# Patient Record
Sex: FEMALE | Race: WHITE | Employment: UNEMPLOYED | URBAN - METROPOLITAN AREA
[De-identification: names, ages, dates, MRNs, and addresses within clinical notes are randomized per-mention and may not be internally consistent; named-entity substitution may affect disease eponyms.]

---

## 2017-10-26 PROBLEM — I34.0 NON-RHEUMATIC MITRAL REGURGITATION: Status: ACTIVE | Noted: 2017-10-26

## 2017-10-26 PROBLEM — R00.0 TACHYCARDIA: Status: ACTIVE | Noted: 2017-10-26

## 2017-10-26 PROBLEM — E78.00 PURE HYPERCHOLESTEROLEMIA: Status: ACTIVE | Noted: 2017-10-26

## 2017-10-26 PROBLEM — Z98.890 S/P MVR (MITRAL VALVE REPAIR): Status: ACTIVE | Noted: 2017-10-26

## 2017-10-26 PROBLEM — I25.10 CORONARY ARTERY DISEASE INVOLVING NATIVE CORONARY ARTERY OF NATIVE HEART WITHOUT ANGINA PECTORIS: Status: ACTIVE | Noted: 2017-10-26

## 2017-11-10 PROBLEM — I42.0 DCM (DILATED CARDIOMYOPATHY) (HCC): Status: ACTIVE | Noted: 2017-11-10

## 2017-11-29 ENCOUNTER — HOSPITAL ENCOUNTER (OUTPATIENT)
Dept: LAB | Age: 57
Discharge: HOME | End: 2017-11-29
Payer: COMMERCIAL

## 2017-11-29 DIAGNOSIS — I42.0 DILATED CARDIOMYOPATHY (HCC): ICD-10-CM

## 2017-11-29 PROBLEM — I50.22 CHRONIC SYSTOLIC CONGESTIVE HEART FAILURE (HCC): Status: ACTIVE | Noted: 2017-11-29

## 2017-11-29 LAB
ANION GAP SERPL CALC-SCNC: 7 MMOL/L
BUN SERPL-MCNC: 12 MG/DL (ref 6–23)
CALCIUM SERPL-MCNC: 9.5 MG/DL (ref 8.3–10.4)
CHLORIDE SERPL-SCNC: 103 MMOL/L (ref 98–107)
CO2 SERPL-SCNC: 29 MMOL/L (ref 21–32)
CREAT SERPL-MCNC: 0.8 MG/DL (ref 0.6–1)
GLUCOSE SERPL-MCNC: 116 MG/DL (ref 65–100)
POTASSIUM SERPL-SCNC: 4 MMOL/L (ref 3.5–5.1)
SODIUM SERPL-SCNC: 139 MMOL/L (ref 136–145)

## 2017-11-29 PROCEDURE — 80048 BASIC METABOLIC PNL TOTAL CA: CPT | Performed by: INTERNAL MEDICINE

## 2017-11-29 PROCEDURE — 36415 COLL VENOUS BLD VENIPUNCTURE: CPT | Performed by: INTERNAL MEDICINE

## 2018-01-04 ENCOUNTER — HOSPITAL ENCOUNTER (OUTPATIENT)
Dept: LAB | Age: 58
Discharge: HOME | End: 2018-01-04
Payer: COMMERCIAL

## 2018-01-04 DIAGNOSIS — I50.22 CHRONIC SYSTOLIC CONGESTIVE HEART FAILURE (HCC): ICD-10-CM

## 2018-01-04 LAB
ANION GAP SERPL CALC-SCNC: 8 MMOL/L
BUN SERPL-MCNC: 14 MG/DL (ref 6–23)
CALCIUM SERPL-MCNC: 9.5 MG/DL (ref 8.3–10.4)
CHLORIDE SERPL-SCNC: 102 MMOL/L (ref 98–107)
CO2 SERPL-SCNC: 28 MMOL/L (ref 21–32)
CREAT SERPL-MCNC: 0.8 MG/DL (ref 0.6–1)
GLUCOSE SERPL-MCNC: 97 MG/DL (ref 65–100)
POTASSIUM SERPL-SCNC: 3.7 MMOL/L (ref 3.5–5.1)
SODIUM SERPL-SCNC: 138 MMOL/L (ref 136–145)

## 2018-01-04 PROCEDURE — 36415 COLL VENOUS BLD VENIPUNCTURE: CPT | Performed by: INTERNAL MEDICINE

## 2018-01-04 PROCEDURE — 80048 BASIC METABOLIC PNL TOTAL CA: CPT | Performed by: INTERNAL MEDICINE

## 2018-01-19 ENCOUNTER — HOSPITAL ENCOUNTER (OUTPATIENT)
Dept: CARDIAC REHAB | Age: 58
Discharge: HOME | End: 2018-01-19
Attending: INTERNAL MEDICINE

## 2018-01-19 NOTE — CARDIO/PULMONARY
Dear Dr. Mccormick Books: Thank you for referring your patient, Tito Mendoza (1960), to the Cardiac Rehabilitation Program at [facility]. Mrs. Milan Luevano is a good candidate for the Rehab Program and should see improvements with regular participation. We will be addressing appropriate interventions for modifiable risk factors with your patient during the next 12 weeks. We will contact you with any issues or concerns that may arise, or you can follow your patients progress through [address] [place] [address] at any time. A final summary will be sent to you when the program is completed. Again, thank you for your referral. If we can be of further assistance, please feel free to contact the Cardiopulmonary Rehab staff at [phone].

## 2018-01-23 ENCOUNTER — HOSPITAL ENCOUNTER (OUTPATIENT)
Dept: CARDIAC REHAB | Age: 58
Discharge: HOME | End: 2018-01-23
Payer: COMMERCIAL

## 2018-01-23 VITALS — HEIGHT: 62 IN | BODY MASS INDEX: 36.18 KG/M2 | WEIGHT: 196.6 LBS

## 2018-01-23 PROCEDURE — 93798 PHYS/QHP OP CAR RHAB W/ECG: CPT

## 2018-01-24 ENCOUNTER — HOSPITAL ENCOUNTER (OUTPATIENT)
Dept: CARDIAC REHAB | Age: 58
Discharge: HOME | End: 2018-01-24
Payer: COMMERCIAL

## 2018-01-24 PROCEDURE — 93798 PHYS/QHP OP CAR RHAB W/ECG: CPT

## 2018-01-26 ENCOUNTER — HOSPITAL ENCOUNTER (OUTPATIENT)
Dept: CARDIAC REHAB | Age: 58
Discharge: HOME | End: 2018-01-26
Payer: COMMERCIAL

## 2018-01-26 VITALS — WEIGHT: 197.4 LBS | BODY MASS INDEX: 36.1 KG/M2

## 2018-01-26 PROCEDURE — 93798 PHYS/QHP OP CAR RHAB W/ECG: CPT

## 2018-01-29 ENCOUNTER — HOSPITAL ENCOUNTER (OUTPATIENT)
Dept: CARDIAC REHAB | Age: 58
Discharge: HOME | End: 2018-01-29
Payer: COMMERCIAL

## 2018-01-29 PROCEDURE — 93798 PHYS/QHP OP CAR RHAB W/ECG: CPT

## 2018-01-31 ENCOUNTER — HOSPITAL ENCOUNTER (OUTPATIENT)
Dept: CARDIAC REHAB | Age: 58
Discharge: HOME | End: 2018-01-31
Payer: COMMERCIAL

## 2018-01-31 VITALS — BODY MASS INDEX: 36.03 KG/M2 | WEIGHT: 197 LBS

## 2018-01-31 PROCEDURE — 93798 PHYS/QHP OP CAR RHAB W/ECG: CPT

## 2018-02-02 ENCOUNTER — APPOINTMENT (OUTPATIENT)
Dept: CARDIAC REHAB | Age: 58
End: 2018-02-02
Payer: COMMERCIAL

## 2018-02-05 ENCOUNTER — HOSPITAL ENCOUNTER (OUTPATIENT)
Dept: CARDIAC REHAB | Age: 58
Discharge: HOME | End: 2018-02-05
Payer: COMMERCIAL

## 2018-02-05 PROCEDURE — 93798 PHYS/QHP OP CAR RHAB W/ECG: CPT

## 2018-02-06 ENCOUNTER — APPOINTMENT (OUTPATIENT)
Dept: CARDIAC CATH/INVASIVE PROCEDURES | Age: 58
End: 2018-02-06
Payer: COMMERCIAL

## 2018-02-09 ENCOUNTER — HOSPITAL ENCOUNTER (OUTPATIENT)
Dept: CARDIAC REHAB | Age: 58
Discharge: HOME | End: 2018-02-09
Payer: COMMERCIAL

## 2018-02-09 PROCEDURE — 93798 PHYS/QHP OP CAR RHAB W/ECG: CPT

## 2018-02-09 NOTE — PROGRESS NOTES
Client History:  Current Medical Diagnosis: see ITP  Pertinent Medications: see review PTA meds      Have you gained or lost any weight in the past 3 months: lost 20-30 lbs      What do you attribute it to: surgery and foods did not taste the same  What is your weight goal: see cardiac ITP    Have you made any changes in your eating habits since your heart problems began: yes, pt is lowering added salt and not eating mann anymore    Describe your appetite: good    Supplements/Vitamins/Herbs: MVI, krill oil, garlic, iron, flaxseed oil    Food allergies: no    Problems with digestion, N/V/C/D: no    Alcohol use: see cardiac ITP      Dietary recall:  Breakfast is egg, half an [place] muffin, 1 cup of coffee with 1/2 tsp sugar and small amt of half and half  Lunch is tuna and villagomez or salad with tuna and olive oil and vinegar or roast beef sandwich on white bread or rye  Snack is tuna on crackers or popcorn cooked in olive oil  Dinner is grilled pork, salad and vegetables. 48 oz water/day            Anthropometric Data: see cardiac ITP  Ht:   Wt:   Age:  BMI:  Waist measurement:     Estimated Nutritional Needs:  Calories: 15 kcal/kg PCZ=0766 kcals  Protein:1.5gm/kg IBW=75gm    CHO:   Fluids: 1ml/kcal unless restricted by MD      Nutrition Diagnosis: Good knowledge base related to therapeutic diet as evidenced by diet low in sodium and high in fiber. Nutrition Intervention:  Reviewed lab/body comp results/goals. Reviewed rate your plate and heart healthy choices. Reviewed fiber and sodium guidelines. Reviewed label reading. Reviewed plate method for portion control and wt loss. Reviewed wt loss tips. Affirmed that pt is limiting added salt. Reviewed dietary ways to lower LDL cholesterol and increase HDL cholesterol.     Handouts: lab/body comp, heart healthy, grocery guide, fiber, herbs and spices, recipes, recipe modification tips, plate method    Goals: See cardiac ITP         Monitoring/Evaluation: Follow-up appointment: RD to follow up with participant during cardiac rehab sessions for questions and assessment of progression toward goals.     Jaime Whipple, OTIS, LD, CDE  Phone: [phone]

## 2018-02-12 ENCOUNTER — HOSPITAL ENCOUNTER (OUTPATIENT)
Dept: CARDIAC REHAB | Age: 58
Discharge: HOME | End: 2018-02-12
Payer: COMMERCIAL

## 2018-02-12 PROCEDURE — 93798 PHYS/QHP OP CAR RHAB W/ECG: CPT

## 2018-02-14 ENCOUNTER — HOSPITAL ENCOUNTER (OUTPATIENT)
Dept: LAB | Age: 58
Discharge: HOME | End: 2018-02-14
Payer: COMMERCIAL

## 2018-02-14 ENCOUNTER — HOSPITAL ENCOUNTER (OUTPATIENT)
Dept: CARDIAC REHAB | Age: 58
Discharge: HOME | End: 2018-02-14
Payer: COMMERCIAL

## 2018-02-14 VITALS — BODY MASS INDEX: 36.25 KG/M2 | WEIGHT: 198.2 LBS

## 2018-02-14 DIAGNOSIS — I50.22 CHRONIC SYSTOLIC CONGESTIVE HEART FAILURE (HCC): ICD-10-CM

## 2018-02-14 LAB
ALBUMIN SERPL-MCNC: 4 G/DL (ref 3.5–5)
ALBUMIN/GLOB SERPL: 1.1 {RATIO}
ALP SERPL-CCNC: 80 U/L (ref 50–136)
ALT SERPL-CCNC: 37 U/L (ref 12–65)
ANION GAP SERPL CALC-SCNC: 5 MMOL/L
AST SERPL-CCNC: 24 U/L (ref 15–37)
BASOPHILS # BLD: 0.1 K/UL (ref 0–0.2)
BASOPHILS NFR BLD: 1 % (ref 0–2)
BILIRUB SERPL-MCNC: 0.8 MG/DL (ref 0.2–1.1)
BUN SERPL-MCNC: 18 MG/DL (ref 6–23)
CALCIUM SERPL-MCNC: 9.3 MG/DL (ref 8.3–10.4)
CHLORIDE SERPL-SCNC: 101 MMOL/L (ref 98–107)
CO2 SERPL-SCNC: 31 MMOL/L (ref 21–32)
CREAT SERPL-MCNC: 0.8 MG/DL (ref 0.6–1)
DIFFERENTIAL METHOD BLD: ABNORMAL
EOSINOPHIL # BLD: 0.1 K/UL (ref 0–0.8)
EOSINOPHIL NFR BLD: 2 % (ref 0.5–7.8)
ERYTHROCYTE [DISTWIDTH] IN BLOOD BY AUTOMATED COUNT: 14.8 % (ref 11.9–14.6)
GLOBULIN SER CALC-MCNC: 3.8 G/DL
GLUCOSE SERPL-MCNC: 96 MG/DL (ref 65–100)
HCT VFR BLD AUTO: 41.5 % (ref 35.8–46.3)
HGB BLD-MCNC: 14 G/DL (ref 11.7–15.4)
LYMPHOCYTES # BLD: 1.4 K/UL (ref 0.5–4.6)
LYMPHOCYTES NFR BLD: 22 % (ref 13–44)
MAGNESIUM SERPL-MCNC: 2.1 MG/DL (ref 1.8–2.4)
MCH RBC QN AUTO: 31.3 PG (ref 26.1–32.9)
MCHC RBC AUTO-ENTMCNC: 33.7 G/DL (ref 31.4–35)
MCV RBC AUTO: 92.6 FL (ref 79.6–97.8)
MONOCYTES # BLD: 0.5 K/UL (ref 0.1–1.3)
MONOCYTES NFR BLD: 8 % (ref 4–12)
NEUTS SEG # BLD: 4.1 K/UL (ref 1.7–8.2)
NEUTS SEG NFR BLD: 67 % (ref 43–78)
PLATELET # BLD AUTO: 274 K/UL (ref 150–450)
PMV BLD AUTO: 10.5 FL (ref 10.8–14.1)
POTASSIUM SERPL-SCNC: 4.2 MMOL/L (ref 3.5–5.1)
PROT SERPL-MCNC: 7.8 G/DL (ref 6.3–8.2)
RBC # BLD AUTO: 4.48 M/UL (ref 4.05–5.25)
SODIUM SERPL-SCNC: 137 MMOL/L (ref 136–145)
WBC # BLD AUTO: 6.2 K/UL (ref 4.3–11.1)

## 2018-02-14 PROCEDURE — 36415 COLL VENOUS BLD VENIPUNCTURE: CPT | Performed by: INTERNAL MEDICINE

## 2018-02-14 PROCEDURE — 93798 PHYS/QHP OP CAR RHAB W/ECG: CPT

## 2018-02-14 PROCEDURE — 85025 COMPLETE CBC W/AUTO DIFF WBC: CPT | Performed by: INTERNAL MEDICINE

## 2018-02-14 PROCEDURE — 80053 COMPREHEN METABOLIC PANEL: CPT | Performed by: INTERNAL MEDICINE

## 2018-02-14 PROCEDURE — 83735 ASSAY OF MAGNESIUM: CPT | Performed by: INTERNAL MEDICINE

## 2018-02-15 ENCOUNTER — ANESTHESIA EVENT (OUTPATIENT)
Dept: SURGERY | Age: 58
End: 2018-02-15
Payer: COMMERCIAL

## 2018-02-16 ENCOUNTER — APPOINTMENT (OUTPATIENT)
Dept: CARDIAC REHAB | Age: 58
End: 2018-02-16
Payer: COMMERCIAL

## 2018-02-16 ENCOUNTER — APPOINTMENT (OUTPATIENT)
Dept: CARDIAC CATH/INVASIVE PROCEDURES | Age: 58
End: 2018-02-16
Attending: INTERNAL MEDICINE
Payer: COMMERCIAL

## 2018-02-16 ENCOUNTER — HOSPITAL ENCOUNTER (OUTPATIENT)
Age: 58
Setting detail: OBSERVATION
Discharge: HOME | End: 2018-02-17
Attending: INTERNAL MEDICINE | Admitting: INTERNAL MEDICINE
Payer: COMMERCIAL

## 2018-02-16 ENCOUNTER — APPOINTMENT (OUTPATIENT)
Dept: GENERAL RADIOLOGY | Age: 58
End: 2018-02-16
Attending: INTERNAL MEDICINE
Payer: COMMERCIAL

## 2018-02-16 ENCOUNTER — ANESTHESIA (OUTPATIENT)
Dept: SURGERY | Age: 58
End: 2018-02-16
Payer: COMMERCIAL

## 2018-02-16 DIAGNOSIS — I50.22 CHRONIC SYSTOLIC CONGESTIVE HEART FAILURE (HCC): Primary | ICD-10-CM

## 2018-02-16 LAB
ALBUMIN SERPL-MCNC: 4.1 G/DL (ref 3.5–5)
ALBUMIN/GLOB SERPL: 1.2 {RATIO} (ref 1.2–3.5)
ALP SERPL-CCNC: 77 U/L (ref 50–136)
ALT SERPL-CCNC: 34 U/L (ref 12–65)
ANION GAP SERPL CALC-SCNC: 7 MMOL/L (ref 7–16)
AST SERPL-CCNC: 28 U/L (ref 15–37)
ATRIAL RATE: 72 BPM
ATRIAL RATE: 86 BPM
BILIRUB SERPL-MCNC: 0.7 MG/DL (ref 0.2–1.1)
BUN SERPL-MCNC: 18 MG/DL (ref 6–23)
CALCIUM SERPL-MCNC: 9.1 MG/DL (ref 8.3–10.4)
CALCULATED P AXIS, ECG09: 72 DEGREES
CALCULATED P AXIS, ECG09: 74 DEGREES
CALCULATED R AXIS, ECG10: 105 DEGREES
CALCULATED R AXIS, ECG10: 87 DEGREES
CALCULATED T AXIS, ECG11: 35 DEGREES
CALCULATED T AXIS, ECG11: 65 DEGREES
CHLORIDE SERPL-SCNC: 106 MMOL/L (ref 98–107)
CO2 SERPL-SCNC: 27 MMOL/L (ref 21–32)
CREAT SERPL-MCNC: 0.83 MG/DL (ref 0.6–1)
DIAGNOSIS, 93000: NORMAL
DIAGNOSIS, 93000: NORMAL
ERYTHROCYTE [DISTWIDTH] IN BLOOD BY AUTOMATED COUNT: 15 % (ref 11.9–14.6)
GLOBULIN SER CALC-MCNC: 3.4 G/DL (ref 2.3–3.5)
GLUCOSE SERPL-MCNC: 114 MG/DL (ref 65–100)
HCT VFR BLD AUTO: 40.7 % (ref 35.8–46.3)
HGB BLD-MCNC: 13.7 G/DL (ref 11.7–15.4)
INR PPP: 1
MAGNESIUM SERPL-MCNC: 2.2 MG/DL (ref 1.8–2.4)
MCH RBC QN AUTO: 31 PG (ref 26.1–32.9)
MCHC RBC AUTO-ENTMCNC: 33.7 G/DL (ref 31.4–35)
MCV RBC AUTO: 92.1 FL (ref 79.6–97.8)
P-R INTERVAL, ECG05: 206 MS
P-R INTERVAL, ECG05: 238 MS
PLATELET # BLD AUTO: 247 K/UL (ref 150–450)
PMV BLD AUTO: 10.6 FL (ref 10.8–14.1)
POTASSIUM SERPL-SCNC: 4 MMOL/L (ref 3.5–5.1)
PROT SERPL-MCNC: 7.5 G/DL (ref 6.3–8.2)
PROTHROMBIN TIME: 12.4 SEC (ref 11.5–14.5)
Q-T INTERVAL, ECG07: 394 MS
Q-T INTERVAL, ECG07: 434 MS
QRS DURATION, ECG06: 102 MS
QRS DURATION, ECG06: 88 MS
QTC CALCULATION (BEZET), ECG08: 471 MS
QTC CALCULATION (BEZET), ECG08: 475 MS
RBC # BLD AUTO: 4.42 M/UL (ref 4.05–5.25)
SODIUM SERPL-SCNC: 140 MMOL/L (ref 136–145)
VENTRICULAR RATE, ECG03: 72 BPM
VENTRICULAR RATE, ECG03: 86 BPM
WBC # BLD AUTO: 5 K/UL (ref 4.3–11.1)

## 2018-02-16 PROCEDURE — 74011250637 HC RX REV CODE- 250/637: Performed by: INTERNAL MEDICINE

## 2018-02-16 PROCEDURE — 76060000033 HC ANESTHESIA 1 TO 1.5 HR: Performed by: INTERNAL MEDICINE

## 2018-02-16 PROCEDURE — 74011000250 HC RX REV CODE- 250: Performed by: INTERNAL MEDICINE

## 2018-02-16 PROCEDURE — 74011250636 HC RX REV CODE- 250/636: Performed by: INTERNAL MEDICINE

## 2018-02-16 PROCEDURE — C1892 INTRO/SHEATH,FIXED,PEEL-AWAY: HCPCS

## 2018-02-16 PROCEDURE — 74011000272 HC RX REV CODE- 272: Performed by: INTERNAL MEDICINE

## 2018-02-16 PROCEDURE — C1722 AICD, SINGLE CHAMBER: HCPCS

## 2018-02-16 PROCEDURE — 74011250636 HC RX REV CODE- 250/636: Performed by: ANESTHESIOLOGY

## 2018-02-16 PROCEDURE — 93641 EP EVL 1/2CHMB PAC CVDFB TST: CPT

## 2018-02-16 PROCEDURE — 99218 HC RM OBSERVATION: CPT

## 2018-02-16 PROCEDURE — A4565 SLINGS: HCPCS

## 2018-02-16 PROCEDURE — 33249 INSJ/RPLCMT DEFIB W/LEAD(S): CPT

## 2018-02-16 PROCEDURE — 77030008467 HC STPLR SKN COVD -B

## 2018-02-16 PROCEDURE — 93005 ELECTROCARDIOGRAM TRACING: CPT | Performed by: INTERNAL MEDICINE

## 2018-02-16 PROCEDURE — 85027 COMPLETE CBC AUTOMATED: CPT | Performed by: INTERNAL MEDICINE

## 2018-02-16 PROCEDURE — 71046 X-RAY EXAM CHEST 2 VIEWS: CPT

## 2018-02-16 PROCEDURE — 80053 COMPREHEN METABOLIC PANEL: CPT | Performed by: INTERNAL MEDICINE

## 2018-02-16 PROCEDURE — 74011250637 HC RX REV CODE- 250/637: Performed by: ANESTHESIOLOGY

## 2018-02-16 PROCEDURE — 83735 ASSAY OF MAGNESIUM: CPT | Performed by: INTERNAL MEDICINE

## 2018-02-16 PROCEDURE — 77030012935 HC DRSG AQUACEL BMS -B

## 2018-02-16 PROCEDURE — C1777 LEAD, AICD, ENDO SINGLE COIL: HCPCS

## 2018-02-16 PROCEDURE — 85610 PROTHROMBIN TIME: CPT | Performed by: INTERNAL MEDICINE

## 2018-02-16 PROCEDURE — 74011250636 HC RX REV CODE- 250/636

## 2018-02-16 RX ORDER — SPIRONOLACTONE 25 MG/1
12.5 TABLET ORAL DAILY
Status: DISCONTINUED | OUTPATIENT
Start: 2018-02-17 | End: 2018-02-17 | Stop reason: HOSPADM

## 2018-02-16 RX ORDER — SODIUM CHLORIDE 9 MG/ML
25 INJECTION, SOLUTION INTRAVENOUS CONTINUOUS
Status: DISCONTINUED | OUTPATIENT
Start: 2018-02-16 | End: 2018-02-16

## 2018-02-16 RX ORDER — SODIUM CHLORIDE 0.9 % (FLUSH) 0.9 %
5-10 SYRINGE (ML) INJECTION AS NEEDED
Status: DISCONTINUED | OUTPATIENT
Start: 2018-02-16 | End: 2018-02-17 | Stop reason: HOSPADM

## 2018-02-16 RX ORDER — FENTANYL CITRATE 50 UG/ML
INJECTION, SOLUTION INTRAMUSCULAR; INTRAVENOUS AS NEEDED
Status: DISCONTINUED | OUTPATIENT
Start: 2018-02-16 | End: 2018-02-16 | Stop reason: HOSPADM

## 2018-02-16 RX ORDER — SODIUM CHLORIDE, SODIUM LACTATE, POTASSIUM CHLORIDE, CALCIUM CHLORIDE 600; 310; 30; 20 MG/100ML; MG/100ML; MG/100ML; MG/100ML
100 INJECTION, SOLUTION INTRAVENOUS CONTINUOUS
Status: DISCONTINUED | OUTPATIENT
Start: 2018-02-16 | End: 2018-02-16

## 2018-02-16 RX ORDER — MIDAZOLAM HYDROCHLORIDE 1 MG/ML
2 INJECTION, SOLUTION INTRAMUSCULAR; INTRAVENOUS
Status: DISCONTINUED | OUTPATIENT
Start: 2018-02-16 | End: 2018-02-16

## 2018-02-16 RX ORDER — CEPHALEXIN 500 MG/1
500 CAPSULE ORAL 3 TIMES DAILY
Status: DISCONTINUED | OUTPATIENT
Start: 2018-02-17 | End: 2018-02-17 | Stop reason: HOSPADM

## 2018-02-16 RX ORDER — BUPIVACAINE HYDROCHLORIDE AND EPINEPHRINE 5; 5 MG/ML; UG/ML
60 INJECTION, SOLUTION EPIDURAL; INTRACAUDAL; PERINEURAL ONCE
Status: COMPLETED | OUTPATIENT
Start: 2018-02-16 | End: 2018-02-16

## 2018-02-16 RX ORDER — OXYCODONE AND ACETAMINOPHEN 5; 325 MG/1; MG/1
1 TABLET ORAL
Status: DISCONTINUED | OUTPATIENT
Start: 2018-02-16 | End: 2018-02-17 | Stop reason: HOSPADM

## 2018-02-16 RX ORDER — SODIUM CHLORIDE 0.9 % (FLUSH) 0.9 %
5-10 SYRINGE (ML) INJECTION AS NEEDED
Status: DISCONTINUED | OUTPATIENT
Start: 2018-02-16 | End: 2018-02-16

## 2018-02-16 RX ORDER — HYDROCODONE BITARTRATE AND ACETAMINOPHEN 7.5; 325 MG/1; MG/1
1 TABLET ORAL AS NEEDED
Status: DISCONTINUED | OUTPATIENT
Start: 2018-02-16 | End: 2018-02-16 | Stop reason: ALTCHOICE

## 2018-02-16 RX ORDER — CEFAZOLIN SODIUM/WATER 2 G/20 ML
2 SYRINGE (ML) INTRAVENOUS EVERY 8 HOURS
Status: DISCONTINUED | OUTPATIENT
Start: 2018-02-16 | End: 2018-02-16 | Stop reason: SDUPTHER

## 2018-02-16 RX ORDER — PROPOFOL 10 MG/ML
INJECTION, EMULSION INTRAVENOUS
Status: DISCONTINUED | OUTPATIENT
Start: 2018-02-16 | End: 2018-02-16 | Stop reason: HOSPADM

## 2018-02-16 RX ORDER — MIDAZOLAM HYDROCHLORIDE 1 MG/ML
INJECTION, SOLUTION INTRAMUSCULAR; INTRAVENOUS AS NEEDED
Status: DISCONTINUED | OUTPATIENT
Start: 2018-02-16 | End: 2018-02-16 | Stop reason: HOSPADM

## 2018-02-16 RX ORDER — ZOLPIDEM TARTRATE 5 MG/1
5 TABLET ORAL
Status: DISCONTINUED | OUTPATIENT
Start: 2018-02-16 | End: 2018-02-17 | Stop reason: HOSPADM

## 2018-02-16 RX ORDER — SODIUM CHLORIDE 0.9 % (FLUSH) 0.9 %
5-10 SYRINGE (ML) INJECTION EVERY 8 HOURS
Status: DISCONTINUED | OUTPATIENT
Start: 2018-02-16 | End: 2018-02-17 | Stop reason: HOSPADM

## 2018-02-16 RX ORDER — LIDOCAINE HYDROCHLORIDE 10 MG/ML
0.1 INJECTION INFILTRATION; PERINEURAL AS NEEDED
Status: DISCONTINUED | OUTPATIENT
Start: 2018-02-16 | End: 2018-02-16

## 2018-02-16 RX ORDER — NALOXONE HYDROCHLORIDE 0.4 MG/ML
0.1 INJECTION, SOLUTION INTRAMUSCULAR; INTRAVENOUS; SUBCUTANEOUS AS NEEDED
Status: DISCONTINUED | OUTPATIENT
Start: 2018-02-16 | End: 2018-02-17 | Stop reason: HOSPADM

## 2018-02-16 RX ORDER — OXYCODONE HYDROCHLORIDE 5 MG/1
5 TABLET ORAL
Status: DISCONTINUED | OUTPATIENT
Start: 2018-02-16 | End: 2018-02-16 | Stop reason: ALTCHOICE

## 2018-02-16 RX ORDER — CEFAZOLIN SODIUM/WATER 2 G/20 ML
2 SYRINGE (ML) INTRAVENOUS
Status: COMPLETED | OUTPATIENT
Start: 2018-02-16 | End: 2018-02-16

## 2018-02-16 RX ORDER — CEFAZOLIN SODIUM/WATER 2 G/20 ML
2 SYRINGE (ML) INTRAVENOUS EVERY 8 HOURS
Status: COMPLETED | OUTPATIENT
Start: 2018-02-16 | End: 2018-02-17

## 2018-02-16 RX ORDER — SODIUM CHLORIDE 0.9 % (FLUSH) 0.9 %
5-10 SYRINGE (ML) INJECTION EVERY 8 HOURS
Status: DISCONTINUED | OUTPATIENT
Start: 2018-02-16 | End: 2018-02-16

## 2018-02-16 RX ORDER — FENTANYL CITRATE 50 UG/ML
100 INJECTION, SOLUTION INTRAMUSCULAR; INTRAVENOUS ONCE
Status: DISCONTINUED | OUTPATIENT
Start: 2018-02-16 | End: 2018-02-16

## 2018-02-16 RX ORDER — HYDROMORPHONE HYDROCHLORIDE 2 MG/ML
0.5 INJECTION, SOLUTION INTRAMUSCULAR; INTRAVENOUS; SUBCUTANEOUS
Status: DISCONTINUED | OUTPATIENT
Start: 2018-02-16 | End: 2018-02-16 | Stop reason: ALTCHOICE

## 2018-02-16 RX ORDER — ASPIRIN 81 MG/1
81 TABLET ORAL DAILY
Status: DISCONTINUED | OUTPATIENT
Start: 2018-02-17 | End: 2018-02-17 | Stop reason: HOSPADM

## 2018-02-16 RX ORDER — METOPROLOL SUCCINATE 25 MG/1
12.5 TABLET, EXTENDED RELEASE ORAL DAILY
Status: DISCONTINUED | OUTPATIENT
Start: 2018-02-17 | End: 2018-02-17 | Stop reason: HOSPADM

## 2018-02-16 RX ORDER — FAMOTIDINE 20 MG/1
20 TABLET, FILM COATED ORAL ONCE
Status: DISCONTINUED | OUTPATIENT
Start: 2018-02-16 | End: 2018-02-16

## 2018-02-16 RX ADMIN — Medication 2 G: at 21:12

## 2018-02-16 RX ADMIN — BUPIVACAINE HYDROCHLORIDE AND EPINEPHRINE 300 MG: 5; 5 INJECTION, SOLUTION EPIDURAL; INTRACAUDAL; PERINEURAL at 13:08

## 2018-02-16 RX ADMIN — WATER: 100 IRRIGANT IRRIGATION at 13:07

## 2018-02-16 RX ADMIN — MIDAZOLAM HYDROCHLORIDE 2 MG: 1 INJECTION, SOLUTION INTRAMUSCULAR; INTRAVENOUS at 12:11

## 2018-02-16 RX ADMIN — Medication 10 ML: at 22:56

## 2018-02-16 RX ADMIN — OXYCODONE HYDROCHLORIDE AND ACETAMINOPHEN 1 TABLET: 5; 325 TABLET ORAL at 22:35

## 2018-02-16 RX ADMIN — OXYCODONE HYDROCHLORIDE AND ACETAMINOPHEN 1 TABLET: 5; 325 TABLET ORAL at 18:14

## 2018-02-16 RX ADMIN — PROPOFOL 50 MCG/KG/MIN: 10 INJECTION, EMULSION INTRAVENOUS at 12:18

## 2018-02-16 RX ADMIN — FENTANYL CITRATE 25 MCG: 50 INJECTION, SOLUTION INTRAMUSCULAR; INTRAVENOUS at 12:52

## 2018-02-16 RX ADMIN — FENTANYL CITRATE 25 MCG: 50 INJECTION, SOLUTION INTRAMUSCULAR; INTRAVENOUS at 12:42

## 2018-02-16 RX ADMIN — HYDROCODONE BITARTRATE AND ACETAMINOPHEN 1 TABLET: 7.5; 325 TABLET ORAL at 13:50

## 2018-02-16 RX ADMIN — FENTANYL CITRATE 25 MCG: 50 INJECTION, SOLUTION INTRAMUSCULAR; INTRAVENOUS at 12:15

## 2018-02-16 RX ADMIN — SODIUM CHLORIDE, SODIUM LACTATE, POTASSIUM CHLORIDE, AND CALCIUM CHLORIDE: 600; 310; 30; 20 INJECTION, SOLUTION INTRAVENOUS at 12:11

## 2018-02-16 RX ADMIN — MIDAZOLAM HYDROCHLORIDE 2 MG: 1 INJECTION, SOLUTION INTRAMUSCULAR; INTRAVENOUS at 12:27

## 2018-02-16 RX ADMIN — FENTANYL CITRATE 25 MCG: 50 INJECTION, SOLUTION INTRAMUSCULAR; INTRAVENOUS at 12:21

## 2018-02-16 RX ADMIN — Medication 2 G: at 12:22

## 2018-02-16 NOTE — PROGRESS NOTES
Pt arrived, ambulated to room with no visible problems, planned ICD for Dr Damien Lucas. Consent signed, Procedure discussed with pt all questions answered voiced understanding. Medications and history discussed with pt.     Pt prepped per orders

## 2018-02-16 NOTE — PROGRESS NOTES
TRANSFER - IN REPORT:    Verbal report received from Jones Garcia [id number] RN(name) on Maryanne Sullivan  being received from EP lab(unit) for routine progression of care      Report consisted of patients Situation, Background, Assessment and   Recommendations(SBAR). Information from the following report(s) Procedure Summary was reviewed with the receiving nurse. Opportunity for questions and clarification was provided. Assessment completed upon patients arrival to unit and care assumed.

## 2018-02-16 NOTE — IP AVS SNAPSHOT
[address] 
[phone] Patient: Tito Mendoza MRN: [id number] PGL:[id number] A check jayson indicates which time of day the medication should be taken. My Medications START taking these medications Instructions Each Dose to Equal  
 Morning Noon Evening Bedtime  
 cephALEXin 500 mg capsule Commonly known as:  Delgado Higuera Your last dose was: Today 2/17/2018 Take 1 Cap by mouth three (3) times daily for 3 days. 500 mg HYDROcodone-acetaminophen 5-325 mg per tablet Commonly known as:  Mortimer Newness Take 1 Tab by mouth every six (6) hours as needed for Pain. Max Daily Amount: 4 Tabs. 1 Tab CONTINUE taking these medications Instructions Each Dose to Equal  
 Morning Noon Evening Bedtime AMBIEN 5 mg tablet Generic drug:  zolpidem Take  by mouth nightly as needed for Sleep. aspirin delayed-release 81 mg tablet Your last dose was: Today 2/17/2018 Take  by mouth daily. flaxseed oil 1,000 mg Cap Your last dose was:  2/15/2018 Take  by mouth daily. garlic 1,394 mg Cap Your last dose was:  2/15/2018 Take  by mouth daily. Iron 325 mg (65 mg iron) tablet Generic drug:  ferrous sulfate Your last dose was:  2/15/2018 Take  by mouth Daily (before breakfast). ivabradine 5 mg tablet Commonly known as:  [employer] Your last dose was: Today 2/17/2018 Take 1 Tab by mouth two (2) times daily (with meals). Indications: Chronic Heart Failure 5 mg KRILL OIL PO Your last dose was:  2/15/2018 Take  by mouth daily. metoprolol succinate 25 mg XL tablet Commonly known as:  TOPROL-XL  
 Your last dose was: Today 2/17/2018 Take 0.5 Tabs by mouth daily. 12.5 mg  
    
  
   
   
   
  
 multivitamin tablet Commonly known as:  ONE A DAY Your last dose was:  2/15/2018 Take 1 Tab by mouth daily. 1 Tab  
    
  
   
   
   
  
 sacubitril-valsartan 24-26 mg tablet Commonly known as:  ENTRESTO Your last dose was: Today 2/17/2018 Take 1 Tab by mouth two (2) times a day. 1 Tab  
    
  
   
   
   
  
  
 spironolactone 25 mg tablet Commonly known as:  ALDACTONE Your last dose was: Today 2/18/2018 Take 0.5 Tabs by mouth daily. 12.5 mg Where to Get Your Medications These medications were sent to [facility] [address] [place] [id number] Phone:  [phone]  
  cephALEXin 500 mg capsule Information on where to get these meds will be given to you by the nurse or doctor. ! Ask your nurse or doctor about these medications HYDROcodone-acetaminophen 5-325 mg per tablet

## 2018-02-16 NOTE — ANESTHESIA POSTPROCEDURE EVALUATION
Post-Anesthesia Evaluation and Assessment    Patient: Ge Mcclain MRN: [id number]  SSN: [id number]    YOB: 1960  Age: 56 y.o. Sex: female       Cardiovascular Function/Vital Signs  Visit Vitals    /70    Pulse 76    Temp 36.4 °C (97.5 °F)    Resp 15    Ht 5' 2.5\" (1.588 m)    Wt 89.4 kg (197 lb)    SpO2 96%    Breastfeeding No    BMI 35.46 kg/m2       Patient is status post total IV anesthesia anesthesia for Procedure(s):  IMPLANTATION OF CARDIOVERTER DEFIBRILLATOR. Nausea/Vomiting: None    Postoperative hydration reviewed and adequate. Pain:  Pain Scale 1: Numeric (0 - 10) (02/16/18 1349)  Pain Intensity 1: 5 (02/16/18 1349)   Managed    Neurological Status: At baseline    Mental Status and Level of Consciousness: Alert and oriented     Pulmonary Status:   O2 Device: Room air (02/16/18 1322)   Adequate oxygenation and airway patent    Complications related to anesthesia: None    Post-anesthesia assessment completed.  No concerns    Signed By: Jose Rivera MD     February 16, 2018

## 2018-02-16 NOTE — PROGRESS NOTES
TRANSFER - IN REPORT:    Verbal report received from Serena Olmstead, [facility] (name) on Luanne Bowie  being received from Cath Lab (unit) for routine progression of care      Report consisted of patients Situation, Background, Assessment and   Recommendations(SBAR). Information from the following report(s) SBAR, Kardex, Procedure Summary and MAR was reviewed with the receiving nurse. Opportunity for questions and clarification was provided. Assessment completed upon patients arrival to unit and care assumed.

## 2018-02-16 NOTE — PROGRESS NOTES
Dual admission skin assessment completed. Left subclavian incision. Dressing clean, dry, and intact. No other abnormalities noted.

## 2018-02-16 NOTE — IP AVS SNAPSHOT
Brooke Ibarra 
 
 
 [address] [facility] 322 [facility] 
[phone] Patient: Talia Alcaraz MRN: [id number] About your hospitalization You were admitted on:  February 16, 2018 You last received care in the:  [facility] 3 TELEMETRY You were discharged on:  February 17, 2018 Why you were hospitalized Your primary diagnosis was:  Not on File Your diagnoses also included:  Chronic Systolic Hf (Heart Failure) (Hcc) Follow-up Information Follow up With Details Comments Contact Info Blu Resendez MD In 1 week [facility] follow up, Please call office for appointment on Monday  [address] [place] [address] 
[phone]  Dianna Gallego 
[phone] Veronica Garza MD In 2 weeks [facility] follow up, Please call office for appointment on Monday for wound re-check [address] [facility] [address] 
[phone] Your Scheduled Appointments Monday February 19, 2018 11:00 AM EST CARDIAC REHAB with Dariel Phenes SFO CARDIOPULM REHAB ([facility]) [address] [place] [address]  
[phone] Wednesday February 21, 2018 11:00 AM EST CARDIAC REHAB with Dariel Phenes SFO CARDIOPULM REHAB ([facility]) [address] [place] [address]  
[phone] Friday February 23, 2018 11:00 AM EST CARDIAC REHAB with Dariel Phenes SFO CARDIOPULM REHAB ([facility]) [address] [place] [address]  
[phone] Monday February 26, 2018 11:00 AM EST CARDIAC REHAB with Dariel Phenes SFO CARDIOPULM REHAB ([facility]) [address] [place] [address]  
[phone] Wednesday February 28, 2018 11:00 AM EST CARDIAC REHAB with Dariel Phenes SFO CARDIOPULM REHAB ([facility]) [address] [place] [address]  
[phone] Thursday March 01, 2018  9:00 AM EST OFFICE DEVICE CHECKS with GVLLE DEVICE 39 [facility] CARDIOLOGY [place] OFFICE ([address] [facility]) [address] 
[address] [place] [address]  
[phone] This upcoming device check will take place IN OUR OFFICE. Please arrive 15 minutes early to review any necessary paperwork requirements. If you have any further questions or need to change this appointment, we are happy to help and can be reached at [phone]. Friday March 02, 2018 11:00 AM EST CARDIAC REHAB with Yonas Fuller SFO CARDIOPULM REHAB ([facility]) [address]  
[phone] Monday March 05, 2018 11:00 AM EST CARDIAC REHAB with Yonas Fuller SFO CARDIOPULM REHAB ([facility]) [address]  
[phone] Wednesday March 07, 2018 11:00 AM EST CARDIAC REHAB with Yonas Fuller SFO CARDIOPULM REHAB ([facility]) [address]  
[phone] Friday March 09, 2018 11:00 AM EST CARDIAC REHAB with Yonas Fuller SFO CARDIOPULM REHAB ([facility]) [address] [place] [address]  
[phone] Monday March 12, 2018 11:00 AM EDT  
CARDIAC REHAB with Yonas Fuller SFO CARDIOPULM REHAB ([facility]) [address]  
[phone] Wednesday March 14, 2018 11:00 AM EDT  
CARDIAC REHAB with Yonas Fuller SFO CARDIOPULM REHAB ([facility]) [address]  
[phone] Wednesday March 14, 2018 12:30 PM EDT  
CARDIAC FOLLOW UP with OTIS Garcia DIABETIC TREATMENT ([facility]) 1 [facility] [address] [place] [address]  
[phone] Friday March 16, 2018 11:00 AM EDT  
CARDIAC REHAB with Yonas Fuller SFO CARDIOPULM REHAB ([facility]) 1100 [facility] [address] [facility]  
[phone] Monday March 19, 2018 11:00 AM EDT  
CARDIAC REHAB with Hawa Casino SFO CARDIOPULM REHAB ([facility]) 1100 [facility] [address] [facility]  
[phone] Wednesday March 21, 2018 11:00 AM EDT  
CARDIAC REHAB with Hawa Casino SFO CARDIOPULM REHAB ([facility]) 1100 [facility] [address] [facility]  
[phone] Friday March 23, 2018 11:00 AM EDT  
CARDIAC REHAB with Hawa Casino SFO CARDIOPULM REHAB ([facility]) 1100 [facility] [address] [facility]  
[phone] Monday March 26, 2018 11:00 AM EDT  
CARDIAC REHAB with Hawa Casino SFO CARDIOPULM REHAB ([facility]) [address] [facility]  
[phone] Wednesday March 28, 2018 11:00 AM EDT  
CARDIAC REHAB with Hawa Casino SFO CARDIOPULM REHAB ([facility]) 1100 [facility]  
[phone] Monday April 02, 2018 11:00 AM EDT  
CARDIAC REHAB with Hawa Casino SFO CARDIOPULM REHAB ([facility]) 1100 [facility] [address] [facility]  
[phone] Wednesday April 04, 2018 11:00 AM EDT  
CARDIAC REHAB with Hawa Casino SFO CARDIOPULM REHAB ([facility]) [facility]  
[phone] Friday April 06, 2018 11:00 AM EDT  
CARDIAC REHAB with Hawa Casino SFO CARDIOPULM REHAB ([facility]) [address]  
[phone] Monday April 09, 2018 11:00 AM EDT  
CARDIAC REHAB with Hawa Casino SFO CARDIOPULM REHAB ([facility]) [facility]  
[phone] Wednesday April 11, 2018 11:00 AM EDT  
CARDIAC REHAB with Hawa Casino SFO CARDIOPULM REHAB ([facility]) [address]  
[phone] Friday April 13, 2018 11:00 AM EDT  
CARDIAC REHAB with Claudeen Fraction SFO CARDIOPULM REHAB ([facility]) [address]  
[phone] Discharge Orders None A check jayson indicates which time of day the medication should be taken. My Medications START taking these medications Instructions Each Dose to Equal  
 Morning Noon Evening Bedtime  
 cephALEXin 500 mg capsule Commonly known as:  Delgado Higuera Your last dose was: Today 2/17/2018 Take 1 Cap by mouth three (3) times daily for 3 days. 500 mg HYDROcodone-acetaminophen 5-325 mg per tablet Commonly known as:  Mortimer Newness Take 1 Tab by mouth every six (6) hours as needed for Pain. Max Daily Amount: 4 Tabs. 1 Tab CONTINUE taking these medications Instructions Each Dose to Equal  
 Morning Noon Evening Bedtime AMBIEN 5 mg tablet Generic drug:  zolpidem Take  by mouth nightly as needed for Sleep. aspirin delayed-release 81 mg tablet Your last dose was: Today 2/17/2018 Take  by mouth daily. flaxseed oil 1,000 mg Cap Your last dose was:  2/15/2018 Take  by mouth daily. garlic 1,399 mg Cap Your last dose was:  2/15/2018 Take  by mouth daily. Iron 325 mg (65 mg iron) tablet Generic drug:  ferrous sulfate Your last dose was:  2/15/2018 Take  by mouth Daily (before breakfast). ivabradine 5 mg tablet Commonly known as:  [employer] Your last dose was: Today 2/17/2018 Take 1 Tab by mouth two (2) times daily (with meals). Indications: Chronic Heart Failure 5 mg KRILL OIL PO Your last dose was:  2/15/2018 Take  by mouth daily. metoprolol succinate 25 mg XL tablet Commonly known as:  TOPROL-XL Your last dose was: Today 2/17/2018 Take 0.5 Tabs by mouth daily. 12.5 mg  
    
  
   
   
   
  
 multivitamin tablet Commonly known as:  ONE A DAY Your last dose was:  2/15/2018 Take 1 Tab by mouth daily. 1 Tab  
    
  
   
   
   
  
 sacubitril-valsartan 24-26 mg tablet Commonly known as:  ENTRESTO Your last dose was: Today 2/17/2018 Take 1 Tab by mouth two (2) times a day. 1 Tab  
    
  
   
   
   
  
  
 spironolactone 25 mg tablet Commonly known as:  ALDACTONE Your last dose was: Today 2/18/2018 Take 0.5 Tabs by mouth daily. 12.5 mg Where to Get Your Medications These medications were sent to [address] [place] [address] [address] [place] [id number] Phone:  [phone]  
  cephALEXin 500 mg capsule Information on where to get these meds will be given to you by the nurse or doctor. ! Ask your nurse or doctor about these medications HYDROcodone-acetaminophen 5-325 mg per tablet Discharge Instructions CARDIAC DEVICE INSTRUCTION SHEET 1. You should have received a 1-2 weeks follow up appointment upon discharge from the hospital.  If you did not receive this appointment prior to leaving the hospital, please contact us at [phone]. 2.  Keep your incision dry for 14 days. DO NOT put ointments, creams or lotions on the incision for 2 weeks. 3.  Your dressing will be removed at your follow up appointment. If you have sutures/staples, the nurse will remove them at the follow up appointment. 4.  Call us IMMEDIATELY if you develop fever, pain, redness, or drainage at the incision site.   
 
5.  You may use your pacemaker/ICD arm, but keep your arm lower than your shoulder for the first 8 weeks (or until cleared by a physician) to prevent the device lead(s) from moving. 6.  Do not lift more than 10 pounds for 4 weeks and 20 pounds for 8 weeks. 7.  Within 6 weeks you should receive your cardiac device ID card. Carry your card with you at all times. Always show it to any doctor or dentist you see. 8.    You will need to have your device evaluated every 3 months via office, remote, or telephone. 9.  Microwaves WILL NOT harm your device. Warnings do not apply to you. 10.  At airports, always show your cardiac device ID card. You may walk through metal detectors but do not allow the hand held wand near your device. 11.  DO NOT have an MRI without contacting your cardiologists office first.  
 
12.  Please refer to the education booklet given to you at the hospital for the activities and equipment you should avoid. Some may reprogram or interfere with your cardiac device. Learning About ICDs (Implantable Cardioverter-Defibrillators) What is an ICD (implantable cardioverter-defibrillator)? An ICD (implantable cardioverter-defibrillator) is a small, battery-powered device. It fixes serious changes in your heartbeat. ICDs are used in people who have had a life-threatening heart rhythm or are at high risk of having one. The ICD is placed under the skin of the chest. It's attached to one or two wires (called leads). Most of the time, these leads go into the heart through a vein. How does an ICD work? An ICD is always checking your heart rate and rhythm. If the ICD detects a life-threatening, rapid heart rhythm, it tries to slow the rhythm to get it back to normal. If the bad rhythm doesn't stop, the ICD sends an electric shock to the heart. This restores a normal rhythm. The device then goes back to its watchful mode. An ICD also can fix a heart rate that is too fast or too slow. It does this without using a shock.  It can send out electrical pulses to speed up a heart rate that is too slow. Or it can slow down a fast heart rate by matching the pace and bringing the heart rate back to normal. 
Your doctor will check the ICD regularly to make sure it is working right and isn't causing any problems. Your doctor will also check the battery to see if it needs to be replaced. How is an ICD placed? Your doctor will put the ICD under the skin in your chest during minor surgery. You will likely have medicine to make you feel relaxed and sleepy during the surgery. Your doctor makes a small cut (incision) in your upper chest. He or she puts one or two leads (wires) through the cut. Most of the time, the leads go into a large blood vessel in the upper chest. Then your doctor guides the leads through the blood vessel into your heart. Your doctor connects the leads to the ICD and places it in your chest. Then the incision is closed. Your doctor also programs the ICD. Most people spend the night in the hospital, just to make sure that the device is working and that there are no problems from the surgery. How does it feel to get a shock? The shock from an ICD hurts briefly. People feel it in different ways. It's been described as feeling like a punch in the chest. But the shock is a sign that the ICD is doing its job. It's there to save your life. You won't feel any pain if the ICD uses electrical pulses to fix a heart rate that is too fast or too slow. There's no way to know how often a shock might occur. It might never happen. Not knowing when or if a shock might happen may make you nervous. Knowing what to do if you get shocked can help. Ask your doctor for an action plan. This plan will guide you step-by-step if a shock happens. What else should you know? You can live a normal life with your ICD. Here are a few tips for living well with your ICD. · Avoid strong magnetic and electrical fields.  These can keep your device from working right. Most office equipment and home appliances are safe to use. Check with your doctor about which things you should use with caution and which you should stay away from. · Be sure that any doctor, dentist, or other health professional you see knows that you have an ICD. · Always carry a card that tells what kind of device you have. Wear medical alert jewelry that says you have an ICD. You can buy this at most drugstores. · If you do get a shock, follow your action plan for what to do. · You can lead an active life with an ICD. Ask your doctor what sort of activity and intensity is safe for you. As you plan for your future and your end of life, be sure to include plans for your ICD. You can make the decision to turn off your ICD as part of the medical treatment you want at the end of life. Follow-up care is a key part of your treatment and safety. Be sure to make and go to all appointments, and call your doctor if you are having problems. It's also a good idea to know your test results and keep a list of the medicines you take. Where can you learn more? Go to http://priyank-bret.info/. Enter C381 in the search box to learn more about \"Learning About ICDs (Implantable Cardioverter-Defibrillators). \" 
Current as of: September 21, 2016 Content Version: 11.4 © 8430-4103 Healthwise, Incorporated. Care instructions adapted under license by [employer] (which disclaims liability or warranty for this information). If you have questions about a medical condition or this instruction, always ask your healthcare professional. [place] [address] any warranty or liability for your use of this information. DISCHARGE SUMMARY from Nurse PATIENT INSTRUCTIONS: 
 
 
F-face looks uneven A-arms unable to move or move unevenly S-speech slurred or non-existent T-time-call 911 as soon as signs and symptoms begin-DO NOT go Back to bed or wait to see if you get better-TIME IS BRAIN. Warning Signs of HEART ATTACK Call 911 if you have these symptoms: 
? Chest discomfort. Most heart attacks involve discomfort in the center of the chest that lasts more than a few minutes, or that goes away and comes back. It can feel like uncomfortable pressure, squeezing, fullness, or pain. ? Discomfort in other areas of the upper body. Symptoms can include pain or discomfort in one or both arms, the back, neck, jaw, or stomach. ? Shortness of breath with or without chest discomfort. ? Other signs may include breaking out in a cold sweat, nausea, or lightheadedness. Don't wait more than five minutes to call [address]! Fast action can save your life. Calling 911 is almost always the fastest way to get lifesaving treatment. Emergency Medical Services staff can begin treatment when they arrive  up to an hour sooner than if someone gets to the hospital by car. The discharge information has been reviewed with the patient. The patient verbalized understanding. Discharge medications reviewed with the patient and appropriate educational materials and side effects teaching were provided. Avoiding Triggers With Heart Failure: Care Instructions Your Care Instructions Triggers are anything that make your heart failure flare up. A flare-up is also called \"sudden heart failure\" or \"acute heart failure. \" When you have a flare-up, fluid builds up in your lungs, and you have problems breathing.  You might need to go to the hospital. By watching for changes in your condition and avoiding triggers, you can prevent heart failure flare-ups. Follow-up care is a key part of your treatment and safety. Be sure to make and go to all appointments, and call your doctor if you are having problems. It's also a good idea to know your test results and keep a list of the medicines you take. How can you care for yourself at home? Watch for changes in your weight and condition · Weigh yourself without clothing at the same time each day. Record your weight. Call your doctor if you have sudden weight gain, such as more than 2 to 3 pounds in a day or 5 pounds in a week. (Your doctor may suggest a different range of weight gain.) A sudden weight gain may mean that your heart failure is getting worse. · Keep a daily record of your symptoms. Write down any changes in how you feel, such as new shortness of breath, cough, or problems eating. Also record if your ankles are more swollen than usual and if you feel more tired than usual. Note anything that you ate or did that could have triggered these changes. Limit sodium Sodium causes your body to hold on to extra water. This may cause your heart failure symptoms to get worse. People get most of their sodium from processed foods. Fast food and restaurant meals also tend to be very high in sodium. · Your doctor may suggest that you limit sodium to 2,000 milligrams (mg) a day or less. That is less than 1 teaspoon of salt a day, including all the salt you eat in cooking or in packaged foods. · Read food labels on cans and food packages. They tell you how much sodium you get in one serving. Check the serving size. If you eat more than one serving, you are getting more sodium. · Be aware that sodium can come in forms other than salt, including monosodium glutamate (MSG), sodium citrate, and sodium bicarbonate (baking soda). MSG is often added to Asian food. You can sometimes ask for food without MSG or salt. · Slowly reducing salt will help you adjust to the taste. Take the salt shaker off the table. · Flavor your food with garlic, lemon juice, onion, vinegar, herbs, and spices instead of salt. Do not use soy sauce, steak sauce, onion salt, garlic salt, mustard, or ketchup on your food, unless it is labeled \"low-sodium\" or \"low-salt. \" 
· Make your own salad dressings, sauces, and ketchup without adding salt. · Use fresh or frozen ingredients, instead of canned ones, whenever you can. Choose low-sodium canned goods. · Eat less processed food and food from restaurants, including fast food. Exercise as directed Moderate, regular exercise is very good for your heart. It improves your blood flow and helps control your weight. But too much exercise can stress your heart and cause a heart failure flare-up. · Check with your doctor before you start an exercise program. 
· Walking is an easy way to get exercise. Start out slowly. Gradually increase the length and pace of your walk. Swimming, riding a bike, and using a treadmill are also good forms of exercise. · When you exercise, watch for signs that your heart is working too hard. You are pushing yourself too hard if you cannot talk while you are exercising. If you become short of breath or dizzy or have chest pain, stop, sit down, and rest. 
· Do not exercise when you do not feel well. Take medicines correctly · Take your medicines exactly as prescribed. Call your doctor if you think you are having a problem with your medicine. · Make a list of all the medicines you take. Include those prescribed to you by other doctors and any over-the-counter medicines, vitamins, or supplements you take. Take this list with you when you go to any doctor. · Take your medicines at the same time every day. It may help you to post a list of all the medicines you take every day and what time of day you take them. · Make taking your medicine as simple as you can. Plan times to take your medicines when you are doing other things, such as eating a meal or getting ready for bed. This will make it easier to remember to take your medicines. · Get organized. Use helpful tools, such as daily or weekly pill containers. When should you call for help? Call 911 if you have symptoms of sudden heart failure such as: 
? · You have severe trouble breathing. ? · You cough up pink, foamy mucus. ? · You have a new irregular or rapid heartbeat. ?Call your doctor now or seek immediate medical care if: 
? · You have new or increased shortness of breath. ? · You are dizzy or lightheaded, or you feel like you may faint. ? · You have sudden weight gain, such as more than 2 to 3 pounds in a day or 5 pounds in a week. (Your doctor may suggest a different range of weight gain.) ? · You have increased swelling in your legs, ankles, or feet. ? · You are suddenly so tired or weak that you cannot do your usual activities. ? Watch closely for changes in your health, and be sure to contact your doctor if you develop new symptoms. Where can you learn more? Go to http://priyank-bret.info/. Enter W345 in the search box to learn more about \"Avoiding Triggers With Heart Failure: Care Instructions. \" Current as of: September 21, 2016 Content Version: 11.4 © 7600-7366 Healthwise, Incorporated. Care instructions adapted under license by [employer] (which disclaims liability or warranty for this information). If you have questions about a medical condition or this instruction, always ask your healthcare professional. [address] any warranty or liability for your use of this information. ___________________________________________________________________________________________________________________________________ MyChart Announcement We are excited to announce that we are making your provider's discharge notes available to you in Janeeva. You will see these notes when they are completed and signed by the physician that discharged you from your recent hospital stay. If you have any questions or concerns about any information you see in Janeeva, please call the Health Information Department where you were seen or reach out to your Primary Care Provider for more information about your plan of care. Introducing [facility]! Dear Carrie Davidson: Thank you for requesting a Janeeva account. Our records indicate that you already have an active Janeeva account. You can access your account anytime at [email or web address] Did you know that you can access your hospital and ER discharge instructions at any time in Janeeva? You can also review all of your test results from your hospital stay or ER visit. Additional Information If you have questions, please visit the Frequently Asked Questions section of the Janeeva website at [email or web address] Remember, Janeeva is NOT to be used for urgent needs. For medical emergencies, dial 911. Now available from your iPhone and Android! Providers Seen During Your Hospitalization Provider Specialty Primary office phone Shauna Zambrano MD Cardiology [phone] Your Primary Care Physician (PCP) Primary Care Physician Office Phone Office Fax Samreen Potter [phone] You are allergic to the following Allergen Reactions Sulfa (Sulfonamide Antibiotics) Anaphylaxis Shortness of breath & hives Lipitor (Atorvastatin) Myalgia Septa Hives Jenny Arthur Recent Documentation Height Weight Breastfeeding? BMI OB Status Smoking Status 1.588 m 91.9 kg No 36.45 kg/m2 Postmenopausal Former Smoker Emergency Contacts Name Discharge Info Relation Home Work Mobile Tiffanie Diver  Spouse [3] [phone] Patient Belongings The following personal items are in your possession at time of discharge: 
  Dental Appliances: None  Visual Aid: Glasses, At bedside      Home Medications: Sent to pharmacy   Jewelry: None  Clothing: None Please provide this summary of care documentation to your next provider. Signatures-by signing, you are acknowledging that this After Visit Summary has been reviewed with you and you have received a copy. Patient Signature:  ____________________________________________________________ Date:  ____________________________________________________________  
  
[facility] Provider Signature:  ____________________________________________________________ Date:  ____________________________________________________________

## 2018-02-16 NOTE — PROGRESS NOTES
TRANSFER - OUT REPORT:    Verbal report given to Deyanira RN(name) on Noris Uriarte  being transferred to tele 315(unit) for routine progression of care       Report consisted of patients Situation, Background, Assessment and   Recommendations(SBAR). Information from the following report(s) Procedure Summary was reviewed with the receiving nurse. Lines:   Peripheral IV 02/16/18 Left Hand (Active)        Opportunity for questions and clarification was provided.       Patient transported with:   [employer] [place]

## 2018-02-16 NOTE — PROCEDURES
PRE-ELECTROPHYSIOLOGY STUDY DIAGNOSES  1. Chronic systolic heart failure, ejection fraction of 20%  2. Nonischemic dilated cardiomyopathy. 3. Class II heart failure symptoms. 4. Chronic systolic heart failure for greater than 3 months duration. 5. Primary prevention indications for defibrillator  6. No hospitalizations for congestive heart failure. 7. Life expectancy greater than 1 year. 8. On Guideline directed medical therapy maximum dose for 3 months prior to implant. 9.  Mitral valve regurgitation-status post repair July 2017  10.  Echo-November 2017-EF 16%, moderate MR  11.  Echo - Jan 2018 - EF 15%, Mod to Sev MR        PROCEDURE PERFORMED  1. Insertion of Biotronik VDI implantable cardioverter defibrillator. 2. EP Evaluation of implantable cardioverter defibrillator lead at the time of implant    Anesthesia: MAC     Estimated Blood Loss: Less than 10 mL     Specimens: * No specimens in log *       PROCEDURE IN DETAIL: The patient was brought into the EP lab in a fasting  state. The left shoulder was prepped and draped in the usual sterile  fashion. Skin anesthetized with lidocaine with epinephrine. Incision was  made in the region of the deltopectoral groove. Pocket was made for the   ICD. Access was obtained in the left subclavian vein via  the Seldinger technique over which a guidewire was placed. Over the first guidewire, an 8-Congolese sheath was placed. Through this  8-Congolese sheath, a Biotronik ICD lead, model Plexa ProMRI DX 65/15 was placed  at the RV apex, we achieved the following values: R waves were 7.8mV,  threshold was 0.5 at 0.4 msec pulse width. This lead was then secured  to the pectoral fascia with 0 Ti-Cron x2.  We achieved the following values: P waves were 6.2mV. Pocket was irrigated with triple antibiotic flush. The leads were connected to a Biotronik VDI ICD, model Inventra 7 VR-T dx, serial [id number]. The leads  And ICD were then placed in the pocket area.   Pocket was then closed with 2-0 Vicryl, followed by a next layer of 3-0  Vicryl, followed by a superficial layer of staples. The wound was  dressed. The patient was recovered from his sedation, transferred from  the lab in a stable condition. IMPRESSION: Successful implantation of Biotronik VDI MRI  implantable cardioverter defibrillator for primary prevention of sudden death.

## 2018-02-16 NOTE — ANESTHESIA PREPROCEDURE EVALUATION
Anesthetic History               Review of Systems / Medical History  Patient summary reviewed    Pulmonary                   Neuro/Psych              Cardiovascular      Valvular problems/murmurs (s/p MV repair 2017)    CHF: NYHA Classification II        Exercise tolerance: >4 METS  Comments: EF 16% 11/2017   GI/Hepatic/Renal                Endo/Other             Other Findings              Physical Exam    Airway  Mallampati: II    Neck ROM: normal range of motion   Mouth opening: Normal     Cardiovascular  Regular rate and rhythm,  S1 and S2 normal,  no murmur, click, rub, or gallop             Dental  No notable dental hx       Pulmonary  Breath sounds clear to auscultation               Abdominal         Other Findings            Anesthetic Plan    ASA: 4  Anesthesia type: total IV anesthesia            Anesthetic plan and risks discussed with: Patient

## 2018-02-16 NOTE — PROGRESS NOTES
Patient pre-assessment complete for ICD with DR Mere Cheung scheduled for 18 at 12pm, arrival time 10am. Patient verified using . Patient instructed to bring all home medications in labeled bottles on the day of procedure. NPO status reinforced. Patient instructed to HOLD Spironolactone & entresto in am. Instructed they can take all other medications excluding vitamins & supplements. Patient verbalizes understanding of all instructions & denies any questions at this time.

## 2018-02-16 NOTE — PROGRESS NOTES
[address], [address] [address]  [address] [place] [address], [address] [place] [address]  PHONE: [phone]  Marcos Cortez  1960  PCP: Doris Tang MD    SUBJECTIVE:   Marcos Cortez is a 56 y.o. female seen for a consultation visit regarding the following:     No chief complaint on file. HPI: Chronic systolic heart failure symptoms and EF have not improved. CHF symptoms have improved to Class II    History: This is a 14-year-old patient followed by Dr. Jerod Zarco. She has a history of a nonischemic dilated cardiomyopathy and mitral valve disease. She underwent a mitral valve repair in July 2017. Her ejection fraction has not improved since surgery is actually decreased. Cardiac PMH: (Old records have been reviewed and summarized below)  1. Chronic systolic heart failure-diagnosed July 2017  2. Mitral valve regurgitation-status post repair July 2017  3. Echo-November 2017-EF 16%, moderate MR  4.  Echo - Jan 2018 - EF 15%, Mod to Sev MR      Past Medical History, Past Surgical History, Family history, Social History, and Medications were all reviewed with the patient today and updated as necessary.      Current Facility-Administered Medications   Medication Dose Route Frequency Provider Last Rate Last Dose    lidocaine (XYLOCAINE) 10 mg/mL (1 %) injection 0.1 mL  0.1 mL SubCUTAneous PRN Rian Lacy MD        lactated Ringers infusion  100 mL/hr IntraVENous CONTINUOUS Rian Lacy MD        0.9% sodium chloride infusion  25 mL/hr IntraVENous CONTINUOUS Rian Lacy MD        sodium chloride (NS) flush 5-10 mL  5-10 mL IntraVENous Q8H Rian Lacy MD        sodium chloride (NS) flush 5-10 mL  5-10 mL IntraVENous PRN Rian Lacy MD        famotidine (PEPCID) tablet 20 mg  20 mg Oral ONCE Rian Lacy MD        fentaNYL citrate (PF) injection 100 mcg  100 mcg IntraVENous ONCE Rian Lacy MD        midazolam (VERSED) injection 2 mg  2 mg IntraVENous ONCE PRN Terrence Reynoso MD        ceFAZolin (ANCEF) 2 g/20 mL in sterile water IV syringe  2 g IntraVENous ON CALL Kim Huynh MD        bupivacaine-EPINEPHrine (PF) (SENSORCAINE PF) 0.5 %-1:200,000 injection 300 mg  60 mL SubCUTAneous Machelle Linder MD         Allergies   Allergen Reactions    Sulfa (Sulfonamide Antibiotics) Anaphylaxis     Shortness of breath & hives    Lipitor [Atorvastatin] Myalgia    Septa Hives     SEPTRA     Patient Active Problem List    Diagnosis    Chronic systolic congestive heart failure (HCC)    DCM (dilated cardiomyopathy) (HCC)    S/P MVR (mitral valve repair)    Tachycardia    Non-rheumatic mitral regurgitation    Coronary artery disease involving native coronary artery of native heart without angina pectoris    Pure hypercholesterolemia       Past Medical History:   Diagnosis Date    Heart failure ([facility] [address]     Psychiatric disorder     patient reports mild     Past Surgical History:   Procedure Laterality Date    CARDIAC SURG PROCEDURE UNLIST      MVR 07/2017    VASCULAR SURGERY PROCEDURE UNLIST      MVR 2017     History reviewed. No pertinent family history.   Social History   Substance Use Topics    Smoking status: Former Smoker     Packs/day: 0.25     Years: 10.00     Quit date: 1991    Smokeless tobacco: Never Used    Alcohol use Yes      Comment: occasional       ROS:    Constitutional: negative for fevers, fatigue and weight loss  Eyes: negative for visual disturbance  Ears, nose, mouth, throat, and face: negative for hearing loss and tinnitus  Respiratory: negative for cough  Cardiovascular: negative except as noted in HPI  Gastrointestinal: negative for change in bowel habits and abdominal pain  Genitourinary:negative for urinary issues  Integument: negative for rash  Hematologic/lymphatic: negative for bleeding or blood clots  Musculoskeletal:negative for joint pain  Neurological: negative for seizures and stroke  Behavioral/Psych: negative for anxiety and depression  Endocrine: negative for diabetic symptoms including polyuria, polydipsia and poor wound healing         PHYSICAL EXAM:     Visit Vitals    /64 (BP 1 Location: Right arm, BP Patient Position: At rest)    Pulse 89    Temp 98.1 °F (36.7 °C)    Ht 5' 2.5\" (1.588 m)    Wt 197 lb (89.4 kg)    SpO2 97%    Breastfeeding No    BMI 35.46 kg/m2        Wt Readings from Last 5 Encounters:   02/16/18 197 lb (89.4 kg)   02/14/18 198 lb 3.2 oz (89.9 kg)   01/31/18 197 lb (89.4 kg)   01/26/18 197 lb 6.4 oz (89.5 kg)   01/23/18 196 lb 9.6 oz (89.2 kg)     BP Readings from Last 5 Encounters:   02/16/18 124/64   01/15/18 124/68   01/04/18 114/72   12/06/17 112/78   11/29/17 110/64         General appearance - Alert, well appearing, and in no distress   Mental status - Affect appropriate to mood. Eyes - Sclerae anicteric,  ENMT - Hearing grossly normal bilaterally, Dental hygiene good. Neck - Carotids upstroke normal bilaterally, no bruits, no JVD. Resp - Clear to auscultation, no wheezes, rales or rhonchi, symmetric air entry. Heart - Normal rate, regular rhythm, normal S1, S2, 1/6 murmur, No rubs, clicks or gallops. GI - Soft, nontender, nondistended, no masses or organomegaly. Neurological - Grossly intact - normal speech, no focal findings  Musculoskeletal - No joint tenderness, deformity or swelling, no muscular tenderness noted. Extremities - Peripheral pulses normal, no pedal edema, no clubbing or cyanosis. Skin - Normal coloration and turgor. Psych -  oriented to person, place, and time. Medical problems and test results were reviewed with the patient today.      Results for orders placed or performed during the hospital encounter of 02/16/18   CBC W/O DIFF   Result Value Ref Range    WBC 5.0 4.3 - 11.1 K/uL    RBC 4.42 4.05 - 5.25 M/uL    HGB 13.7 11.7 - 15.4 g/dL    HCT 40.7 35.8 - 46.3 %    MCV 92.1 79.6 - 97.8 FL    MCH 31.0 26.1 - 32.9 PG    MCHC 33.7 31.4 - 35.0 g/dL    RDW 15.0 (H) 11.9 - 14.6 %    PLATELET 464 401 - 918 K/uL    MPV 10.6 (L) 10.8 - 14.1 FL   EKG, 12 LEAD, INITIAL   Result Value Ref Range    Ventricular Rate 86 BPM    Atrial Rate 86 BPM    P-R Interval 206 ms    QRS Duration 88 ms    Q-T Interval 394 ms    QTC Calculation (Bezet) 471 ms    Calculated P Axis 72 degrees    Calculated R Axis 105 degrees    Calculated T Axis 65 degrees    Diagnosis       Normal sinus rhythm  Possible Left atrial enlargement  Rightward axis  Possible Anterior infarct , age undetermined  Abnormal ECG  No previous ECGs available           Recent Results (from the past 672 hour(s))   CBC WITH AUTOMATED DIFF    Collection Time: 02/14/18 12:15 PM   Result Value Ref Range    WBC 6.2 4.3 - 11.1 K/uL    RBC 4.48 4.05 - 5.25 M/uL    HGB 14.0 11.7 - 15.4 g/dL    HCT 41.5 35.8 - 46.3 %    MCV 92.6 79.6 - 97.8 FL    MCH 31.3 26.1 - 32.9 PG    MCHC 33.7 31.4 - 35.0 g/dL    RDW 14.8 (H) 11.9 - 14.6 %    PLATELET 242 012 - 653 K/uL    MPV 10.5 (L) 10.8 - 14.1 FL    DF AUTOMATED      NEUTROPHILS 67 43 - 78 %    LYMPHOCYTES 22 13 - 44 %    MONOCYTES 8 4.0 - 12.0 %    EOSINOPHILS 2 0.5 - 7.8 %    BASOPHILS 1 0.0 - 2.0 %    ABS. NEUTROPHILS 4.1 1.7 - 8.2 K/UL    ABS. LYMPHOCYTES 1.4 0.5 - 4.6 K/UL    ABS. MONOCYTES 0.5 0.1 - 1.3 K/UL    ABS. EOSINOPHILS 0.1 0.0 - 0.8 K/UL    ABS. BASOPHILS 0.1 0.0 - 0.2 K/UL   METABOLIC PANEL, COMPREHENSIVE    Collection Time: 02/14/18 12:15 PM   Result Value Ref Range    Sodium 137 136 - 145 mmol/L    Potassium 4.2 3.5 - 5.1 mmol/L    Chloride 101 98 - 107 mmol/L    CO2 31 21 - 32 mmol/L    Anion gap 5 mmol/L    Glucose 96 65 - 100 mg/dL    BUN 18 6 - 23 MG/DL    Creatinine 0.80 0.6 - 1.0 MG/DL    GFR est AA >60 ml/min/1.73m2    GFR est non-AA >60 ml/min/1.73m2    Calcium 9.3 8.3 - 10.4 MG/DL    Bilirubin, total 0.8 0.2 - 1.1 MG/DL    ALT (SGPT) 37 12 - 65 U/L    AST (SGOT) 24 15 - 37 U/L    Alk.  phosphatase 80 50 - 136 U/L    Protein, total 7.8 6.3 - 8.2 g/dL    Albumin 4.0 3.5 - 5.0 g/dL    Globulin 3.8 g/dL    A-G Ratio 1.1     MAGNESIUM    Collection Time: 02/14/18 12:15 PM   Result Value Ref Range    Magnesium 2.1 1.8 - 2.4 mg/dL   EKG, 12 LEAD, INITIAL    Collection Time: 02/16/18 10:40 AM   Result Value Ref Range    Ventricular Rate 86 BPM    Atrial Rate 86 BPM    P-R Interval 206 ms    QRS Duration 88 ms    Q-T Interval 394 ms    QTC Calculation (Bezet) 471 ms    Calculated P Axis 72 degrees    Calculated R Axis 105 degrees    Calculated T Axis 65 degrees    Diagnosis       Normal sinus rhythm  Possible Left atrial enlargement  Rightward axis  Possible Anterior infarct , age undetermined  Abnormal ECG  No previous ECGs available     CBC W/O DIFF    Collection Time: 02/16/18 10:41 AM   Result Value Ref Range    WBC 5.0 4.3 - 11.1 K/uL    RBC 4.42 4.05 - 5.25 M/uL    HGB 13.7 11.7 - 15.4 g/dL    HCT 40.7 35.8 - 46.3 %    MCV 92.1 79.6 - 97.8 FL    MCH 31.0 26.1 - 32.9 PG    MCHC 33.7 31.4 - 35.0 g/dL    RDW 15.0 (H) 11.9 - 14.6 %    PLATELET 761 326 - 881 K/uL    MPV 10.6 (L) 10.8 - 14.1 FL     No results found for: CHOL, CHOLPOCT, CHOLX, CHLST, CHOLV, HDL, HDLPOC, LDL, LDLCPOC, LDLC, DLDLP, VLDLC, VLDL, TGLX, TRIGL, TRIGP, TGLPOCT, CHHD, CHHDX    EKG:  (EKG has been independently visualized by me and summarized below)  Sinus  Rhythm   -Left atrial enlargement.    -  Nonspecific T-abnormality. ASSESSMENT and PLAN  1. Chronic systolic heart failure-patient meets indications for a defibrillator. We discussed with her the risk and benefits of a defibrillator. We discussed the procedure in great detail including risks, benefits, and alternatives. The patient understands that risks include bleeding, infection, stroke, MI, cardiac perforation, and even death. Follow-up Disposition: Not on File    1. [employer] device    [employer]. Nora Alvarez M.D., F.A.C.C, F.H.R.S.   Cardiology/Electrophysiology  02/16/18  2:28 PM

## 2018-02-17 VITALS
BODY MASS INDEX: 35.88 KG/M2 | SYSTOLIC BLOOD PRESSURE: 98 MMHG | WEIGHT: 202.5 LBS | OXYGEN SATURATION: 97 % | HEIGHT: 63 IN | HEART RATE: 83 BPM | RESPIRATION RATE: 18 BRPM | TEMPERATURE: 97.4 F | DIASTOLIC BLOOD PRESSURE: 54 MMHG

## 2018-02-17 PROCEDURE — 74011250637 HC RX REV CODE- 250/637: Performed by: INTERNAL MEDICINE

## 2018-02-17 PROCEDURE — 74011250636 HC RX REV CODE- 250/636: Performed by: INTERNAL MEDICINE

## 2018-02-17 PROCEDURE — 99218 HC RM OBSERVATION: CPT

## 2018-02-17 RX ORDER — HYDROCODONE BITARTRATE AND ACETAMINOPHEN 5; 325 MG/1; MG/1
1 TABLET ORAL
Qty: 20 TAB | Refills: 0 | Status: SHIPPED
Start: 2018-02-17 | End: 2018-06-05

## 2018-02-17 RX ORDER — CEPHALEXIN 500 MG/1
500 CAPSULE ORAL 3 TIMES DAILY
Qty: 9 CAP | Refills: 0 | Status: SHIPPED | OUTPATIENT
Start: 2018-02-17 | End: 2018-02-17

## 2018-02-17 RX ORDER — CEPHALEXIN 500 MG/1
500 CAPSULE ORAL 3 TIMES DAILY
Qty: 9 CAP | Refills: 0 | Status: SHIPPED | OUTPATIENT
Start: 2018-02-17 | End: 2018-02-20

## 2018-02-17 RX ADMIN — CEPHALEXIN 500 MG: 500 CAPSULE ORAL at 08:35

## 2018-02-17 RX ADMIN — METOPROLOL SUCCINATE 12.5 MG: 25 TABLET, EXTENDED RELEASE ORAL at 08:54

## 2018-02-17 RX ADMIN — Medication 2 G: at 04:54

## 2018-02-17 RX ADMIN — OXYCODONE HYDROCHLORIDE AND ACETAMINOPHEN 1 TABLET: 5; 325 TABLET ORAL at 04:57

## 2018-02-17 RX ADMIN — ASPIRIN 81 MG: 81 TABLET ORAL at 08:53

## 2018-02-17 RX ADMIN — SPIRONOLACTONE 12.5 MG: 25 TABLET ORAL at 08:53

## 2018-02-17 RX ADMIN — Medication 10 ML: at 05:28

## 2018-02-17 NOTE — DISCHARGE INSTRUCTIONS
CARDIAC DEVICE INSTRUCTION SHEET    1. You should have received a 1-2 weeks follow up appointment upon discharge from the hospital.  If you did not receive this appointment prior to leaving the hospital, please contact us at [phone]. 2.  Keep your incision dry for 14 days. DO NOT put ointments, creams or lotions on the incision for 2 weeks. 3.  Your dressing will be removed at your follow up appointment. If you have sutures/staples, the nurse will remove them at the follow up appointment. 4.  Call us IMMEDIATELY if you develop fever, pain, redness, or drainage at the incision site. 5.  You may use your pacemaker/ICD arm, but keep your arm lower than your shoulder for the first 8 weeks (or until cleared by a physician) to prevent the device lead(s) from moving. 6.  Do not lift more than 10 pounds for 4 weeks and 20 pounds for 8 weeks. 7.  Within 6 weeks you should receive your cardiac device ID card. Carry your card with you at all times. Always show it to any doctor or dentist you see. 8.    You will need to have your device evaluated every 3 months via office, remote, or telephone. 9.  Microwaves WILL NOT harm your device. Warnings do not apply to you. 10.  At airports, always show your cardiac device ID card. You may walk through metal detectors but do not allow the hand held wand near your device. 11.  DO NOT have an MRI without contacting your cardiologists office first.     12.  Please refer to the education booklet given to you at the hospital for the activities and equipment you should avoid. Some may reprogram or interfere with your cardiac device. Learning About ICDs (Implantable Cardioverter-Defibrillators)  What is an ICD (implantable cardioverter-defibrillator)? An ICD (implantable cardioverter-defibrillator) is a small, battery-powered device. It fixes serious changes in your heartbeat.  ICDs are used in people who have had a life-threatening heart rhythm or are at high risk of having one. The ICD is placed under the skin of the chest. It's attached to one or two wires (called leads). Most of the time, these leads go into the heart through a vein. How does an ICD work? An ICD is always checking your heart rate and rhythm. If the ICD detects a life-threatening, rapid heart rhythm, it tries to slow the rhythm to get it back to normal. If the bad rhythm doesn't stop, the ICD sends an electric shock to the heart. This restores a normal rhythm. The device then goes back to its watchful mode. An ICD also can fix a heart rate that is too fast or too slow. It does this without using a shock. It can send out electrical pulses to speed up a heart rate that is too slow. Or it can slow down a fast heart rate by matching the pace and bringing the heart rate back to normal.  Your doctor will check the ICD regularly to make sure it is working right and isn't causing any problems. Your doctor will also check the battery to see if it needs to be replaced. How is an ICD placed? Your doctor will put the ICD under the skin in your chest during minor surgery. You will likely have medicine to make you feel relaxed and sleepy during the surgery. Your doctor makes a small cut (incision) in your upper chest. He or she puts one or two leads (wires) through the cut. Most of the time, the leads go into a large blood vessel in the upper chest. Then your doctor guides the leads through the blood vessel into your heart. Your doctor connects the leads to the ICD and places it in your chest. Then the incision is closed. Your doctor also programs the ICD. Most people spend the night in the hospital, just to make sure that the device is working and that there are no problems from the surgery. How does it feel to get a shock? The shock from an ICD hurts briefly. People feel it in different ways.  It's been described as feeling like a punch in the chest. But the shock is a sign that the ICD is doing its job. It's there to save your life. You won't feel any pain if the ICD uses electrical pulses to fix a heart rate that is too fast or too slow. There's no way to know how often a shock might occur. It might never happen. Not knowing when or if a shock might happen may make you nervous. Knowing what to do if you get shocked can help. Ask your doctor for an action plan. This plan will guide you step-by-step if a shock happens. What else should you know? You can live a normal life with your ICD. Here are a few tips for living well with your ICD. · Avoid strong magnetic and electrical fields. These can keep your device from working right. Most office equipment and home appliances are safe to use. Check with your doctor about which things you should use with caution and which you should stay away from. · Be sure that any doctor, dentist, or other health professional you see knows that you have an ICD. · Always carry a card that tells what kind of device you have. Wear medical alert jewelry that says you have an ICD. You can buy this at most [employer]. · If you do get a shock, follow your action plan for what to do. · You can lead an active life with an ICD. Ask your doctor what sort of activity and intensity is safe for you. As you plan for your future and your end of life, be sure to include plans for your ICD. You can make the decision to turn off your ICD as part of the medical treatment you want at the end of life. Follow-up care is a key part of your treatment and safety. Be sure to make and go to all appointments, and call your doctor if you are having problems. It's also a good idea to know your test results and keep a list of the medicines you take. Where can you learn more? Go to http://priyank-bret.info/. Enter E656 in the search box to learn more about \"Learning About ICDs (Implantable Cardioverter-Defibrillators). \"  Current as of: September 21, 2016  Content Version: 11.4  © 2702-9414 [employer]. Care instructions adapted under license by [employer] (which disclaims liability or warranty for this information). If you have questions about a medical condition or this instruction, always ask your healthcare professional. [address] any warranty or liability for your use of this information. DISCHARGE SUMMARY from Nurse    PATIENT INSTRUCTIONS:    After general anesthesia or intravenous sedation, for 24 hours or while taking prescription Narcotics:  · Limit your activities  · Do not drive and operate hazardous machinery  · Do not make important personal or business decisions  · Do  not drink alcoholic beverages  · If you have not urinated within 8 hours after discharge, please contact your surgeon on call. Report the following to your surgeon:  · Excessive pain, swelling, redness or odor of or around the surgical area  · Temperature over 100.5  · Nausea and vomiting lasting longer than 4 hours or if unable to take medications  · Any signs of decreased circulation or nerve impairment to extremity: change in color, persistent  numbness, tingling, coldness or increase pain  · Any questions    What to do at Home:  Recommended activity: No heavy lifting, pushing, pulling with the implant side for 2 months    If you experience any of the following symptoms chest pain, shortness of breath, weight gain of 3 pounds overnight or 5 pounds in a week, drainage at incision site please follow up with [facility]. *  Please give a list of your current medications to your Primary Care Provider. *  Please update this list whenever your medications are discontinued, doses are      changed, or new medications (including over-the-counter products) are added. *  Please carry medication information at all times in case of emergency situations.     These are general instructions for a healthy lifestyle:    No smoking/ No tobacco products/ Avoid exposure to second hand smoke  Surgeon General's Warning:  Quitting smoking now greatly reduces serious risk to your health. Obesity, smoking, and sedentary lifestyle greatly increases your risk for illness    A healthy diet, regular physical exercise & weight monitoring are important for maintaining a healthy lifestyle    You may be retaining fluid if you have a history of heart failure or if you experience any of the following symptoms:  Weight gain of 3 pounds or more overnight or 5 pounds in a week, increased swelling in our hands or feet or shortness of breath while lying flat in bed. Please call your doctor as soon as you notice any of these symptoms; do not wait until your next office visit. Recognize signs and symptoms of STROKE:    F-face looks uneven    A-arms unable to move or move unevenly    S-speech slurred or non-existent    T-time-call 911 as soon as signs and symptoms begin-DO NOT go       Back to bed or wait to see if you get better-TIME IS BRAIN. Warning Signs of HEART ATTACK     Call 911 if you have these symptoms:   Chest discomfort. Most heart attacks involve discomfort in the center of the chest that lasts more than a few minutes, or that goes away and comes back. It can feel like uncomfortable pressure, squeezing, fullness, or pain.  Discomfort in other areas of the upper body. Symptoms can include pain or discomfort in one or both arms, the back, neck, jaw, or stomach.  Shortness of breath with or without chest discomfort.  Other signs may include breaking out in a cold sweat, nausea, or lightheadedness. Don't wait more than five minutes to call 911 - MINUTES MATTER! Fast action can save your life. Calling 911 is almost always the fastest way to get lifesaving treatment. Emergency Medical Services staff can begin treatment when they arrive -- up to an hour sooner than if someone gets to the hospital by car.      The discharge information has been reviewed with the patient. The patient verbalized understanding. Discharge medications reviewed with the patient and appropriate educational materials and side effects teaching were provided. Avoiding Triggers With Heart Failure: Care Instructions  Your Care Instructions    Triggers are anything that make your heart failure flare up. A flare-up is also called \"sudden heart failure\" or \"acute heart failure. \" When you have a flare-up, fluid builds up in your lungs, and you have problems breathing. You might need to go to the hospital. By watching for changes in your condition and avoiding triggers, you can prevent heart failure flare-ups. Follow-up care is a key part of your treatment and safety. Be sure to make and go to all appointments, and call your doctor if you are having problems. It's also a good idea to know your test results and keep a list of the medicines you take. How can you care for yourself at home? Watch for changes in your weight and condition  · Weigh yourself without clothing at the same time each day. Record your weight. Call your doctor if you have sudden weight gain, such as more than 2 to 3 pounds in a day or 5 pounds in a week. (Your doctor may suggest a different range of weight gain.) A sudden weight gain may mean that your heart failure is getting worse. · Keep a daily record of your symptoms. Write down any changes in how you feel, such as new shortness of breath, cough, or problems eating. Also record if your ankles are more swollen than usual and if you feel more tired than usual. Note anything that you ate or did that could have triggered these changes. Limit sodium  Sodium causes your body to hold on to extra water. This may cause your heart failure symptoms to get worse. People get most of their sodium from processed foods. Fast food and restaurant meals also tend to be very high in sodium.   · Your doctor may suggest that you limit sodium to 2,000 milligrams (mg) a day or less. That is less than 1 teaspoon of salt a day, including all the salt you eat in cooking or in packaged foods. · Read food labels on cans and food packages. They tell you how much sodium you get in one serving. Check the serving size. If you eat more than one serving, you are getting more sodium. · Be aware that sodium can come in forms other than salt, including monosodium glutamate (MSG), sodium citrate, and sodium bicarbonate (baking soda). MSG is often added to Asian food. You can sometimes ask for food without MSG or salt. · Slowly reducing salt will help you adjust to the taste. Take the salt shaker off the table. · Flavor your food with garlic, lemon juice, onion, vinegar, herbs, and spices instead of salt. Do not use soy sauce, steak sauce, onion salt, garlic salt, mustard, or ketchup on your food, unless it is labeled \"low-sodium\" or \"low-salt. \"  · Make your own salad dressings, sauces, and ketchup without adding salt. · Use fresh or frozen ingredients, instead of canned ones, whenever you can. Choose low-sodium canned goods. · Eat less processed food and food from restaurants, including fast food. Exercise as directed  Moderate, regular exercise is very good for your heart. It improves your blood flow and helps control your weight. But too much exercise can stress your heart and cause a heart failure flare-up. · Check with your doctor before you start an exercise program.  · Walking is an easy way to get exercise. Start out slowly. Gradually increase the length and pace of your walk. Swimming, riding a bike, and using a treadmill are also good forms of exercise. · When you exercise, watch for signs that your heart is working too hard. You are pushing yourself too hard if you cannot talk while you are exercising. If you become short of breath or dizzy or have chest pain, stop, sit down, and rest.  · Do not exercise when you do not feel well.   Take medicines correctly  · Take your medicines exactly as prescribed. Call your doctor if you think you are having a problem with your medicine. · Make a list of all the medicines you take. Include those prescribed to you by other doctors and any over-the-counter medicines, vitamins, or supplements you take. Take this list with you when you go to any doctor. · Take your medicines at the same time every day. It may help you to post a list of all the medicines you take every day and what time of day you take them. · Make taking your medicine as simple as you can. Plan times to take your medicines when you are doing other things, such as eating a meal or getting ready for bed. This will make it easier to remember to take your medicines. · Get organized. Use helpful tools, such as daily or weekly pill containers. When should you call for help? Call 911 if you have symptoms of sudden heart failure such as:  ? · You have severe trouble breathing. ? · You cough up pink, foamy mucus. ? · You have a new irregular or rapid heartbeat. ?Call your doctor now or seek immediate medical care if:  ? · You have new or increased shortness of breath. ? · You are dizzy or lightheaded, or you feel like you may faint. ? · You have sudden weight gain, such as more than 2 to 3 pounds in a day or 5 pounds in a week. (Your doctor may suggest a different range of weight gain.)   ? · You have increased swelling in your legs, ankles, or feet. ? · You are suddenly so tired or weak that you cannot do your usual activities. ? Watch closely for changes in your health, and be sure to contact your doctor if you develop new symptoms. Where can you learn more? Go to http://priyank-bret.info/. Enter P459 in the search box to learn more about \"Avoiding Triggers With Heart Failure: Care Instructions. \"  Current as of: September 21, 2016  Content Version: 11.4  © 9866-9898 [employer].  Care instructions adapted under license by [address] (which disclaims liability or warranty for this information). If you have questions about a medical condition or this instruction, always ask your healthcare professional. [address] any warranty or liability for your use of this information.     ___________________________________________________________________________________________________________________________________

## 2018-02-17 NOTE — PROGRESS NOTES
Verbal and bedside shift report received from PADMINI Mcmillan. Left subclavian site clean dry and intact.

## 2018-02-17 NOTE — DISCHARGE SUMMARY
[facility] Discharge Summary     Patient ID:  Brenton Matias  [id number]  67 y.o.  1960    Admit date: 2/16/2018    Discharge date:  2/17/2018    Admitting Physician: Shauna Zambrano MD     Discharge Physician: Dr. Vianey Valentino    Admission Diagnoses: Chronic left systolic heart failure ([address] [I50.22]    Discharge Diagnoses:    Diagnosis    [facility] systolic HF (heart failure) [facility] [address]    DCM (dilated cardiomyopathy) ([facility] [address]    S/P MVR (mitral valve repair)    Non-rheumatic mitral regurgitation    Coronary artery disease involving native coronary artery of native heart without angina pectoris    Pure hypercholesterolemia       Cardiology Procedures this admission:  Insertion of Biotronik VDI ICD  Consults: None    Hospital Course: Patient was seen at the office of [facility] by Dr. Av Valencia for complaints of worsening EF (15%) s/p MV repair (7/2017) and was subsequently scheduled for an ICD implantation at [facility] on 2/16/18. Patient was taken to the EP lab and underwent implantation of a Biotronik VDI ICD by Dr. Av Valencia. Patient tolerated the procedure well and was taken to the telemetry floor for recovery. Follow up chest xray showed no pneumothorax. The following morning patient was up feeling well without any complaints of chest pain, shortness of breath, or palpitations. Patient's left subclavian site was clean, dry and intact without hematoma. Patient's labs were stable. Patient was seen and examined by Dr. Vianey Valentino and determined stable and ready for discharge. For maximized medical treatment of CHF will cont BB and ARB. Patient was instructed on the importance of medication compliance and outpatient follow up. The patient will follow up with [facility] for device check  -- office will call Pt to schedule appt. DISPOSITION: Patient has been instructed to keep affected arm below shoulder level for the next 4 weeks or until cleared by doctor.   The arm sling should be worn while sleeping. The dressing will be removed at follow up appointment. The incision site must be kept clean and dry. The patient may shower in a few days. Lotions, powders, or creams should be avoided as these can increase the risk of infection. The affected arm should not be used for any pushing, pulling, or lifting until cleared by doctor. Driving is prohibited until cleared by doctor in a follow up appointment. Any signs of infection including increased redness, suspicious drainage, or unexplained fever should be reported to the doctor immediately by calling [phone]. Discharge Exam:   Visit Vitals    BP 98/54 (BP 1 Location: Right arm, BP Patient Position: At rest)    Pulse 83    Temp 97.4 °F (36.3 °C)    Resp 18    Ht 5' 2.5\" (1.588 m)    Wt 91.9 kg (202 lb 8 oz)    SpO2 97%    Breastfeeding No    BMI 36.45 kg/m2       Pt has been seen by Dr. Keshawn Deng: see his progress note for exam details.     Recent Results (from the past 24 hour(s))   EKG, 12 LEAD, INITIAL    Collection Time: 02/16/18 10:40 AM   Result Value Ref Range    Ventricular Rate 86 BPM    Atrial Rate 86 BPM    P-R Interval 206 ms    QRS Duration 88 ms    Q-T Interval 394 ms    QTC Calculation (Bezet) 471 ms    Calculated P Axis 72 degrees    Calculated R Axis 105 degrees    Calculated T Axis 65 degrees    Diagnosis       Normal sinus rhythm  Possible Left atrial enlargement  Rightward axis  Possible Anterior infarct , age undetermined  Abnormal ECG  No previous ECGs available  Confirmed by Jo-Ann White ([id number]) on 2/16/2018 11:06:52 AM     CBC W/O DIFF    Collection Time: 02/16/18 10:41 AM   Result Value Ref Range    WBC 5.0 4.3 - 11.1 K/uL    RBC 4.42 4.05 - 5.25 M/uL    HGB 13.7 11.7 - 15.4 g/dL    HCT 40.7 35.8 - 46.3 %    MCV 92.1 79.6 - 97.8 FL    MCH 31.0 26.1 - 32.9 PG    MCHC 33.7 31.4 - 35.0 g/dL    RDW 15.0 (H) 11.9 - 14.6 %    PLATELET 123 665 - 155 K/uL    MPV 10.6 (L) 10.8 - 78.9 FL   METABOLIC PANEL, COMPREHENSIVE    Collection Time: 02/16/18 10:41 AM   Result Value Ref Range    Sodium 140 136 - 145 mmol/L    Potassium 4.0 3.5 - 5.1 mmol/L    Chloride 106 98 - 107 mmol/L    CO2 27 21 - 32 mmol/L    Anion gap 7 7 - 16 mmol/L    Glucose 114 (H) 65 - 100 mg/dL    BUN 18 6 - 23 MG/DL    Creatinine 0.83 0.6 - 1.0 MG/DL    GFR est AA >60 >60 ml/min/1.73m2    GFR est non-AA >60 >60 ml/min/1.73m2    Calcium 9.1 8.3 - 10.4 MG/DL    Bilirubin, total 0.7 0.2 - 1.1 MG/DL    ALT (SGPT) 34 12 - 65 U/L    AST (SGOT) 28 15 - 37 U/L    Alk. phosphatase 77 50 - 136 U/L    Protein, total 7.5 6.3 - 8.2 g/dL    Albumin 4.1 3.5 - 5.0 g/dL    Globulin 3.4 2.3 - 3.5 g/dL    A-G Ratio 1.2 1.2 - 3.5     PROTHROMBIN TIME + INR    Collection Time: 02/16/18 10:41 AM   Result Value Ref Range    Prothrombin time 12.4 11.5 - 14.5 sec    INR 1.0     MAGNESIUM    Collection Time: 02/16/18 10:41 AM   Result Value Ref Range    Magnesium 2.2 1.8 - 2.4 mg/dL   EKG, 12 LEAD, INITIAL    Collection Time: 02/16/18  1:34 PM   Result Value Ref Range    Ventricular Rate 72 BPM    Atrial Rate 72 BPM    P-R Interval 238 ms    QRS Duration 102 ms    Q-T Interval 434 ms    QTC Calculation (Bezet) 475 ms    Calculated P Axis 74 degrees    Calculated R Axis 87 degrees    Calculated T Axis 35 degrees    Diagnosis       Sinus rhythm with 1st degree A-V block  Possible Left atrial enlargement  Incomplete right bundle branch block  Cannot rule out Anterior infarct (cited on or before 16-FEB-2018)  Abnormal ECG  When compared with ECG of 16-FEB-2018 10:40,  CO interval has increased  Inverted T waves have replaced nonspecific T wave abnormality in Anterior   leads  Confirmed by Tracy Sharma ([id number]) on 2/16/2018 1:45:52 PM           Patient Instructions:   Current Discharge Medication List      START taking these medications    Details   cephALEXin (KEFLEX) 500 mg capsule Take 1 Cap by mouth three (3) times daily for 3 days.   Qty: 9 Cap, Refills: 0    Associated Diagnoses: Chronic systolic congestive heart failure (HCC)      HYDROcodone-acetaminophen (NORCO) 5-325 mg per tablet Take 1 Tab by mouth every six (6) hours as needed for Pain. Max Daily Amount: 4 Tabs. Qty: 20 Tab, Refills: 0    Associated Diagnoses: Chronic systolic congestive heart failure ([facility] [address]         CONTINUE these medications which have NOT CHANGED    Details   sacubitril-valsartan (ENTRESTO) 24 mg/26 mg tablet Take 1 Tab by mouth two (2) times a day. Qty: 60 Tab, Refills: 5      ivabradine (CORLANOR) 5 mg tablet Take 1 Tab by mouth two (2) times daily (with meals). Indications: Chronic Heart Failure  Qty: 60 Tab, Refills: 5      spironolactone (ALDACTONE) 25 mg tablet Take 0.5 Tabs by mouth daily. Qty: 15 Tab, Refills: 5      metoprolol succinate (TOPROL-XL) 25 mg XL tablet Take 0.5 Tabs by mouth daily. Qty: 15 Tab, Refills: 5      aspirin delayed-release 81 mg tablet Take  by mouth daily. ferrous sulfate (IRON) 325 mg (65 mg iron) tablet Take  by mouth Daily (before breakfast). multivitamin (ONE A DAY) tablet Take 1 Tab by mouth daily. KRILL OIL PO Take  by mouth daily. garlic 5,713 mg cap Take  by mouth daily. flaxseed oil 1,000 mg cap Take  by mouth daily. zolpidem (AMBIEN) 5 mg tablet Take  by mouth nightly as needed for Sleep.

## 2018-02-17 NOTE — PROGRESS NOTES
Bedside and Verbal shift change report given to Garfield Michaud RN (oncoming nurse) by self Cherylene Berliner nurse). Report included the following information SBAR, Procedure Summary, MAR and Recent Results. LEft subclavian site clean dry and intact.

## 2018-02-17 NOTE — PROGRESS NOTES
Received shift report from PADMINI Nick at patients bedside. Assumed patient care. Pt resting in bed with no needs at this time.

## 2018-02-19 ENCOUNTER — APPOINTMENT (OUTPATIENT)
Dept: CARDIAC REHAB | Age: 58
End: 2018-02-19
Payer: COMMERCIAL

## 2018-02-21 ENCOUNTER — HOSPITAL ENCOUNTER (OUTPATIENT)
Dept: CARDIAC REHAB | Age: 58
Discharge: HOME | End: 2018-02-21
Payer: COMMERCIAL

## 2018-02-23 ENCOUNTER — APPOINTMENT (OUTPATIENT)
Dept: CARDIAC REHAB | Age: 58
End: 2018-02-23
Payer: COMMERCIAL

## 2018-02-26 ENCOUNTER — APPOINTMENT (OUTPATIENT)
Dept: CARDIAC REHAB | Age: 58
End: 2018-02-26
Payer: COMMERCIAL

## 2018-02-28 ENCOUNTER — APPOINTMENT (OUTPATIENT)
Dept: CARDIAC REHAB | Age: 58
End: 2018-02-28
Payer: COMMERCIAL

## 2018-03-02 ENCOUNTER — APPOINTMENT (OUTPATIENT)
Dept: CARDIAC REHAB | Age: 58
End: 2018-03-02
Payer: COMMERCIAL

## 2018-03-02 ENCOUNTER — HOSPITAL ENCOUNTER (OUTPATIENT)
Dept: CARDIAC REHAB | Age: 58
Discharge: HOME | End: 2018-03-02
Payer: COMMERCIAL

## 2018-03-02 VITALS — BODY MASS INDEX: 36.23 KG/M2 | WEIGHT: 201.3 LBS

## 2018-03-02 PROCEDURE — 93798 PHYS/QHP OP CAR RHAB W/ECG: CPT

## 2018-03-05 ENCOUNTER — HOSPITAL ENCOUNTER (OUTPATIENT)
Dept: CARDIAC REHAB | Age: 58
Discharge: HOME | End: 2018-03-05
Payer: COMMERCIAL

## 2018-03-05 PROCEDURE — 93798 PHYS/QHP OP CAR RHAB W/ECG: CPT

## 2018-03-07 ENCOUNTER — HOSPITAL ENCOUNTER (OUTPATIENT)
Dept: CARDIAC REHAB | Age: 58
Discharge: HOME | End: 2018-03-07
Payer: COMMERCIAL

## 2018-03-07 VITALS — BODY MASS INDEX: 36.03 KG/M2 | WEIGHT: 200.2 LBS

## 2018-03-07 PROCEDURE — 93798 PHYS/QHP OP CAR RHAB W/ECG: CPT

## 2018-03-09 ENCOUNTER — APPOINTMENT (OUTPATIENT)
Dept: CARDIAC REHAB | Age: 58
End: 2018-03-09
Payer: COMMERCIAL

## 2018-03-12 ENCOUNTER — APPOINTMENT (OUTPATIENT)
Dept: CARDIAC REHAB | Age: 58
End: 2018-03-12
Payer: COMMERCIAL

## 2018-03-14 ENCOUNTER — HOSPITAL ENCOUNTER (OUTPATIENT)
Dept: CARDIAC REHAB | Age: 58
End: 2018-03-14
Payer: COMMERCIAL

## 2018-03-16 ENCOUNTER — HOSPITAL ENCOUNTER (OUTPATIENT)
Dept: CARDIAC REHAB | Age: 58
End: 2018-03-16
Payer: COMMERCIAL

## 2018-03-19 ENCOUNTER — APPOINTMENT (OUTPATIENT)
Dept: CARDIAC REHAB | Age: 58
End: 2018-03-19
Payer: COMMERCIAL

## 2018-03-21 ENCOUNTER — HOSPITAL ENCOUNTER (OUTPATIENT)
Dept: CARDIAC REHAB | Age: 58
End: 2018-03-21
Payer: COMMERCIAL

## 2018-03-23 ENCOUNTER — APPOINTMENT (OUTPATIENT)
Dept: CARDIAC REHAB | Age: 58
End: 2018-03-23
Payer: COMMERCIAL

## 2018-03-26 ENCOUNTER — HOSPITAL ENCOUNTER (OUTPATIENT)
Dept: CARDIAC REHAB | Age: 58
Discharge: HOME | End: 2018-03-26
Payer: COMMERCIAL

## 2018-03-28 ENCOUNTER — HOSPITAL ENCOUNTER (OUTPATIENT)
Dept: CARDIAC REHAB | Age: 58
End: 2018-03-28
Payer: COMMERCIAL

## 2018-03-30 ENCOUNTER — APPOINTMENT (OUTPATIENT)
Dept: CARDIAC REHAB | Age: 58
End: 2018-03-30
Payer: COMMERCIAL

## 2018-04-02 ENCOUNTER — APPOINTMENT (OUTPATIENT)
Dept: CARDIAC REHAB | Age: 58
End: 2018-04-02
Payer: COMMERCIAL

## 2018-04-04 ENCOUNTER — HOSPITAL ENCOUNTER (OUTPATIENT)
Dept: CARDIAC REHAB | Age: 58
Discharge: HOME | End: 2018-04-04
Payer: COMMERCIAL

## 2018-04-06 ENCOUNTER — HOSPITAL ENCOUNTER (OUTPATIENT)
Dept: CARDIAC REHAB | Age: 58
Discharge: HOME | End: 2018-04-06
Payer: COMMERCIAL

## 2018-04-06 PROCEDURE — 93798 PHYS/QHP OP CAR RHAB W/ECG: CPT

## 2018-04-09 ENCOUNTER — HOSPITAL ENCOUNTER (OUTPATIENT)
Dept: CARDIAC REHAB | Age: 58
Discharge: HOME | End: 2018-04-09
Payer: COMMERCIAL

## 2018-04-09 VITALS — BODY MASS INDEX: 37.01 KG/M2 | WEIGHT: 205.6 LBS

## 2018-04-09 PROCEDURE — 93798 PHYS/QHP OP CAR RHAB W/ECG: CPT

## 2018-04-11 ENCOUNTER — APPOINTMENT (OUTPATIENT)
Dept: CARDIAC REHAB | Age: 58
End: 2018-04-11
Payer: COMMERCIAL

## 2018-04-13 ENCOUNTER — HOSPITAL ENCOUNTER (OUTPATIENT)
Dept: CARDIAC REHAB | Age: 58
Discharge: HOME | End: 2018-04-13
Payer: COMMERCIAL

## 2018-04-13 PROCEDURE — 93798 PHYS/QHP OP CAR RHAB W/ECG: CPT

## 2018-04-16 ENCOUNTER — HOSPITAL ENCOUNTER (OUTPATIENT)
Dept: CARDIAC REHAB | Age: 58
Discharge: HOME | End: 2018-04-16
Payer: COMMERCIAL

## 2018-04-16 ENCOUNTER — APPOINTMENT (OUTPATIENT)
Dept: CARDIAC REHAB | Age: 58
End: 2018-04-16
Payer: COMMERCIAL

## 2018-04-16 PROCEDURE — 93798 PHYS/QHP OP CAR RHAB W/ECG: CPT

## 2018-04-18 ENCOUNTER — APPOINTMENT (OUTPATIENT)
Dept: CARDIAC REHAB | Age: 58
End: 2018-04-18
Payer: COMMERCIAL

## 2018-04-20 ENCOUNTER — APPOINTMENT (OUTPATIENT)
Dept: CARDIAC REHAB | Age: 58
End: 2018-04-20
Payer: COMMERCIAL

## 2018-04-23 ENCOUNTER — HOSPITAL ENCOUNTER (OUTPATIENT)
Dept: CARDIAC REHAB | Age: 58
Discharge: HOME | End: 2018-04-23
Payer: COMMERCIAL

## 2018-04-25 ENCOUNTER — HOSPITAL ENCOUNTER (OUTPATIENT)
Dept: CARDIAC REHAB | Age: 58
Discharge: HOME | End: 2018-04-25
Payer: COMMERCIAL

## 2018-04-25 VITALS — BODY MASS INDEX: 37.44 KG/M2 | WEIGHT: 208 LBS

## 2018-04-25 PROCEDURE — 93798 PHYS/QHP OP CAR RHAB W/ECG: CPT

## 2018-04-27 ENCOUNTER — HOSPITAL ENCOUNTER (OUTPATIENT)
Dept: CARDIAC REHAB | Age: 58
End: 2018-04-27
Payer: COMMERCIAL

## 2018-04-30 ENCOUNTER — HOSPITAL ENCOUNTER (OUTPATIENT)
Dept: CARDIAC REHAB | Age: 58
Discharge: HOME | End: 2018-04-30
Payer: COMMERCIAL

## 2018-04-30 PROCEDURE — 93798 PHYS/QHP OP CAR RHAB W/ECG: CPT

## 2018-05-02 ENCOUNTER — HOSPITAL ENCOUNTER (OUTPATIENT)
Dept: CARDIAC REHAB | Age: 58
Discharge: HOME | End: 2018-05-02
Payer: COMMERCIAL

## 2018-05-02 VITALS — BODY MASS INDEX: 37.51 KG/M2 | WEIGHT: 208.4 LBS

## 2018-05-02 PROCEDURE — 93798 PHYS/QHP OP CAR RHAB W/ECG: CPT

## 2018-05-04 ENCOUNTER — APPOINTMENT (OUTPATIENT)
Dept: CARDIAC REHAB | Age: 58
End: 2018-05-04
Payer: COMMERCIAL

## 2018-05-07 ENCOUNTER — HOSPITAL ENCOUNTER (OUTPATIENT)
Dept: CARDIAC REHAB | Age: 58
Discharge: HOME | End: 2018-05-07
Payer: COMMERCIAL

## 2018-05-07 PROCEDURE — 93798 PHYS/QHP OP CAR RHAB W/ECG: CPT

## 2018-05-09 ENCOUNTER — HOSPITAL ENCOUNTER (OUTPATIENT)
Dept: CARDIAC REHAB | Age: 58
Discharge: HOME | End: 2018-05-09
Payer: COMMERCIAL

## 2018-05-09 VITALS — WEIGHT: 209 LBS | BODY MASS INDEX: 37.62 KG/M2

## 2018-05-09 PROCEDURE — 93798 PHYS/QHP OP CAR RHAB W/ECG: CPT

## 2018-05-11 ENCOUNTER — APPOINTMENT (OUTPATIENT)
Dept: CARDIAC REHAB | Age: 58
End: 2018-05-11
Payer: COMMERCIAL

## 2018-05-14 ENCOUNTER — HOSPITAL ENCOUNTER (OUTPATIENT)
Dept: CARDIAC REHAB | Age: 58
Discharge: HOME | End: 2018-05-14
Payer: COMMERCIAL

## 2018-05-14 PROCEDURE — 93798 PHYS/QHP OP CAR RHAB W/ECG: CPT

## 2018-05-16 ENCOUNTER — HOSPITAL ENCOUNTER (OUTPATIENT)
Dept: CARDIAC REHAB | Age: 58
Discharge: HOME | End: 2018-05-16
Payer: COMMERCIAL

## 2018-05-16 VITALS — WEIGHT: 208 LBS | BODY MASS INDEX: 37.44 KG/M2

## 2018-05-16 PROCEDURE — 93798 PHYS/QHP OP CAR RHAB W/ECG: CPT

## 2018-05-18 ENCOUNTER — APPOINTMENT (OUTPATIENT)
Dept: CARDIAC REHAB | Age: 58
End: 2018-05-18
Payer: COMMERCIAL

## 2018-05-21 ENCOUNTER — HOSPITAL ENCOUNTER (OUTPATIENT)
Dept: CARDIAC REHAB | Age: 58
Discharge: HOME | End: 2018-05-21
Payer: COMMERCIAL

## 2018-05-21 PROCEDURE — 93798 PHYS/QHP OP CAR RHAB W/ECG: CPT

## 2018-05-23 ENCOUNTER — HOSPITAL ENCOUNTER (OUTPATIENT)
Dept: CARDIAC REHAB | Age: 58
Discharge: HOME | End: 2018-05-23
Payer: COMMERCIAL

## 2018-05-23 VITALS — BODY MASS INDEX: 37.44 KG/M2 | WEIGHT: 208 LBS

## 2018-05-23 PROCEDURE — 93798 PHYS/QHP OP CAR RHAB W/ECG: CPT

## 2018-05-25 ENCOUNTER — APPOINTMENT (OUTPATIENT)
Dept: CARDIAC REHAB | Age: 58
End: 2018-05-25
Payer: COMMERCIAL

## 2018-05-30 ENCOUNTER — HOSPITAL ENCOUNTER (OUTPATIENT)
Dept: CARDIAC REHAB | Age: 58
Discharge: HOME | End: 2018-05-30
Payer: COMMERCIAL

## 2018-05-30 VITALS — WEIGHT: 208 LBS | BODY MASS INDEX: 37.44 KG/M2

## 2018-05-30 PROCEDURE — 93798 PHYS/QHP OP CAR RHAB W/ECG: CPT

## 2018-06-01 ENCOUNTER — HOSPITAL ENCOUNTER (OUTPATIENT)
Dept: CARDIAC REHAB | Age: 58
Discharge: HOME | End: 2018-06-01
Payer: COMMERCIAL

## 2018-06-01 PROCEDURE — 93798 PHYS/QHP OP CAR RHAB W/ECG: CPT

## 2018-06-04 ENCOUNTER — APPOINTMENT (OUTPATIENT)
Dept: CARDIAC REHAB | Age: 58
End: 2018-06-04
Payer: COMMERCIAL

## 2018-06-06 ENCOUNTER — APPOINTMENT (OUTPATIENT)
Dept: CARDIAC REHAB | Age: 58
End: 2018-06-06
Payer: COMMERCIAL

## 2018-06-08 ENCOUNTER — APPOINTMENT (OUTPATIENT)
Dept: CARDIAC REHAB | Age: 58
End: 2018-06-08
Payer: COMMERCIAL

## 2018-06-11 ENCOUNTER — APPOINTMENT (OUTPATIENT)
Dept: CARDIAC REHAB | Age: 58
End: 2018-06-11
Payer: COMMERCIAL

## 2018-06-13 ENCOUNTER — HOSPITAL ENCOUNTER (OUTPATIENT)
Dept: CARDIAC REHAB | Age: 58
Discharge: HOME | End: 2018-06-13
Payer: COMMERCIAL

## 2018-06-13 VITALS — BODY MASS INDEX: 37.4 KG/M2 | WEIGHT: 207.8 LBS

## 2018-06-13 PROCEDURE — 93798 PHYS/QHP OP CAR RHAB W/ECG: CPT

## 2018-06-15 ENCOUNTER — APPOINTMENT (OUTPATIENT)
Dept: CARDIAC REHAB | Age: 58
End: 2018-06-15
Payer: COMMERCIAL

## 2018-06-18 ENCOUNTER — APPOINTMENT (OUTPATIENT)
Dept: CARDIAC REHAB | Age: 58
End: 2018-06-18
Payer: COMMERCIAL

## 2018-06-20 ENCOUNTER — APPOINTMENT (OUTPATIENT)
Dept: CARDIAC REHAB | Age: 58
End: 2018-06-20
Payer: COMMERCIAL

## 2018-06-22 ENCOUNTER — APPOINTMENT (OUTPATIENT)
Dept: CARDIAC REHAB | Age: 58
End: 2018-06-22
Payer: COMMERCIAL

## 2018-06-25 ENCOUNTER — HOSPITAL ENCOUNTER (OUTPATIENT)
Dept: CARDIAC REHAB | Age: 58
Discharge: HOME | End: 2018-06-25
Payer: COMMERCIAL

## 2018-06-27 ENCOUNTER — HOSPITAL ENCOUNTER (OUTPATIENT)
Dept: CARDIAC REHAB | Age: 58
Discharge: HOME | End: 2018-06-27
Payer: COMMERCIAL

## 2018-06-27 VITALS — WEIGHT: 208.4 LBS | BODY MASS INDEX: 37.51 KG/M2

## 2018-06-27 PROCEDURE — 93798 PHYS/QHP OP CAR RHAB W/ECG: CPT

## 2018-06-29 ENCOUNTER — APPOINTMENT (OUTPATIENT)
Dept: CARDIAC REHAB | Age: 58
End: 2018-06-29
Payer: COMMERCIAL

## 2018-07-02 ENCOUNTER — APPOINTMENT (OUTPATIENT)
Dept: CARDIAC REHAB | Age: 58
End: 2018-07-02

## 2018-07-06 ENCOUNTER — APPOINTMENT (OUTPATIENT)
Dept: CARDIAC REHAB | Age: 58
End: 2018-07-06

## 2018-07-09 ENCOUNTER — APPOINTMENT (OUTPATIENT)
Dept: CARDIAC REHAB | Age: 58
End: 2018-07-09

## 2018-07-11 ENCOUNTER — APPOINTMENT (OUTPATIENT)
Dept: CARDIAC REHAB | Age: 58
End: 2018-07-11

## 2018-07-13 ENCOUNTER — APPOINTMENT (OUTPATIENT)
Dept: CARDIAC REHAB | Age: 58
End: 2018-07-13

## 2018-07-16 ENCOUNTER — APPOINTMENT (OUTPATIENT)
Dept: CARDIAC REHAB | Age: 58
End: 2018-07-16

## 2018-07-18 ENCOUNTER — APPOINTMENT (OUTPATIENT)
Dept: CARDIAC REHAB | Age: 58
End: 2018-07-18

## 2018-07-20 ENCOUNTER — APPOINTMENT (OUTPATIENT)
Dept: CARDIAC REHAB | Age: 58
End: 2018-07-20

## 2018-07-23 ENCOUNTER — HOSPITAL ENCOUNTER (OUTPATIENT)
Dept: CARDIAC REHAB | Age: 58
Discharge: HOME | End: 2018-07-23

## 2018-07-25 ENCOUNTER — HOSPITAL ENCOUNTER (OUTPATIENT)
Dept: CARDIAC REHAB | Age: 58
End: 2018-07-25

## 2018-07-27 ENCOUNTER — APPOINTMENT (OUTPATIENT)
Dept: CARDIAC REHAB | Age: 58
End: 2018-07-27

## 2018-07-30 ENCOUNTER — APPOINTMENT (OUTPATIENT)
Dept: CARDIAC REHAB | Age: 58
End: 2018-07-30

## 2018-08-01 ENCOUNTER — APPOINTMENT (OUTPATIENT)
Dept: CARDIAC REHAB | Age: 58
End: 2018-08-01

## 2018-08-15 ENCOUNTER — HOSPITAL ENCOUNTER (OUTPATIENT)
Dept: CARDIAC REHAB | Age: 58
End: 2018-08-15

## 2018-08-17 ENCOUNTER — APPOINTMENT (OUTPATIENT)
Dept: CARDIAC REHAB | Age: 58
End: 2018-08-17

## 2018-08-20 ENCOUNTER — APPOINTMENT (OUTPATIENT)
Dept: CARDIAC REHAB | Age: 58
End: 2018-08-20

## 2018-08-22 ENCOUNTER — HOSPITAL ENCOUNTER (OUTPATIENT)
Dept: CARDIAC REHAB | Age: 58
End: 2018-08-22

## 2018-08-22 ENCOUNTER — TELEPHONE (OUTPATIENT)
Dept: CARDIAC REHAB | Age: 58
End: 2018-08-22

## 2018-08-24 ENCOUNTER — HOSPITAL ENCOUNTER (OUTPATIENT)
Dept: CARDIAC REHAB | Age: 58
Discharge: HOME | End: 2018-08-24

## 2018-08-27 ENCOUNTER — APPOINTMENT (OUTPATIENT)
Dept: CARDIAC REHAB | Age: 58
End: 2018-08-27

## 2018-08-29 ENCOUNTER — APPOINTMENT (OUTPATIENT)
Dept: CARDIAC REHAB | Age: 58
End: 2018-08-29

## 2018-08-31 ENCOUNTER — HOSPITAL ENCOUNTER (OUTPATIENT)
Dept: CARDIAC REHAB | Age: 58
Discharge: HOME | End: 2018-08-31

## 2018-09-05 ENCOUNTER — APPOINTMENT (OUTPATIENT)
Dept: CARDIAC REHAB | Age: 58
End: 2018-09-05

## 2019-02-25 PROBLEM — Z95.810 ICD (IMPLANTABLE CARDIOVERTER-DEFIBRILLATOR) IN PLACE: Status: ACTIVE | Noted: 2019-02-25

## 2019-07-09 ENCOUNTER — HOSPITAL ENCOUNTER (OUTPATIENT)
Dept: LAB | Age: 59
Discharge: HOME | End: 2019-07-09
Attending: INTERNAL MEDICINE
Payer: COMMERCIAL

## 2019-07-09 DIAGNOSIS — I50.22 CHRONIC SYSTOLIC CONGESTIVE HEART FAILURE (HCC): ICD-10-CM

## 2019-07-09 LAB
ANION GAP SERPL CALC-SCNC: 6 MMOL/L (ref 7–16)
BUN SERPL-MCNC: 15 MG/DL (ref 6–23)
CALCIUM SERPL-MCNC: 9.6 MG/DL (ref 8.3–10.4)
CHLORIDE SERPL-SCNC: 103 MMOL/L (ref 98–107)
CHOLEST SERPL-MCNC: 307 MG/DL
CO2 SERPL-SCNC: 29 MMOL/L (ref 21–32)
CREAT SERPL-MCNC: 0.9 MG/DL (ref 0.6–1)
GLUCOSE SERPL-MCNC: 109 MG/DL (ref 65–100)
HDLC SERPL-MCNC: 67 MG/DL (ref 40–60)
HDLC SERPL: 4.6 {RATIO}
LDLC SERPL CALC-MCNC: 215.4 MG/DL
LIPID PROFILE,FLP: ABNORMAL
POTASSIUM SERPL-SCNC: 3.9 MMOL/L (ref 3.5–5.1)
SODIUM SERPL-SCNC: 138 MMOL/L (ref 136–145)
TRIGL SERPL-MCNC: 123 MG/DL (ref 35–150)
VLDLC SERPL CALC-MCNC: 24.6 MG/DL (ref 6–23)

## 2019-07-09 PROCEDURE — 36415 COLL VENOUS BLD VENIPUNCTURE: CPT

## 2019-07-09 PROCEDURE — 80061 LIPID PANEL: CPT

## 2019-07-09 PROCEDURE — 80048 BASIC METABOLIC PNL TOTAL CA: CPT

## 2020-11-03 PROBLEM — U07.1 COVID-19: Status: ACTIVE | Noted: 2020-11-03

## 2022-03-18 PROBLEM — I25.10 CORONARY ARTERY DISEASE INVOLVING NATIVE CORONARY ARTERY OF NATIVE HEART WITHOUT ANGINA PECTORIS: Status: ACTIVE | Noted: 2017-10-26

## 2022-03-18 PROBLEM — R00.0 TACHYCARDIA: Status: ACTIVE | Noted: 2017-10-26

## 2022-03-19 PROBLEM — I50.22 CHRONIC SYSTOLIC HF (HEART FAILURE) (HCC): Status: ACTIVE | Noted: 2018-02-16

## 2022-03-19 PROBLEM — I34.0 NON-RHEUMATIC MITRAL REGURGITATION: Status: ACTIVE | Noted: 2017-10-26

## 2022-03-19 PROBLEM — U07.1 COVID-19: Status: ACTIVE | Noted: 2020-11-03

## 2022-03-19 PROBLEM — I42.0 DCM (DILATED CARDIOMYOPATHY) (HCC): Status: ACTIVE | Noted: 2017-11-10

## 2022-03-19 PROBLEM — I50.22 CHRONIC SYSTOLIC CONGESTIVE HEART FAILURE (HCC): Status: ACTIVE | Noted: 2017-11-29

## 2022-03-19 PROBLEM — Z95.810 ICD (IMPLANTABLE CARDIOVERTER-DEFIBRILLATOR) IN PLACE: Status: ACTIVE | Noted: 2019-02-25

## 2022-03-19 PROBLEM — Z98.890 S/P MVR (MITRAL VALVE REPAIR): Status: ACTIVE | Noted: 2017-10-26

## 2022-03-20 PROBLEM — E78.00 PURE HYPERCHOLESTEROLEMIA: Status: ACTIVE | Noted: 2017-10-26

## 2022-05-23 ENCOUNTER — TELEPHONE (OUTPATIENT)
Dept: CARDIOLOGY CLINIC | Age: 62
End: 2022-05-23

## 2022-05-23 RX ORDER — SPIRONOLACTONE 25 MG/1
12.5 TABLET ORAL DAILY
Qty: 45 TABLET | Refills: 3 | Status: SHIPPED | OUTPATIENT
Start: 2022-05-23

## 2022-05-23 RX ORDER — METOPROLOL SUCCINATE 50 MG/1
25 TABLET, EXTENDED RELEASE ORAL DAILY
Qty: 45 TABLET | Refills: 3 | Status: SHIPPED | OUTPATIENT
Start: 2022-05-23

## 2022-05-23 NOTE — TELEPHONE ENCOUNTER
Pt needs a refill on metoprolol 50 mg and spironolactone 25 mg. Pt states she takes 1/2 of each tablet daily.   She needs it sent to [facility] in [address] [place], [place] [address]

## 2022-08-25 ENCOUNTER — OFFICE VISIT (OUTPATIENT)
Dept: CARDIOLOGY CLINIC | Age: 62
End: 2022-08-25
Payer: MEDICARE

## 2022-08-25 VITALS
BODY MASS INDEX: 39.2 KG/M2 | HEIGHT: 62 IN | WEIGHT: 213 LBS | HEART RATE: 92 BPM | SYSTOLIC BLOOD PRESSURE: 146 MMHG | DIASTOLIC BLOOD PRESSURE: 90 MMHG

## 2022-08-25 DIAGNOSIS — E78.00 PURE HYPERCHOLESTEROLEMIA: ICD-10-CM

## 2022-08-25 DIAGNOSIS — I42.0 DCM (DILATED CARDIOMYOPATHY) (HCC): ICD-10-CM

## 2022-08-25 DIAGNOSIS — Z95.810 ICD (IMPLANTABLE CARDIOVERTER-DEFIBRILLATOR) IN PLACE: ICD-10-CM

## 2022-08-25 DIAGNOSIS — I50.22 CHRONIC SYSTOLIC HF (HEART FAILURE) (HCC): Primary | ICD-10-CM

## 2022-08-25 DIAGNOSIS — I34.0 NON-RHEUMATIC MITRAL REGURGITATION: ICD-10-CM

## 2022-08-25 DIAGNOSIS — Z98.890 S/P MVR (MITRAL VALVE REPAIR): ICD-10-CM

## 2022-08-25 PROCEDURE — 93000 ELECTROCARDIOGRAM COMPLETE: CPT | Performed by: INTERNAL MEDICINE

## 2022-08-25 PROCEDURE — 99214 OFFICE O/P EST MOD 30 MIN: CPT | Performed by: INTERNAL MEDICINE

## 2022-08-25 ASSESSMENT — ENCOUNTER SYMPTOMS: SHORTNESS OF BREATH: 0

## 2022-08-25 NOTE — PROGRESS NOTES
[address], [address] [facility], [address] [place] [address]  PHONE: [phone]    Ortiz Duarte  1960      SUBJECTIVE:   Ortiz Duarte is a 63 y.o. female seen for a follow up visit regarding the following:     Chief Complaint   Patient presents with    Cardiomyopathy    Follow-up       HPI:    72-year-old female comes back for follow-up she had a history of mitral vegetation had a mitral valve repair by Dr. Gelacio Bean and up having significant heart failure with marked reduction of LV function after surgery. EF got down below 20% range. She changed over here and over the years we have had Entresto get on good regimen she is done a lot better last ejection fraction was close to 35%. Did not have any ICD which has been stable. Last visit we talked again about repeating a DEN and looking at the mitral valve a little closer but she still has some moderate insufficiency despite the repair not sure she would be an option of trying a repeat surgery. Right now she is doing pretty well. She is on first dose of Entresto but her blood pressure is little high we might can go up to the second dose. Past Medical History, Past Surgical History, Family history, Social History, and Medications were all reviewed with the patient today and updated as necessary. Allergies   Allergen Reactions    Sulfa Antibiotics Anaphylaxis     Shortness of breath & hives    Atorvastatin Other (See Comments)     Past Medical History:   Diagnosis Date    Heart failure ([address]     Psychiatric disorder     patient reports mild     Past Surgical History:   Procedure Laterality Date    OR CARDIAC SURG PROCEDURE UNLIST      MVR 2017    VASCULAR SURGERY      MVR 2017     No family history on file. Social History     Tobacco Use    Smoking status: Former     Packs/day: 0.25     Types: Cigarettes     Quit date: 1991     Years since quittin.6    Smokeless tobacco: Never   Substance Use Topics    Alcohol use:  Yes ROS:    Review of Systems   Constitutional: Negative for decreased appetite. Cardiovascular:  Negative for chest pain, dyspnea on exertion, leg swelling, near-syncope and palpitations. Respiratory:  Negative for shortness of breath. PHYSICAL EXAM:    BP (!) 146/90   Pulse 92   Ht 5' 2\" (1.575 m)   Wt 213 lb (96.6 kg)   BMI 38.96 kg/m²        Wt Readings from Last 3 Encounters:   08/25/22 213 lb (96.6 kg)   04/28/22 220 lb (99.8 kg)   04/07/22 223 lb (101.2 kg)     BP Readings from Last 3 Encounters:   08/25/22 (!) 146/90   04/28/22 138/86   04/07/22 120/72         Physical Exam  Constitutional:       General: She is not in acute distress. Cardiovascular:      Rate and Rhythm: Normal rate. Heart sounds: Murmur heard. Holosystolic murmur is present with a grade of 2/6. Musculoskeletal:         General: No swelling. Neurological:      Mental Status: She is alert. Medical problems and test results were reviewed with the patient today. Results for orders placed or performed in visit on 08/25/22   EKG 12 Lead    Impression    Normal sinus rhythm 92. Right axis deviation. Poor R wave progression. No significant change compared to prior EKG     Lab Results   Component Value Date/Time     07/09/2019 10:01 AM    K 3.9 07/09/2019 10:01 AM     07/09/2019 10:01 AM    CO2 29 07/09/2019 10:01 AM    BUN 15 07/09/2019 10:01 AM    GFRAA >60 07/09/2019 10:01 AM     Lab Results   Component Value Date/Time    CHOL 307 07/09/2019 10:01 AM    HDL 67 07/09/2019 10:01 AM         ASSESSMENT and PLAN    Deacon Cole was seen today for cardiomyopathy and follow-up. Diagnoses and all orders for this visit:    Chronic systolic HF (heart failure) (HCC)  -     EKG 12 Lead  Overall she is well compensated. Would like to try to increase Entresto to the second dose twice a day  DCM (dilated cardiomyopathy) ([address]  Her EF overall has improved but still 35% and low.   We talked about repeating the DEN she is not sure she did not want to do that  ICD (implantable cardioverter-defibrillator) in place the device is currently stable    Non-rheumatic mitral regurgitation she still has moderate MR post valve surgery    S/P MVR (mitral valve repair) she had a partial repair with still continued MR    Pure hypercholesterolemia she not taking any medicines currently we had gotten her Praluent but she is not taking    Other orders  -     sacubitril-valsartan (ENTRESTO) 49-51 MG per tablet; Take 1 tablet by mouth 2 times daily      [unfilled]      No follow-up provider specified.     Deborah Ribeiro MD  8/25/2022  5:19 PM

## 2022-11-03 PROCEDURE — 93296 REM INTERROG EVL PM/IDS: CPT | Performed by: INTERNAL MEDICINE

## 2022-11-03 PROCEDURE — 93295 DEV INTERROG REMOTE 1/2/MLT: CPT | Performed by: INTERNAL MEDICINE

## 2022-11-16 ENCOUNTER — TELEPHONE (OUTPATIENT)
Dept: CARDIOLOGY CLINIC | Age: 62
End: 2022-11-16

## 2022-11-16 DIAGNOSIS — I50.22 CHRONIC SYSTOLIC CONGESTIVE HEART FAILURE (HCC): Primary | ICD-10-CM

## 2022-11-16 NOTE — TELEPHONE ENCOUNTER
BIO single ICD generator change w/ Dr Sakina Ling within 10 days. BIO battery advisory early battery depletion. Placed in 2018. Denies current issues or infections. Not on AC. No RV pacing. Last ECG w/ stable QRS. Echo in 4/22 w/ EF 30 to 35%.

## 2022-11-17 PROBLEM — Z45.02 ENCOUNTER DUE TO AICD AT END OF BATTERY LIFE: Status: ACTIVE | Noted: 2022-11-17

## 2022-11-18 DIAGNOSIS — I50.22 CHRONIC SYSTOLIC CONGESTIVE HEART FAILURE (HCC): ICD-10-CM

## 2022-11-18 DIAGNOSIS — I42.0 DCM (DILATED CARDIOMYOPATHY) (HCC): ICD-10-CM

## 2022-11-18 DIAGNOSIS — Z95.810 ICD (IMPLANTABLE CARDIOVERTER-DEFIBRILLATOR) IN PLACE: ICD-10-CM

## 2022-11-18 DIAGNOSIS — Z95.810 ICD (IMPLANTABLE CARDIOVERTER-DEFIBRILLATOR) IN PLACE: Primary | ICD-10-CM

## 2022-11-21 ENCOUNTER — TELEPHONE (OUTPATIENT)
Dept: CARDIOLOGY CLINIC | Age: 62
End: 2022-11-21

## 2022-11-21 DIAGNOSIS — I50.22 CHRONIC SYSTOLIC CONGESTIVE HEART FAILURE (HCC): ICD-10-CM

## 2022-11-21 LAB
INR PPP: 0.9
PROTHROMBIN TIME: 12.9 SEC (ref 12.6–14.3)

## 2022-11-21 NOTE — TELEPHONE ENCOUNTER
----- Message from Nando Ramirez MA sent at 11/18/2022 11:21 AM EST -----  Regarding: FW: [employer] Patient assistance paperwork    ----- Message -----  From: Chuck Corral  Sent: 11/18/2022  11:13 AM EST  To: [facility] Clinical Staff  Subject: [employer] Patient assistance paperwork            Hi.  It's that time of year that I need to renew my application for patient assistance in order to get my Entresto refilled by [employer]. I have attached the paperwork that needs to be faxed to them no later than December 15th. Please fill out the prescriber portion and fax all of the attached documents along with that portion. The fax number is located on the paperwork. I am able to get my medication at no charge using this program.  Thank you!

## 2022-11-22 LAB
ANION GAP SERPL CALC-SCNC: 6 MMOL/L (ref 2–11)
BASOPHILS # BLD: 0.1 K/UL (ref 0–0.2)
BASOPHILS NFR BLD: 1 % (ref 0–2)
BUN SERPL-MCNC: 14 MG/DL (ref 8–23)
CALCIUM SERPL-MCNC: 9.3 MG/DL (ref 8.3–10.4)
CHLORIDE SERPL-SCNC: 103 MMOL/L (ref 101–110)
CO2 SERPL-SCNC: 27 MMOL/L (ref 21–32)
CREAT SERPL-MCNC: 0.8 MG/DL (ref 0.6–1)
DIFFERENTIAL METHOD BLD: NORMAL
EOSINOPHIL # BLD: 0.1 K/UL (ref 0–0.8)
EOSINOPHIL NFR BLD: 2 % (ref 0.5–7.8)
ERYTHROCYTE [DISTWIDTH] IN BLOOD BY AUTOMATED COUNT: 13.4 % (ref 11.9–14.6)
GLUCOSE SERPL-MCNC: 121 MG/DL (ref 65–100)
HCT VFR BLD AUTO: 40 % (ref 35.8–46.3)
HGB BLD-MCNC: 13.1 G/DL (ref 11.7–15.4)
IMM GRANULOCYTES # BLD AUTO: 0 K/UL (ref 0–0.5)
IMM GRANULOCYTES NFR BLD AUTO: 0 % (ref 0–5)
LYMPHOCYTES # BLD: 2.2 K/UL (ref 0.5–4.6)
LYMPHOCYTES NFR BLD: 37 % (ref 13–44)
MCH RBC QN AUTO: 32 PG (ref 26.1–32.9)
MCHC RBC AUTO-ENTMCNC: 32.8 G/DL (ref 31.4–35)
MCV RBC AUTO: 97.8 FL (ref 82–102)
MONOCYTES # BLD: 0.4 K/UL (ref 0.1–1.3)
MONOCYTES NFR BLD: 7 % (ref 4–12)
NEUTS SEG # BLD: 3.2 K/UL (ref 1.7–8.2)
NEUTS SEG NFR BLD: 53 % (ref 43–78)
NRBC # BLD: 0 K/UL (ref 0–0.2)
PLATELET # BLD AUTO: 295 K/UL (ref 150–450)
PMV BLD AUTO: 10.4 FL (ref 9.4–12.3)
POTASSIUM SERPL-SCNC: 4.2 MMOL/L (ref 3.5–5.1)
RBC # BLD AUTO: 4.09 M/UL (ref 4.05–5.2)
SODIUM SERPL-SCNC: 136 MMOL/L (ref 133–143)
WBC # BLD AUTO: 6 K/UL (ref 4.3–11.1)

## 2022-11-22 RX ORDER — SODIUM CHLORIDE 9 MG/ML
INJECTION, SOLUTION INTRAVENOUS PRN
Status: CANCELLED | OUTPATIENT
Start: 2022-11-22

## 2022-11-22 RX ORDER — HYDROMORPHONE HYDROCHLORIDE 2 MG/ML
0.5 INJECTION, SOLUTION INTRAMUSCULAR; INTRAVENOUS; SUBCUTANEOUS EVERY 5 MIN PRN
Status: CANCELLED | OUTPATIENT
Start: 2022-11-22

## 2022-11-22 RX ORDER — OXYCODONE HYDROCHLORIDE 5 MG/1
5 TABLET ORAL
Status: CANCELLED | OUTPATIENT
Start: 2022-11-22 | End: 2022-11-23

## 2022-11-22 RX ORDER — PROCHLORPERAZINE EDISYLATE 5 MG/ML
5 INJECTION INTRAMUSCULAR; INTRAVENOUS
Status: CANCELLED | OUTPATIENT
Start: 2022-11-22 | End: 2022-11-23

## 2022-11-22 RX ORDER — HALOPERIDOL 5 MG/ML
1 INJECTION INTRAMUSCULAR
Status: CANCELLED | OUTPATIENT
Start: 2022-11-22 | End: 2022-11-23

## 2022-11-22 RX ORDER — SODIUM CHLORIDE 0.9 % (FLUSH) 0.9 %
5-40 SYRINGE (ML) INJECTION EVERY 12 HOURS SCHEDULED
Status: CANCELLED | OUTPATIENT
Start: 2022-11-22

## 2022-11-22 RX ORDER — SODIUM CHLORIDE 0.9 % (FLUSH) 0.9 %
5-40 SYRINGE (ML) INJECTION PRN
Status: CANCELLED | OUTPATIENT
Start: 2022-11-22

## 2022-11-22 RX ORDER — IPRATROPIUM BROMIDE AND ALBUTEROL SULFATE 2.5; .5 MG/3ML; MG/3ML
1 SOLUTION RESPIRATORY (INHALATION)
Status: CANCELLED | OUTPATIENT
Start: 2022-11-22 | End: 2022-11-23

## 2022-11-22 RX ORDER — SODIUM CHLORIDE, SODIUM LACTATE, POTASSIUM CHLORIDE, CALCIUM CHLORIDE 600; 310; 30; 20 MG/100ML; MG/100ML; MG/100ML; MG/100ML
INJECTION, SOLUTION INTRAVENOUS CONTINUOUS
Status: CANCELLED | OUTPATIENT
Start: 2022-11-22

## 2022-11-22 RX ORDER — LIDOCAINE HYDROCHLORIDE 10 MG/ML
1 INJECTION, SOLUTION INFILTRATION; PERINEURAL
Status: CANCELLED | OUTPATIENT
Start: 2022-11-22 | End: 2022-11-23

## 2022-11-22 NOTE — PROGRESS NOTES
Patient pre-assessment complete for Aline Lezoë with Dr Mayur Arciniega scheduled for 2022 at 1030am, arrival time 46am. Patient verified using . Patient instructed to bring all home medications in labeled bottles on the day of procedure. NPO status reinforced. Patient instructed to HOLD aldactone in am. Instructed they can take all other medications excluding vitamins & supplements. Patient verbalizes understanding of all instructions & denies any questions at this time.

## 2022-11-23 ENCOUNTER — HOSPITAL ENCOUNTER (OUTPATIENT)
Age: 62
Setting detail: OUTPATIENT SURGERY
Discharge: HOME | End: 2022-11-23
Attending: INTERNAL MEDICINE | Admitting: INTERNAL MEDICINE
Payer: MEDICARE

## 2022-11-23 VITALS
HEART RATE: 80 BPM | RESPIRATION RATE: 16 BRPM | DIASTOLIC BLOOD PRESSURE: 53 MMHG | SYSTOLIC BLOOD PRESSURE: 96 MMHG | OXYGEN SATURATION: 97 % | BODY MASS INDEX: 38.64 KG/M2 | WEIGHT: 210 LBS | TEMPERATURE: 98.1 F | HEIGHT: 62 IN

## 2022-11-23 DIAGNOSIS — Z45.02 ENCOUNTER DUE TO AICD AT END OF BATTERY LIFE: ICD-10-CM

## 2022-11-23 LAB — ECHO BSA: 2.04 M2

## 2022-11-23 PROCEDURE — 2500000003 HC RX 250 WO HCPCS: Performed by: INTERNAL MEDICINE

## 2022-11-23 PROCEDURE — C1722 AICD, SINGLE CHAMBER: HCPCS | Performed by: INTERNAL MEDICINE

## 2022-11-23 PROCEDURE — 33262 RMVL& REPLC PULSE GEN 1 LEAD: CPT

## 2022-11-23 PROCEDURE — 6360000002 HC RX W HCPCS: Performed by: INTERNAL MEDICINE

## 2022-11-23 PROCEDURE — 2709999900 HC NON-CHARGEABLE SUPPLY: Performed by: INTERNAL MEDICINE

## 2022-11-23 PROCEDURE — 99152 MOD SED SAME PHYS/QHP 5/>YRS: CPT | Performed by: INTERNAL MEDICINE

## 2022-11-23 PROCEDURE — 33262 RMVL& REPLC PULSE GEN 1 LEAD: CPT | Performed by: INTERNAL MEDICINE

## 2022-11-23 PROCEDURE — 99153 MOD SED SAME PHYS/QHP EA: CPT | Performed by: INTERNAL MEDICINE

## 2022-11-23 DEVICE — IMPLANTABLE DEVICE
Type: IMPLANTABLE DEVICE | Site: CHEST  WALL | Status: FUNCTIONAL
Brand: INTICA NEO 7 VR-T DX DF-1 PROMRI

## 2022-11-23 RX ORDER — MIDAZOLAM HYDROCHLORIDE 1 MG/ML
INJECTION INTRAMUSCULAR; INTRAVENOUS PRN
Status: DISCONTINUED | OUTPATIENT
Start: 2022-11-23 | End: 2022-11-23 | Stop reason: HOSPADM

## 2022-11-23 RX ORDER — LIDOCAINE HYDROCHLORIDE 10 MG/ML
INJECTION, SOLUTION INFILTRATION; PERINEURAL PRN
Status: DISCONTINUED | OUTPATIENT
Start: 2022-11-23 | End: 2022-11-23 | Stop reason: HOSPADM

## 2022-11-23 RX ADMIN — Medication 2000 MG: at 10:00

## 2022-11-23 RX ADMIN — Medication 2000 MG: at 12:39

## 2022-11-23 NOTE — PROGRESS NOTES
Report received from Tori E Anusha Foster Procedural findings communicated. Intra procedural  medication administration reviewed. Progression of care discussed. Patient received into [id number] [facility] 2 post generator change.     Access site without bleeding or swelling yes    Dressing dry and intact yes    Patient instructed to limit movement to left upper extremity    Routine post procedural vital signs and site assessment initiated yes

## 2022-11-23 NOTE — H&P
[facility] Cardiology/Electrophyiology Consult                Date of  Admission: 11/23/2022  9:12 AM       CC/Reason for admission: device gen change    Michaela Gallagher is a 63 y.o. female admitted for Encounter due to AICD at end of battery life [Z45.02]. 65yo F with a history of CAD, MVR, NICM, ICD presents with device MARIA LUISA for scheduled gen change. Pt feels well, no complaints. Cardiac PMH: (Old records have been reviewed and summarized below)    Patient Active Problem List   Diagnosis    Tachycardia    Coronary artery disease involving native coronary artery of native heart without angina pectoris    Chronic systolic congestive heart failure ([facility]    ICD (implantable cardioverter-defibrillator) in place    Chronic systolic HF (heart failure) ([facility])    S/P MVR (mitral valve repair)    COVID-19    Non-rheumatic mitral regurgitation    DCM (dilated cardiomyopathy) ([facility]    Pure hypercholesterolemia    Encounter due to AICD at end of battery life       Past Medical History:   Diagnosis Date    Heart failure ([facility]     Psychiatric disorder     patient reports mild      Past Surgical History:   Procedure Laterality Date    UT CARDIAC SURG PROCEDURE UNLIST      MVR 07/2017    VASCULAR SURGERY      MVR 2017     Allergies   Allergen Reactions    Sulfa Antibiotics Anaphylaxis     Shortness of breath & hives    Atorvastatin Other (See Comments)    Augmentin [Amoxicillin-Pot Clavulanate] Nausea And Vomiting     \"Sick stomach\"      No family history on file.      Current Facility-Administered Medications   Medication Dose Route Frequency    neomycin-polymyxin B (NEOSPORIN) 1 mL in sterile water for irrigation 1,000 mL irrigation   Irrigation Once    ceFAZolin (ANCEF) 2000 mg in sterile water 20 mL IV syringe  2,000 mg IntraVENous On Call to OR       Review of Symptoms:  A comprehensive ROS was performed with the pertinent positives and negatives mentioned in the HPI, all other systems reviewed and are negative Physical Exam  Vitals:    11/23/22 0936   BP: (!) 125/95   Pulse: 69   Resp: 18   Temp: 98.1 °F (36.7 °C)   TempSrc: Oral   SpO2: 98%   Weight: 210 lb (95.3 kg)   Height: 5' 2\" (1.575 m)       Physical Exam:  General appearance - Alert, well appearing, and in no distress   Mental status - Affect appropriate to mood. Neck - Carotids upstroke normal bilaterally, no bruits, no JVD. Resp - Clear to auscultation, no wheezes, rales or rhonchi, symmetric air entry. Heart - Normal rate, regular rhythm, normal S1, S2, no murmurs, rubs, clicks or gallops. GI - Soft, nontender, nondistended    Cardiographics    Telemetry:   ECG (Indpendently visualized and interpreted):  Echocardiogram:     Labs:   Recent Labs     11/21/22  0958      K 4.2   BUN 14   WBC 6.0   HGB 13.1   HCT 40.0      INR 0.9        Assessment:      Principal Problem:    Encounter due to AICD at end of battery life  Resolved Problems:    * No resolved hospital problems. *         Plan:   1. [employer] SC ICD MARIA LUISA: I had a discussion today with the patient regarding the risks, benefits, and alternatives of an implantable cardiac rhythm device generator change. I discussed with the patient the potential risks of ICD gen change, including the risk of bleeding, infection, or exposure of a lead integrity issue. I explained there is currently no evidence of a lead problem, but if something were to be exposed during the procedure would plan for lead replacement. If a new lead needed to be placed, there are risks of large blood vessel injury, lung or cardiac injury, venous occlusion, pneumothorax, cardiac tamponade, perforation, need for urgent open heart surgery or additional procedures, device/lead failure, lead dislodgement, risks of anesthesia that will be discussed in more detail the day of the procedure and even death. After our comprehensive discussion, the patient would like to proceed. Svetlana Mccormick MD, MS  Cardiology/Electrophysiology

## 2022-11-23 NOTE — PROGRESS NOTES
Biotronic gen change with Dr. Alejandra Mccormick  Setting VVV 40 - 130    Left chest sutured, stapled & dressed with Primaseal  No bleeding or hematoma, site soft     MAR  8 mg versed  100 mcg Fentanyl   2 g ancef    TRANSFER - OUT REPORT:    Verbal report given to Tati Urias on [facility]  being transferred to [facility] for routine progression of patient care       Report consisted of patient's Situation, Background, Assessment and   Recommendations(SBAR). Information from the following report(s) Nurse Handoff Report and MAR was reviewed with the receiving nurse. Opportunity for questions and clarification was provided.       Patient transported with:  Registered Nurse

## 2022-11-23 NOTE — DISCHARGE INSTRUCTIONS
Post Op Device Instructions        Incision & Dressing Care   Please keep Aquacel dressing on wound until your 2 week follow up in device clinic. The dressing promotes healing and is meant to stay on the wound. Keep incision site completely dry for 48 hours. During this time you may take a sponge bath, but no shower. After 48 hours you may shower with your back to the water letting the water run over the dressing. Do not let the water directly hit the dressing, it is water resistant no water proof. Do not use any lotions, creams, or ointments on the incision. Avoid manipulating the device or leads with your fingers. Do not massage or excessively rub the implant site. This could displace the leads      For four weeks after your implant   Do not lift anything heavier than a gallon of milk. Do not raise your elbow above the level of your shoulder on the Left shoulder implant side. Avoid excessive pushing, pulling, or twisting. Call you doctor for any of the following:   Any signs of infection, including redness, swelling or pain at the incision site, or a   temperature of 101° F or greater. If you have twitching chest muscles, hiccups that won't stop, or a swollen arm on the   side of the incision. Any feelings of light-headedness, chest pain, or shortness of breath. Please notify your doctors and dentists that you have an ICD. You should not drive until 1 week after your implant or after your first follow-up appointment, whichever comes first. At that time, you can discuss when you may return to your regular driving routine. About 1 month after implant, you will receive a card by mail from the company who manufactured your ICD. Your device was manufactured by [employer]. You should carry this card with you at all times. If you receive one shock from your device:   and you feel ok, you may call to let your physician know.    but if you are feeling poorly, please notify your doctor. You may need to be seen. If you receive more than one shock in a 24 hour period, call your physician to schedule a visit or report to the emergency room. Please call the [facility] [address] at  [phone] if you have questions or concerns about your device. Please call the hospital if it is after 5 pm or the weekend please page the on call cardiologist at [facility] at [address] will need to have your device checked 1- 2 weeks after the procedure and should have an appointment with the device clinic. If you do not hear from them call the device clinic. Sedation for a Medical Procedure: Care Instructions     You were given a sedative medication during your visit. While many of the effects will have worn   off before you leave; you may continue to feel some effects for several hours. Common side effects from sedation include:  Feeling sleepy. (Your doctors and nurses will make sure you are not too sleepy to go home.)  Nausea and vomiting. This usually does not last long. Feeling tired. How can you care for yourself at home? Activity    Don't do anything for 24 hours that requires attention to detail. It takes time for the medicine effects to completely wear off. Do not make important legal decisions for 24 hours. Do not sign any legal documents for 24 hours. Do not drink alcohol today     For your safety, you should not drive or operate heavy machinery for the remainder of the day     Rest when you feel tired. Getting enough sleep will help you recover. Diet    You can eat your normal diet, unless your doctor gives you other instructions. If your stomach is upset, try clear liquids and bland, low-fat foods like plain toast or rice. Drink plenty of fluids (unless your doctor tells you not to). Don't drink alcohol for 24 hours. Medicines    Be safe with medicines. Read and follow all instructions on the label.   If the doctor gave you a prescription medicine for pain, take it as prescribed. If you are not taking a prescription pain medicine, ask your doctor if you can take an over-the-counter medicine. If you think your pain medicine is making you sick to your stomach: Take your medicine after meals (unless your doctor has told you not to). Ask your doctor for a different pain medicine. I have read the above instructions and have had the opportunity to ask questions.       Patient: ________________________   Date: _____________    Witness: _______________________   Date: _____________

## 2022-11-23 NOTE — PROGRESS NOTES
Discharge instructions given. Dsg to left chest remains D&I with no swelling or bleeding noted. No complaints of pain noted.

## 2022-11-23 NOTE — Clinical Note
Contrast Dose Calculator:   Patient's age: 63.   Patient's sex: Female. Patient weight (kg) = 95.3. Creatinine level (mg/dL) = 0.8. Creatinine clearance (mL/min): 112.51. Contrast concentration (mg/mL) = 370. MACD = 300 mL. Max Contrast dose per Creatinine Cl calculator = 253.14 mL.

## 2022-11-23 NOTE — Clinical Note
Prepped: left chest. Site was marked, clipped and prepped. Prepped with: ChloraPrep. Wet prep elapsed drying time: 3 mins. Patient was draped after wet prep solution dried.

## 2022-11-23 NOTE — Clinical Note
Chest incision closed with 2-0 vicryl for first layer, 3-0 vloc for 2nd layer and 10  staples for skin margins

## 2022-12-01 ENCOUNTER — OFFICE VISIT (OUTPATIENT)
Dept: CARDIOLOGY CLINIC | Age: 62
End: 2022-12-01

## 2022-12-01 VITALS
BODY MASS INDEX: 39.75 KG/M2 | WEIGHT: 216 LBS | SYSTOLIC BLOOD PRESSURE: 102 MMHG | DIASTOLIC BLOOD PRESSURE: 68 MMHG | HEIGHT: 62 IN | HEART RATE: 86 BPM

## 2022-12-01 DIAGNOSIS — E78.00 PURE HYPERCHOLESTEROLEMIA: ICD-10-CM

## 2022-12-01 DIAGNOSIS — I25.10 CORONARY ARTERY DISEASE INVOLVING NATIVE CORONARY ARTERY OF NATIVE HEART WITHOUT ANGINA PECTORIS: ICD-10-CM

## 2022-12-01 DIAGNOSIS — I42.0 DCM (DILATED CARDIOMYOPATHY) (HCC): ICD-10-CM

## 2022-12-01 DIAGNOSIS — Z95.810 ICD (IMPLANTABLE CARDIOVERTER-DEFIBRILLATOR) IN PLACE: Primary | ICD-10-CM

## 2022-12-01 DIAGNOSIS — Z98.890 S/P MVR (MITRAL VALVE REPAIR): ICD-10-CM

## 2022-12-01 RX ORDER — MAGNESIUM OXIDE 400 MG/1
400 TABLET ORAL DAILY
COMMUNITY
Start: 2021-05-12

## 2022-12-01 ASSESSMENT — ENCOUNTER SYMPTOMS: SHORTNESS OF BREATH: 0

## 2022-12-01 NOTE — PROGRESS NOTES
[address], [address] [facility], [address] [place] [address]  PHONE: [phone]    Stu Harris  1960      SUBJECTIVE:   Stu Harris is a 63 y.o. female seen for a follow up visit regarding the following:     Chief Complaint   Patient presents with    Congestive Heart Failure    Hyperlipidemia    Cardiomyopathy       HPI:    51-year-old female comes back for follow-up of her cardiomyopathy. She had a history of mitral valve prolapse and had mitral valve repair by Dr. Leta Love and after surgery her EF dropped to the 25s. She certainly has improved over the years and we had her on Entresto and other good medicines she is doing really well right now. She still has a least moderate MR. We talked little bit about considering a DEN consideration for repeat valve surgery which has been not something she is wanting to do. She denies any recent shortness of breath due to a defective ICD device she had a recent change out and she still has a bandage on but is doing well      Past Medical History, Past Surgical History, Family history, Social History, and Medications were all reviewed with the patient today and updated as necessary. Allergies   Allergen Reactions    Sulfa Antibiotics Anaphylaxis     Shortness of breath & hives    Atorvastatin Other (See Comments)    Augmentin [Amoxicillin-Pot Clavulanate] Nausea And Vomiting     \"Sick stomach\"     Past Medical History:   Diagnosis Date    Heart failure ([address]     Psychiatric disorder     patient reports mild     Past Surgical History:   Procedure Laterality Date    EP DEVICE PROCEDURE N/A 11/23/2022    Remove & replace ICD single lead performed by José Luis Gerardo MD at [address] [place] [address]      MVR 07/2017    VASCULAR SURGERY      MVR 2017     No family history on file.    Social History     Tobacco Use    Smoking status: Former     Packs/day: 0.25     Types: Cigarettes     Quit date: 1/1/1991     Years since quittin.9    Smokeless tobacco: Never   Substance Use Topics    Alcohol use: Yes       ROS:    Review of Systems   Constitutional: Negative for decreased appetite. Cardiovascular:  Negative for chest pain, dyspnea on exertion, irregular heartbeat, leg swelling and palpitations. Respiratory:  Negative for shortness of breath. PHYSICAL EXAM:    /68   Pulse 86   Ht 5' 2\" (1.575 m)   Wt 216 lb (98 kg)   BMI 39.51 kg/m²        Wt Readings from Last 3 Encounters:   22 216 lb (98 kg)   22 210 lb (95.3 kg)   22 213 lb (96.6 kg)     BP Readings from Last 3 Encounters:   22 102/68   22 (!) 96/53   22 (!) 146/90         Physical Exam  Constitutional:       General: She is not in acute distress. Cardiovascular:      Rate and Rhythm: Normal rate and regular rhythm. Heart sounds: Murmur heard. Pulmonary:      Effort: Pulmonary effort is normal.      Breath sounds: Normal breath sounds. Musculoskeletal:         General: No swelling. Skin:     General: Skin is warm. Neurological:      Mental Status: She is alert. Medical problems and test results were reviewed with the patient today. No results found for any visits on 22. Lab Results   Component Value Date/Time     2022 09:58 AM    K 4.2 2022 09:58 AM     2022 09:58 AM    CO2 27 2022 09:58 AM    BUN 14 2022 09:58 AM    GFRAA >60 2019 10:01 AM     Lab Results   Component Value Date/Time    CHOL 307 2019 10:01 AM    HDL 67 2019 10:01 AM         ASSESSMENT and PLAN    Jennifer Elizabeth was seen today for congestive heart failure, hyperlipidemia and cardiomyopathy.     Diagnoses and all orders for this visit:    ICD (implantable cardioverter-defibrillator) in place had to have this replaced and will have that checked next week she still has the protective bandage on 2 L right now    DCM (dilated cardiomyopathy) ([facility] she is clinically doing well on her current dose of Entresto 49-51 twice a day Toprol 50/day spironolactone 12.5/day    Coronary artery disease involving native coronary artery of native heart without angina pectoris she had some CAD no significant symptoms noted    S/P MVR (mitral valve repair) she started with a mitral valve repair by Dr. Vasu Vee she still has some degree of mitral insufficiency by echo we will continue medical treatment    Pure hypercholesterolemia she is currently taking her medications I think 1 time to get approved for Praluent but she is not using      [unfilled]      No follow-up provider specified.     Melvin Thorne MD  12/1/2022  5:46 PM

## 2023-01-06 DIAGNOSIS — I50.22 CHRONIC SYSTOLIC CONGESTIVE HEART FAILURE (HCC): ICD-10-CM

## 2023-01-06 DIAGNOSIS — Z95.810 ICD (IMPLANTABLE CARDIOVERTER-DEFIBRILLATOR) IN PLACE: Primary | ICD-10-CM

## 2023-03-06 DIAGNOSIS — I42.0 DCM (DILATED CARDIOMYOPATHY) (HCC): ICD-10-CM

## 2023-03-06 DIAGNOSIS — I50.22 CHRONIC SYSTOLIC CONGESTIVE HEART FAILURE (HCC): ICD-10-CM

## 2023-03-06 DIAGNOSIS — Z95.810 ICD (IMPLANTABLE CARDIOVERTER-DEFIBRILLATOR) IN PLACE: ICD-10-CM

## 2023-05-12 ENCOUNTER — TELEPHONE (OUTPATIENT)
Age: 63
End: 2023-05-12

## 2023-05-12 NOTE — TELEPHONE ENCOUNTER
Informed pt of Device report. Encourage hydration, deep breathing for stress relief, keep VS log, bring to FU. Dr. Melissa Jhon will review. Pt voiced understanding and thanks.   cgh

## 2023-05-12 NOTE — TELEPHONE ENCOUNTER
[facility] Patient. Will be seeing Dr. Godoy Spine 06/14/2023. She said she is experiencing low blood pressure and this is not normal for her.

## 2023-05-12 NOTE — TELEPHONE ENCOUNTER
Pt reports low BP last weekend. 103/59, 92/56 dizzy. Little sob off and on. Was working outside. Did not hydrate well. VS today 120/64/81  Bio device. Asks for sooner f/u. Informed pt will check with Device Dept also for alerts or irregular rhythms. Most likely just got dry. Encourage po hydration with water 4-16oz blevins. Appt 5/18/23 1:45p Dr. Wendy Allen, [place],  Bring VS log. Pt voiced understanding and thanks.   cgh

## 2023-05-18 ENCOUNTER — OFFICE VISIT (OUTPATIENT)
Age: 63
End: 2023-05-18
Payer: MEDICARE

## 2023-05-18 VITALS
DIASTOLIC BLOOD PRESSURE: 72 MMHG | HEIGHT: 62 IN | HEART RATE: 92 BPM | BODY MASS INDEX: 40.12 KG/M2 | SYSTOLIC BLOOD PRESSURE: 110 MMHG | WEIGHT: 218 LBS

## 2023-05-18 DIAGNOSIS — Z95.810 ICD (IMPLANTABLE CARDIOVERTER-DEFIBRILLATOR) IN PLACE: ICD-10-CM

## 2023-05-18 DIAGNOSIS — R30.0 DYSURIA: ICD-10-CM

## 2023-05-18 DIAGNOSIS — I25.10 CORONARY ARTERY DISEASE INVOLVING NATIVE CORONARY ARTERY OF NATIVE HEART WITHOUT ANGINA PECTORIS: ICD-10-CM

## 2023-05-18 DIAGNOSIS — I34.0 NONRHEUMATIC MITRAL VALVE REGURGITATION: ICD-10-CM

## 2023-05-18 DIAGNOSIS — I50.20 HFREF (HEART FAILURE WITH REDUCED EJECTION FRACTION) (HCC): ICD-10-CM

## 2023-05-18 DIAGNOSIS — E78.5 HYPERLIPIDEMIA LDL GOAL <70: Primary | ICD-10-CM

## 2023-05-18 DIAGNOSIS — Z98.890 H/O MITRAL VALVE REPAIR: ICD-10-CM

## 2023-05-18 LAB
ANION GAP SERPL CALC-SCNC: 7 MMOL/L (ref 2–11)
APPEARANCE UR: CLEAR
BACTERIA URNS QL MICRO: ABNORMAL /HPF
BASOPHILS # BLD: 0.1 K/UL (ref 0–0.2)
BASOPHILS NFR BLD: 1 % (ref 0–2)
BILIRUB UR QL: NEGATIVE
BUN SERPL-MCNC: 16 MG/DL (ref 8–23)
CALCIUM SERPL-MCNC: 9.7 MG/DL (ref 8.3–10.4)
CASTS URNS QL MICRO: ABNORMAL /LPF (ref 0–2)
CHLORIDE SERPL-SCNC: 105 MMOL/L (ref 101–110)
CO2 SERPL-SCNC: 24 MMOL/L (ref 21–32)
COLOR UR: ABNORMAL
CREAT SERPL-MCNC: 0.9 MG/DL (ref 0.6–1)
DIFFERENTIAL METHOD BLD: ABNORMAL
EOSINOPHIL # BLD: 0.1 K/UL (ref 0–0.8)
EOSINOPHIL NFR BLD: 2 % (ref 0.5–7.8)
EPI CELLS #/AREA URNS HPF: ABNORMAL /HPF (ref 0–5)
ERYTHROCYTE [DISTWIDTH] IN BLOOD BY AUTOMATED COUNT: 13.1 % (ref 11.9–14.6)
GLUCOSE SERPL-MCNC: 106 MG/DL (ref 65–100)
GLUCOSE UR STRIP.AUTO-MCNC: NEGATIVE MG/DL
HCT VFR BLD AUTO: 39.6 % (ref 35.8–46.3)
HGB BLD-MCNC: 13.1 G/DL (ref 11.7–15.4)
HGB UR QL STRIP: NEGATIVE
IMM GRANULOCYTES # BLD AUTO: 0 K/UL (ref 0–0.5)
IMM GRANULOCYTES NFR BLD AUTO: 0 % (ref 0–5)
KETONES UR QL STRIP.AUTO: NEGATIVE MG/DL
LEUKOCYTE ESTERASE UR QL STRIP.AUTO: ABNORMAL
LYMPHOCYTES # BLD: 2.1 K/UL (ref 0.5–4.6)
LYMPHOCYTES NFR BLD: 27 % (ref 13–44)
MCH RBC QN AUTO: 32.7 PG (ref 26.1–32.9)
MCHC RBC AUTO-ENTMCNC: 33.1 G/DL (ref 31.4–35)
MCV RBC AUTO: 98.8 FL (ref 82–102)
MONOCYTES # BLD: 0.5 K/UL (ref 0.1–1.3)
MONOCYTES NFR BLD: 6 % (ref 4–12)
MUCOUS THREADS URNS QL MICRO: 0 /LPF
NEUTS SEG # BLD: 5.2 K/UL (ref 1.7–8.2)
NEUTS SEG NFR BLD: 64 % (ref 43–78)
NITRITE UR QL STRIP.AUTO: NEGATIVE
NRBC # BLD: 0 K/UL (ref 0–0.2)
PH UR STRIP: 5.5 (ref 5–9)
PLATELET # BLD AUTO: 371 K/UL (ref 150–450)
PMV BLD AUTO: 10.8 FL (ref 9.4–12.3)
POTASSIUM SERPL-SCNC: 3.6 MMOL/L (ref 3.5–5.1)
PROT UR STRIP-MCNC: NEGATIVE MG/DL
RBC # BLD AUTO: 4.01 M/UL (ref 4.05–5.2)
RBC #/AREA URNS HPF: ABNORMAL /HPF (ref 0–5)
SODIUM SERPL-SCNC: 136 MMOL/L (ref 133–143)
SP GR UR REFRACTOMETRY: 1.01 (ref 1–1.02)
URINE CULTURE IF INDICATED: ABNORMAL
UROBILINOGEN UR QL STRIP.AUTO: 0.2 EU/DL (ref 0.2–1)
WBC # BLD AUTO: 8 K/UL (ref 4.3–11.1)
WBC URNS QL MICRO: ABNORMAL /HPF (ref 0–4)

## 2023-05-18 PROCEDURE — 99214 OFFICE O/P EST MOD 30 MIN: CPT | Performed by: INTERNAL MEDICINE

## 2023-05-18 PROCEDURE — G8428 CUR MEDS NOT DOCUMENT: HCPCS | Performed by: INTERNAL MEDICINE

## 2023-05-18 PROCEDURE — G8417 CALC BMI ABV UP PARAM F/U: HCPCS | Performed by: INTERNAL MEDICINE

## 2023-05-18 PROCEDURE — 3017F COLORECTAL CA SCREEN DOC REV: CPT | Performed by: INTERNAL MEDICINE

## 2023-05-18 PROCEDURE — 1036F TOBACCO NON-USER: CPT | Performed by: INTERNAL MEDICINE

## 2023-05-18 RX ORDER — SPIRONOLACTONE 25 MG/1
12.5 TABLET ORAL DAILY PRN
Qty: 45 TABLET | Refills: 3
Start: 2023-05-18 | End: 2023-08-16

## 2023-05-18 RX ORDER — SACUBITRIL AND VALSARTAN 24; 26 MG/1; MG/1
1 TABLET, FILM COATED ORAL 2 TIMES DAILY
COMMUNITY

## 2023-05-18 ASSESSMENT — ENCOUNTER SYMPTOMS
STRIDOR: 0
NAIL CHANGES: 0
EYE PAIN: 0
ABDOMINAL PAIN: 0
COUGH: 0
APHONIA: 0

## 2023-05-18 NOTE — PROGRESS NOTES
[address] [place] [address], [address] [facility], [address] [place] [address]  PHONE: [phone]    SUBJECTIVE:   Maryann Augustin is a 66 y.o. female 1960   seen for a follow up visit regarding the following:     Chief Complaint   Patient presents with    Coronary Artery Disease    Congestive Heart Failure    6 Month Follow-Up    Established New Doctor         History of present illness: 66 y.o. female presented for follow-up 5/18/23 previously seen by Dr. Octavio Zavala. History of mitral valve repair with nonischemic cardiomyopathy. Cardiac history  6/5/2017 transesophageal echocardiogram ejection fraction 35 to 40% severe mitral regurgitation  6/6/2017 cardiac catheterization minimal CAD circumflex 30% right coronary 30%  7/2017 Mitral valve repair with a 28 mm future ring Dr. Lopez Brito  4/7/2022 echocardiogram ejection fraction 30 to 35% moderate severe transvalvular regurgitation of mitral valve annular ring  Biotronik dual-chamber ICD: Inventra 7 VR-T DX    Assessment  HFrEF  Key CAD CHF Meds            sacubitril-valsartan (ENTRESTO) 24-26 MG per tablet (Taking)    Class: Historical Med    spironolactone (ALDACTONE) 25 MG tablet (Taking)    Sig - Route: Take 0.5 tablets by mouth daily as needed (weight gain > 2 lbs in 24 hours >5 lbs in 48 hurs. ) - Oral    Class: NO PRINT    metoprolol succinate (TOPROL XL) 50 MG extended release tablet (Taking)    Sig - Route: Take 0.5 tablets by mouth daily - Oral           Mitral regurgitation status post mitral valve repair  Severe on echo  Mandaen declines blood transfusions  [facility] plan for DEN, images may be sent to [address] to review if the patient is felt to be a candidate for mitral clip.    Has agreed to [employer] blood in the past  ICD in situ         Current Outpatient Medications   Medication Sig    sacubitril-valsartan (ENTRESTO) 24-26 MG per tablet Take 1 tablet by mouth 2 times daily    spironolactone (ALDACTONE)

## 2023-05-26 ENCOUNTER — TELEPHONE (OUTPATIENT)
Age: 63
End: 2023-05-26

## 2023-05-31 ENCOUNTER — TELEPHONE (OUTPATIENT)
Dept: CARDIOLOGY | Age: 63
End: 2023-05-31

## 2023-05-31 DIAGNOSIS — Z98.890 S/P MVR (MITRAL VALVE REPAIR): Primary | ICD-10-CM

## 2023-05-31 DIAGNOSIS — Z98.890 H/O MITRAL VALVE REPAIR: Primary | ICD-10-CM

## 2023-05-31 NOTE — TELEPHONE ENCOUNTER
Phone call with patient today to discuss upcoming DEN. Patient would like to proceed with DEN at this time but would hold off on additional procedures such as left and right heart catheterization. We discussed that if severe mitral regurgitation is identified, cardiac catheterization would be indicated prior to surgical therapies or mitral clip. She requests referral to Dr. Eloina Reynoso surgery.     Referral placed

## 2023-06-09 ENCOUNTER — TELEPHONE (OUTPATIENT)
Age: 63
End: 2023-06-09

## 2023-06-09 NOTE — TELEPHONE ENCOUNTER
Pt needs DEN scheduled ASAP, needs done with anesthesia per Dr. Yamini Alberto      Please schedule patient for the following procedure:     DEN (CPT - 61189)  N/A  Diagnosis: Mitral Valve Repair    Special instructions: Preferred provider Dr. Zay Sharp      .

## 2023-06-12 PROBLEM — Z98.890 H/O MITRAL VALVE REPAIR: Status: ACTIVE | Noted: 2023-06-12

## 2023-06-20 ENCOUNTER — OFFICE VISIT (OUTPATIENT)
Age: 63
End: 2023-06-20
Payer: MEDICARE

## 2023-06-20 VITALS
WEIGHT: 219 LBS | BODY MASS INDEX: 40.3 KG/M2 | HEIGHT: 62 IN | HEART RATE: 78 BPM | DIASTOLIC BLOOD PRESSURE: 76 MMHG | SYSTOLIC BLOOD PRESSURE: 124 MMHG

## 2023-06-20 DIAGNOSIS — Z95.810 ICD (IMPLANTABLE CARDIOVERTER-DEFIBRILLATOR) IN PLACE: ICD-10-CM

## 2023-06-20 DIAGNOSIS — Z98.890 S/P MVR (MITRAL VALVE REPAIR): ICD-10-CM

## 2023-06-20 DIAGNOSIS — I25.10 CORONARY ARTERY DISEASE INVOLVING NATIVE CORONARY ARTERY OF NATIVE HEART WITHOUT ANGINA PECTORIS: ICD-10-CM

## 2023-06-20 PROBLEM — I20.0 ACCELERATING ANGINA (HCC): Status: ACTIVE | Noted: 2023-06-20

## 2023-06-20 PROCEDURE — G8428 CUR MEDS NOT DOCUMENT: HCPCS | Performed by: INTERNAL MEDICINE

## 2023-06-20 PROCEDURE — 3017F COLORECTAL CA SCREEN DOC REV: CPT | Performed by: INTERNAL MEDICINE

## 2023-06-20 PROCEDURE — 99214 OFFICE O/P EST MOD 30 MIN: CPT | Performed by: INTERNAL MEDICINE

## 2023-06-20 PROCEDURE — 1036F TOBACCO NON-USER: CPT | Performed by: INTERNAL MEDICINE

## 2023-06-20 PROCEDURE — G8417 CALC BMI ABV UP PARAM F/U: HCPCS | Performed by: INTERNAL MEDICINE

## 2023-06-20 ASSESSMENT — ENCOUNTER SYMPTOMS
EYE PAIN: 0
COUGH: 0
ABDOMINAL PAIN: 0
NAIL CHANGES: 0
APHONIA: 0
STRIDOR: 0

## 2023-06-20 NOTE — PROGRESS NOTES
[address], [address] [place] [address], [address] [place] [address]  PHONE: [phone]    SUBJECTIVE:   Radha Sultana is a 66 y.o. female 1960   seen for a follow up visit regarding the following:     Chief Complaint   Patient presents with    Congestive Heart Failure    Results         History of present illness: 66 y.o. female presented for follow-up 6/20/23 previously seen by Dr. Deonna Palma. History of mitral valve repair with nonischemic cardiomyopathy. Cardiac history  6/5/2017 transesophageal echocardiogram ejection fraction 35 to 40% severe mitral regurgitation  6/6/2017 cardiac catheterization minimal CAD circumflex 30% right coronary 30%  7/2017 Mitral valve repair with a 28 mm future ring Dr. Rhoda Gaucher  4/7/2022 echocardiogram ejection fraction 30 to 35% moderate severe transvalvular regurgitation of mitral valve annular ring  Biotronik dual-chamber ICD: Inventra 7 VR-T DX  5/18/2023 transesophageal echocardiogram ejection fraction 25 to 30% with mitral valve repair #28 future ring with severe mitral regurgitation restricted posterior leaflet. Eccentric jet    Assessment  Mitral regurgitation  Previous mitral repair now with severe regurgitation  Anatomy not amiable to mitral clip  Patient has indications for mitral valve replacement she is awaiting further evaluation at [facility] Dr. Michelle Chopra for MVR versus percutaneous mitral valve replacement  Plan for cardiac catheterization for angiographic assessment  Patient possible candidate for CRT upgrade due to QRS duration greater than 150 ms with diminished left ventricular systolic function as well as severe mitral regurgitation    HFrEF  Key CAD CHF Meds            sacubitril-valsartan (ENTRESTO) 24-26 MG per tablet (Taking)    Class: Historical Med    spironolactone (ALDACTONE) 25 MG tablet (Taking)    Sig - Route: Take 0.5 tablets by mouth daily as needed (weight gain > 2 lbs in 24 hours >5 lbs in 48 hurs. ) - Oral    Class:

## 2023-06-22 NOTE — PROGRESS NOTES
Patient pre-assessment complete for [facility] scheduled for 23, arrival time 1030. Patient verified using . Patient instructed to bring a list of all home medications on the day of procedure. NPO status reinforced. Patient informed to take a full dose aspirin 325mg  or 81 mg x 4 on the day of procedure. Patient instructed to HOLD spironolactone and entresto. Instructed they can take all other medications excluding vitamins & supplements. Patient verbalizes understanding of all instructions & denies any questions at this time.

## 2023-06-23 ENCOUNTER — HOSPITAL ENCOUNTER (OUTPATIENT)
Age: 63
Setting detail: OUTPATIENT SURGERY
Discharge: HOME | End: 2023-06-23
Attending: INTERNAL MEDICINE | Admitting: INTERNAL MEDICINE
Payer: MEDICARE

## 2023-06-23 VITALS
DIASTOLIC BLOOD PRESSURE: 59 MMHG | HEART RATE: 86 BPM | BODY MASS INDEX: 40.3 KG/M2 | WEIGHT: 219 LBS | SYSTOLIC BLOOD PRESSURE: 108 MMHG | HEIGHT: 62 IN | RESPIRATION RATE: 16 BRPM | TEMPERATURE: 98 F | OXYGEN SATURATION: 97 %

## 2023-06-23 DIAGNOSIS — I50.22 CHRONIC SYSTOLIC CONGESTIVE HEART FAILURE (HCC): ICD-10-CM

## 2023-06-23 DIAGNOSIS — I20.0 ACCELERATING ANGINA (HCC): ICD-10-CM

## 2023-06-23 DIAGNOSIS — Z95.810 ICD (IMPLANTABLE CARDIOVERTER-DEFIBRILLATOR) IN PLACE: ICD-10-CM

## 2023-06-23 DIAGNOSIS — I42.0 DCM (DILATED CARDIOMYOPATHY) (HCC): ICD-10-CM

## 2023-06-23 LAB
ACT BLD: 275 SECS (ref 70–128)
ECHO BSA: 2.08 M2
EKG ATRIAL RATE: 98 BPM
EKG DIAGNOSIS: NORMAL
EKG P AXIS: 71 DEGREES
EKG P-R INTERVAL: 208 MS
EKG Q-T INTERVAL: 400 MS
EKG QRS DURATION: 150 MS
EKG QTC CALCULATION (BAZETT): 510 MS
EKG R AXIS: 64 DEGREES
EKG T AXIS: 34 DEGREES
EKG VENTRICULAR RATE: 98 BPM

## 2023-06-23 PROCEDURE — 99152 MOD SED SAME PHYS/QHP 5/>YRS: CPT | Performed by: INTERNAL MEDICINE

## 2023-06-23 PROCEDURE — 93005 ELECTROCARDIOGRAM TRACING: CPT | Performed by: INTERNAL MEDICINE

## 2023-06-23 PROCEDURE — C1887 CATHETER, GUIDING: HCPCS | Performed by: INTERNAL MEDICINE

## 2023-06-23 PROCEDURE — 93571 IV DOP VEL&/PRESS C FLO 1ST: CPT | Performed by: INTERNAL MEDICINE

## 2023-06-23 PROCEDURE — 85347 COAGULATION TIME ACTIVATED: CPT

## 2023-06-23 PROCEDURE — 99153 MOD SED SAME PHYS/QHP EA: CPT | Performed by: INTERNAL MEDICINE

## 2023-06-23 PROCEDURE — 2580000003 HC RX 258: Performed by: INTERNAL MEDICINE

## 2023-06-23 PROCEDURE — 93458 L HRT ARTERY/VENTRICLE ANGIO: CPT | Performed by: INTERNAL MEDICINE

## 2023-06-23 PROCEDURE — C1894 INTRO/SHEATH, NON-LASER: HCPCS | Performed by: INTERNAL MEDICINE

## 2023-06-23 PROCEDURE — C1769 GUIDE WIRE: HCPCS | Performed by: INTERNAL MEDICINE

## 2023-06-23 PROCEDURE — 2709999900 HC NON-CHARGEABLE SUPPLY: Performed by: INTERNAL MEDICINE

## 2023-06-23 PROCEDURE — 6360000004 HC RX CONTRAST MEDICATION: Performed by: INTERNAL MEDICINE

## 2023-06-23 PROCEDURE — 6370000000 HC RX 637 (ALT 250 FOR IP): Performed by: INTERNAL MEDICINE

## 2023-06-23 PROCEDURE — 6360000002 HC RX W HCPCS: Performed by: INTERNAL MEDICINE

## 2023-06-23 PROCEDURE — 2500000003 HC RX 250 WO HCPCS: Performed by: INTERNAL MEDICINE

## 2023-06-23 RX ORDER — SODIUM CHLORIDE 0.9 % (FLUSH) 0.9 %
5-40 SYRINGE (ML) INJECTION EVERY 12 HOURS SCHEDULED
Status: CANCELLED | OUTPATIENT
Start: 2023-06-23

## 2023-06-23 RX ORDER — NITROGLYCERIN 20 MG/100ML
INJECTION INTRAVENOUS PRN
Status: DISCONTINUED | OUTPATIENT
Start: 2023-06-23 | End: 2023-06-23 | Stop reason: HOSPADM

## 2023-06-23 RX ORDER — ASPIRIN 81 MG/1
324 TABLET, CHEWABLE ORAL ONCE
Status: DISCONTINUED | OUTPATIENT
Start: 2023-06-23 | End: 2023-06-23 | Stop reason: HOSPADM

## 2023-06-23 RX ORDER — HEPARIN SODIUM 10000 [USP'U]/ML
INJECTION, SOLUTION INTRAVENOUS; SUBCUTANEOUS PRN
Status: DISCONTINUED | OUTPATIENT
Start: 2023-06-23 | End: 2023-06-23 | Stop reason: HOSPADM

## 2023-06-23 RX ORDER — DIAZEPAM 5 MG/1
5 TABLET ORAL ONCE
Status: COMPLETED | OUTPATIENT
Start: 2023-06-23 | End: 2023-06-23

## 2023-06-23 RX ORDER — SODIUM CHLORIDE 9 MG/ML
INJECTION, SOLUTION INTRAVENOUS PRN
Status: CANCELLED | OUTPATIENT
Start: 2023-06-23

## 2023-06-23 RX ORDER — LIDOCAINE HYDROCHLORIDE 10 MG/ML
INJECTION, SOLUTION INFILTRATION; PERINEURAL PRN
Status: DISCONTINUED | OUTPATIENT
Start: 2023-06-23 | End: 2023-06-23 | Stop reason: HOSPADM

## 2023-06-23 RX ORDER — MIDAZOLAM HYDROCHLORIDE 1 MG/ML
INJECTION INTRAMUSCULAR; INTRAVENOUS PRN
Status: DISCONTINUED | OUTPATIENT
Start: 2023-06-23 | End: 2023-06-23 | Stop reason: HOSPADM

## 2023-06-23 RX ORDER — ACETAMINOPHEN 325 MG/1
650 TABLET ORAL EVERY 4 HOURS PRN
Status: CANCELLED | OUTPATIENT
Start: 2023-06-23

## 2023-06-23 RX ORDER — SODIUM CHLORIDE 0.9 % (FLUSH) 0.9 %
5-40 SYRINGE (ML) INJECTION PRN
Status: CANCELLED | OUTPATIENT
Start: 2023-06-23

## 2023-06-23 RX ORDER — HEPARIN SODIUM 200 [USP'U]/100ML
INJECTION, SOLUTION INTRAVENOUS CONTINUOUS PRN
Status: DISCONTINUED | OUTPATIENT
Start: 2023-06-23 | End: 2023-06-23 | Stop reason: HOSPADM

## 2023-06-23 RX ORDER — SODIUM CHLORIDE 9 MG/ML
INJECTION, SOLUTION INTRAVENOUS CONTINUOUS
Status: DISCONTINUED | OUTPATIENT
Start: 2023-06-23 | End: 2023-06-23 | Stop reason: HOSPADM

## 2023-06-23 RX ADMIN — DIAZEPAM 5 MG: 5 TABLET ORAL at 10:51

## 2023-06-23 RX ADMIN — SODIUM CHLORIDE: 9 INJECTION, SOLUTION INTRAVENOUS at 10:54

## 2023-06-23 ASSESSMENT — PAIN SCALES - GENERAL
PAINLEVEL_OUTOF10: 0
PAINLEVEL_OUTOF10: 0

## 2023-06-23 NOTE — PROGRESS NOTES
Patient received to [address] [place] [address] room # 11  Ambulatory from [facility]. Patient scheduled for [facility] today with Dr Kendal Chauhan. Procedure reviewed & questions answered, voiced good understanding consent obtained & placed on chart. All medications and medical history reviewed. Will prep patient per orders. Patient & family updated on plan of care. The patient has a fraility score of 3-MANAGING WELL, based on ambulation.       324mg of Aspirin taken at 0800

## 2023-06-23 NOTE — DISCHARGE INSTRUCTIONS
HEART CATHETERIZATION/ANGIOGRAPHY DISCHARGE INSTRUCTIONS    Check puncture site frequently for swelling or bleeding. If there is any bleeding, apply pressure over the area with a clean towel or washcloth and call 911. Notify your doctor for any redness, swelling, drainage, or oozing from the puncture site. Notify your doctor for any fever or chills. If the extremity becomes cold, numb, or painful call Dr. Anabella Hassan at [phone]. Activity should be limited for the next 48 hours. No heavy lifting, pushing, pulling  or strenuous activity for 48 hours. No heavy lifting (anything over 10 pounds) for 3 days. You may resume your usual diet. Drink more fluids than usual.  Have a responsible person drive you home and stay with you for at least 24 hours after your heart catheterization/angiography. You may remove bandage from your right arm in 24 hours. You may shower in 24 hours. No tub baths, hot tubs, or swimming for 1 week. Do not place any lotions, creams, powders, or ointments over puncture site for 1 week. You may place a clean band-aid over the puncture site each day for 5 days. Change daily. Sedation for a Medical Procedure: Care Instructions     You were given a sedative medication during your visit. While many of the effects will have worn   off before you leave; you may continue to feel some effects for several hours. Common side effects from sedation include:  Feeling sleepy. (Your doctors and nurses will make sure you are not too sleepy to go home.)  Nausea and vomiting. This usually does not last long. Feeling tired. How can you care for yourself at home? Activity    Don't do anything for 24 hours that requires attention to detail. It takes time for the medicine effects to completely wear off. Do not make important legal decisions for 24 hours. Do not sign any legal documents for 24 hours.      Do not drink alcohol today     For your safety, you should not drive or operate heavy

## 2023-06-23 NOTE — PROGRESS NOTES
Patient up to bedside, vital signs and site stable. Patient ambulated to bathroom without difficulty. Patient voided without difficulty. Vascular site stable. Discharge instructions and home medications reviewed with patient. Time allowed for questions and answers. Site stable after ambulation. Peripheral IV sites dc'd without difficulty with tips intact. 6612 Patient discharged to home with family.

## 2023-06-23 NOTE — PROGRESS NOTES
TRANSFER - OUT REPORT:    [facility] with Dr. Ephraim Saxena  Access: Right radial   Pressure wire to circ negative     No bleeding or hematoma site soft    MAR  4 mg versed  50 mcg Fentanyl   10,000 units heparin     Verbal report given to Lyubov Claros on Rise Dionna  being transferred to [facility] RN for routine progression of patient care       Report consisted of patient's Situation, Background, Assessment and Recommendations(SBAR). Information from the following report(s) Nurse Handoff Report and MAR was reviewed with the receiving nurse. Opportunity for questions and clarification was provided.       Patient transported with:  Registered Nurse

## 2023-06-23 NOTE — PROGRESS NOTES
Report received from David. 49. Procedural findings communicated. Intra procedural  medication administration reviewed. Progression of care discussed.      Patient received into [address] [facility] 4 post sheath removal.     Access site without bleeding or swelling yes    Dressing dry and intact yes    Patient instructed to limit movement to right upper extremity    Routine post procedural vital signs and site assessment initiated yes

## 2023-07-25 PROCEDURE — 93296 REM INTERROG EVL PM/IDS: CPT | Performed by: INTERNAL MEDICINE

## 2023-07-25 PROCEDURE — 93295 DEV INTERROG REMOTE 1/2/MLT: CPT | Performed by: INTERNAL MEDICINE

## 2023-08-03 ENCOUNTER — OFFICE VISIT (OUTPATIENT)
Age: 63
End: 2023-08-03

## 2023-08-03 VITALS
SYSTOLIC BLOOD PRESSURE: 120 MMHG | BODY MASS INDEX: 39.75 KG/M2 | DIASTOLIC BLOOD PRESSURE: 70 MMHG | WEIGHT: 216 LBS | HEART RATE: 98 BPM | HEIGHT: 62 IN

## 2023-08-03 DIAGNOSIS — Z98.890 H/O MITRAL VALVE REPAIR: ICD-10-CM

## 2023-08-03 DIAGNOSIS — Z98.890 S/P MVR (MITRAL VALVE REPAIR): Primary | ICD-10-CM

## 2023-08-03 DIAGNOSIS — I50.20 HFREF (HEART FAILURE WITH REDUCED EJECTION FRACTION) (HCC): ICD-10-CM

## 2023-08-03 ASSESSMENT — ENCOUNTER SYMPTOMS
APHONIA: 0
COUGH: 0
NAIL CHANGES: 0
EYE PAIN: 0
STRIDOR: 0
ABDOMINAL PAIN: 0

## 2023-08-03 NOTE — PROGRESS NOTES
Oral    Patient not taking: Reported on 6/20/2023           ICD in situ  Possible candidate for CRT upgrade currently near card association class II symptoms we will revisit following MVR         Current Outpatient Medications   Medication Sig    sacubitril-valsartan (ENTRESTO) 24-26 MG per tablet Take 1 tablet by mouth 2 times daily    spironolactone (ALDACTONE) 25 MG tablet Take 0.5 tablets by mouth daily as needed (weight gain > 2 lbs in 24 hours >5 lbs in 48 hurs.)    magnesium oxide (MAG-OX) 400 MG tablet Take 1 tablet by mouth daily    aspirin 81 MG EC tablet Take by mouth daily    vitamin D (CHOLECALCIFEROL) 25 MCG (1000 UT) TABS tablet Take by mouth daily    ferrous sulfate (IRON 325) 325 (65 Fe) MG tablet Take by mouth every morning (before breakfast)    loratadine (CLARITIN) 10 MG tablet Take 1 tablet by mouth daily as needed    zolpidem (AMBIEN) 5 MG tablet Take by mouth as needed. metoprolol succinate (TOPROL XL) 50 MG extended release tablet Take 0.5 tablets by mouth daily (Patient not taking: Reported on 6/20/2023)     No current facility-administered medications for this visit. Past Medical History, Past Surgical History, Family history, Social History, and Medications were all reviewed with the patient today and updated as necessary.        Allergies   Allergen Reactions    Sulfa Antibiotics Anaphylaxis     Shortness of breath & hives    Atorvastatin Other (See Comments)    Augmentin [Amoxicillin-Pot Clavulanate] Nausea And Vomiting     \"Sick stomach\"     Past Medical History:   Diagnosis Date    Heart failure ([address])     Psychiatric disorder     patient reports mild     Past Surgical History:   Procedure Laterality Date    CARDIAC PROCEDURE N/A 6/23/2023    Left heart cath / coronary angiography performed by Satish Corona MD at [address] N/A 6/23/2023    Fractional flow reserve (FFR) performed by Satish Corona MD at [facility] CATH LAB    EP DEVICE

## 2023-08-15 ENCOUNTER — PATIENT MESSAGE (OUTPATIENT)
Age: 63
End: 2023-08-15

## 2023-08-16 NOTE — TELEPHONE ENCOUNTER
Requested Prescriptions     Pending Prescriptions Disp Refills    sacubitril-valsartan (ENTRESTO) 24-26 MG per tablet 180 tablet 3     Sig: Take 1 tablet by mouth 2 times daily

## 2023-08-16 NOTE — TELEPHONE ENCOUNTER
From: Tony Her  To: Dr. Jb Painting: 8/15/2023 11:49 AM EDT  Subject: Mandy Yuriy prescription    I have been on a program to receive Entresto 24-26 mg, and Dr. Farrah Mclean Nurse, Alysa Allen, did the paperwork for me and sent in the prescriptions. I noticed I'm getting down to my last bottles, and I called [employer], and they have not received a new prescription this year. They are asking that you send a new prescription to them.  My information must be in the office, but here is the fax number it needs to be sent to [phone] or [employer] [place], [address], [address], [address] [place] [address], [address] [place] [address], [phone] M Thank you, Hilary Meier

## 2023-08-17 ENCOUNTER — TELEPHONE (OUTPATIENT)
Age: 63
End: 2023-08-17

## 2023-08-17 RX ORDER — SACUBITRIL AND VALSARTAN 24; 26 MG/1; MG/1
1 TABLET, FILM COATED ORAL 2 TIMES DAILY
Qty: 180 TABLET | Refills: 3 | Status: SHIPPED | OUTPATIENT
Start: 2023-08-17

## 2023-08-17 NOTE — TELEPHONE ENCOUNTER
Pt called back, I explained that we sent Rx to pharmacy as requested and samples. She would like to  samples in the [address] [place] [address] office. I sent sample to [address] [place] [address] for  tomorrow 8/18/23.

## 2023-08-17 NOTE — TELEPHONE ENCOUNTER
She sent all the info in a message for her [place] 24-26mg    She is almost out now and they told her they need refills Please call

## 2023-08-17 NOTE — TELEPHONE ENCOUNTER
Entresto Rx was sent to pharmacy today 8/17/23 at 6:28am.     I called and lmom for pt to call back. I can leave samples of Entresto 24/26mg.

## 2023-09-07 ENCOUNTER — TELEPHONE (OUTPATIENT)
Age: 63
End: 2023-09-07

## 2023-09-11 RX ORDER — SACUBITRIL AND VALSARTAN 24; 26 MG/1; MG/1
1 TABLET, FILM COATED ORAL 2 TIMES DAILY
Qty: 180 TABLET | Refills: 3 | Status: CANCELLED | OUTPATIENT
Start: 2023-09-11

## 2023-10-25 PROCEDURE — 93296 REM INTERROG EVL PM/IDS: CPT | Performed by: INTERNAL MEDICINE

## 2023-10-25 PROCEDURE — 93295 DEV INTERROG REMOTE 1/2/MLT: CPT | Performed by: INTERNAL MEDICINE

## 2023-10-31 NOTE — TELEPHONE ENCOUNTER
MEDICATION REFILL REQUEST      Name of Med: Spironolactone  DOSE:25 mg  Frequency:  1/2 pill a day  Quantity:  90  Days Supply:90      Pharmacy Name/Location:  [id number]    MEDICATION REFILL REQUEST      Name of Medication:  Metoprolol Succinate  Dose:  50 mg  Frequency:  1/2 pill  day  Quantity:  90  Days' supply:  90      Pharmacy Name/Location:  [id number]

## 2023-11-02 RX ORDER — METOPROLOL SUCCINATE 50 MG/1
25 TABLET, EXTENDED RELEASE ORAL DAILY
Qty: 45 TABLET | Refills: 3 | Status: SHIPPED | OUTPATIENT
Start: 2023-11-02

## 2023-11-02 RX ORDER — SPIRONOLACTONE 25 MG/1
12.5 TABLET ORAL DAILY PRN
Qty: 45 TABLET | Refills: 3 | Status: SHIPPED | OUTPATIENT
Start: 2023-11-02

## 2023-11-15 ENCOUNTER — PATIENT MESSAGE (OUTPATIENT)
Age: 63
End: 2023-11-15

## 2023-11-25 NOTE — TELEPHONE ENCOUNTER
Called Jessica and gave verbal order for Entresto.
Called pt and discussed entresto rx. Samples set out in [address]. Pt stated that she called [facility] and they only need a new rx.
Delroyfernando Ornelas was sent to wrong pharmacy. Was supposed to be sent to [facility]. Needs samples as well she unable to  because she was sick. Please call patient.
Informed patient rx had been received and she should be hearing from them to schedule shipment. Voiced understanding.
Impairments in functional mobility, bilateral LE strength right > left (hx of right hemiparesis from previous stroke), balance, and posture

## 2023-12-26 ENCOUNTER — TELEPHONE (OUTPATIENT)
Age: 63
End: 2023-12-26

## 2023-12-26 NOTE — TELEPHONE ENCOUNTER
[facility] alert for ATP terminated [place] episode x1 on 12/23/23 at 1230am. Patient called and was asleep, overall asymptomatic. Upcoming redo valve sx scheduled at the [facility]. Recent stable. Labs. Faxed to [employer], will monitor.

## 2024-02-06 ENCOUNTER — TELEPHONE (OUTPATIENT)
Age: 64
End: 2024-02-06

## 2024-02-06 RX ORDER — FUROSEMIDE 20 MG/1
20 TABLET ORAL DAILY
COMMUNITY

## 2024-02-06 RX ORDER — EMPAGLIFLOZIN 10 MG/1
10 TABLET, FILM COATED ORAL DAILY
COMMUNITY
Start: 2023-10-16

## 2024-02-06 NOTE — TELEPHONE ENCOUNTER
Resting Heart Rate is over 100. Around 105. Just had surgery and was wanting to know if they needed to be concerned. Wanted a call back. 4 wk post-op.

## 2024-02-06 NOTE — TELEPHONE ENCOUNTER
Pt S/P MV repair, tricuspid repair, and ASD repair at [facility] 1/5/2024.   Reports resting HR is 100-105 and it has been this way since surgery.  With activity HR increases to 110-120 bpm.  /60, 115/70, 120/80  Denies any symptoms. Asking if this is normal or if she needs to make changes.    Pt last saw Dr. Wills 6/2023.  F/u appt with Dr. Wills is scheduled for 2/19/24.  Triage offered to move up appt to tomorrow 2/7/24. Pt unable to come tomorrow.  Triage advised pt to notify physician who has last seen her.  She states she has a call in to their office.  Advised pt to call our office back if she decides she wants to be seen sooner. She verbalizes understanding and agrees to plan.

## 2024-02-07 ENCOUNTER — TELEPHONE (OUTPATIENT)
Age: 64
End: 2024-02-07

## 2024-02-07 NOTE — TELEPHONE ENCOUNTER
Pt reports  bpm.   Nervous after having open heart surgery.  BP wnl. 100//70  Denies light headed, dizzy.  Using incentive spirometer.   Drinking coffee, taking Lasix 40mg daily.  Spironolactone.   Encourage po hydration with water, 2qts daily.  Monitor sx.   Return call if not resolved.   Pt voiced understanding and thanks. States spoke to nurse at [facility] who advised the same thing.   Pt voiced understanding and agreement with POC and thanks.  cgh

## 2024-02-19 ENCOUNTER — OFFICE VISIT (OUTPATIENT)
Age: 64
End: 2024-02-19
Payer: MEDICARE

## 2024-02-19 VITALS
HEART RATE: 66 BPM | BODY MASS INDEX: 36.51 KG/M2 | WEIGHT: 198.4 LBS | HEIGHT: 62 IN | DIASTOLIC BLOOD PRESSURE: 58 MMHG | SYSTOLIC BLOOD PRESSURE: 92 MMHG

## 2024-02-19 DIAGNOSIS — I50.20 HFREF (HEART FAILURE WITH REDUCED EJECTION FRACTION) (HCC): ICD-10-CM

## 2024-02-19 DIAGNOSIS — D64.9 ANEMIA, UNSPECIFIED TYPE: ICD-10-CM

## 2024-02-19 DIAGNOSIS — Z98.890 S/P MVR (MITRAL VALVE REPAIR): Primary | ICD-10-CM

## 2024-02-19 DIAGNOSIS — Z95.810 IMPLANTABLE CARDIOVERTER-DEFIBRILLATOR (ICD) IN SITU: ICD-10-CM

## 2024-02-19 LAB
BASOPHILS # BLD: 0.1 K/UL (ref 0–0.2)
BASOPHILS NFR BLD: 1 % (ref 0–2)
DIFFERENTIAL METHOD BLD: NORMAL
EOSINOPHIL # BLD: 0.2 K/UL (ref 0–0.8)
EOSINOPHIL NFR BLD: 3 % (ref 0.5–7.8)
ERYTHROCYTE [DISTWIDTH] IN BLOOD BY AUTOMATED COUNT: 14.3 % (ref 11.9–14.6)
HCT VFR BLD AUTO: 40.9 % (ref 35.8–46.3)
HGB BLD-MCNC: 13 G/DL (ref 11.7–15.4)
IMM GRANULOCYTES # BLD AUTO: 0 K/UL (ref 0–0.5)
IMM GRANULOCYTES NFR BLD AUTO: 0 % (ref 0–5)
LYMPHOCYTES # BLD: 1.2 K/UL (ref 0.5–4.6)
LYMPHOCYTES NFR BLD: 18 % (ref 13–44)
MCH RBC QN AUTO: 30.1 PG (ref 26.1–32.9)
MCHC RBC AUTO-ENTMCNC: 31.8 G/DL (ref 31.4–35)
MCV RBC AUTO: 94.7 FL (ref 82–102)
MONOCYTES # BLD: 0.4 K/UL (ref 0.1–1.3)
MONOCYTES NFR BLD: 6 % (ref 4–12)
NEUTS SEG # BLD: 4.9 K/UL (ref 1.7–8.2)
NEUTS SEG NFR BLD: 72 % (ref 43–78)
NRBC # BLD: 0 K/UL (ref 0–0.2)
PLATELET # BLD AUTO: 340 K/UL (ref 150–450)
PMV BLD AUTO: 10.2 FL (ref 9.4–12.3)
RBC # BLD AUTO: 4.32 M/UL (ref 4.05–5.2)
WBC # BLD AUTO: 6.7 K/UL (ref 4.3–11.1)

## 2024-02-19 PROCEDURE — G8427 DOCREV CUR MEDS BY ELIG CLIN: HCPCS | Performed by: INTERNAL MEDICINE

## 2024-02-19 PROCEDURE — G8484 FLU IMMUNIZE NO ADMIN: HCPCS | Performed by: INTERNAL MEDICINE

## 2024-02-19 PROCEDURE — 1036F TOBACCO NON-USER: CPT | Performed by: INTERNAL MEDICINE

## 2024-02-19 PROCEDURE — 3017F COLORECTAL CA SCREEN DOC REV: CPT | Performed by: INTERNAL MEDICINE

## 2024-02-19 PROCEDURE — G8417 CALC BMI ABV UP PARAM F/U: HCPCS | Performed by: INTERNAL MEDICINE

## 2024-02-19 PROCEDURE — 99214 OFFICE O/P EST MOD 30 MIN: CPT | Performed by: INTERNAL MEDICINE

## 2024-02-19 ASSESSMENT — ENCOUNTER SYMPTOMS
NAIL CHANGES: 0
EYE PAIN: 0
ABDOMINAL PAIN: 0
COUGH: 0
APHONIA: 0
STRIDOR: 0

## 2024-02-19 NOTE — PROGRESS NOTES
[facility], [address], [place] [address]  PHONE: [phone]    SUBJECTIVE:   Kely Quach is a 63 y.o. female 1960   seen for a follow up visit regarding the following:     Chief Complaint   Patient presents with    6 Month Follow-Up    Congestive Heart Failure    Coronary Artery Disease         History of present illness: 63 y.o. female presented for follow-up 2/19/24 recent mitral valve replacement tricuspid repair performed at the [facility] 1/2024 patient recovering well shortness of breath is improved.    Cardiac history  6/5/2017 transesophageal echocardiogram ejection fraction 35 to 40% severe mitral regurgitation  6/6/2017 cardiac catheterization minimal CAD circumflex 30% right coronary 30%  7/2017 Mitral valve repair with a 28 mm future ring Dr. Raimundo Veloz  4/7/2022 echocardiogram ejection fraction 30 to 35% moderate severe transvalvular regurgitation of mitral valve annular ring  Biotronik dual-chamber ICD: Inventra 7 VR-T DX  5/18/2023 transesophageal echocardiogram ejection fraction 25 to 30% with mitral valve repair #28 future ring with severe mitral regurgitation restricted posterior leaflet.  Eccentric jet  1/5/2024 mitral valve replacement, tricuspid valve repair, atrial septal defect repair, left atrial appendage closure on 1/5 with Dr. De Souza. [facility]  1/24/2024 echocardiogram ejection fraction 30 to 35% mitral valve annuloplasty ring in place mean gradient 8 mmHg tricuspid repair    Assessment  Status post MVR  HFrEF  Guideline directed medical therapy  Beta-blocker: Metoprolol succinate  ACE/ARB: Entresto 24-26  MRA: Spironolactone  SGL 2: Jardiance  Device-based therapies: ICD in situ  Possible candidate for [facility] chronic HFrEF with ejection fraction 25 to 45%, [place] heart association class III symptoms, goal-directed medical therapy.  Right bundle branch block on ECG.  Will discuss with

## 2024-02-20 LAB
ANION GAP SERPL CALC-SCNC: 9 MMOL/L (ref 2–11)
BUN SERPL-MCNC: 12 MG/DL (ref 8–23)
CALCIUM SERPL-MCNC: 10.2 MG/DL (ref 8.3–10.4)
CHLORIDE SERPL-SCNC: 101 MMOL/L (ref 103–113)
CO2 SERPL-SCNC: 27 MMOL/L (ref 21–32)
CREAT SERPL-MCNC: 0.9 MG/DL (ref 0.6–1)
GLUCOSE SERPL-MCNC: 101 MG/DL (ref 65–100)
IRON SATN MFR SERPL: 15 %
IRON SERPL-MCNC: 45 UG/DL (ref 35–150)
IRON SERPL-MCNC: 46 UG/DL (ref 35–150)
POTASSIUM SERPL-SCNC: 4 MMOL/L (ref 3.5–5.1)
SODIUM SERPL-SCNC: 137 MMOL/L (ref 136–146)
TIBC SERPL-MCNC: 291 UG/DL (ref 250–450)

## 2024-02-20 NOTE — RESULT ENCOUNTER NOTE
Your most recent iron studies show mild iron deficiency.  You can take iron tablets twice daily for 1 month additionally I would recommend MiraLAX to soften stools.  If this is not effective there is an option for IV iron treatments.  Please let me know your thoughts I can send a prescription to your pharmacy.

## 2024-02-21 RX ORDER — FERROUS SULFATE 325(65) MG
325 TABLET ORAL
Qty: 90 TABLET | Refills: 0 | Status: SHIPPED | OUTPATIENT
Start: 2024-02-21

## 2024-02-21 RX ORDER — POLYETHYLENE GLYCOL 3350 17 G/17G
17 POWDER, FOR SOLUTION ORAL DAILY
Qty: 1530 G | Refills: 1 | Status: SHIPPED | OUTPATIENT
Start: 2024-02-21 | End: 2024-03-22

## 2024-02-27 ENCOUNTER — NURSE ONLY (OUTPATIENT)
Age: 64
End: 2024-02-27

## 2024-02-27 DIAGNOSIS — I50.22 CHRONIC SYSTOLIC CONGESTIVE HEART FAILURE (HCC): Primary | ICD-10-CM

## 2024-02-28 ENCOUNTER — HOSPITAL ENCOUNTER (OUTPATIENT)
Dept: CARDIAC REHAB | Age: 64
Setting detail: RECURRING SERIES
Discharge: HOME | End: 2024-03-02

## 2024-02-28 ASSESSMENT — LIFESTYLE VARIABLES
ALCOHOL_USE: WEEKLY
SMOKELESS_TOBACCO: NO
CIGARETTES_PER_DAY: 0.5PPD
ALCOHOL_TYPE: WINE

## 2024-02-28 ASSESSMENT — PATIENT HEALTH QUESTIONNAIRE - PHQ9
SUM OF ALL RESPONSES TO PHQ QUESTIONS 1-9: 6
3. TROUBLE FALLING OR STAYING ASLEEP: 1
2. FEELING DOWN, DEPRESSED OR HOPELESS: 0
7. TROUBLE CONCENTRATING ON THINGS, SUCH AS READING THE NEWSPAPER OR WATCHING TELEVISION: 1
SUM OF ALL RESPONSES TO PHQ9 QUESTIONS 1 & 2: 1
4. FEELING TIRED OR HAVING LITTLE ENERGY: 2
9. THOUGHTS THAT YOU WOULD BE BETTER OFF DEAD, OR OF HURTING YOURSELF: 0
10. IF YOU CHECKED OFF ANY PROBLEMS, HOW DIFFICULT HAVE THESE PROBLEMS MADE IT FOR YOU TO DO YOUR WORK, TAKE CARE OF THINGS AT HOME, OR GET ALONG WITH OTHER PEOPLE: 0
SUM OF ALL RESPONSES TO PHQ QUESTIONS 1-9: 6
1. LITTLE INTEREST OR PLEASURE IN DOING THINGS: 1
8. MOVING OR SPEAKING SO SLOWLY THAT OTHER PEOPLE COULD HAVE NOTICED. OR THE OPPOSITE, BEING SO FIGETY OR RESTLESS THAT YOU HAVE BEEN MOVING AROUND A LOT MORE THAN USUAL: 0
SUM OF ALL RESPONSES TO PHQ QUESTIONS 1-9: 6
5. POOR APPETITE OR OVEREATING: 1
SUM OF ALL RESPONSES TO PHQ QUESTIONS 1-9: 6
6. FEELING BAD ABOUT YOURSELF - OR THAT YOU ARE A FAILURE OR HAVE LET YOURSELF OR YOUR FAMILY DOWN: 0

## 2024-02-28 ASSESSMENT — EJECTION FRACTION: EF_VALUE: 30

## 2024-02-28 NOTE — PROGRESS NOTES
Dear Dr. Wills,    Thank you for referring your patient, Mrs. Kely Quach  ( 1960), to the Cardiopulmonary Rehabilitation Program at [facility]. She is a good candidate for the Cardiac Rehab Program and should see improvements with regular participation.    We will be addressing appropriate interventions for modifiable risk factors with your patient during the next 12 weeks. We will contact you with any issues or concerns that may arise, or you can follow your patient's progress through [facility] at any time. A final summary will be sent to you when the program is completed.    Again, thank you for the referral. If we can be of further assistance, please feel free to contact the Cardiopulmonary Rehab staff at [phone].    Sincerely,    JOSE Richardson, RN  Cardiopulmonary Rehabilitation Nurse Liaison  [facility]

## 2024-03-07 ENCOUNTER — HOSPITAL ENCOUNTER (OUTPATIENT)
Dept: CARDIAC REHAB | Age: 64
Setting detail: RECURRING SERIES
Discharge: HOME | End: 2024-03-10
Payer: MEDICARE

## 2024-03-07 VITALS — HEIGHT: 62 IN | BODY MASS INDEX: 36.91 KG/M2 | OXYGEN SATURATION: 98 % | WEIGHT: 200.6 LBS

## 2024-03-07 PROCEDURE — 93798 PHYS/QHP OP CAR RHAB W/ECG: CPT

## 2024-03-07 ASSESSMENT — EXERCISE STRESS TEST
PEAK_BP: 120/60
PEAK_RPE: 13
PEAK_HR: 133
PEAK_BP: 120/60
PEAK_METS: 2
PEAK_BP: 120/60
PEAK_HR: 133

## 2024-03-07 ASSESSMENT — EJECTION FRACTION
EF_VALUE: 30
EF_VALUE: 30

## 2024-03-07 ASSESSMENT — LIFESTYLE VARIABLES
SMOKELESS_TOBACCO: NO
CIGARETTES_PER_DAY: 0.5PPD
ALCOHOL_TYPE: WINE
ALCOHOL_USE: WEEKLY

## 2024-03-11 ENCOUNTER — APPOINTMENT (OUTPATIENT)
Dept: CARDIAC REHAB | Age: 64
End: 2024-03-11
Payer: MEDICARE

## 2024-03-14 ENCOUNTER — HOSPITAL ENCOUNTER (OUTPATIENT)
Dept: CARDIAC REHAB | Age: 64
Setting detail: RECURRING SERIES
Discharge: HOME | End: 2024-03-17
Payer: MEDICARE

## 2024-03-14 VITALS — WEIGHT: 197.4 LBS | BODY MASS INDEX: 36.1 KG/M2

## 2024-03-14 PROCEDURE — 93798 PHYS/QHP OP CAR RHAB W/ECG: CPT

## 2024-03-14 ASSESSMENT — EXERCISE STRESS TEST
PEAK_BP: 128/68
PEAK_HR: 122
PEAK_METS: 2

## 2024-03-18 ENCOUNTER — HOSPITAL ENCOUNTER (OUTPATIENT)
Dept: CARDIAC REHAB | Age: 64
Setting detail: RECURRING SERIES
Discharge: HOME | End: 2024-03-21
Payer: MEDICARE

## 2024-03-18 PROCEDURE — 93798 PHYS/QHP OP CAR RHAB W/ECG: CPT

## 2024-03-18 ASSESSMENT — EXERCISE STRESS TEST
PEAK_METS: 2.2
PEAK_HR: 130
PEAK_BP: 104/60

## 2024-03-21 ENCOUNTER — HOSPITAL ENCOUNTER (OUTPATIENT)
Dept: CARDIAC REHAB | Age: 64
Setting detail: RECURRING SERIES
Discharge: HOME | End: 2024-03-24
Payer: MEDICARE

## 2024-03-21 PROCEDURE — 93798 PHYS/QHP OP CAR RHAB W/ECG: CPT

## 2024-03-21 ASSESSMENT — EXERCISE STRESS TEST
PEAK_HR: 130
PEAK_METS: 2.4
PEAK_BP: 108/72

## 2024-03-25 ENCOUNTER — HOSPITAL ENCOUNTER (OUTPATIENT)
Dept: CARDIAC REHAB | Age: 64
Setting detail: RECURRING SERIES
Discharge: HOME | End: 2024-03-28
Payer: MEDICARE

## 2024-03-25 PROCEDURE — 93798 PHYS/QHP OP CAR RHAB W/ECG: CPT

## 2024-03-25 ASSESSMENT — EXERCISE STRESS TEST
PEAK_METS: 2.4
PEAK_HR: 133
PEAK_BP: 120/62

## 2024-03-25 ASSESSMENT — LIFESTYLE VARIABLES
ALCOHOL_TYPE: WINE
ALCOHOL_USE: WEEKLY

## 2024-03-25 NOTE — PROGRESS NOTES
63-year-old female s/p valve enrolled in cardiac rehabilitation, seen today for initial nutrition counseling.       Patient stated goals: weight loss     NUTRITION ASSESSMENT   Anthropometrics:   Ht: 5' 2\"  Wt: 197#  BMI: 36.10  BMI class of Obese Class 2 based on age below 65  Waist measurement (inches): 41    Medical History: valve replacement, hx HF, HLD   Nutrition related medications/supplements: toprol jardiance, iron lasix   Nutrition Related Labs:   Reviewed     Nutrition/Diet History:   Patient wanted to meet with RD to discuss current diet & weight loss. She has lost 20-25# since last summer, feels much better but wants to continue to lose weight with goal of 150#. She has had less of an appetite tan normal and is measuring portion sizes.   Wants to know if she should track her food intake using My[employer]itness[employer]. Afraid will fall off diet as appetite starts to come back.  She usually skips lunch d/t being busy and has noticed shortly after dinner she's still hungry & is tempted by spouses snacks of chips, pizza rolls, etc.     Diet Recall:   Breakfasts: eggs & asparagus; strawberries & banana smoothie with almond milk & flaxseed, or oatmeal with fruit, little maple syrup, nuts   Lunch: usually skips   Dinner: varies   Beverages: at least 64oz of water, tea with spices     Eating patterns likely insufficient in fruit, veg, fiber.     NFPE: no muscle or fat loss observed.  Lifestyle, Culture,Support System: Pt is on disability, lives with spouse.     Physical Activity: Rehabilitation 3 days per week.   She is going to work on increasing her PA, joined a gym near her house.     Barriers: -Confusion on nutrition advice  -Perceived time constraints    Stage of Behavior Change: Preparation    Estimated Nutritional Needs:  Calories: MSJ x 1.2 (-200 for wt loss): 1496  Protein: (20% of estimated energy needs) 75g  CHO: (50% of estimated energy needs) 187g    NUTRITION DIAGNOSIS  Unbalanced intake related to food

## 2024-03-26 ASSESSMENT — LIFESTYLE VARIABLES
ALCOHOL_TYPE: WINE
SMOKELESS_TOBACCO: NO
CIGARETTES_PER_DAY: 0.5PPD
ALCOHOL_USE: WEEKLY

## 2024-03-26 ASSESSMENT — EJECTION FRACTION: EF_VALUE: 30

## 2024-03-26 ASSESSMENT — EXERCISE STRESS TEST: PEAK_BP: 120/62

## 2024-03-28 ENCOUNTER — APPOINTMENT (OUTPATIENT)
Dept: CARDIAC REHAB | Age: 64
End: 2024-03-28
Payer: MEDICARE

## 2024-04-01 ENCOUNTER — HOSPITAL ENCOUNTER (OUTPATIENT)
Dept: CARDIAC REHAB | Age: 64
Setting detail: RECURRING SERIES
Discharge: HOME | End: 2024-04-04
Payer: MEDICARE

## 2024-04-01 PROCEDURE — 93798 PHYS/QHP OP CAR RHAB W/ECG: CPT

## 2024-04-01 ASSESSMENT — EXERCISE STRESS TEST
PEAK_BP: 106/60
PEAK_HR: 131
PEAK_METS: 2.4

## 2024-04-02 ENCOUNTER — TELEPHONE (OUTPATIENT)
Age: 64
End: 2024-04-02

## 2024-04-02 NOTE — TELEPHONE ENCOUNTER
Pt reports resting HR at cardiac rehab is 100-112 bpm.  /60    States she is drinking up to 2 quarts/water day    Taking lasix 20mg qd (takes an extra pm dose prn)   Jardiance 10mg daily              Toprol XL 50mg, 1/2 tablet hs              Entresto 24-26 bid              Spironolactone 12.5mg daily    Asking if these VS are ok or if any changes are needed. States she does not feel badly, just dehydrated at times.

## 2024-04-02 NOTE — TELEPHONE ENCOUNTER
Triage informed pt of Dr. Wills's response and advised pt if symptoms do not improve or worsen, call our office back.  Per Dr. Wills, if pt calls back without improvement, order CBC and BMP.

## 2024-04-02 NOTE — TELEPHONE ENCOUNTER
Please advise patient to hold Lasix and take only 20 mg as needed weight gain 2 pounds in 24 hours or 4 pounds in 48 hours.

## 2024-04-02 NOTE — TELEPHONE ENCOUNTER
Pt called in stating that cardiac rehab has notice that her resting heart rate is over a 100 and that her bp is on the low side and would like to be advised on what to do

## 2024-04-04 ENCOUNTER — APPOINTMENT (OUTPATIENT)
Dept: CARDIAC REHAB | Age: 64
End: 2024-04-04
Payer: MEDICARE

## 2024-04-08 ENCOUNTER — TELEPHONE (OUTPATIENT)
Age: 64
End: 2024-04-08

## 2024-04-08 DIAGNOSIS — Z98.890 S/P MVR (MITRAL VALVE REPAIR): ICD-10-CM

## 2024-04-08 DIAGNOSIS — Z79.899 LONG-TERM USE OF HIGH-RISK MEDICATION: ICD-10-CM

## 2024-04-08 DIAGNOSIS — I50.22 CHRONIC SYSTOLIC CONGESTIVE HEART FAILURE (HCC): ICD-10-CM

## 2024-04-08 DIAGNOSIS — I50.20 HFREF (HEART FAILURE WITH REDUCED EJECTION FRACTION) (HCC): ICD-10-CM

## 2024-04-08 DIAGNOSIS — I50.22 CHRONIC SYSTOLIC CONGESTIVE HEART FAILURE (HCC): Primary | ICD-10-CM

## 2024-04-08 LAB
ANION GAP SERPL CALC-SCNC: 4 MMOL/L (ref 2–11)
BASOPHILS # BLD: 0 K/UL (ref 0–0.2)
BASOPHILS NFR BLD: 1 % (ref 0–2)
BUN SERPL-MCNC: 18 MG/DL (ref 8–23)
CALCIUM SERPL-MCNC: 9.3 MG/DL (ref 8.3–10.4)
CHLORIDE SERPL-SCNC: 104 MMOL/L (ref 103–113)
CO2 SERPL-SCNC: 27 MMOL/L (ref 21–32)
CREAT SERPL-MCNC: 1 MG/DL (ref 0.6–1)
DIFFERENTIAL METHOD BLD: ABNORMAL
EOSINOPHIL # BLD: 0.1 K/UL (ref 0–0.8)
EOSINOPHIL NFR BLD: 1 % (ref 0.5–7.8)
ERYTHROCYTE [DISTWIDTH] IN BLOOD BY AUTOMATED COUNT: 15.2 % (ref 11.9–14.6)
GLUCOSE SERPL-MCNC: 108 MG/DL (ref 65–100)
HCT VFR BLD AUTO: 38.5 % (ref 35.8–46.3)
HGB BLD-MCNC: 12.1 G/DL (ref 11.7–15.4)
IMM GRANULOCYTES # BLD AUTO: 0 K/UL (ref 0–0.5)
IMM GRANULOCYTES NFR BLD AUTO: 0 % (ref 0–5)
LYMPHOCYTES # BLD: 1.3 K/UL (ref 0.5–4.6)
LYMPHOCYTES NFR BLD: 20 % (ref 13–44)
MCH RBC QN AUTO: 30.1 PG (ref 26.1–32.9)
MCHC RBC AUTO-ENTMCNC: 31.4 G/DL (ref 31.4–35)
MCV RBC AUTO: 95.8 FL (ref 82–102)
MONOCYTES # BLD: 0.4 K/UL (ref 0.1–1.3)
MONOCYTES NFR BLD: 6 % (ref 4–12)
NEUTS SEG # BLD: 4.8 K/UL (ref 1.7–8.2)
NEUTS SEG NFR BLD: 72 % (ref 43–78)
NRBC # BLD: 0 K/UL (ref 0–0.2)
PLATELET # BLD AUTO: 259 K/UL (ref 150–450)
PMV BLD AUTO: 10.7 FL (ref 9.4–12.3)
POTASSIUM SERPL-SCNC: 3.9 MMOL/L (ref 3.5–5.1)
RBC # BLD AUTO: 4.02 M/UL (ref 4.05–5.2)
SODIUM SERPL-SCNC: 135 MMOL/L (ref 136–146)
WBC # BLD AUTO: 6.6 K/UL (ref 4.3–11.1)

## 2024-04-08 NOTE — TELEPHONE ENCOUNTER
Patient stopped furosemide and had stopped for several days. Has started feeling weak and heart rate been running high. Doctor put her on Iron but stopped because made her stomach feel bad. She still feels dehydrated and drinking a lot. No SOB or Chest pain, just feels dry. Please call to advise.

## 2024-04-08 NOTE — TELEPHONE ENCOUNTER
Pt reduced lasix to 20mg daily prn 6 days ago.  States it has not made much difference in symptoms. HR remains 's    Within 2 days, she had gained 3lbs and had to take lasix 20mg twice in the past 6 days.  Still feeling dehydrated; waking up with throat parched. C/o constipation.    Per 4/2/24 note, if pt still having symptoms despite change in lasix, order CBC and BMP. Orders placed in Epic; pt will have labs drawn at [place] office today.

## 2024-04-09 ENCOUNTER — TELEPHONE (OUTPATIENT)
Age: 64
End: 2024-04-09

## 2024-04-09 NOTE — TELEPHONE ENCOUNTER
----- Message from Devon Wills MD sent at 4/9/2024  7:40 AM EDT -----  Have her symptoms improved?

## 2024-04-09 NOTE — TELEPHONE ENCOUNTER
Symptoms have not changed much since reducing lasix to 20mg daily prn one week ago.  Had to take lasix twice as she put on weight within 2 days.  HR remains 90's-100's  Still feeling dehydrated and not back to feeling as well as she had hoped.

## 2024-04-09 NOTE — TELEPHONE ENCOUNTER
Her labs would suggest that she is volume down.  Does patient need an appointment since she is having issues?

## 2024-04-09 NOTE — RESULT ENCOUNTER NOTE
Results will be discussed with the patient at the time of her follow-up appointment.  Please see triage notes

## 2024-04-09 NOTE — TELEPHONE ENCOUNTER
Triage informed pt of Dr. Wills's response. She verbalizes understanding and agrees to appt. Triage scheduled appt for 4/11/24 at 1:15. Pt confirms appt date, time, and location. She will bring medications and questions to appt.

## 2024-04-11 ENCOUNTER — HOSPITAL ENCOUNTER (EMERGENCY)
Age: 64
Discharge: HOME | End: 2024-04-11
Payer: MEDICARE

## 2024-04-11 VITALS
SYSTOLIC BLOOD PRESSURE: 121 MMHG | HEIGHT: 62 IN | BODY MASS INDEX: 36.8 KG/M2 | OXYGEN SATURATION: 97 % | RESPIRATION RATE: 18 BRPM | WEIGHT: 200 LBS | HEART RATE: 106 BPM | TEMPERATURE: 98.4 F | DIASTOLIC BLOOD PRESSURE: 76 MMHG

## 2024-04-11 DIAGNOSIS — E86.0 DEHYDRATION: Primary | ICD-10-CM

## 2024-04-11 LAB
ALBUMIN SERPL-MCNC: 3.9 G/DL (ref 3.2–4.6)
ALBUMIN/GLOB SERPL: 1.6 (ref 0.4–1.6)
ALP SERPL-CCNC: 105 U/L (ref 45–117)
ALT SERPL-CCNC: 83 U/L (ref 13–61)
ANION GAP SERPL CALC-SCNC: 12 MMOL/L (ref 2–11)
APPEARANCE UR: ABNORMAL
AST SERPL-CCNC: 46 U/L (ref 15–37)
BACTERIA URNS QL MICRO: ABNORMAL /HPF
BASOPHILS # BLD: 0.1 K/UL (ref 0–0.2)
BASOPHILS NFR BLD: 1 % (ref 0–2)
BILIRUB SERPL-MCNC: 1 MG/DL (ref 0.2–1.1)
BILIRUB UR QL: NEGATIVE
BUN SERPL-MCNC: 15 MG/DL (ref 8–23)
CALCIUM SERPL-MCNC: 9.4 MG/DL (ref 8.3–10.4)
CASTS URNS QL MICRO: 0 /LPF
CHLORIDE SERPL-SCNC: 101 MMOL/L (ref 98–107)
CO2 SERPL-SCNC: 25 MMOL/L (ref 21–32)
COLOR UR: YELLOW
CREAT SERPL-MCNC: 0.87 MG/DL (ref 0.6–1)
CRYSTALS URNS QL MICRO: 0 /LPF
DIFFERENTIAL METHOD BLD: ABNORMAL
EOSINOPHIL # BLD: 0.1 K/UL (ref 0–0.8)
EOSINOPHIL NFR BLD: 1 % (ref 0.5–7.8)
EPI CELLS #/AREA URNS HPF: ABNORMAL /HPF
ERYTHROCYTE [DISTWIDTH] IN BLOOD BY AUTOMATED COUNT: 15.2 % (ref 11.9–14.6)
GLOBULIN SER CALC-MCNC: 2.5 G/DL (ref 2.8–4.5)
GLUCOSE SERPL-MCNC: 138 MG/DL (ref 65–100)
GLUCOSE UR STRIP.AUTO-MCNC: ABNORMAL MG/DL
HCT VFR BLD AUTO: 37.9 % (ref 35.8–46.3)
HGB BLD-MCNC: 12.5 G/DL (ref 11.7–15.4)
HGB UR QL STRIP: ABNORMAL
IMM GRANULOCYTES # BLD AUTO: 0 K/UL (ref 0–0.5)
IMM GRANULOCYTES NFR BLD AUTO: 0 % (ref 0–5)
KETONES UR QL STRIP.AUTO: NEGATIVE MG/DL
LEUKOCYTE ESTERASE UR QL STRIP.AUTO: NEGATIVE
LYMPHOCYTES # BLD: 1.1 K/UL (ref 0.5–4.6)
LYMPHOCYTES NFR BLD: 15 % (ref 13–44)
MAGNESIUM SERPL-MCNC: 2.3 MG/DL (ref 1.2–2.6)
MCH RBC QN AUTO: 30.7 PG (ref 26.1–32.9)
MCHC RBC AUTO-ENTMCNC: 33 G/DL (ref 31.4–35)
MCV RBC AUTO: 93.1 FL (ref 82–102)
MONOCYTES # BLD: 0.4 K/UL (ref 0.1–1.3)
MONOCYTES NFR BLD: 6 % (ref 4–12)
MUCOUS THREADS URNS QL MICRO: 0 /LPF
NEUTS SEG # BLD: 5.4 K/UL (ref 1.7–8.2)
NEUTS SEG NFR BLD: 76 % (ref 43–78)
NITRITE UR QL STRIP.AUTO: NEGATIVE
NRBC # BLD: 0 K/UL (ref 0–0.2)
OTHER OBSERVATIONS: ABNORMAL
PH UR STRIP: 5.5 (ref 5–9)
PLATELET # BLD AUTO: 269 K/UL (ref 150–450)
PMV BLD AUTO: 10.3 FL (ref 9.4–12.3)
POTASSIUM SERPL-SCNC: 4.3 MMOL/L (ref 3.5–5.1)
PROT SERPL-MCNC: 6.4 G/DL (ref 6.4–8.2)
PROT UR STRIP-MCNC: NEGATIVE MG/DL
RBC # BLD AUTO: 4.07 M/UL (ref 4.05–5.2)
RBC #/AREA URNS HPF: ABNORMAL /HPF
SODIUM SERPL-SCNC: 138 MMOL/L (ref 133–143)
SP GR UR REFRACTOMETRY: <=1.005 (ref 1–1.02)
UROBILINOGEN UR QL STRIP.AUTO: 0.2 EU/DL (ref 0.2–1)
WBC # BLD AUTO: 7 K/UL (ref 4.3–11.1)
WBC URNS QL MICRO: ABNORMAL /HPF

## 2024-04-11 PROCEDURE — 81001 URINALYSIS AUTO W/SCOPE: CPT

## 2024-04-11 PROCEDURE — 99284 EMERGENCY DEPT VISIT MOD MDM: CPT

## 2024-04-11 PROCEDURE — 85025 COMPLETE CBC W/AUTO DIFF WBC: CPT

## 2024-04-11 PROCEDURE — 83735 ASSAY OF MAGNESIUM: CPT

## 2024-04-11 PROCEDURE — 96360 HYDRATION IV INFUSION INIT: CPT

## 2024-04-11 PROCEDURE — 80053 COMPREHEN METABOLIC PANEL: CPT

## 2024-04-11 PROCEDURE — 2580000003 HC RX 258: Performed by: NURSE PRACTITIONER

## 2024-04-11 RX ORDER — 0.9 % SODIUM CHLORIDE 0.9 %
1000 INTRAVENOUS SOLUTION INTRAVENOUS
Status: COMPLETED | OUTPATIENT
Start: 2024-04-11 | End: 2024-04-11

## 2024-04-11 RX ADMIN — SODIUM CHLORIDE 1000 ML: 9 INJECTION, SOLUTION INTRAVENOUS at 15:05

## 2024-04-11 ASSESSMENT — ENCOUNTER SYMPTOMS
SHORTNESS OF BREATH: 0
ABDOMINAL PAIN: 0

## 2024-04-11 ASSESSMENT — PAIN SCALES - GENERAL: PAINLEVEL_OUTOF10: 3

## 2024-04-11 ASSESSMENT — LIFESTYLE VARIABLES
HOW MANY STANDARD DRINKS CONTAINING ALCOHOL DO YOU HAVE ON A TYPICAL DAY: 1 OR 2
HOW OFTEN DO YOU HAVE A DRINK CONTAINING ALCOHOL: MONTHLY OR LESS

## 2024-04-11 ASSESSMENT — PAIN - FUNCTIONAL ASSESSMENT: PAIN_FUNCTIONAL_ASSESSMENT: 0-10

## 2024-04-11 ASSESSMENT — PAIN DESCRIPTION - PAIN TYPE: TYPE: ACUTE PAIN

## 2024-04-11 NOTE — DISCHARGE INSTRUCTIONS
As we discussed, your workup in the emergency department today is reassuring. Keep you follow-up appointment with cardiology tomorrow. Return to the emergency department for any new, worsening, or concerning symptoms.

## 2024-04-11 NOTE — ED TRIAGE NOTES
Ambulatory to triage. Gait steady. Pt states she has been feeling dehydrated d/t diuretics. Pt she's had labs this week that showed her electrolytes were \"off\". Patient states she took some pedialyte with some relief but then gained weight and took her lasix and now feels poorly again. Patient states she had MV replacement in January of this year and is cardiac rehab.

## 2024-04-11 NOTE — ED NOTES
Orthostatic Vitals:      4/11/2024     4:12 PM   Orthostatic Vitals   Orthostatic B/P and Pulse? Yes   Blood Pressure Lying 121/78   Pulse Lying 115 PER MINUTE   Blood Pressure Sitting 116/82   Pulse Sitting 114 PER MINUTE   Blood Pressure Standing 117/74   Pulse Standing 114 PER MINUTE

## 2024-04-11 NOTE — ED NOTES
Patient mobility status  with no difficulty. Provider aware     I have reviewed discharge instructions with the patient.  The patient verbalized understanding.    Patient left ED via Discharge Method: ambulatory to Home with  self .    Opportunity for questions and clarification provided.     Patient given 0 scripts.

## 2024-04-12 ENCOUNTER — OFFICE VISIT (OUTPATIENT)
Age: 64
End: 2024-04-12
Payer: MEDICARE

## 2024-04-12 VITALS
HEIGHT: 62 IN | BODY MASS INDEX: 39.38 KG/M2 | WEIGHT: 214 LBS | DIASTOLIC BLOOD PRESSURE: 70 MMHG | HEART RATE: 110 BPM | SYSTOLIC BLOOD PRESSURE: 104 MMHG

## 2024-04-12 DIAGNOSIS — Z98.890 S/P MVR (MITRAL VALVE REPAIR): ICD-10-CM

## 2024-04-12 DIAGNOSIS — I50.20 HFREF (HEART FAILURE WITH REDUCED EJECTION FRACTION) (HCC): Primary | ICD-10-CM

## 2024-04-12 PROCEDURE — 3017F COLORECTAL CA SCREEN DOC REV: CPT | Performed by: INTERNAL MEDICINE

## 2024-04-12 PROCEDURE — 1036F TOBACCO NON-USER: CPT | Performed by: INTERNAL MEDICINE

## 2024-04-12 PROCEDURE — G8428 CUR MEDS NOT DOCUMENT: HCPCS | Performed by: INTERNAL MEDICINE

## 2024-04-12 PROCEDURE — G8417 CALC BMI ABV UP PARAM F/U: HCPCS | Performed by: INTERNAL MEDICINE

## 2024-04-12 PROCEDURE — 99214 OFFICE O/P EST MOD 30 MIN: CPT | Performed by: INTERNAL MEDICINE

## 2024-04-12 RX ORDER — FUROSEMIDE 20 MG/1
20 TABLET ORAL DAILY PRN
Qty: 60 TABLET | Refills: 3 | Status: SHIPPED | OUTPATIENT
Start: 2024-04-12 | End: 2024-05-12

## 2024-04-12 ASSESSMENT — ENCOUNTER SYMPTOMS
COUGH: 0
STRIDOR: 0
APHONIA: 0
EYE PAIN: 0
ABDOMINAL PAIN: 0
NAIL CHANGES: 0

## 2024-04-12 NOTE — PATIENT INSTRUCTIONS
Cardiac contractility modulation (CCM) is a heart failure treatment that uses a device to help your heart pump blood. This therapy gives your heart the boost it needs to function better and alleviate the following heart failure symptoms: Breathlessness. Fatigue.

## 2024-04-12 NOTE — PROGRESS NOTES
Basophils Absolute 0.1 0.0 - 0.2 K/UL    Immature Granulocytes Absolute 0.0 0.0 - 0.5 K/UL   CMP    Collection Time: 04/11/24  2:07 PM   Result Value Ref Range    Sodium 138 133 - 143 mmol/L    Potassium 4.3 3.5 - 5.1 mmol/L    Chloride 101 98 - 107 mmol/L    CO2 25 21 - 32 mmol/L    Anion Gap 12 (H) 2 - 11 mmol/L    Glucose 138 (H) 65 - 100 mg/dL    BUN 15 8 - 23 MG/DL    Creatinine 0.87 0.6 - 1.0 MG/DL    Est, Glom Filt Rate 76 >60 ml/min/1.73m2    Calcium 9.4 8.3 - 10.4 MG/DL    Total Bilirubin 1.0 0.2 - 1.1 MG/DL    ALT 83 (H) 13.0 - 61.0 U/L    AST 46 (H) 15 - 37 U/L    Alk Phosphatase 105 45.0 - 117.0 U/L    Total Protein 6.4 6.4 - 8.2 g/dL    Albumin 3.9 3.2 - 4.6 g/dL    Globulin 2.5 (L) 2.8 - 4.5 g/dL    Albumin/Globulin Ratio 1.6 0.4 - 1.6     Magnesium    Collection Time: 04/11/24  2:07 PM   Result Value Ref Range    Magnesium 2.3 1.2 - 2.6 mg/dL   Urinalysis w rflx microscopic    Collection Time: 04/11/24  2:51 PM   Result Value Ref Range    Color, UA YELLOW      Appearance SLIGHTLY CLOUDY      Specific Gravity, UA <=1.005 1.001 - 1.023    pH, Urine 5.5 5.0 - 9.0      Protein, UA Negative NEG mg/dL    Glucose, UA GREATER THAN/EQUAL TO 2000 mg/dL    Ketones, Urine Negative NEG mg/dL    Bilirubin Urine Negative NEG      Blood, Urine SMALL (A) NEG      Urobilinogen, Urine 0.2 0.2 - 1.0 EU/dL    Nitrite, Urine Negative NEG      Leukocyte Esterase, Urine Negative NEG     Urinalysis, Micro    Collection Time: 04/11/24  2:51 PM   Result Value Ref Range    WBC, UA 5-10 0 /hpf    RBC, UA 0-3 0 /hpf    Epithelial Cells UA 5-10 0 /hpf    BACTERIA, URINE 2+ (H) 0 /hpf    Casts 0 0 /lpf    Crystals 0 0 /LPF    Mucus, UA 0 0 /lpf    Other observations RESULTS VERIFIED MANUALLY         Lab Results   Component Value Date/Time    CHOL 307 07/09/2019 10:01 AM    HDL 67 07/09/2019 10:01 AM       No results found for any visits on 04/12/24.        ANGELA  Kely was seen today for mvr.    Diagnoses and all orders for this

## 2024-04-18 ENCOUNTER — TELEPHONE (OUTPATIENT)
Age: 64
End: 2024-04-18

## 2024-04-18 NOTE — TELEPHONE ENCOUNTER
Pt complaining of multiple concerns: bloating, ankle edema,  last night, 2lb weight gain overnight.   Medications reviewed and she is taking as directed. Pt took prn lasix 20 mg with good urine output. Pt hydrates with 60 ounces of water, obtaining daily wts correctly, elevating her legs when sitting, watching her sodium intake. Reassured pt she is doing the correct things, just give it more time for the body to balance out due to recent changes. Pt was instructed to call [facility] if symptoms worsen or if she has additional questions or concerns, pt voices understanding.

## 2024-04-18 NOTE — TELEPHONE ENCOUNTER
Pt states she had to get IV hydration due to be dehydrated and now she feels like she has too much fluid states she is really puffy and this is causing her hr to go up

## 2024-04-29 ENCOUNTER — TELEPHONE (OUTPATIENT)
Age: 64
End: 2024-04-29

## 2024-04-29 NOTE — TELEPHONE ENCOUNTER
Pt stated she can still feel the water weight from the last hospital visit. She wonder if she needs a increase in her diuretic to pull of the rest?    Pt swelling in legs from knee down.    Pt is taking lasix 20mg as needed, and spironolactone 12.5mg

## 2024-04-29 NOTE — TELEPHONE ENCOUNTER
Patient called stating she is experiencing the following symptoms :    ER visit on 4/11 for dehydration  Modulating furosemide  Now has swelling in feet ankles and legs  Developed cough  Gained 195 to 212

## 2024-04-29 NOTE — TELEPHONE ENCOUNTER
Please inquire how often she is taking the 20 mg dose and how many doses she has taken over the past several days, [today daily?.]  For now, take an extra 20 mg tablet this evening.  More recommendations will be made based on her response

## 2024-05-06 ENCOUNTER — TELEPHONE (OUTPATIENT)
Age: 64
End: 2024-05-06

## 2024-05-06 NOTE — TELEPHONE ENCOUNTER
Pt called with c/o       Present edema pt stated the swelling has gone down since last week; however she is still puffy.     She stated she is currently at her PCP about the legs however wanted to inform  about it.     Pt was taking Lasix 20mg BID for 3 days.

## 2024-05-07 NOTE — TELEPHONE ENCOUNTER
Lasix 20 mg p.o. daily as needed weight gain greater than 2 pounds in 24 hours or 4 pounds in 48 hours.

## 2024-05-10 ENCOUNTER — TELEPHONE (OUTPATIENT)
Age: 64
End: 2024-05-10

## 2024-05-10 NOTE — TELEPHONE ENCOUNTER
Pt had her labs done with her PCP and was concern with the Results of her BNP being elevated and low iron (Transferrin % Saturation: 11 ) She was wondering if the iron infusion would still be in the options.

## 2024-05-10 NOTE — TELEPHONE ENCOUNTER
Pt called in and had an update to her lab results from pcp please see notes from 5/6 and pt also has some questions.

## 2024-05-15 NOTE — TELEPHONE ENCOUNTER
[facility]  Figueroa Bustamante M.D., [facility]  [address], [place].    [address], [address]  [place] [address]  Phone [phone]  Fax [phone]    OUTPATIENT CARDIOLOGY FOLLOW-UP VISIT    CHIEF COMPLAINT / REASON FOR FOLLOW-UP: acute combined systolic and diastolic congestive heart failure, ***    CARDIOLOGIST: Dr. Bustamante    I have reviewed and summarized all records as in problem list:  PROBLEM LIST:  1. Coronary artery disease:             a. CABG in the remote past at [facility]             b.  re-do CABG 2008 at [facility] with Bioprosthetic AVR             c.  no recurrent angina  2.Peripheral artery disease:             a. left Femoral-proximal popliteal bypass with PTFE 10/22/2009.             b. right external iliac-distal popliteal arterial bypass with PTFE 2/2/2010             c. MARIUM/PVR 9/2/2016 (performed for recurrent intermittent claudication): mild-moderate arterial disease on right leg. Moderate to severe disease on the left.             d. lower extremity arterial duplex bilateral, 09/22/2016: revealed abnormally elevated external iliac and right common femoral velocity suggesting inflow disease and proximal stenosis.             e. IR aortogram, 10/04/2016:revealed no significant iliac artery stenosis, patent right common femoral to posterior tibial artery trunk graft, occlusion of the distal anastomosis of the left common femoral artery to distal SFA graft.  Patent left anterior tibial artery.             f.  IR angiogram left lower extremity, 10/12/2016: successful stenting of the left proximal popliteal artery.   3.Carotid artery disease:             a. left carotid endarterectomy 4/4/2001             b. carotid duplex 1/22/2016: right ICA occluded chronically. Mild left carotid bifurcation plaque without significant ICA stenosis. Vertebral artery flow antegrade.  4. Chronic Diastolic CHF:             a. 2-D echo 9/9/2016: EF 38%. LV systolic function  Spoke with pt and review  note. Pt v/u        moderately reduced. Moderate concentric LV hypertrophy. Mild to moderate mitral enlargement. Mild to moderate mitral regurgitation.   5. Valvular heart disease:             s. s/p Bioprosthetic aortic valve replacement in 2008  6. Chronic obstructive pulmonary disease:              -PFT 9/2/2016             Moderate restrictive defect.             Lung volumes are stable, diffusion capacity decreased from             2008             -CHEST XRAY 12/10/18:             Mild pulmonary venous hypertension  7. Essential hypertension  8. Dyslipidemia  8. Type 2 Diabetes  9.  Anemia  10. Chronic kidney disease stage 4             -GFR 15-29 ml/min  11. Neuropathy of both feet  12. Status post percutaneous angioplasty of renal artery  13. Hypothyroidism  14. CKD             -creatine 1.25; GFR 28 drawn 12/10/18 followed by Dr Vazquez    ASSESSMENT/PLAN:   ***    -CT of chest ordered - will call with date and time  -Pulmonary Rehab in the future  -make appointment with Dr Rossi when available  -fluid intake should be 48oz per day (including soup, milk, tea, ice cream, popsicles)  -start taking metolazone 30 minutes prior to furosemide  -start weighing daily and keep a log (1 liter of water = 2 pounds)  -start monitoring blood pressure and heart rate daily (also put in log book)  -when your mouth is dry try ice chips, freezing fruit, popsicles  -start wearing pressure stockings 8 hours a day, off at night   -do deep breathing exercises (inhale 5 sets of 5 up to 2000)  -try to loose 4-6 pounds  -SINCERE Ojeda will call from CHF office for follow ups  -follow up in 15 days with Nia Hand  -echocardiogram scheduled for 12/21/18 at 9:30AM  -repeat PFT's once CHF is controlled      Total time spent with the patient was *** minutes, with greater than ***% of that time spent in counseling, teaching and/or coordination of care.    HISTORY OF PRESENT ILLNESS:   Carlos Lockhart is a 83 year old male who presents today for follow up  visit.  ***      MEDICATIONS:  Current Outpatient Medications   Medication Sig Dispense Refill   • nitroGLYcerin (NITROSTAT) 0.4 MG sublingual tablet Place 1 tablet under the tongue every 5 minutes as needed for Chest pain. 25 tablet 0   • metOLazone (ZAROXOLYN) 2.5 MG tablet Take one daily for 3 daily then one three times a week , mwf 30 tablet 1   • furosemide (LASIX) 80 MG tablet Take 1.5 tablets by mouth daily. 135 tablet 3   • imipramine (TOFRANIL) 10 MG tablet Take 1 tablet by mouth nightly. 90 tablet 1   • nortriptyline (PAMELOR) 10 MG capsule Take 1 capsule by mouth nightly. 90 capsule 1   • amLODIPine (NORVASC) 5 MG tablet Take 1 tablet by mouth daily. 90 tablet 3   • insulin glargine (TOUJEO SOLOSTAR) 300 UNIT/ML pen-injector Inject 35 Units into the skin nightly. 30 mL 0   • atorvastatin (LIPITOR) 80 MG tablet TAKE 1 TABLET EVERY DAY 90 tablet 0   • calcitRIOL (ROCALTROL) 0.25 MCG capsule TAKE 1 CAPSULE EVERY OTHER DAY 45 capsule 1   • ferrous sulfate 324 (65 Fe) MG EC tablet Take 1 tablet by mouth 2 times daily. (Patient taking differently: Take 324 mg by mouth daily. ) 60 tablet 0   • metoPROLOL tartrate (LOPRESSOR) 50 MG tablet Take 1.5 tablets by mouth 2 times daily. 270 tablet 3   • SOFTCLIX LANCETS Misc 4 times daily. Dx:  E11.9 400 each 11   • tiotropium-olodaterol (STIOLTO RESPIMAT) 2.5-2.5 MCG/ACT inhaler One Puff daily 4 g 0   • insulin aspart (NOVOLOG) 100 UNIT/ML injectable solution Indications: Type 2 Diabetes Inject 7-10 units three daily before meals per sliding scale. 10 mL 2   • Blood Glucose Monitoring Suppl (ACCU-CHEK JEFFRY SMARTVIEW) w/Device Kit 1 each 3 times daily. 1 kit 1   • blood glucose test strip Test blood sugar 3 times daily as directed. Diagnosis: E11.9. Meter: Insurnace covered, Accu-chek Jeffry Smartview 300 each 3   • Alcohol Swabs (B-D SINGLE USE SWABS REGULAR) Pads Use before each check 300 each 3   • Lancets Misc. (ACCU-CHEK FASTCLIX LANCET) Kit Check 3-4 times daily dx:  E11.9 300 kit 3   • aspirin 81 MG tablet Take 1 tablet by mouth daily. 90 tablet 3   • clopidogrel (PLAVIX) 75 MG tablet Take 1 tablet by mouth daily. 90 tablet 1   • levothyroxine (SYNTHROID, LEVOTHROID) 112 MCG tablet Take 1 tablet by mouth daily. 90 tablet 1   • cloNIDine (CATAPRES) 0.3 MG tablet Take 1 tablet by mouth 3 times daily. 270 tablet 1   • hydrALAZINE (APRESOLINE) 100 MG tablet Take 1 tablet by mouth 3 times daily. 270 tablet 1   • allopurinol (ZYLOPRIM) 100 MG tablet Take 1 tablet by mouth 2 times daily. 180 tablet 1   • tamsulosin (FLOMAX) 0.4 MG Cap Take 1 capsule by mouth daily after a meal. 90 capsule 1   • docusate sodium (COLACE) 100 MG capsule Take 1 capsule by mouth 3 times daily as needed for Constipation. 90 capsule 1   • polyethylene glycol (MIRALAX) powder Take 17 g by mouth daily. Take 4 doses over the first 2 hours then 1 dose nightly as needed. Drink 8 ounces of fluid with each dose. 255 g 0   • TRUEPLUS LANCETS 28G Misc 1 each 3 times daily. 3 Package 11     No current facility-administered medications for this visit.        ALLERGIES:  ALLERGIES:   Allergen Reactions   • Flurox [Fluorescein-Benoxinate] Other (See Comments)     Severe redness, itching and pain  Per Dr. Patient should NOT have any flourescein derivative.     • Losartan Other (See Comments)     Palpitation        SOCIAL HISTORY:  Social History     Tobacco Use   • Smoking status: Former Smoker     Packs/day: 1.00     Years: 22.00     Pack years: 22.00     Start date:      Last attempt to quit: 1970     Years since quittin.0   • Smokeless tobacco: Never Used   Substance Use Topics   • Alcohol use: No     Alcohol/week: 0.0 oz   • Drug use: No        FAMILY HISTORY:  Family History   Problem Relation Age of Onset   • Heart Mother 75        cabg   • Heart Father 84        chf   • Heart disease Brother         REVIEW OF SYSTEMS:  CONSTITUTIONAL: No fevers. No chills. No weight gain or loss.   EYES: No diplopia.  No floaters.  ENT: No otalgia. No difficulty swallowing.   RESPIRATORY: No wheezing. No cough.   CARDIAC: No chest pain. No palpitations.  : No hematuria. No dysuria.   GI: No hematemesis. No melena. No diarrhea.  SKIN: No rash. No urticaria.  NEURO: No seizure disorders.  ENDOCRINE: No heat or cold intolerances.   PSYCH: No depression.    PHYSICAL EXAMINATION:  Well developed male who is in no acute distress.   VITALS: There were no vitals taken for this visit.   General: Cooperative, sitting comfortably.  HEAD: Normocephalic and atraumatic.   Neck:  Trachea mid line.   Respiratory: Bilateral air entry present. Clear to auscultation bilaterally. Respirations regular and unlabored. No wheezes, rales, rhonchi or crackles. Good respiratory effort.   Cardiovascular: Regular rate and rhythm. Normal S1 and S2. No murmurs, rubs, or gallops. No jugular venous distension. The carotid pulses are 2+ bilaterally. No carotid bruits auscultated. No pulsatile abdominal mass or bruit.  Abdominal : Abdomen soft, nontender, nondistended. No rebound or guarding. Normal  bowel sounds.  No organomegaly.  Extremities: No cyanosis, clubbing or edema.  Lymphatic: No significant lymphadenopathy in submental, submandibular, or cervical chain.   Neurologic/psych : Alert and oriented x3. The patient is cooperative and pleasant. No gross sensory deficits noted. No apparent focal motor  deficits.   Skin/ integumentary : Warm and dry skin. No rashes.    LABS:   CHOLESTEROL (mg/dL)   Date Value   01/16/2018 126     TRIGLYCERIDE (mg/dL)   Date Value   01/16/2018 210 (H)     HDL (mg/dL)   Date Value   01/16/2018 37 (L)     CALCULATED LDL (mg/dL)   Date Value   01/16/2018 47      Lab Results   Component Value Date    WBC 12.6 (H) 12/10/2018    WBC 12.5 (H) 10/10/2018    HGB 11.3 (L) 12/10/2018    HGB 11.7 (L) 10/10/2018    HCT 36.2 (L) 12/10/2018    HCT 36.8 (L) 10/10/2018     12/10/2018     10/10/2018     12/16/2013    PLT  204 06/12/2013    INR 1.0 10/04/2016    POTASSIUM 4.6 12/10/2018    POTASSIUM 4.2 10/10/2018    BUN 84 (H) 12/10/2018    BUN 85 (H) 10/10/2018    CREATININE 2.45 (H) 12/10/2018    CREATININE 2.43 (H) 10/10/2018     (H) 09/19/2016     (H) 08/30/2016    TSH 2.432 10/10/2018         ECG: ***      FOLLOW-UP: *** months, or sooner if needed.

## 2024-05-15 NOTE — TELEPHONE ENCOUNTER
BNP is can be elevated in multiple clinical circumstances.  They can suggest fluid retention.  Typically cardiologists do not order these tests as they can have results that do not correlate late with the clinical scenario.  If the patient is having any symptoms of weight gain shortness of breath please arrange appropriate follow-up.

## 2024-05-20 ENCOUNTER — INITIAL CONSULT (OUTPATIENT)
Age: 64
End: 2024-05-20
Payer: MEDICARE

## 2024-05-20 VITALS
HEART RATE: 101 BPM | SYSTOLIC BLOOD PRESSURE: 90 MMHG | WEIGHT: 209.5 LBS | BODY MASS INDEX: 38.55 KG/M2 | HEIGHT: 62 IN | DIASTOLIC BLOOD PRESSURE: 60 MMHG

## 2024-05-20 DIAGNOSIS — I50.22 CHRONIC SYSTOLIC CONGESTIVE HEART FAILURE (HCC): Primary | ICD-10-CM

## 2024-05-20 DIAGNOSIS — Z95.810 ICD (IMPLANTABLE CARDIOVERTER-DEFIBRILLATOR) IN PLACE: ICD-10-CM

## 2024-05-20 DIAGNOSIS — Z98.890 S/P MVR (MITRAL VALVE REPAIR): ICD-10-CM

## 2024-05-20 DIAGNOSIS — I25.10 CORONARY ARTERY DISEASE INVOLVING NATIVE CORONARY ARTERY OF NATIVE HEART WITHOUT ANGINA PECTORIS: ICD-10-CM

## 2024-05-20 DIAGNOSIS — I50.22 CHRONIC SYSTOLIC HF (HEART FAILURE) (HCC): ICD-10-CM

## 2024-05-20 DIAGNOSIS — R00.0 TACHYCARDIA: ICD-10-CM

## 2024-05-20 DIAGNOSIS — I42.0 DCM (DILATED CARDIOMYOPATHY) (HCC): ICD-10-CM

## 2024-05-20 DIAGNOSIS — I50.20 HFREF (HEART FAILURE WITH REDUCED EJECTION FRACTION) (HCC): ICD-10-CM

## 2024-05-20 DIAGNOSIS — Z98.890 H/O MITRAL VALVE REPAIR: ICD-10-CM

## 2024-05-20 PROCEDURE — 93000 ELECTROCARDIOGRAM COMPLETE: CPT | Performed by: INTERNAL MEDICINE

## 2024-05-20 PROCEDURE — 3017F COLORECTAL CA SCREEN DOC REV: CPT | Performed by: INTERNAL MEDICINE

## 2024-05-20 PROCEDURE — G8417 CALC BMI ABV UP PARAM F/U: HCPCS | Performed by: INTERNAL MEDICINE

## 2024-05-20 PROCEDURE — 99205 OFFICE O/P NEW HI 60 MIN: CPT | Performed by: INTERNAL MEDICINE

## 2024-05-20 PROCEDURE — 1036F TOBACCO NON-USER: CPT | Performed by: INTERNAL MEDICINE

## 2024-05-20 PROCEDURE — G8427 DOCREV CUR MEDS BY ELIG CLIN: HCPCS | Performed by: INTERNAL MEDICINE

## 2024-05-20 ASSESSMENT — ENCOUNTER SYMPTOMS
ALLERGIC/IMMUNOLOGIC NEGATIVE: 1
EYES NEGATIVE: 1
SHORTNESS OF BREATH: 1
GASTROINTESTINAL NEGATIVE: 1

## 2024-05-20 NOTE — PROGRESS NOTES
History:   Procedure Laterality Date    CARDIAC PROCEDURE N/A 2023    Left heart cath / coronary angiography performed by Sal Graf MD at [facility] CARDIAC CATH LAB    CARDIAC PROCEDURE N/A 2023    Fractional flow reserve (FFR) performed by Sal Graf MD at [facility] CARDIAC CATH LAB    EP DEVICE PROCEDURE N/A 2022    Remove & replace ICD single lead performed by Jassi Mahan MD at [facility] CARDIAC CATH LAB    IL UNLISTED PROCEDURE CARDIAC SURGERY      MVR 2017    VASCULAR SURGERY      MVR 2017     Family History   Problem Relation Age of Onset    Mitral Valve Prolapse Mother     Lung Cancer Mother     Mitral Valve Prolapse Maternal Aunt     Mitral Valve Prolapse Maternal Grandfather      Social History     Tobacco Use    Smoking status: Former     Current packs/day: 0.00     Types: Cigarettes     Quit date: 1991     Years since quittin.4    Smokeless tobacco: Never   Substance Use Topics    Alcohol use: Yes     Alcohol/week: 1.0 standard drink of alcohol     Types: 1 Glasses of wine per week       ROS:  A comprehensive review of systems was performed with the pertinent positives and negatives as noted in the HPI in addition to:  Review of Systems   Constitutional:  Positive for fatigue.   HENT: Negative.     Eyes: Negative.    Respiratory:  Positive for shortness of breath.    Cardiovascular: Negative.    Gastrointestinal: Negative.    Endocrine: Negative.    Genitourinary: Negative.    Musculoskeletal: Negative.    Skin: Negative.    Allergic/Immunologic: Negative.    Neurological: Negative.    Hematological: Negative.    Psychiatric/Behavioral: Negative.     All other systems reviewed and are negative.    PHYSICAL EXAM:   BP 90/60   Pulse (!) 101   Ht 1.575 m (5' 2\")   Wt 95 kg (209 lb 8 oz)   BMI 38.32 kg/m²      Wt Readings from Last 3 Encounters:   24 95 kg (209 lb 8 oz)   24 97.1 kg (214 lb)   24 90.7 kg (200 lb)     BP Readings from Last 3 Encounters:

## 2024-05-23 ENCOUNTER — TELEPHONE (OUTPATIENT)
Age: 64
End: 2024-05-23

## 2024-05-23 NOTE — TELEPHONE ENCOUNTER
Patient calling stating that she had some lab work  a few weeks ago with PCP and her Iron low . Patient wanted to know if she should do an Iron infusion.The pill form for Iron upset's her stomach.  Please call to advise.

## 2024-05-24 PROBLEM — I50.20 HFREF (HEART FAILURE WITH REDUCED EJECTION FRACTION) (HCC): Status: ACTIVE | Noted: 2024-05-20

## 2024-05-29 NOTE — TELEPHONE ENCOUNTER
Iron levels were low hemoglobin normal.  Oral iron supplementation versus intravenous iron or options.  Oral supplements may be more appropriate.

## 2024-05-29 NOTE — TELEPHONE ENCOUNTER
Do know which infusion she will be approved for?  Unlikely she would need a full course of treatment.

## 2024-05-31 NOTE — TELEPHONE ENCOUNTER
Message sent to Giovana at [facility]. Sent pt mychart message and faxed orders to [facility] for iron infusions.

## 2024-06-04 DIAGNOSIS — D50.8 IRON DEFICIENCY ANEMIA SECONDARY TO INADEQUATE DIETARY IRON INTAKE: Primary | ICD-10-CM

## 2024-06-04 RX ORDER — SODIUM CHLORIDE 9 MG/ML
INJECTION, SOLUTION INTRAVENOUS CONTINUOUS
OUTPATIENT
Start: 2024-06-04

## 2024-06-04 RX ORDER — EPINEPHRINE 1 MG/ML
0.3 INJECTION, SOLUTION, CONCENTRATE INTRAVENOUS PRN
OUTPATIENT
Start: 2024-06-04

## 2024-06-04 RX ORDER — ALBUTEROL SULFATE 90 UG/1
4 AEROSOL, METERED RESPIRATORY (INHALATION) PRN
Status: CANCELLED | OUTPATIENT
Start: 2024-06-04

## 2024-06-04 RX ORDER — DIPHENHYDRAMINE HYDROCHLORIDE 50 MG/ML
50 INJECTION INTRAMUSCULAR; INTRAVENOUS
Status: CANCELLED | OUTPATIENT
Start: 2024-06-04

## 2024-06-04 RX ORDER — SODIUM CHLORIDE 0.9 % (FLUSH) 0.9 %
5-40 SYRINGE (ML) INJECTION PRN
OUTPATIENT
Start: 2024-06-04

## 2024-06-04 RX ORDER — ALBUTEROL SULFATE 90 UG/1
4 AEROSOL, METERED RESPIRATORY (INHALATION) PRN
OUTPATIENT
Start: 2024-06-04

## 2024-06-04 RX ORDER — ONDANSETRON 2 MG/ML
8 INJECTION INTRAMUSCULAR; INTRAVENOUS
OUTPATIENT
Start: 2024-06-04

## 2024-06-04 RX ORDER — SODIUM CHLORIDE 9 MG/ML
INJECTION, SOLUTION INTRAVENOUS CONTINUOUS
Status: CANCELLED | OUTPATIENT
Start: 2024-06-04

## 2024-06-04 RX ORDER — SODIUM CHLORIDE 9 MG/ML
5-250 INJECTION, SOLUTION INTRAVENOUS PRN
OUTPATIENT
Start: 2024-06-04

## 2024-06-04 RX ORDER — ACETAMINOPHEN 325 MG/1
650 TABLET ORAL
OUTPATIENT
Start: 2024-06-04

## 2024-06-04 RX ORDER — ACETAMINOPHEN 325 MG/1
650 TABLET ORAL
Status: CANCELLED | OUTPATIENT
Start: 2024-06-04

## 2024-06-04 RX ORDER — EPINEPHRINE 1 MG/ML
0.3 INJECTION, SOLUTION, CONCENTRATE INTRAVENOUS PRN
Status: CANCELLED | OUTPATIENT
Start: 2024-06-04

## 2024-06-04 RX ORDER — DIPHENHYDRAMINE HYDROCHLORIDE 50 MG/ML
50 INJECTION INTRAMUSCULAR; INTRAVENOUS
OUTPATIENT
Start: 2024-06-04

## 2024-06-04 RX ORDER — ONDANSETRON 2 MG/ML
8 INJECTION INTRAMUSCULAR; INTRAVENOUS
Status: CANCELLED | OUTPATIENT
Start: 2024-06-04

## 2024-06-04 RX ORDER — HEPARIN 100 UNIT/ML
500 SYRINGE INTRAVENOUS PRN
OUTPATIENT
Start: 2024-06-04

## 2024-06-05 ENCOUNTER — TELEPHONE (OUTPATIENT)
Age: 64
End: 2024-06-05

## 2024-06-05 DIAGNOSIS — I50.22 CHRONIC SYSTOLIC CONGESTIVE HEART FAILURE (HCC): Primary | ICD-10-CM

## 2024-06-05 NOTE — TELEPHONE ENCOUNTER
Lasix 40 mg Take 1 tablet daily.  Measure weight the a.m.  If weight increases by 2 pounds in 24 hours or 4 pounds in 48 hours, take 1 additional tablet in the afternoon.     BMP 1 week

## 2024-06-05 NOTE — TELEPHONE ENCOUNTER
Spoke with pt and she stated she is still had some swelling and she was concern, if she should take an extra lasix.

## 2024-06-06 RX ORDER — FUROSEMIDE 20 MG/1
40 TABLET ORAL DAILY PRN
Qty: 90 TABLET | Refills: 3 | Status: SHIPPED
Start: 2024-06-06 | End: 2024-12-03

## 2024-06-06 NOTE — TELEPHONE ENCOUNTER
Called patient regarding CCM procedure. Patient verbalized she called the triage department due to an increase in BLE edema. Dr. Wills ordered increase in Lasix dose and labs. I educated the patient on daily weights, daily intake and daily output recording. Instructed patient to write down all 3 measurements and call me on Monday. Patient verbalized understanding.

## 2024-06-06 NOTE — TELEPHONE ENCOUNTER
Spoke with pt and review  note. Pt v/u      Orders Placed This Encounter   Procedures    Basic Metabolic Panel     Standing Status:   Future     Standing Expiration Date:   6/6/2025

## 2024-06-13 DIAGNOSIS — I50.22 CHRONIC SYSTOLIC CONGESTIVE HEART FAILURE (HCC): ICD-10-CM

## 2024-06-13 LAB
ANION GAP SERPL CALC-SCNC: 5 MMOL/L (ref 9–18)
BUN SERPL-MCNC: 16 MG/DL (ref 8–23)
CALCIUM SERPL-MCNC: 9.7 MG/DL (ref 8.8–10.2)
CHLORIDE SERPL-SCNC: 100 MMOL/L (ref 98–107)
CO2 SERPL-SCNC: 37 MMOL/L (ref 20–28)
CREAT SERPL-MCNC: 1.09 MG/DL (ref 0.6–1.1)
ERYTHROCYTE [DISTWIDTH] IN BLOOD BY AUTOMATED COUNT: 14.8 % (ref 11.9–14.6)
GLUCOSE SERPL-MCNC: 81 MG/DL (ref 70–99)
HCT VFR BLD AUTO: 44.3 % (ref 35.8–46.3)
HGB BLD-MCNC: 13.9 G/DL (ref 11.7–15.4)
INR PPP: 1.2
MCH RBC QN AUTO: 29.3 PG (ref 26.1–32.9)
MCHC RBC AUTO-ENTMCNC: 31.4 G/DL (ref 31.4–35)
MCV RBC AUTO: 93.5 FL (ref 82–102)
NRBC # BLD: 0 K/UL (ref 0–0.2)
PLATELET # BLD AUTO: 259 K/UL (ref 150–450)
PMV BLD AUTO: 10.4 FL (ref 9.4–12.3)
POTASSIUM SERPL-SCNC: 3.6 MMOL/L (ref 3.5–5.1)
PROTHROMBIN TIME: 15.5 SEC (ref 11.3–14.9)
RBC # BLD AUTO: 4.74 M/UL (ref 4.05–5.2)
SODIUM SERPL-SCNC: 141 MMOL/L (ref 136–145)
WBC # BLD AUTO: 5.8 K/UL (ref 4.3–11.1)

## 2024-06-17 ENCOUNTER — TELEPHONE (OUTPATIENT)
Age: 64
End: 2024-06-17

## 2024-06-18 NOTE — PROGRESS NOTES
Patient pre-assessment complete for CCM Device implant with Dr. Mills scheduled for 24 at 1:00pm, arrival time 1100. Patient verified using . Patient instructed to bring a list of home medications on the day of procedure. NPO status reinforced. Patient instructed to follow EP Information Sheet given at appointment. Patient verbalizes understanding of all instructions & denies any questions at this time.

## 2024-06-19 ENCOUNTER — APPOINTMENT (OUTPATIENT)
Dept: GENERAL RADIOLOGY | Age: 64
End: 2024-06-19
Attending: INTERNAL MEDICINE
Payer: MEDICARE

## 2024-06-19 ENCOUNTER — HOSPITAL ENCOUNTER (OUTPATIENT)
Age: 64
Setting detail: OBSERVATION
Discharge: HOME | End: 2024-06-20
Attending: INTERNAL MEDICINE | Admitting: INTERNAL MEDICINE
Payer: MEDICARE

## 2024-06-19 ENCOUNTER — ANESTHESIA (OUTPATIENT)
Dept: CARDIAC CATH/INVASIVE PROCEDURES | Age: 64
End: 2024-06-19
Payer: MEDICARE

## 2024-06-19 ENCOUNTER — ANESTHESIA EVENT (OUTPATIENT)
Dept: CARDIAC CATH/INVASIVE PROCEDURES | Age: 64
End: 2024-06-19
Payer: MEDICARE

## 2024-06-19 DIAGNOSIS — I50.20 HFREF (HEART FAILURE WITH REDUCED EJECTION FRACTION) (HCC): ICD-10-CM

## 2024-06-19 PROBLEM — Z95.0: Status: ACTIVE | Noted: 2024-06-19

## 2024-06-19 LAB
ECHO BSA: 2 M2
EKG ATRIAL RATE: 70 BPM
EKG ATRIAL RATE: 82 BPM
EKG DIAGNOSIS: NORMAL
EKG DIAGNOSIS: NORMAL
EKG P AXIS: 63 DEGREES
EKG P AXIS: 85 DEGREES
EKG P-R INTERVAL: 250 MS
EKG P-R INTERVAL: 274 MS
EKG Q-T INTERVAL: 476 MS
EKG Q-T INTERVAL: 480 MS
EKG QRS DURATION: 112 MS
EKG QRS DURATION: 162 MS
EKG QTC CALCULATION (BAZETT): 514 MS
EKG QTC CALCULATION (BAZETT): 560 MS
EKG R AXIS: 143 DEGREES
EKG R AXIS: 19 DEGREES
EKG T AXIS: -27 DEGREES
EKG T AXIS: 92 DEGREES
EKG VENTRICULAR RATE: 70 BPM
EKG VENTRICULAR RATE: 82 BPM
MAGNESIUM SERPL-MCNC: 2.7 MG/DL (ref 1.8–2.4)

## 2024-06-19 PROCEDURE — 2580000003 HC RX 258: Performed by: INTERNAL MEDICINE

## 2024-06-19 PROCEDURE — 2580000003 HC RX 258

## 2024-06-19 PROCEDURE — 6360000004 HC RX CONTRAST MEDICATION: Performed by: INTERNAL MEDICINE

## 2024-06-19 PROCEDURE — 0408T INSJ/RPLC CARDIAC MODULJ SYS: CPT | Performed by: INTERNAL MEDICINE

## 2024-06-19 PROCEDURE — 93010 ELECTROCARDIOGRAM REPORT: CPT | Performed by: INTERNAL MEDICINE

## 2024-06-19 PROCEDURE — 93005 ELECTROCARDIOGRAM TRACING: CPT | Performed by: INTERNAL MEDICINE

## 2024-06-19 PROCEDURE — C1894 INTRO/SHEATH, NON-LASER: HCPCS | Performed by: INTERNAL MEDICINE

## 2024-06-19 PROCEDURE — C1898 LEAD, PMKR, OTHER THAN TRANS: HCPCS | Performed by: INTERNAL MEDICINE

## 2024-06-19 PROCEDURE — 3700000001 HC ADD 15 MINUTES (ANESTHESIA): Performed by: INTERNAL MEDICINE

## 2024-06-19 PROCEDURE — 6360000002 HC RX W HCPCS: Performed by: INTERNAL MEDICINE

## 2024-06-19 PROCEDURE — 83735 ASSAY OF MAGNESIUM: CPT

## 2024-06-19 PROCEDURE — 2720000010 HC SURG SUPPLY STERILE: Performed by: INTERNAL MEDICINE

## 2024-06-19 PROCEDURE — A4217 STERILE WATER/SALINE, 500 ML: HCPCS | Performed by: INTERNAL MEDICINE

## 2024-06-19 PROCEDURE — 2500000003 HC RX 250 WO HCPCS

## 2024-06-19 PROCEDURE — 3700000000 HC ANESTHESIA ATTENDED CARE: Performed by: INTERNAL MEDICINE

## 2024-06-19 PROCEDURE — 2500000003 HC RX 250 WO HCPCS: Performed by: ANESTHESIOLOGY

## 2024-06-19 PROCEDURE — 71045 X-RAY EXAM CHEST 1 VIEW: CPT

## 2024-06-19 PROCEDURE — C1892 INTRO/SHEATH,FIXED,PEEL-AWAY: HCPCS | Performed by: INTERNAL MEDICINE

## 2024-06-19 PROCEDURE — G0378 HOSPITAL OBSERVATION PER HR: HCPCS

## 2024-06-19 PROCEDURE — 2500000003 HC RX 250 WO HCPCS: Performed by: INTERNAL MEDICINE

## 2024-06-19 PROCEDURE — 6360000002 HC RX W HCPCS

## 2024-06-19 PROCEDURE — C1824 GENERATOR, CCM, IMPLANT: HCPCS | Performed by: INTERNAL MEDICINE

## 2024-06-19 PROCEDURE — 2709999900 HC NON-CHARGEABLE SUPPLY: Performed by: INTERNAL MEDICINE

## 2024-06-19 DEVICE — PACE/SENSE LEAD
Type: IMPLANTABLE DEVICE | Status: FUNCTIONAL
Brand: INGEVITY™+

## 2024-06-19 DEVICE — OPTIMIZER SMART MINI IMPLANTABLE PULSE GENERATOR FOR THE TREATMENT OF HEART FAILURESYSTEM CONSISTS OF:(1) OPTIMIZER SMART MINI IMPLANTABLE PULSE GENERATOR (IPG)(1) ALLEN #2 TORQUE WRENCH(1) PORT PLUG
Type: IMPLANTABLE DEVICE | Status: FUNCTIONAL
Brand: OPTIMIZER

## 2024-06-19 RX ORDER — METOPROLOL SUCCINATE 25 MG/1
25 TABLET, EXTENDED RELEASE ORAL DAILY
Status: DISCONTINUED | OUTPATIENT
Start: 2024-06-20 | End: 2024-06-20 | Stop reason: HOSPADM

## 2024-06-19 RX ORDER — SODIUM CHLORIDE, SODIUM LACTATE, POTASSIUM CHLORIDE, CALCIUM CHLORIDE 600; 310; 30; 20 MG/100ML; MG/100ML; MG/100ML; MG/100ML
INJECTION, SOLUTION INTRAVENOUS CONTINUOUS PRN
Status: DISCONTINUED | OUTPATIENT
Start: 2024-06-19 | End: 2024-06-19 | Stop reason: SDUPTHER

## 2024-06-19 RX ORDER — SODIUM CHLORIDE 0.9 % (FLUSH) 0.9 %
5-40 SYRINGE (ML) INJECTION EVERY 12 HOURS SCHEDULED
Status: DISCONTINUED | OUTPATIENT
Start: 2024-06-19 | End: 2024-06-20 | Stop reason: HOSPADM

## 2024-06-19 RX ORDER — ACETAMINOPHEN 325 MG/1
650 TABLET ORAL EVERY 4 HOURS PRN
Status: DISCONTINUED | OUTPATIENT
Start: 2024-06-19 | End: 2024-06-20 | Stop reason: HOSPADM

## 2024-06-19 RX ORDER — MIDAZOLAM HYDROCHLORIDE 2 MG/2ML
2 INJECTION, SOLUTION INTRAMUSCULAR; INTRAVENOUS
Status: CANCELLED | OUTPATIENT
Start: 2024-06-19 | End: 2024-06-20

## 2024-06-19 RX ORDER — ONDANSETRON 2 MG/ML
4 INJECTION INTRAMUSCULAR; INTRAVENOUS EVERY 6 HOURS PRN
Status: DISCONTINUED | OUTPATIENT
Start: 2024-06-19 | End: 2024-06-20 | Stop reason: HOSPADM

## 2024-06-19 RX ORDER — SODIUM CHLORIDE 0.9 % (FLUSH) 0.9 %
5-40 SYRINGE (ML) INJECTION PRN
Status: CANCELLED | OUTPATIENT
Start: 2024-06-19

## 2024-06-19 RX ORDER — ONDANSETRON 2 MG/ML
4 INJECTION INTRAMUSCULAR; INTRAVENOUS
Status: DISCONTINUED | OUTPATIENT
Start: 2024-06-19 | End: 2024-06-19 | Stop reason: HOSPADM

## 2024-06-19 RX ORDER — FENTANYL CITRATE 50 UG/ML
50 INJECTION, SOLUTION INTRAMUSCULAR; INTRAVENOUS EVERY 5 MIN PRN
Status: DISCONTINUED | OUTPATIENT
Start: 2024-06-19 | End: 2024-06-19 | Stop reason: HOSPADM

## 2024-06-19 RX ORDER — DEXMEDETOMIDINE HYDROCHLORIDE 4 UG/ML
INJECTION, SOLUTION INTRAVENOUS CONTINUOUS PRN
Status: DISCONTINUED | OUTPATIENT
Start: 2024-06-19 | End: 2024-06-19 | Stop reason: SDUPTHER

## 2024-06-19 RX ORDER — SODIUM CHLORIDE 0.9 % (FLUSH) 0.9 %
5-40 SYRINGE (ML) INJECTION PRN
Status: DISCONTINUED | OUTPATIENT
Start: 2024-06-19 | End: 2024-06-20 | Stop reason: HOSPADM

## 2024-06-19 RX ORDER — SPIRONOLACTONE 25 MG/1
12.5 TABLET ORAL DAILY
Status: DISCONTINUED | OUTPATIENT
Start: 2024-06-20 | End: 2024-06-20 | Stop reason: HOSPADM

## 2024-06-19 RX ORDER — MIDAZOLAM HYDROCHLORIDE 1 MG/ML
INJECTION INTRAMUSCULAR; INTRAVENOUS PRN
Status: DISCONTINUED | OUTPATIENT
Start: 2024-06-19 | End: 2024-06-19 | Stop reason: SDUPTHER

## 2024-06-19 RX ORDER — HYDROMORPHONE HYDROCHLORIDE 2 MG/ML
0.5 INJECTION, SOLUTION INTRAMUSCULAR; INTRAVENOUS; SUBCUTANEOUS EVERY 10 MIN PRN
Status: DISCONTINUED | OUTPATIENT
Start: 2024-06-19 | End: 2024-06-19 | Stop reason: HOSPADM

## 2024-06-19 RX ORDER — GLYCOPYRROLATE 0.2 MG/ML
INJECTION INTRAMUSCULAR; INTRAVENOUS PRN
Status: DISCONTINUED | OUTPATIENT
Start: 2024-06-19 | End: 2024-06-19 | Stop reason: SDUPTHER

## 2024-06-19 RX ORDER — SODIUM CHLORIDE 9 MG/ML
INJECTION, SOLUTION INTRAVENOUS PRN
Status: DISCONTINUED | OUTPATIENT
Start: 2024-06-19 | End: 2024-06-20 | Stop reason: HOSPADM

## 2024-06-19 RX ORDER — LIDOCAINE HYDROCHLORIDE 10 MG/ML
1 INJECTION, SOLUTION INFILTRATION; PERINEURAL
Status: CANCELLED | OUTPATIENT
Start: 2024-06-19 | End: 2024-06-20

## 2024-06-19 RX ORDER — DEXMEDETOMIDINE HYDROCHLORIDE 100 UG/ML
INJECTION, SOLUTION INTRAVENOUS PRN
Status: DISCONTINUED | OUTPATIENT
Start: 2024-06-19 | End: 2024-06-19 | Stop reason: SDUPTHER

## 2024-06-19 RX ORDER — SODIUM CHLORIDE, SODIUM LACTATE, POTASSIUM CHLORIDE, CALCIUM CHLORIDE 600; 310; 30; 20 MG/100ML; MG/100ML; MG/100ML; MG/100ML
INJECTION, SOLUTION INTRAVENOUS CONTINUOUS
Status: CANCELLED | OUTPATIENT
Start: 2024-06-19

## 2024-06-19 RX ORDER — EPHEDRINE SULFATE 5 MG/ML
INJECTION INTRAVENOUS PRN
Status: DISCONTINUED | OUTPATIENT
Start: 2024-06-19 | End: 2024-06-19 | Stop reason: SDUPTHER

## 2024-06-19 RX ORDER — SODIUM CHLORIDE 0.9 % (FLUSH) 0.9 %
5-40 SYRINGE (ML) INJECTION PRN
Status: DISCONTINUED | OUTPATIENT
Start: 2024-06-19 | End: 2024-06-19 | Stop reason: HOSPADM

## 2024-06-19 RX ORDER — POLYETHYLENE GLYCOL 3350 17 G/17G
17 POWDER, FOR SOLUTION ORAL DAILY PRN
Status: DISCONTINUED | OUTPATIENT
Start: 2024-06-19 | End: 2024-06-20 | Stop reason: HOSPADM

## 2024-06-19 RX ORDER — OXYCODONE HYDROCHLORIDE 5 MG/1
5 TABLET ORAL
Status: DISCONTINUED | OUTPATIENT
Start: 2024-06-19 | End: 2024-06-19 | Stop reason: HOSPADM

## 2024-06-19 RX ORDER — FENTANYL CITRATE 50 UG/ML
INJECTION, SOLUTION INTRAMUSCULAR; INTRAVENOUS PRN
Status: DISCONTINUED | OUTPATIENT
Start: 2024-06-19 | End: 2024-06-19 | Stop reason: SDUPTHER

## 2024-06-19 RX ORDER — IPRATROPIUM BROMIDE AND ALBUTEROL SULFATE 2.5; .5 MG/3ML; MG/3ML
1 SOLUTION RESPIRATORY (INHALATION)
Status: DISCONTINUED | OUTPATIENT
Start: 2024-06-19 | End: 2024-06-19 | Stop reason: HOSPADM

## 2024-06-19 RX ORDER — HALOPERIDOL 5 MG/ML
1 INJECTION INTRAMUSCULAR
Status: DISCONTINUED | OUTPATIENT
Start: 2024-06-19 | End: 2024-06-19 | Stop reason: HOSPADM

## 2024-06-19 RX ORDER — NALOXONE HYDROCHLORIDE 0.4 MG/ML
INJECTION, SOLUTION INTRAMUSCULAR; INTRAVENOUS; SUBCUTANEOUS PRN
Status: DISCONTINUED | OUTPATIENT
Start: 2024-06-19 | End: 2024-06-19 | Stop reason: HOSPADM

## 2024-06-19 RX ORDER — OXYCODONE HYDROCHLORIDE 5 MG/1
10 TABLET ORAL EVERY 4 HOURS PRN
Status: DISCONTINUED | OUTPATIENT
Start: 2024-06-19 | End: 2024-06-20 | Stop reason: HOSPADM

## 2024-06-19 RX ORDER — OXYCODONE HYDROCHLORIDE 5 MG/1
5 TABLET ORAL EVERY 4 HOURS PRN
Status: DISCONTINUED | OUTPATIENT
Start: 2024-06-19 | End: 2024-06-20 | Stop reason: HOSPADM

## 2024-06-19 RX ORDER — IPRATROPIUM BROMIDE AND ALBUTEROL SULFATE 2.5; .5 MG/3ML; MG/3ML
1 SOLUTION RESPIRATORY (INHALATION)
Status: CANCELLED | OUTPATIENT
Start: 2024-06-19 | End: 2024-06-20

## 2024-06-19 RX ORDER — FERROUS SULFATE 325(65) MG
325 TABLET ORAL
Status: DISCONTINUED | OUTPATIENT
Start: 2024-06-20 | End: 2024-06-20 | Stop reason: HOSPADM

## 2024-06-19 RX ORDER — EPHEDRINE SULFATE 50 MG/ML
25 INJECTION INTRAVENOUS ONCE
Status: COMPLETED | OUTPATIENT
Start: 2024-06-19 | End: 2024-06-19

## 2024-06-19 RX ORDER — SODIUM CHLORIDE 0.9 % (FLUSH) 0.9 %
5-40 SYRINGE (ML) INJECTION EVERY 12 HOURS SCHEDULED
Status: CANCELLED | OUTPATIENT
Start: 2024-06-19

## 2024-06-19 RX ORDER — ACETAMINOPHEN 500 MG
1000 TABLET ORAL ONCE
Status: CANCELLED | OUTPATIENT
Start: 2024-06-19 | End: 2024-06-19

## 2024-06-19 RX ORDER — SODIUM CHLORIDE 0.9 % (FLUSH) 0.9 %
5-40 SYRINGE (ML) INJECTION EVERY 12 HOURS SCHEDULED
Status: DISCONTINUED | OUTPATIENT
Start: 2024-06-19 | End: 2024-06-19 | Stop reason: HOSPADM

## 2024-06-19 RX ORDER — EPHEDRINE SULFATE 50 MG/ML
INJECTION INTRAVENOUS
Status: DISPENSED
Start: 2024-06-19 | End: 2024-06-20

## 2024-06-19 RX ADMIN — PHENYLEPHRINE HYDROCHLORIDE 100 MCG: 10 INJECTION INTRAVENOUS at 13:00

## 2024-06-19 RX ADMIN — DEXMEDETOMIDINE 8 MCG: 100 INJECTION, SOLUTION, CONCENTRATE INTRAVENOUS at 12:58

## 2024-06-19 RX ADMIN — SODIUM CHLORIDE, SODIUM LACTATE, POTASSIUM CHLORIDE, AND CALCIUM CHLORIDE: 600; 310; 30; 20 INJECTION, SOLUTION INTRAVENOUS at 12:46

## 2024-06-19 RX ADMIN — SODIUM CHLORIDE, PRESERVATIVE FREE 10 ML: 5 INJECTION INTRAVENOUS at 21:18

## 2024-06-19 RX ADMIN — FENTANYL CITRATE 25 MCG: 50 INJECTION, SOLUTION INTRAMUSCULAR; INTRAVENOUS at 13:15

## 2024-06-19 RX ADMIN — DEXMEDETOMIDINE HYDROCHLORIDE 0.8 MCG/KG/HR: 4 INJECTION, SOLUTION INTRAVENOUS at 12:54

## 2024-06-19 RX ADMIN — DEXMEDETOMIDINE 8 MCG: 100 INJECTION, SOLUTION, CONCENTRATE INTRAVENOUS at 13:03

## 2024-06-19 RX ADMIN — VASOPRESSIN 1 UNITS/HR: 20 INJECTION PARENTERAL at 13:14

## 2024-06-19 RX ADMIN — PHENYLEPHRINE HYDROCHLORIDE 200 MCG: 10 INJECTION INTRAVENOUS at 13:05

## 2024-06-19 RX ADMIN — FENTANYL CITRATE 25 MCG: 50 INJECTION, SOLUTION INTRAMUSCULAR; INTRAVENOUS at 12:53

## 2024-06-19 RX ADMIN — EPHEDRINE SULFATE 10 MG: 5 INJECTION INTRAVENOUS at 13:38

## 2024-06-19 RX ADMIN — EPHEDRINE SULFATE 5 MG: 5 INJECTION INTRAVENOUS at 13:11

## 2024-06-19 RX ADMIN — MIDAZOLAM 2 MG: 1 INJECTION INTRAMUSCULAR; INTRAVENOUS at 12:53

## 2024-06-19 RX ADMIN — GLYCOPYRROLATE 0.2 MG: 0.2 INJECTION INTRAMUSCULAR; INTRAVENOUS at 12:52

## 2024-06-19 RX ADMIN — FENTANYL CITRATE 25 MCG: 50 INJECTION, SOLUTION INTRAMUSCULAR; INTRAVENOUS at 13:00

## 2024-06-19 RX ADMIN — DEXMEDETOMIDINE 8 MCG: 100 INJECTION, SOLUTION, CONCENTRATE INTRAVENOUS at 12:53

## 2024-06-19 RX ADMIN — EPHEDRINE SULFATE 25 MG: 50 INJECTION, SOLUTION INTRAVENOUS at 15:36

## 2024-06-19 NOTE — PROGRESS NOTES
Patient received to CPRU room # 9  Ambulatory from [facility]. Patient scheduled for CCM today with Dr Mills. Procedure reviewed & questions answered, voiced good understanding consent obtained & placed on chart. All medications and medical history reviewed. Will prep patient per orders. Patient & family updated on plan of care.      The patient has a fraility score of 3-MANAGING WELL, based on ambulation.

## 2024-06-19 NOTE — ANESTHESIA POSTPROCEDURE EVALUATION
Department of Anesthesiology  Postprocedure Note    Patient: Kely Quach  MRN: [id number]  YOB: 1960  Date of evaluation: 6/19/2024    Procedure Summary       Date: 06/19/24 Room / Location: [facility] 2 ALL EVENTS / [facility] CARDIAC CATH LAB    Anesthesia Start: 1246 Anesthesia Stop: 1445    Procedure: Insert CCM Device Diagnosis:       HFrEF (heart failure with reduced ejection fraction) (HCC)      (HFrEF (heart failure with reduced ejection fraction) (HCC) [I50.20])    Providers: Landon Mills MD Responsible Provider: Romario Juares MD    Anesthesia Type: MAC ASA Status: 3            Anesthesia Type: No value filed.    Lexie Phase I: Lexie Score: 10    Lexie Phase II:      Anesthesia Post Evaluation    Patient location during evaluation: PACU  Patient participation: complete - patient participated  Level of consciousness: awake and alert  Airway patency: patent  Nausea & Vomiting: no nausea and no vomiting  Cardiovascular status: hypotensive and blood pressure returned to baseline  Respiratory status: acceptable, nonlabored ventilation and spontaneous ventilation  Hydration status: euvolemic  Comments: BP (!) 89/80   Pulse 78   Temp 98.2 °F (36.8 °C)   Resp 20   Ht 1.575 m (5' 2\")   Wt 91.6 kg (202 lb)   SpO2 95%   Breastfeeding No   BMI 36.95 kg/m²     Patiend has been hypotensive in the PACU, but she came in 90s/50s in preop and has known severe MR with EF 20-25% and difficult to manage CHF with recent admission for volume overload.  We gave her 250ml of a fluid bolus and I gave an additional 25mg ephedrine IM because her BP was in the high 70s systolic.  She has come up to high 80s systolic.  She is also wide awake and totally asymptomatic regarding her pressure.  I have asked Gene to also reevaluate her, but I think she has recovered from her light sedation and is stable for discharge from the PACU.    Multimodal analgesia pain management approach  Pain management: adequate

## 2024-06-19 NOTE — PROGRESS NOTES
TRANSFER - IN REPORT:    Verbal report received from Naomi MÁRQUEZ on Kely Quach  being received from EP lab for routine progression of patient care      Report consisted of patient's Situation, Background, Assessment and   Recommendations(SBAR).     Information from the following report(s) Nurse Handoff Report was reviewed with the receiving nurse.    Opportunity for questions and clarification was provided.      Assessment completed upon patient's arrival to unit and care assumed.

## 2024-06-19 NOTE — PROGRESS NOTES
TRANSFER - IN REPORT:    Verbal report received from Williams on Kely Quach  being received from Cath Lab for routine progression of patient care      Report consisted of patient's Situation, Background, Assessment and   Recommendations(SBAR).     Information from the following report(s) Procedure was reviewed with the receiving nurse.    Opportunity for questions and clarification was provided.      Assessment completed upon patient's arrival to unit and care assumed.

## 2024-06-19 NOTE — PROGRESS NOTES
BP 80s/50s  Dr. Billy at bedside  Dr. Mills at bedside  Patient A&Ox3, no complaints at this time  Order for 250ml Normal Saline Bolus

## 2024-06-19 NOTE — PROGRESS NOTES
4 Eyes Skin Assessment     NAME:  Kely Quach  YOB: 1960  MEDICAL RECORD NUMBER:  [id number]    The patient is being assessed for  Admission    I agree that at least one RN has performed a thorough Head to Toe Skin Assessment on the patient. ALL assessment sites listed below have been assessed.      Areas assessed by both nurses:    Head, Face, Ears, Shoulders, Back, Chest, Arms, Elbows, Hands, Sacrum. Buttock, Coccyx, Ischium, and Legs. Feet and Heels        Does the Patient have a Wound? No noted wound(s)       Abraham Prevention initiated by RN: No  Wound Care Orders initiated by RN: No    Pressure Injury (Stage 3,4, Unstageable, DTI, NWPT, and Complex wounds) if present, place Wound referral order by RN under : No    New Ostomies, if present place, Ostomy referral order under : No     Nurse 1 eSignature: Electronically signed by Rita Jackson RN on 6/19/24 at 6:38 PM EDT    **SHARE this note so that the co-signing nurse can place an eSignature**    Nurse 2 eSignature: Electronically signed by Anamika Rocha RN on 6/19/24 at 6:46 PM EDT

## 2024-06-19 NOTE — ANESTHESIA PRE PROCEDURE
problems/murmurs:, CAD:, CHF:    (-) murmur and peripheral edema      Rhythm: regular  Rate: normal                 ROS comment: 1. 1/5/2024 mitral valve replacement, tricuspid valve repair, atrial septal defect repair, left atrial appendage closure on 1/5 with Dr. De Souza. [facility]  2. 1/24/2024 echocardiogram ejection fraction 30 to 35% mitral valve annuloplasty ring in place mean gradient 8 mmHg tricuspid repair      2023 TTE :   1. Left ventricle: LV dilated. Mild concentric hypertrophy. EF 40% (+/-5%). There is moderate global hypokinesis noted. Inconclusive diastolic evaluation due to mitral stenosis.   2. Mitral valve: An annuloplasty ring. Mobility of the posterior      leaflet(s) is restricted. Moderate mitral stenosis. Moderate mitral regurgitation directed eccentrically and toward the septum. The mean diastolic gradient is 9.0mm Hg. The heart rate is 105      bpm.   3. Left atrium: The left atrium is mildly dilated as determined by left      atrial volume index.   4. Tricuspid valve: There is mild tricuspid regurgitation.   5. Pulmonary arteries: Pulmonary artery estimated peak systolic pressure:      37mm Hg.   No previous study was available for comparison.        Neuro/Psych:               GI/Hepatic/Renal:   (+) morbid obesity          Endo/Other:                     Abdominal:             Vascular:          Other Findings:       Anesthesia Plan      MAC     ASA 3       Induction: intravenous.      Anesthetic plan and risks discussed with patient.                    Romario Juares MD   6/19/2024

## 2024-06-19 NOTE — PROCEDURES
: Landon Mills MD    REFERRING: Devon Wills II, MD    Pre-Procedure Diagnosis  1. Chronic systolic heart failure, EF 25-45%  2. Stable GDMT.   3. NYHA class III symptoms.  4. Narrow QRS complex    Procedure Performed  1. Insertion of a Cardiac Contractility Modulation device with two standard pacing leads.    Anesthesia: MAC     Estimated Blood Loss: Less than 10 mL     Specimens: * No specimens in log *       Complications: None     Fluoroscopy Time: 12 minutes     Contrast: 15 cc     Patient Information and Indications: The procedure, indications, risks, benefits, and alternatives were discussed with the patient and family members, who desired to proceed after questions were answered and informed consent was documented.     Procedure: After informed consent was obtained, the patient was brought to the Electrophysiology Laboratory in a fasting state and was prepped and draped in sterile fashion. Prophylactic antibiotic was administered 10 minutes prior to skin incision: refer to anesthesia procedural documentation for full details. Conscious sedation was administered by anesthesia with continuous oxygen saturation measurement and blood pressure measurement. Local anesthetic (sensorcaine) was delivered to the left pectoral region. A venogram of the RUE demonstrated a patent right axillary and subclavian without obvious stenosis. An incision was made parallel to the deltopectoral groove. A subcutaneous pocket was created using blunt dissection and electrocautery, and adequate hemostasis was established. Access x 2 (Micropuncture kit) was obtained in the right axillary vein under fluoroscopic guidance using a modified Seldinger technique with placement of two long 0.35 wires. Next, placement of an 8 Fr peel-away sheath over the first guidewire was performed. Next, through the 8 Fr sheath, a SSPC-3 catheter ([employer]) was advanced over the wire and advanced into the RV septum. A permanent

## 2024-06-19 NOTE — PROGRESS NOTES
TRANSFER - OUT REPORT:    Verbal report given to Toshia RN on Kely Quach  being transferred to [place] [address] for routine progression of patient care       Report consisted of patient's Situation, Background, Assessment and   Recommendations(SBAR).     Information from the following report(s) Nurse Handoff Report was reviewed with the receiving nurse.           Lines:   Peripheral IV 06/19/24 Left;Posterior Hand (Active)       Peripheral IV 06/19/24 Right Antecubital (Active)        Opportunity for questions and clarification was provided.      Patient transported with:  Tech

## 2024-06-20 VITALS
BODY MASS INDEX: 37.17 KG/M2 | OXYGEN SATURATION: 95 % | HEART RATE: 91 BPM | TEMPERATURE: 98.4 F | SYSTOLIC BLOOD PRESSURE: 104 MMHG | HEIGHT: 62 IN | RESPIRATION RATE: 17 BRPM | DIASTOLIC BLOOD PRESSURE: 79 MMHG | WEIGHT: 202 LBS

## 2024-06-20 PROCEDURE — G0378 HOSPITAL OBSERVATION PER HR: HCPCS

## 2024-06-20 PROCEDURE — 2580000003 HC RX 258: Performed by: INTERNAL MEDICINE

## 2024-06-20 PROCEDURE — 6370000000 HC RX 637 (ALT 250 FOR IP): Performed by: INTERNAL MEDICINE

## 2024-06-20 RX ORDER — SPIRONOLACTONE 25 MG/1
12.5 TABLET ORAL DAILY
Qty: 30 TABLET | Refills: 3 | Status: SHIPPED | OUTPATIENT
Start: 2024-06-21

## 2024-06-20 RX ADMIN — ACETAMINOPHEN 650 MG: 325 TABLET ORAL at 08:35

## 2024-06-20 RX ADMIN — FERROUS SULFATE TAB 325 MG (65 MG ELEMENTAL FE) 325 MG: 325 (65 FE) TAB at 08:26

## 2024-06-20 RX ADMIN — SPIRONOLACTONE 12.5 MG: 25 TABLET ORAL at 08:24

## 2024-06-20 RX ADMIN — SODIUM CHLORIDE, PRESERVATIVE FREE 10 ML: 5 INJECTION INTRAVENOUS at 08:26

## 2024-06-20 RX ADMIN — METOPROLOL SUCCINATE 25 MG: 25 TABLET, EXTENDED RELEASE ORAL at 08:26

## 2024-06-20 RX ADMIN — ACETAMINOPHEN 650 MG: 325 TABLET ORAL at 04:16

## 2024-06-20 RX ADMIN — EMPAGLIFLOZIN 10 MG: 10 TABLET, FILM COATED ORAL at 08:26

## 2024-06-20 RX ADMIN — ACETAMINOPHEN 650 MG: 325 TABLET ORAL at 00:20

## 2024-06-20 ASSESSMENT — PAIN DESCRIPTION - ORIENTATION
ORIENTATION: RIGHT
ORIENTATION: RIGHT
ORIENTATION: RIGHT;UPPER

## 2024-06-20 ASSESSMENT — PAIN DESCRIPTION - LOCATION
LOCATION: CHEST
LOCATION: SHOULDER
LOCATION: SHOULDER

## 2024-06-20 ASSESSMENT — PAIN SCALES - GENERAL
PAINLEVEL_OUTOF10: 5
PAINLEVEL_OUTOF10: 0
PAINLEVEL_OUTOF10: 6
PAINLEVEL_OUTOF10: 3
PAINLEVEL_OUTOF10: 0

## 2024-06-20 ASSESSMENT — PAIN SCALES - WONG BAKER
WONGBAKER_NUMERICALRESPONSE: NO HURT
WONGBAKER_NUMERICALRESPONSE: NO HURT

## 2024-06-20 NOTE — PLAN OF CARE
Problem: Chronic Conditions and Co-morbidities  Goal: Patient's chronic conditions and co-morbidity symptoms are monitored and maintained or improved  Outcome: Progressing  Flowsheets (Taken 6/19/2024 2015)  Care Plan - Patient's Chronic Conditions and Co-Morbidity Symptoms are Monitored and Maintained or Improved:   Monitor and assess patient's chronic conditions and comorbid symptoms for stability, deterioration, or improvement   Collaborate with multidisciplinary team to address chronic and comorbid conditions and prevent exacerbation or deterioration   Update acute care plan with appropriate goals if chronic or comorbid symptoms are exacerbated and prevent overall improvement and discharge     Problem: Pain  Goal: Verbalizes/displays adequate comfort level or baseline comfort level  Outcome: Progressing  Flowsheets (Taken 6/19/2024 2015)  Verbalizes/displays adequate comfort level or baseline comfort level:   Assess pain using appropriate pain scale   Encourage patient to monitor pain and request assistance   Administer analgesics based on type and severity of pain and evaluate response   Implement non-pharmacological measures as appropriate and evaluate response   Consider cultural and social influences on pain and pain management   Notify Licensed Independent Practitioner if interventions unsuccessful or patient reports new pain     Problem: Discharge Planning  Goal: Discharge to home or other facility with appropriate resources  Outcome: Progressing  Flowsheets (Taken 6/19/2024 2015)  Discharge to home or other facility with appropriate resources:   Identify barriers to discharge with patient and caregiver   Arrange for needed discharge resources and transportation as appropriate   Identify discharge learning needs (meds, wound care, etc)     Problem: Safety - Adult  Goal: Free from fall injury  Outcome: Progressing  Flowsheets (Taken 6/19/2024 2015)  Free From Fall Injury:   Instruct family/caregiver on

## 2024-06-20 NOTE — MANAGEMENT PLAN
EP Note    64 year old female with a history of severe NICM/NYHA class III HF s/p CCM device.     -Stable device interrogation, CXR and right sided device wound.   -Anticipate d/c today with planned device clinic follow up.    Landon Mills MD, MS  Clinical Cardiac Electrophysiology  [facility]

## 2024-06-20 NOTE — DISCHARGE SUMMARY
[facility] Discharge Summary     Patient ID:  Kely Quach  [id number]  64 y.o.  1960    Admit date: 6/19/2024    Discharge date:  6/20/2024    Admitting Physician: Landon Mills MD     Discharge Physician: Dr. Mills    Admission Diagnoses: HFrEF (heart failure with reduced ejection fraction) ([facility]) [I50.20]  Status post placement of leadless cardiac pacemaker [Z95.0]    Discharge Diagnoses:   Patient Active Problem List    Diagnosis    Status post placement of leadless cardiac pacemaker    HFrEF (heart failure with reduced ejection fraction) ([facility])    DCM (dilated cardiomyopathy) ([facility])       Cardiology Procedures this admission:   CCM device implantation, CXR  Consults: none    Hospital Course: Patient was seen at the office of [facility] Card[facility] by Dr. Mills for management of class 3 chronic HF symptomas and was subsequently scheduled for an AM Admission leadless pacing device implantation at [facility] on 6/19/24. Patient was taken to the EP lab and underwent successful implantation of [employer] CCM device by Dr. Mills. Patient tolerated the procedure well and was taken to the telemetry floor for recovery. Follow up chest xray showed no pneumothorax. The following morning patient was up feeling well without any complaints of chest pain, shortness of breath, or palpitations. Patient's left subclavian cath site was clean, dry and intact without hematoma. Patient's labs were stable. Patient was seen and examined by Dr. Mills and determined stable and ready for discharge. Patient was instructed on the importance of medication compliance and outpatient follow up. Patient has been scheduled for follow-up with [facility] -- device clinic in 10-14 days.    DISPOSITION: Patient has been instructed to keep affected arm below shoulder level for the next 4 weeks or until cleared by doctor.  The arm sling should be worn while sleeping.  The dressing will be removed at

## 2024-06-20 NOTE — CARE COORDINATION
Patient hospitalized for scheduled implantation of [employer] CCM device. Discharge order placed this AM. CM met with patient for assessment of transition of care needs. Patient had Medicare card and CM provided to registration. Patient verified PCP. Patient lives with her spouse in a house where she is independent of ADLs. On disability. No DME needs.No current supportive care.  Patient reports her meds are free. She is enrolled in programs for Jardiance and Entresto where she receives those medications free of charge.   No CM needs identified.   Spouse to transport by car.   06/20/24 9107   Service Assessment   Patient Orientation Alert and Oriented   Cognition Alert   History Provided By Patient   Primary Caregiver Self   Accompanied By/Relationship No one at bedside   Support Systems Spouse/Significant Other   Patient's Healthcare Decision Maker is: Legal Next of Kin   PCP Verified by CM Yes  (Dr Best at [place])   Last Visit to PCP Within last 3 months   Prior Functional Level Independent in ADLs/IADLs   Current Functional Level Independent in ADLs/IADLs   Can patient return to prior living arrangement Yes   Ability to make needs known: Good   Family able to assist with home care needs: Yes   Would you like for me to discuss the discharge plan with any other family members/significant others, and if so, who? No   Financial Resources Medicare   Community Resources None   Social/Functional History   Lives With Spouse   Type of Home House   Home Equipment None   Receives Help From Family   ADL Assistance Independent   Homemaking Assistance Needs assistance  (Spouse assist)   Ambulation Assistance Independent   Transfer Assistance Independent   Mode of Transportation Car   Occupation On disability   Discharge Planning   Type of Residence House   Living Arrangements Spouse/Significant Other   Potential Assistance Needed N/A   DME Ordered? No   Potential Assistance Purchasing Medications No   Type of Home  Care Services None   Patient expects to be discharged to: House   Services At/After Discharge   Transition of Care Consult (CM Consult) Discharge Planning   Services At/After Discharge None    Resource Information Provided? No   Mode of Transport at Discharge Other (see comment)  (Spouse transport by private car.)   Confirm Follow Up Transport Family   Condition of Participation: Discharge Planning   The Plan for Transition of Care is related to the following treatment goals: Home with family assistance   The Patient and/Or Patient Representative agree with the Discharge Plan? Yes

## 2024-06-20 NOTE — PLAN OF CARE
Problem: Chronic Conditions and Co-morbidities  Goal: Patient's chronic conditions and co-morbidity symptoms are monitored and maintained or improved  6/20/2024 1015 by Opal Carcamo RN  Outcome: Completed  6/19/2024 2333 by Armin Colvin RN  Outcome: Progressing  Flowsheets (Taken 6/19/2024 2015)  Care Plan - Patient's Chronic Conditions and Co-Morbidity Symptoms are Monitored and Maintained or Improved:   Monitor and assess patient's chronic conditions and comorbid symptoms for stability, deterioration, or improvement   Collaborate with multidisciplinary team to address chronic and comorbid conditions and prevent exacerbation or deterioration   Update acute care plan with appropriate goals if chronic or comorbid symptoms are exacerbated and prevent overall improvement and discharge     Problem: Pain  Goal: Verbalizes/displays adequate comfort level or baseline comfort level  6/20/2024 1015 by Opal Carcamo RN  Outcome: Completed  6/19/2024 2333 by Armin Colvin RN  Outcome: Progressing  Flowsheets (Taken 6/19/2024 2015)  Verbalizes/displays adequate comfort level or baseline comfort level:   Assess pain using appropriate pain scale   Encourage patient to monitor pain and request assistance   Administer analgesics based on type and severity of pain and evaluate response   Implement non-pharmacological measures as appropriate and evaluate response   Consider cultural and social influences on pain and pain management   Notify Licensed Independent Practitioner if interventions unsuccessful or patient reports new pain     Problem: Discharge Planning  Goal: Discharge to home or other facility with appropriate resources  6/20/2024 1015 by Opal Carcamo RN  Outcome: Completed  6/19/2024 2333 by Armin Colvin RN  Outcome: Progressing  Flowsheets (Taken 6/19/2024 2015)  Discharge to home or other facility with appropriate resources:   Identify barriers to discharge with patient and caregiver   Arrange

## 2024-06-20 NOTE — DISCHARGE INSTRUCTIONS
DEVICE IMPLANT FOLLOWUP INSTRUCTIONS  You will be discharged with an occlusive dressing over the incision site. This dressing will be removed at the 10 -14-day postop site check with the Device Clinic. Your new device will be checked at that visit and all your device teaching will be given.   Keep the incision site dry until your follow up. Use a hand-held shower or bath.   Do not submerge or soak in pools or tubs for 6 weeks. If you are discharged with a prescription for antibiotics, please take the full prescription until it is gone.  After your site check, you may shower and get the incision site wet. You may let soap and water run on the incision and pat dry. Please do not apply creams, lotions or powders on or near the incision.   Mild bruising and swelling can be normal after implant and will resolve in a few weeks.     Call the office at [phone] if you have any of the following:  -Signs of infection, such as fever over 100 F, drainage from the incision, redness, significant swelling, or warmth at the incision site.  -Significant pain around the site that gets worse. Mild discomfort can be normal.   -Bleeding from the incision site  -Swelling in the arm on the side of the incision site  -Chest pain or shortness of breath     Your device has the capability of transmitting device information from home to our office using a home monitoring system or phone diann. The information will transmit through a cellular connection outside of your home. Your device data is reviewed during working hours to ensure proper device function. You will be given your equipment and instruction upon your hospital discharge.                                                                       -You will be given a sling to wear upon discharge with instructions when it should be worn.   -Do not lift the affected arm above                                                   Device Clinic [phone]

## 2024-06-20 NOTE — PROGRESS NOTES
Discharge instructions reviewed with patient. . Opportunity for questions provided. Patient voiced understanding of all discharge instructions.

## 2024-06-21 ENCOUNTER — TELEPHONE (OUTPATIENT)
Age: 64
End: 2024-06-21

## 2024-06-21 NOTE — TELEPHONE ENCOUNTER
Patient called regarding device clinic follow up post CCM insertion. Explained follow up protocol to patient. Understanding verbalized.

## 2024-07-03 ENCOUNTER — NURSE ONLY (OUTPATIENT)
Age: 64
End: 2024-07-03

## 2024-07-03 DIAGNOSIS — I50.22 CHRONIC SYSTOLIC CONGESTIVE HEART FAILURE (HCC): Primary | ICD-10-CM

## 2024-07-19 ENCOUNTER — TELEPHONE (OUTPATIENT)
Age: 64
End: 2024-07-19

## 2024-07-19 NOTE — TELEPHONE ENCOUNTER
Patient called with concerns about CCM site being swollen. Patient is currently in [place] and unable to come in to the clinic. Instructed patient to submit picture of site to [employer]. Patient verbalized understanding.

## 2024-08-01 ENCOUNTER — TELEPHONE (OUTPATIENT)
Age: 64
End: 2024-08-01

## 2024-08-01 NOTE — TELEPHONE ENCOUNTER
----- Message from Jeanie Saravia MA sent at 7/31/2024  8:15 AM EDT -----  Regarding: FW: device check appt  Contact: [phone]    ----- Message -----  From: Kely Quach  Sent: 7/31/2024   4:24 AM EDT  To: [facility] Clinical Staff  Subject: device check appt                                i tried to get through to cancel my appt on Friday, but could not get through the line, and it would not allow me to cancel my appt online.  The swelling had gone down, and that was the reason for the appt.  Thank you, Kely

## 2024-08-01 NOTE — TELEPHONE ENCOUNTER
Spoke with pt she stated she missed her appointment as she decided seh was no longer having any pain or issues and doesn't feel she will need the follow up. She stated it is still protruding but she is aware that device stated it may take longer to settle.     She stated she will follow up with any new or worsening symptoms.

## 2024-08-12 ENCOUNTER — OFFICE VISIT (OUTPATIENT)
Age: 64
End: 2024-08-12
Payer: MEDICARE

## 2024-08-12 VITALS
BODY MASS INDEX: 37.13 KG/M2 | WEIGHT: 201.8 LBS | DIASTOLIC BLOOD PRESSURE: 62 MMHG | HEIGHT: 62 IN | SYSTOLIC BLOOD PRESSURE: 120 MMHG | HEART RATE: 80 BPM

## 2024-08-12 DIAGNOSIS — I50.20 HFREF (HEART FAILURE WITH REDUCED EJECTION FRACTION) (HCC): ICD-10-CM

## 2024-08-12 DIAGNOSIS — I42.9 CARDIOMYOPATHY, UNSPECIFIED TYPE (HCC): ICD-10-CM

## 2024-08-12 DIAGNOSIS — I50.9 CONGESTIVE HEART FAILURE, UNSPECIFIED HF CHRONICITY, UNSPECIFIED HEART FAILURE TYPE (HCC): ICD-10-CM

## 2024-08-12 DIAGNOSIS — Z98.890 S/P MVR (MITRAL VALVE REPAIR): Primary | ICD-10-CM

## 2024-08-12 PROCEDURE — G8417 CALC BMI ABV UP PARAM F/U: HCPCS | Performed by: INTERNAL MEDICINE

## 2024-08-12 PROCEDURE — G8428 CUR MEDS NOT DOCUMENT: HCPCS | Performed by: INTERNAL MEDICINE

## 2024-08-12 PROCEDURE — 99214 OFFICE O/P EST MOD 30 MIN: CPT | Performed by: INTERNAL MEDICINE

## 2024-08-12 PROCEDURE — 1036F TOBACCO NON-USER: CPT | Performed by: INTERNAL MEDICINE

## 2024-08-12 PROCEDURE — 3017F COLORECTAL CA SCREEN DOC REV: CPT | Performed by: INTERNAL MEDICINE

## 2024-08-12 ASSESSMENT — ENCOUNTER SYMPTOMS
EYE PAIN: 0
APHONIA: 0
COUGH: 0
ABDOMINAL PAIN: 0
STRIDOR: 0
NAIL CHANGES: 0

## 2024-08-12 NOTE — PROGRESS NOTES
[facility], [address], [address]  [place] [address]  PHONE: [phone]    SUBJECTIVE:   Kely Quach is a 64 y.o. female 1960   seen for a follow up visit regarding the following:     Chief Complaint   Patient presents with    Follow-up     4 months    Coronary Artery Disease    Congestive Heart Failure         History of present illness: 64 y.o. female presented for follow-up 8/12/24 Now with CCM device. Feels better. BP lower edema is better. Plans to start exercising.     Interval history:  History of mitral valve replacement tricuspid repair performed 1/2024 at the [facility].  She has had multiple calls to triage over the past several days she was seen in the emergency department 4/11/2024.  She was concerned with dehydration lab performed 4/11/2024 with normal renal function electrolytes. Had IV fluids yesterday.     Cardiac history  6/5/2017 transesophageal echocardiogram ejection fraction 35 to 40% severe mitral regurgitation  6/6/2017 cardiac catheterization minimal CAD circumflex 30% right coronary 30%  7/2017 Mitral valve repair with a 28 mm future ring Dr. Raimundo Veloz  4/7/2022 echocardiogram ejection fraction 30 to 35% moderate severe transvalvular regurgitation of mitral valve annular ring  [employer] dual-chamber ICD: Inventra 7 VR-T DX  5/18/2023 transesophageal echocardiogram ejection fraction 25 to 30% with mitral valve repair #28 future ring with severe mitral regurgitation restricted posterior leaflet.  Eccentric jet  1/5/2024 mitral valve replacement, tricuspid valve repair, atrial septal defect repair, left atrial appendage closure on 1/5 with Dr. De Souza. [facility]  1/24/2024 echocardiogram ejection fraction 30 to 35% mitral valve annuloplasty ring in place mean gradient 8 mmHg tricuspid repair  6/19/2024 [employer] optimizer Smart Mini     Assessment  Patient is being seen today within a 90-day global period, but

## 2024-08-13 ENCOUNTER — TELEPHONE (OUTPATIENT)
Age: 64
End: 2024-08-13

## 2024-08-13 NOTE — TELEPHONE ENCOUNTER
Pt called in stating that she would like to go ahead and do the med change that was discussed in yesterdays appt

## 2024-08-14 PROCEDURE — 93295 DEV INTERROG REMOTE 1/2/MLT: CPT | Performed by: INTERNAL MEDICINE

## 2024-08-14 PROCEDURE — 93296 REM INTERROG EVL PM/IDS: CPT | Performed by: INTERNAL MEDICINE

## 2024-08-14 RX ORDER — VALSARTAN 80 MG/1
80 TABLET ORAL DAILY
Qty: 30 TABLET | Refills: 1 | Status: SHIPPED | OUTPATIENT
Start: 2024-08-14

## 2024-08-14 NOTE — TELEPHONE ENCOUNTER
I called pt and let her know about the medication changes and to call us if she needed the 90 day supply, pt voiced understanding.

## 2024-08-14 NOTE — TELEPHONE ENCOUNTER
Entresto discontinued.  Prescription sent to the patient's pharmacy for valsartan 80 mg [lowest dose].  Please advise patient to notify us if she is going to continue this and we will send a 90-day supply in.  I have sent in the prescriptions.

## 2024-08-23 ENCOUNTER — TELEPHONE (OUTPATIENT)
Age: 64
End: 2024-08-23

## 2024-08-23 NOTE — TELEPHONE ENCOUNTER
I don't think the Valsartin 80 mg is agreeing with me.  Yesterday, I stopped it, because I had developed a bad cough to the point where i was gagging myself.  I have had this happen with other blood-pressuring-lowering meds in the past.  Additionally, my blood pressure hasn't really gone up.    I took Entresto this morning.  Any other ideas on raising my blood pressure a bit?  I tried to call the office, but i couldn't get through.  Thank you, Kely    Patient called stating that she stopped the Valsartan 80 mg. Cough is better. Blood pressure runs /60-69. On fluid restriction less than 2 liters a day. Restarted the Entresto 24-26 mg. Blood pressure has not changed with just the Valsartan. Still feels tired. Would like to see what else she can do.//frank

## 2024-09-04 ENCOUNTER — TELEPHONE (OUTPATIENT)
Age: 64
End: 2024-09-04

## 2024-09-05 ENCOUNTER — NURSE ONLY (OUTPATIENT)
Age: 64
End: 2024-09-05

## 2024-09-05 DIAGNOSIS — I50.22 CHRONIC SYSTOLIC CONGESTIVE HEART FAILURE (HCC): Primary | ICD-10-CM

## 2024-09-15 NOTE — ED PROVIDER NOTES
NEUROLOGY PROGRESS NOTE:                             SUBJECTIVE:  Seen in followup while walking around her room with a walker, with the assistance of a nursing assistant. No new problems. She has no new complaints but has not yet decided whether she wants to pursue further workup for NPH.    OBJECTIVE:    Medications:  Current Facility-Administered Medications   Medication Dose Route Frequency Provider Last Rate Last Admin    citalopram (CeleXA) tablet 20 mg  20 mg Oral Nightly Chuyita Pete CNP   20 mg at 09/14/24 2039    sodium chloride 0.9 % injection 2 mL  2 mL Intracatheter 2 times per day Chuyita Pete CNP   2 mL at 09/15/24 0836    insulin lispro (ADMELOG,HumaLOG) - Correction Dose   Subcutaneous 4x Daily AC & HS Chuyita Pete CNP   1 Units at 09/14/24 1243    amLODIPine (NORVASC) tablet 5 mg  5 mg Oral Daily Chuyita Pete CNP   5 mg at 09/15/24 0836    nystatin (MYCOSTATIN) powder   Topical TID Chuyita Pete CNP   Given at 09/15/24 0836    tolterodine (DETROL LA) 24 hr capsule 2 mg  2 mg Oral Daily Chuyita Pete CNP   2 mg at 09/15/24 0836    timolol (TIMOPTIC) 0.5 % ophthalmic solution 1 drop  1 drop Both Eyes BID Chuyita Pete CNP   1 drop at 09/15/24 0836       As needed meds:   Current Facility-Administered Medications   Medication Dose Route Frequency Provider Last Rate Last Admin    sodium chloride 0.9 % flush bag 25 mL  25 mL Intravenous PRN Chuyita Pete CNP        acetaminophen (TYLENOL) tablet 650 mg  650 mg Oral Q4H PRN Chuyita Pete CNP   650 mg at 09/15/24 0159    Or    acetaminophen (TYLENOL) suppository 650 mg  650 mg Rectal Q4H PRN Chuyita Pete CNP        docusate sodium-sennosides (SENOKOT S) 50-8.6 MG 2 tablet  2 tablet Oral BID PRN Chuyita Pete CNP        dextrose 50 % injection 25 g  25 g Intravenous PRN Chuyita Pete CNP        dextrose 50 % injection 12.5 g  12.5 g Intravenous PRN Chuyita Pete CNP        glucagon (GLUCAGEN) injection 1 mg  1 mg  Intramuscular PRN Chuyita Pete CNP        dextrose (GLUTOSE) 40 % gel 15 g  15 g Oral PRN Chuyita Pete CNP        dextrose (GLUTOSE) 40 % gel 30 g  30 g Oral PRN Chuyita Pete CNP            Continuous meds:   Current Facility-Administered Medications   Medication Dose Route Frequency Provider Last Rate Last Admin        PHYSICAL EXAM  Vitals:  BP (!) 145/82   Pulse 61   Temp 98.2 °F (36.8 °C) (Oral)   Resp 18   Ht 4' 11.02\" (1.499 m)   Wt 83.8 kg (184 lb 11.9 oz)   BMI 37.29 kg/m²   BSA 1.78 m²   General:  In no acute distress.  Morbidly obese woman walking around the room with a walker.  Head & Neck:  Normocephalic and atraumatic.      Psychiatric:  Affect was appropriate to situation and mood was normal.  Neurologic:  Mentation: Alert and awake. Oriented to person and place. Patient is able to answer simple questions with fluent speech and is able to follow commands. But does not recall much of our conversation yesterday or the fact that I spoke to her POA in the room.  MMSE 28/30 yesterday, not repeated today.  Cranial Nerves:  Eyes midline, symmetrical pupils, normal visual fields, no facial droop, no dysarthria.   Cerebellar exam:  Finger-nose-finger was normal.  No tremor noted  Motor exam:  Strength in all 4 extremities is 5/5. No pronator drift.  Normal bulk and tone.  No bradykinesia. Walking with a walker.    LABORATORY  I have reviewed the pertinent laboratory tests:    Recent Labs   Lab 09/13/24  0331   WBC 7.0   RBC 4.21   HGB 12.5   HCT 38.1        Recent Labs   Lab 09/13/24  0331 09/12/24  0849   SODIUM 138 140   CHLORIDE 106 107   CO2 28 28   BUN 16 12   CREATININE 0.77 0.69   CALCIUM 9.2 9.4   ALBUMIN 3.1* 3.5*   BILIRUBIN 0.5 0.6   ALKPT 77 87   GPT 18 19   AST 15 14   GLUCOSE 116* 113*     Recent Labs   Lab 09/13/24  0331   CHOLESTEROL 203*   TRIGLYCERIDE 117   HDL 43*   CALCLDL 137*     Hemoglobin A1C (%)   Date Value   09/12/2024 5.8 (H)   ]    IMAGING/OTHER TESTING:  I  have reviewed the pertinent imaging study reports.      MRI CERVICAL SPINE WO CONTRAST   Final Result   1. No definite acute findings by MR criteria.   2. Cervical spondylosis. No spinal stenosis and mild cord compression at   C4-5. Due to motion artifacts spinal cord cannot be evaluated for edema.      Electronically Signed by: BRIAN ALEJO M.D.    Signed on: 9/14/2024 9:16 AM    Workstation ID: [id number]      MRI THORACIC SPINE SURVEY WO CONTRAST   Final Result   No significant findings. See above.      Electronically Signed by: BRIAN ALEJO M.D.    Signed on: 9/14/2024 9:19 AM    Workstation ID: [id number]      CT HEAD WO CONTRAST   Final Result   1. There are no acute intracranial abnormalities. Age-appropriate cerebral   and cerebellar atrophy is noted with chronic supratentorial white matter   ischemic demyelination from small vessel atherosclerotic disease.      Electronically Signed by: BERNADINE MORENO M.D.    Signed on: 9/12/2024 10:43 AM    Workstation ID: [id number]      XR CHEST PA OR AP 1 VIEW   Final Result   Borderline heart size.            Electronically Signed by: BRIAN ALEJO M.D.    Signed on: 9/12/2024 10:28 AM    Workstation ID: [id number]            ASSESSMENT/PLAN  74 yo woman with a history of gait dysfunction for many years, and several falls.  She also has urinary incontinence and some mild cognitive impairment.  Extensive workup over years has revealed persistent hydrocephalus, at one point she had a CSF stroke volume calculated to be 147 (suggestive of shunt responsiveness).  This is the most likely explanation for her chronic gait dysfunction and cognitive impairment. Recent MRI brain, MRI cervical and thoracic spine without alternative causes of imbalance identified. Does not appear myelopathic or parkinsonian on exam.  She would be a good candidate for a lumbar drain trial, though given the duration of her symptoms I'm not sure if she'd be shunt  responsive.     I reviewed the procedure with her again. She wishes to speak with some of her other family members/friends before deciding. I called and spoke to her POA and explained the situation yesterday.    Plan  I discussed the above at length with the patient and her POA yesterday.  We are awaiting a decision about a lumbar drain trial. This could be done during this admission or at another time (it is not urgent).    Kenny Rodriguez DO  (available via [employer])  [facility] - Neurology Attending  Date: 9/15/2024    0.87 0.6 - 1.0 MG/DL    Est, Glom Filt Rate 76 >60 ml/min/1.73m2    Calcium 9.4 8.3 - 10.4 MG/DL    Total Bilirubin 1.0 0.2 - 1.1 MG/DL    ALT 83 (H) 13.0 - 61.0 U/L    AST 46 (H) 15 - 37 U/L    Alk Phosphatase 105 45.0 - 117.0 U/L    Total Protein 6.4 6.4 - 8.2 g/dL    Albumin 3.9 3.2 - 4.6 g/dL    Globulin 2.5 (L) 2.8 - 4.5 g/dL    Albumin/Globulin Ratio 1.6 0.4 - 1.6     Magnesium   Result Value Ref Range    Magnesium 2.3 1.2 - 2.6 mg/dL   Urinalysis w rflx microscopic   Result Value Ref Range    Color, UA YELLOW      Appearance SLIGHTLY CLOUDY      Specific Gravity, UA <=1.005 1.001 - 1.023    pH, Urine 5.5 5.0 - 9.0      Protein, UA Negative NEG mg/dL    Glucose, UA GREATER THAN/EQUAL TO 2000 mg/dL    Ketones, Urine Negative NEG mg/dL    Bilirubin Urine Negative NEG      Blood, Urine SMALL (A) NEG      Urobilinogen, Urine 0.2 0.2 - 1.0 EU/dL    Nitrite, Urine Negative NEG      Leukocyte Esterase, Urine Negative NEG     Urinalysis, Micro   Result Value Ref Range    WBC, UA 5-10 0 /hpf    RBC, UA 0-3 0 /hpf    Epithelial Cells UA 5-10 0 /hpf    BACTERIA, URINE 2+ (H) 0 /hpf    Casts 0 0 /lpf    Crystals 0 0 /LPF    Mucus, UA 0 0 /lpf    Other observations RESULTS VERIFIED MANUALLY           No orders to display                No results for input(s): \"COVID19\" in the last 72 hours.    Voice dictation software was used during the making of this note.  This software is not perfect and grammatical and other typographical errors may be present.  This note has not been completely proofread for errors.        Hilary Ballard, APRN - CNP  04/11/24 [id number]

## 2024-09-27 ENCOUNTER — APPOINTMENT (OUTPATIENT)
Dept: GENERAL RADIOLOGY | Age: 64
End: 2024-09-27
Payer: MEDICARE

## 2024-09-27 ENCOUNTER — HOSPITAL ENCOUNTER (EMERGENCY)
Age: 64
Discharge: ANOTHER HOSPITAL | End: 2024-09-28
Attending: EMERGENCY MEDICINE
Payer: MEDICARE

## 2024-09-27 VITALS
DIASTOLIC BLOOD PRESSURE: 62 MMHG | RESPIRATION RATE: 16 BRPM | OXYGEN SATURATION: 98 % | BODY MASS INDEX: 36.8 KG/M2 | TEMPERATURE: 98.4 F | SYSTOLIC BLOOD PRESSURE: 97 MMHG | HEART RATE: 86 BPM | WEIGHT: 200 LBS | HEIGHT: 62 IN

## 2024-09-27 DIAGNOSIS — E87.1 HYPONATREMIA: Primary | ICD-10-CM

## 2024-09-27 DIAGNOSIS — R53.83 OTHER FATIGUE: ICD-10-CM

## 2024-09-27 DIAGNOSIS — R60.0 PERIPHERAL EDEMA: ICD-10-CM

## 2024-09-27 LAB
ALBUMIN SERPL-MCNC: 4 G/DL (ref 3.2–4.6)
ALBUMIN/GLOB SERPL: 1.9 (ref 0.4–1.6)
ALP SERPL-CCNC: 95 U/L (ref 45–117)
ALT SERPL-CCNC: 24 U/L (ref 13–61)
ANION GAP SERPL CALC-SCNC: 11 MMOL/L (ref 9–18)
AST SERPL-CCNC: 28 U/L (ref 15–37)
BASOPHILS # BLD: 0 K/UL (ref 0–0.2)
BASOPHILS NFR BLD: 0 % (ref 0–2)
BILIRUB SERPL-MCNC: 1.6 MG/DL (ref 0.2–1.1)
BUN SERPL-MCNC: 21 MG/DL (ref 8–23)
CALCIUM SERPL-MCNC: 9.2 MG/DL (ref 8.3–10.4)
CHLORIDE SERPL-SCNC: 83 MMOL/L (ref 98–107)
CO2 SERPL-SCNC: 25 MMOL/L (ref 21–32)
CREAT SERPL-MCNC: 0.63 MG/DL (ref 0.6–1)
DIFFERENTIAL METHOD BLD: ABNORMAL
EOSINOPHIL # BLD: 0 K/UL (ref 0–0.8)
EOSINOPHIL NFR BLD: 1 % (ref 0.5–7.8)
ERYTHROCYTE [DISTWIDTH] IN BLOOD BY AUTOMATED COUNT: 15.2 % (ref 11.9–14.6)
GLOBULIN SER CALC-MCNC: 2.1 G/DL (ref 2.8–4.5)
GLUCOSE SERPL-MCNC: 135 MG/DL (ref 65–100)
HCT VFR BLD AUTO: 39.2 % (ref 35.8–46.3)
HGB BLD-MCNC: 13.7 G/DL (ref 11.7–15.4)
IMM GRANULOCYTES # BLD AUTO: 0 K/UL (ref 0–0.5)
IMM GRANULOCYTES NFR BLD AUTO: 0 % (ref 0–5)
LYMPHOCYTES # BLD: 0.9 K/UL (ref 0.5–4.6)
LYMPHOCYTES NFR BLD: 15 % (ref 13–44)
MAGNESIUM SERPL-MCNC: 2.1 MG/DL (ref 1.2–2.6)
MCH RBC QN AUTO: 29.8 PG (ref 26.1–32.9)
MCHC RBC AUTO-ENTMCNC: 34.9 G/DL (ref 31.4–35)
MCV RBC AUTO: 85.4 FL (ref 82–102)
MONOCYTES # BLD: 0.5 K/UL (ref 0.1–1.3)
MONOCYTES NFR BLD: 8 % (ref 4–12)
NEUTS SEG # BLD: 4.6 K/UL (ref 1.7–8.2)
NEUTS SEG NFR BLD: 76 % (ref 43–78)
NRBC # BLD: 0 K/UL (ref 0–0.2)
NT PRO BNP: 5451 PG/ML (ref 0–450)
PLATELET # BLD AUTO: 256 K/UL (ref 150–450)
PMV BLD AUTO: 9.2 FL (ref 9.4–12.3)
POTASSIUM SERPL-SCNC: 4.2 MMOL/L (ref 3.5–5.1)
PROT SERPL-MCNC: 6.1 G/DL (ref 6.4–8.2)
RBC # BLD AUTO: 4.59 M/UL (ref 4.05–5.2)
SODIUM SERPL-SCNC: 119 MMOL/L (ref 133–143)
WBC # BLD AUTO: 6 K/UL (ref 4.3–11.1)

## 2024-09-27 PROCEDURE — 83735 ASSAY OF MAGNESIUM: CPT

## 2024-09-27 PROCEDURE — 99285 EMERGENCY DEPT VISIT HI MDM: CPT

## 2024-09-27 PROCEDURE — 80053 COMPREHEN METABOLIC PANEL: CPT

## 2024-09-27 PROCEDURE — 71045 X-RAY EXAM CHEST 1 VIEW: CPT

## 2024-09-27 PROCEDURE — 85025 COMPLETE CBC W/AUTO DIFF WBC: CPT

## 2024-09-27 PROCEDURE — 83880 ASSAY OF NATRIURETIC PEPTIDE: CPT

## 2024-09-27 RX ORDER — FUROSEMIDE 10 MG/ML
40 INJECTION INTRAMUSCULAR; INTRAVENOUS
Status: COMPLETED | OUTPATIENT
Start: 2024-09-27 | End: 2024-09-28

## 2024-09-27 ASSESSMENT — ENCOUNTER SYMPTOMS
COUGH: 0
BACK PAIN: 0
FACIAL SWELLING: 0
EYE DISCHARGE: 0
ABDOMINAL PAIN: 0
EYE REDNESS: 0
VOMITING: 0
RHINORRHEA: 0
NAUSEA: 0
COLOR CHANGE: 0
SHORTNESS OF BREATH: 0

## 2024-09-27 ASSESSMENT — LIFESTYLE VARIABLES
HOW MANY STANDARD DRINKS CONTAINING ALCOHOL DO YOU HAVE ON A TYPICAL DAY: PATIENT DOES NOT DRINK
HOW OFTEN DO YOU HAVE A DRINK CONTAINING ALCOHOL: MONTHLY OR LESS

## 2024-09-27 ASSESSMENT — PAIN SCALES - GENERAL: PAINLEVEL_OUTOF10: 8

## 2024-09-27 ASSESSMENT — PAIN - FUNCTIONAL ASSESSMENT: PAIN_FUNCTIONAL_ASSESSMENT: 0-10

## 2024-09-27 NOTE — ED TRIAGE NOTES
Pt was told her sodium is low from bloodwork done at [facility] two days ago. Bilateral leg swelling. Pt feels tired

## 2024-09-28 ENCOUNTER — HOSPITAL ENCOUNTER (INPATIENT)
Age: 64
LOS: 4 days | Discharge: HOME | DRG: 644 | End: 2024-10-02
Attending: HOSPITALIST | Admitting: HOSPITALIST
Payer: MEDICARE

## 2024-09-28 PROBLEM — E87.1 HYPONATREMIA: Status: ACTIVE | Noted: 2024-09-28

## 2024-09-28 LAB
ALBUMIN SERPL-MCNC: 3.7 G/DL (ref 3.2–4.6)
ALBUMIN/GLOB SERPL: 1.3 (ref 1–1.9)
ALP SERPL-CCNC: 87 U/L (ref 35–104)
ALT SERPL-CCNC: 25 U/L (ref 8–45)
ANION GAP SERPL CALC-SCNC: 13 MMOL/L (ref 9–18)
AST SERPL-CCNC: 31 U/L (ref 15–37)
BASOPHILS # BLD: 0 K/UL (ref 0–0.2)
BASOPHILS NFR BLD: 1 % (ref 0–2)
BILIRUB SERPL-MCNC: 1.7 MG/DL (ref 0–1.2)
BUN SERPL-MCNC: 18 MG/DL (ref 8–23)
CALCIUM SERPL-MCNC: 9.2 MG/DL (ref 8.8–10.2)
CHLORIDE SERPL-SCNC: 81 MMOL/L (ref 98–107)
CO2 SERPL-SCNC: 25 MMOL/L (ref 20–28)
CREAT SERPL-MCNC: 0.78 MG/DL (ref 0.6–1.1)
DIFFERENTIAL METHOD BLD: ABNORMAL
EOSINOPHIL # BLD: 0 K/UL (ref 0–0.8)
EOSINOPHIL NFR BLD: 1 % (ref 0.5–7.8)
ERYTHROCYTE [DISTWIDTH] IN BLOOD BY AUTOMATED COUNT: 15.7 % (ref 11.9–14.6)
GLOBULIN SER CALC-MCNC: 2.8 G/DL (ref 2.3–3.5)
GLUCOSE SERPL-MCNC: 112 MG/DL (ref 70–99)
HCT VFR BLD AUTO: 43.1 % (ref 35.8–46.3)
HGB BLD-MCNC: 14.8 G/DL (ref 11.7–15.4)
IMM GRANULOCYTES # BLD AUTO: 0 K/UL (ref 0–0.5)
IMM GRANULOCYTES NFR BLD AUTO: 1 % (ref 0–5)
LYMPHOCYTES # BLD: 1.1 K/UL (ref 0.5–4.6)
LYMPHOCYTES NFR BLD: 18 % (ref 13–44)
MAGNESIUM SERPL-MCNC: 2.2 MG/DL (ref 1.8–2.4)
MCH RBC QN AUTO: 30.1 PG (ref 26.1–32.9)
MCHC RBC AUTO-ENTMCNC: 34.3 G/DL (ref 31.4–35)
MCV RBC AUTO: 87.6 FL (ref 82–102)
MONOCYTES # BLD: 0.5 K/UL (ref 0.1–1.3)
MONOCYTES NFR BLD: 8 % (ref 4–12)
NEUTS SEG # BLD: 4.4 K/UL (ref 1.7–8.2)
NEUTS SEG NFR BLD: 72 % (ref 43–78)
NRBC # BLD: 0 K/UL (ref 0–0.2)
PHOSPHATE SERPL-MCNC: 3.2 MG/DL (ref 2.5–4.5)
PLATELET # BLD AUTO: 247 K/UL (ref 150–450)
PMV BLD AUTO: 9.9 FL (ref 9.4–12.3)
POTASSIUM SERPL-SCNC: 4.3 MMOL/L (ref 3.5–5.1)
PROT SERPL-MCNC: 6.5 G/DL (ref 6.3–8.2)
RBC # BLD AUTO: 4.92 M/UL (ref 4.05–5.2)
SODIUM SERPL-SCNC: 117 MMOL/L (ref 136–145)
SODIUM SERPL-SCNC: 117 MMOL/L (ref 136–145)
SODIUM SERPL-SCNC: 119 MMOL/L (ref 136–145)
SODIUM UR-SCNC: <20 MMOL/L
TSH W FREE THYROID IF ABNORMAL: 3.78 UIU/ML (ref 0.27–4.2)
TSH, 3RD GENERATION: 2.79 UIU/ML (ref 0.27–4.2)
WBC # BLD AUTO: 6.1 K/UL (ref 4.3–11.1)

## 2024-09-28 PROCEDURE — 1100000000 HC RM PRIVATE

## 2024-09-28 PROCEDURE — 6360000002 HC RX W HCPCS: Performed by: HOSPITALIST

## 2024-09-28 PROCEDURE — 83935 ASSAY OF URINE OSMOLALITY: CPT

## 2024-09-28 PROCEDURE — 85025 COMPLETE CBC W/AUTO DIFF WBC: CPT

## 2024-09-28 PROCEDURE — 83930 ASSAY OF BLOOD OSMOLALITY: CPT

## 2024-09-28 PROCEDURE — 84443 ASSAY THYROID STIM HORMONE: CPT

## 2024-09-28 PROCEDURE — 84300 ASSAY OF URINE SODIUM: CPT

## 2024-09-28 PROCEDURE — 82533 TOTAL CORTISOL: CPT

## 2024-09-28 PROCEDURE — 84100 ASSAY OF PHOSPHORUS: CPT

## 2024-09-28 PROCEDURE — 36415 COLL VENOUS BLD VENIPUNCTURE: CPT

## 2024-09-28 PROCEDURE — 6370000000 HC RX 637 (ALT 250 FOR IP): Performed by: HOSPITALIST

## 2024-09-28 PROCEDURE — 84295 ASSAY OF SERUM SODIUM: CPT

## 2024-09-28 PROCEDURE — 80053 COMPREHEN METABOLIC PANEL: CPT

## 2024-09-28 PROCEDURE — 6360000002 HC RX W HCPCS

## 2024-09-28 PROCEDURE — 2580000003 HC RX 258: Performed by: HOSPITALIST

## 2024-09-28 PROCEDURE — 96374 THER/PROPH/DIAG INJ IV PUSH: CPT

## 2024-09-28 PROCEDURE — 83036 HEMOGLOBIN GLYCOSYLATED A1C: CPT

## 2024-09-28 PROCEDURE — 83735 ASSAY OF MAGNESIUM: CPT

## 2024-09-28 RX ORDER — FUROSEMIDE 10 MG/ML
INJECTION INTRAMUSCULAR; INTRAVENOUS
Status: COMPLETED
Start: 2024-09-28 | End: 2024-09-28

## 2024-09-28 RX ORDER — POTASSIUM CHLORIDE 7.45 MG/ML
10 INJECTION INTRAVENOUS PRN
Status: DISCONTINUED | OUTPATIENT
Start: 2024-09-28 | End: 2024-09-28

## 2024-09-28 RX ORDER — POTASSIUM CHLORIDE 1500 MG/1
40 TABLET, EXTENDED RELEASE ORAL PRN
Status: DISCONTINUED | OUTPATIENT
Start: 2024-09-28 | End: 2024-09-28

## 2024-09-28 RX ORDER — ASPIRIN 81 MG/1
81 TABLET, CHEWABLE ORAL DAILY
Status: DISCONTINUED | OUTPATIENT
Start: 2024-09-28 | End: 2024-10-02 | Stop reason: HOSPADM

## 2024-09-28 RX ORDER — ACETAMINOPHEN 325 MG/1
650 TABLET ORAL EVERY 6 HOURS PRN
Status: DISCONTINUED | OUTPATIENT
Start: 2024-09-28 | End: 2024-10-02 | Stop reason: HOSPADM

## 2024-09-28 RX ORDER — FERROUS SULFATE 325(65) MG
325 TABLET ORAL 2 TIMES DAILY WITH MEALS
Status: DISCONTINUED | OUTPATIENT
Start: 2024-09-28 | End: 2024-09-28

## 2024-09-28 RX ORDER — ONDANSETRON 2 MG/ML
4 INJECTION INTRAMUSCULAR; INTRAVENOUS EVERY 6 HOURS PRN
Status: DISCONTINUED | OUTPATIENT
Start: 2024-09-28 | End: 2024-10-02 | Stop reason: HOSPADM

## 2024-09-28 RX ORDER — SODIUM CHLORIDE 0.9 % (FLUSH) 0.9 %
5-40 SYRINGE (ML) INJECTION EVERY 12 HOURS SCHEDULED
Status: DISCONTINUED | OUTPATIENT
Start: 2024-09-28 | End: 2024-10-02 | Stop reason: HOSPADM

## 2024-09-28 RX ORDER — MAGNESIUM SULFATE IN WATER 40 MG/ML
2000 INJECTION, SOLUTION INTRAVENOUS PRN
Status: DISCONTINUED | OUTPATIENT
Start: 2024-09-28 | End: 2024-09-28

## 2024-09-28 RX ORDER — METOPROLOL SUCCINATE 25 MG/1
25 TABLET, EXTENDED RELEASE ORAL DAILY
Status: DISCONTINUED | OUTPATIENT
Start: 2024-09-28 | End: 2024-09-30

## 2024-09-28 RX ORDER — ACETAMINOPHEN 650 MG/1
650 SUPPOSITORY RECTAL EVERY 6 HOURS PRN
Status: DISCONTINUED | OUTPATIENT
Start: 2024-09-28 | End: 2024-10-02 | Stop reason: HOSPADM

## 2024-09-28 RX ORDER — SPIRONOLACTONE 25 MG/1
25 TABLET ORAL DAILY
Status: DISCONTINUED | OUTPATIENT
Start: 2024-09-28 | End: 2024-09-30

## 2024-09-28 RX ORDER — FUROSEMIDE 10 MG/ML
40 INJECTION INTRAMUSCULAR; INTRAVENOUS DAILY
Status: COMPLETED | OUTPATIENT
Start: 2024-09-28 | End: 2024-09-29

## 2024-09-28 RX ORDER — ONDANSETRON 4 MG/1
4 TABLET, ORALLY DISINTEGRATING ORAL EVERY 8 HOURS PRN
Status: DISCONTINUED | OUTPATIENT
Start: 2024-09-28 | End: 2024-10-02 | Stop reason: HOSPADM

## 2024-09-28 RX ORDER — ZOLPIDEM TARTRATE 5 MG/1
5 TABLET ORAL NIGHTLY PRN
Status: DISCONTINUED | OUTPATIENT
Start: 2024-09-28 | End: 2024-10-02 | Stop reason: HOSPADM

## 2024-09-28 RX ORDER — SODIUM CHLORIDE 0.9 % (FLUSH) 0.9 %
5-40 SYRINGE (ML) INJECTION PRN
Status: DISCONTINUED | OUTPATIENT
Start: 2024-09-28 | End: 2024-10-02 | Stop reason: HOSPADM

## 2024-09-28 RX ORDER — POLYETHYLENE GLYCOL 3350 17 G/17G
17 POWDER, FOR SOLUTION ORAL DAILY PRN
Status: DISCONTINUED | OUTPATIENT
Start: 2024-09-28 | End: 2024-10-02 | Stop reason: HOSPADM

## 2024-09-28 RX ORDER — SODIUM CHLORIDE 1 G/1
2 TABLET ORAL ONCE
Status: COMPLETED | OUTPATIENT
Start: 2024-09-28 | End: 2024-09-28

## 2024-09-28 RX ORDER — ENOXAPARIN SODIUM 100 MG/ML
40 INJECTION SUBCUTANEOUS DAILY
Status: DISCONTINUED | OUTPATIENT
Start: 2024-09-29 | End: 2024-09-30

## 2024-09-28 RX ORDER — SODIUM CHLORIDE 9 MG/ML
INJECTION, SOLUTION INTRAVENOUS PRN
Status: DISCONTINUED | OUTPATIENT
Start: 2024-09-28 | End: 2024-10-02 | Stop reason: HOSPADM

## 2024-09-28 RX ADMIN — FUROSEMIDE 40 MG: 10 INJECTION, SOLUTION INTRAMUSCULAR; INTRAVENOUS at 18:40

## 2024-09-28 RX ADMIN — METOPROLOL SUCCINATE 25 MG: 25 TABLET, EXTENDED RELEASE ORAL at 10:00

## 2024-09-28 RX ADMIN — SPIRONOLACTONE 25 MG: 25 TABLET ORAL at 10:00

## 2024-09-28 RX ADMIN — Medication 2 G: at 18:40

## 2024-09-28 RX ADMIN — ONDANSETRON 4 MG: 2 INJECTION, SOLUTION INTRAMUSCULAR; INTRAVENOUS at 23:10

## 2024-09-28 RX ADMIN — FUROSEMIDE 40 MG: 10 INJECTION, SOLUTION INTRAMUSCULAR; INTRAVENOUS at 01:37

## 2024-09-28 RX ADMIN — SODIUM CHLORIDE, PRESERVATIVE FREE 10 ML: 5 INJECTION INTRAVENOUS at 22:10

## 2024-09-28 RX ADMIN — FUROSEMIDE 40 MG: 10 INJECTION INTRAMUSCULAR; INTRAVENOUS at 01:37

## 2024-09-28 RX ADMIN — SACUBITRIL AND VALSARTAN 1 TABLET: 24; 26 TABLET, FILM COATED ORAL at 22:09

## 2024-09-28 RX ADMIN — ASPIRIN 81 MG 81 MG: 81 TABLET ORAL at 10:00

## 2024-09-28 ASSESSMENT — PAIN DESCRIPTION - ORIENTATION: ORIENTATION: RIGHT;LEFT

## 2024-09-28 ASSESSMENT — PAIN DESCRIPTION - LOCATION: LOCATION: LEG

## 2024-09-28 ASSESSMENT — PAIN SCALES - GENERAL: PAINLEVEL_OUTOF10: 9

## 2024-09-28 NOTE — ED NOTES
TRANSFER - OUT REPORT:    Verbal report given to Мария RN on Kely Quach  being transferred to [place] [id number] for routine progression of patient care       Report consisted of patient's Situation, Background, Assessment and   Recommendations(SBAR).     Information from the following report(s) Nurse Handoff Report, ED Encounter Summary, ED SBAR, Intake/Output, and MAR was reviewed with the receiving nurse.    [place] Fall Assessment:    Presents to emergency department  because of falls (Syncope, seizure, or loss of consciousness): No  Age > 70: No  Altered Mental Status, Intoxication with alcohol or substance confusion (Disorientation, impaired judgment, poor safety awaremess, or inability to follow instructions): No  Impaired Mobility: Ambulates or transfers with assistive devices or assistance; Unable to ambulate or transer.: No  Nursing Judgement: No          Lines:   Peripheral IV 09/27/24 Left Antecubital (Active)   Site Assessment Clean, dry & intact 09/27/24 2221   Line Status Brisk blood return;Specimen collected;Flushed;Normal saline locked 09/27/24 2221   Phlebitis Assessment No symptoms 09/27/24 2221   Infiltration Assessment 0 09/27/24 2221   Dressing Status New dressing applied 09/27/24 2221   Dressing Type Transparent 09/27/24 2221   Dressing Intervention New 09/27/24 2221        Opportunity for questions and clarification was provided.      Patient transported with:  [employer]

## 2024-09-28 NOTE — ED NOTES
Told patient that an EKG was ordered and we were going to hook her up. Patient stated she did \"not need all that, I am only here to see what my sodium level is.\" Informed patient that electrolyte levels can affect your heart and patient declined to be put on the monitor.

## 2024-09-28 NOTE — H&P
mmol/L    CO2 25 21 - 32 mmol/L    Anion Gap 11 9 - 18 mmol/L    Glucose 135 (H) 65 - 100 mg/dL    BUN 21 8 - 23 MG/DL    Creatinine 0.63 0.6 - 1.0 MG/DL    Est, Glom Filt Rate >90 >60 ml/min/1.73m2    Calcium 9.2 8.3 - 10.4 MG/DL    Total Bilirubin 1.6 (H) 0.2 - 1.1 MG/DL    ALT 24 13.0 - 61.0 U/L    AST 28 15 - 37 U/L    Alk Phosphatase 95 45.0 - 117.0 U/L    Total Protein 6.1 (L) 6.4 - 8.2 g/dL    Albumin 4.0 3.2 - 4.6 g/dL    Globulin 2.1 (L) 2.8 - 4.5 g/dL    Albumin/Globulin Ratio 1.9 (H) 0.4 - 1.6     Magnesium    Collection Time: 09/27/24  9:53 PM   Result Value Ref Range    Magnesium 2.1 1.2 - 2.6 mg/dL       Assessment:     1- Hyponatremia, acute progressive, hypervolemic. Na 119 on admission    2- History of CHF and MVR     Plan:     Observation  Fluid restriction  Will initiate diuresis depend on next Na levels  IO monitor  Advised to follow fluid restriction    Patient is full code.  Further management depends on patient progress.  Thank you for the oppourtinity to contribute in the care of your patient.    Signed By: Marcin Weldon MD     September 28, 2024

## 2024-09-28 NOTE — ED PROVIDER NOTES
Emergency Department Provider Note       PCP: Sabine Ordonez MD   Age: 64 y.o.   Sex: female     DISPOSITION Decision To Transfer 09/27/2024 10:38:38 PM  Condition at Disposition: Data Unavailable       ICD-10-CM    1. Hyponatremia  E87.1       2. Other fatigue  R53.83       3. Peripheral edema  R60.0           Medical Decision Making     Sodium continues to drop as outlined in the HPI, 119 on today's testing in the ER and had been 125 just a few days before the 123 cited below     1 acute complicated illness or injury.  Prescription drug management performed.  Discussion with external consultants.  Shared medical decision making was utilized in creating the patients health plan today.    I independently ordered and reviewed each unique test.  I reviewed external records: provider visit note from PCP.  I reviewed external records: previous lab results from outside ED.   The patients assessment required an independent historian:  at bedside.  The reason they were needed is important historical information not provided by the patient.  I interpreted the X-rays chest x-ray reveals no pneumonia, no pneumothorax, she does have mild CHF, but no pleural effusion.   patient refused the ordered EKG  The patient was admitted and I have discussed patient management with the admitting provider.          History     64-year-old lady presents to the ER for hyponatremia concerns, she has some blood work drawn through the office 2 days ago on the 25th and sodium was found to be 123 her doctor directed her to the ER for repeat testing she has had trouble with hyponatremia in the past and feels similar symptoms with dizziness and fatigue.  She is not on a SSRI, she is on as needed Lasix which she estimates she takes maybe 3 times a week with the instructions to use it whenever she notices rapid weight gain.  She does have peripheral edema in her legs.  Patient reports polydipsia and actually has a bottle of water in her     Housing Stability Vital Sign     Unable to Pay for Housing in the Last Year: No     Number of Places Lived in the Last Year: 1     Unstable Housing in the Last Year: No        Previous Medications    ASPIRIN 81 MG EC TABLET    Take by mouth daily    FUROSEMIDE (LASIX) 20 MG TABLET    Take 2 tablets by mouth daily as needed (Take for weight gain greater than 2 pounds in 24 hours or 5 pounds in 48 hours.)    JARDIANCE 10 MG TABLET    Take 1 tablet by mouth daily    LORATADINE (CLARITIN) 10 MG TABLET    Take 1 tablet by mouth daily as needed    MAGNESIUM OXIDE (MAG-OX) 400 MG TABLET    Take 250 mg by mouth daily    METOPROLOL SUCCINATE (TOPROL XL) 50 MG EXTENDED RELEASE TABLET    Take 0.5 tablets by mouth daily    SPIRONOLACTONE (ALDACTONE) 25 MG TABLET    Take 0.5 tablets by mouth daily    UNABLE TO FIND    Blood builder    VALSARTAN (DIOVAN) 80 MG TABLET    Take 1 tablet by mouth daily    VITAMIN D (CHOLECALCIFEROL) 25 MCG (1000 UT) TABS TABLET    Take by mouth daily    ZOLPIDEM (AMBIEN) 5 MG TABLET    Take by mouth as needed.        Results for orders placed or performed during the hospital encounter of 09/27/24   XR CHEST PORTABLE    Narrative    Clinical History/Indication for Exam:  chest pain / dyspnea : chest pain / dyspnea    RADIOGRAPH OF THE CHEST 1 VIEW    INDICATION:  chest pain / dyspnea : chest pain / dyspnea    COMPARISON:  06/19/2024    FINDINGS:    Lungs:  There is mild central pulmonary vascular congestion.  No  consolidation.    Pleural space:  Unremarkable.  No pneumothorax.    Heart:  There is cardiomegaly.    Mediastinum:  Unremarkable.  Normal mediastinal contour.    Bones/joints:  There is median sternotomy.  No acute fracture.    Tubes, lines and devices:  There are bilateral cardiac pacing devices  again noted.      Impression    1.  There is cardiomegaly.  2.  There is mild central pulmonary vascular congestion.      Report signed on 09/27/2024 (22:22 Eastern Time)  Signed by: Joi

## 2024-09-29 LAB
ANION GAP SERPL CALC-SCNC: 15 MMOL/L (ref 9–18)
BUN SERPL-MCNC: 22 MG/DL (ref 8–23)
CALCIUM SERPL-MCNC: 9 MG/DL (ref 8.8–10.2)
CHLORIDE SERPL-SCNC: 80 MMOL/L (ref 98–107)
CO2 SERPL-SCNC: 24 MMOL/L (ref 20–28)
CORTIS BS SERPL-MCNC: 15.1 UG/DL
CORTIS SERPL-MCNC: 19.6 UG/DL
CREAT SERPL-MCNC: 0.8 MG/DL (ref 0.6–1.1)
EST. AVERAGE GLUCOSE BLD GHB EST-MCNC: 137 MG/DL
GLUCOSE SERPL-MCNC: 101 MG/DL (ref 70–99)
HBA1C MFR BLD: 6.4 % (ref 0–5.6)
POTASSIUM SERPL-SCNC: 4.5 MMOL/L (ref 3.5–5.1)
SODIUM SERPL-SCNC: 117 MMOL/L (ref 136–145)
SODIUM SERPL-SCNC: 119 MMOL/L (ref 136–145)
SODIUM SERPL-SCNC: 121 MMOL/L (ref 136–145)
SODIUM SERPL-SCNC: 121 MMOL/L (ref 136–145)

## 2024-09-29 PROCEDURE — 6360000002 HC RX W HCPCS: Performed by: HOSPITALIST

## 2024-09-29 PROCEDURE — 84295 ASSAY OF SERUM SODIUM: CPT

## 2024-09-29 PROCEDURE — 6370000000 HC RX 637 (ALT 250 FOR IP): Performed by: HOSPITALIST

## 2024-09-29 PROCEDURE — 36415 COLL VENOUS BLD VENIPUNCTURE: CPT

## 2024-09-29 PROCEDURE — 80048 BASIC METABOLIC PNL TOTAL CA: CPT

## 2024-09-29 PROCEDURE — 2580000003 HC RX 258: Performed by: HOSPITALIST

## 2024-09-29 PROCEDURE — 1100000000 HC RM PRIVATE

## 2024-09-29 RX ORDER — FUROSEMIDE 10 MG/ML
40 INJECTION INTRAMUSCULAR; INTRAVENOUS DAILY
Status: DISCONTINUED | OUTPATIENT
Start: 2024-09-29 | End: 2024-09-30

## 2024-09-29 RX ORDER — SODIUM CHLORIDE 1 G/1
2 TABLET ORAL 2 TIMES DAILY WITH MEALS
Status: DISPENSED | OUTPATIENT
Start: 2024-09-29 | End: 2024-09-30

## 2024-09-29 RX ORDER — SODIUM CHLORIDE 1 G/1
1 TABLET ORAL
Status: DISCONTINUED | OUTPATIENT
Start: 2024-09-29 | End: 2024-09-29

## 2024-09-29 RX ADMIN — ONDANSETRON 4 MG: 2 INJECTION, SOLUTION INTRAMUSCULAR; INTRAVENOUS at 05:55

## 2024-09-29 RX ADMIN — Medication 2 G: at 10:32

## 2024-09-29 RX ADMIN — ENOXAPARIN SODIUM 40 MG: 100 INJECTION SUBCUTANEOUS at 10:41

## 2024-09-29 RX ADMIN — SODIUM CHLORIDE, PRESERVATIVE FREE 10 ML: 5 INJECTION INTRAVENOUS at 22:05

## 2024-09-29 RX ADMIN — ONDANSETRON 4 MG: 2 INJECTION, SOLUTION INTRAMUSCULAR; INTRAVENOUS at 10:37

## 2024-09-29 RX ADMIN — SODIUM CHLORIDE, PRESERVATIVE FREE 10 ML: 5 INJECTION INTRAVENOUS at 10:40

## 2024-09-29 RX ADMIN — ASPIRIN 81 MG 81 MG: 81 TABLET ORAL at 10:33

## 2024-09-29 RX ADMIN — SPIRONOLACTONE 25 MG: 25 TABLET ORAL at 10:31

## 2024-09-29 RX ADMIN — FUROSEMIDE 40 MG: 10 INJECTION, SOLUTION INTRAMUSCULAR; INTRAVENOUS at 10:39

## 2024-09-29 RX ADMIN — Medication 1 G: at 01:56

## 2024-09-29 ASSESSMENT — PAIN DESCRIPTION - LOCATION
LOCATION: LEG
LOCATION: LEG

## 2024-09-29 ASSESSMENT — PAIN DESCRIPTION - ORIENTATION
ORIENTATION: RIGHT;LEFT
ORIENTATION: RIGHT;LEFT

## 2024-09-29 ASSESSMENT — PAIN SCALES - GENERAL
PAINLEVEL_OUTOF10: 4
PAINLEVEL_OUTOF10: 4

## 2024-09-29 NOTE — CARE COORDINATION
Patient assessment completed at bedside in room 366.  Patient presents to assessment alert and oriented, and answers questions appropriately.  Patient typically lives at home with spouse.  At baseline, patient is independent with ADLs. States there is a walker, cane, and rollator in the home, if needed.     Demographics verified. Patient has  [employer] Medicare.  PCP is Dr. Ordonez.    At this time, anticipate patient to be discharged with no case management needs. Family to transport when medically stable for discharge. Case management will continue to follow.  Please notify if there are any changes.          09/29/24 0615   Service Assessment   Patient Orientation Alert and Oriented;Person;Place;Self;Situation   Cognition Alert   History Provided By Patient   Primary Caregiver Self   Accompanied By/Relationship n/a   Support Systems Spouse/Significant Other   Patient's Healthcare Decision Maker is: Legal Next of Kin  (Parish Quach [phone])   PCP Verified by CM Yes   Last Visit to PCP Within last 3 months   Prior Functional Level Independent in ADLs/IADLs   Current Functional Level Independent in ADLs/IADLs   Can patient return to prior living arrangement Yes   Ability to make needs known: Good   Family able to assist with home care needs: Yes   Would you like for me to discuss the discharge plan with any other family members/significant others, and if so, who? No   Financial Resources Medicare   Community Resources None   Social/Functional History   Lives With Spouse   Type of Home House   ADL Assistance Independent   Homemaking Assistance Independent   Homemaking Responsibilities Yes   Ambulation Assistance Independent   Transfer Assistance Independent   Active  Yes   Mode of Transportation Car   Occupation On disability   Discharge Planning   Type of Residence House   Living Arrangements Spouse/Significant Other   Current Services Prior To Admission None   Potential Assistance Needed N/A   DME Ordered? No

## 2024-09-30 LAB
OSMOLALITY SERPL: 248 MOSM/KG H2O (ref 275–295)
OSMOLALITY UR: 489 MOSM/KG H2O (ref 50–1400)
SODIUM SERPL-SCNC: 121 MMOL/L (ref 136–145)
SODIUM SERPL-SCNC: 124 MMOL/L (ref 136–145)
SODIUM SERPL-SCNC: 126 MMOL/L (ref 136–145)

## 2024-09-30 PROCEDURE — 6370000000 HC RX 637 (ALT 250 FOR IP): Performed by: HOSPITALIST

## 2024-09-30 PROCEDURE — 2580000003 HC RX 258: Performed by: HOSPITALIST

## 2024-09-30 PROCEDURE — 36415 COLL VENOUS BLD VENIPUNCTURE: CPT

## 2024-09-30 PROCEDURE — 6370000000 HC RX 637 (ALT 250 FOR IP): Performed by: INTERNAL MEDICINE

## 2024-09-30 PROCEDURE — 1100000000 HC RM PRIVATE

## 2024-09-30 PROCEDURE — 84295 ASSAY OF SERUM SODIUM: CPT

## 2024-09-30 RX ORDER — TOLVAPTAN 15 MG/1
15 TABLET ORAL ONCE
Status: COMPLETED | OUTPATIENT
Start: 2024-09-30 | End: 2024-09-30

## 2024-09-30 RX ORDER — SODIUM CHLORIDE 1 G/1
2 TABLET ORAL
Status: DISCONTINUED | OUTPATIENT
Start: 2024-09-30 | End: 2024-09-30

## 2024-09-30 RX ORDER — FUROSEMIDE 10 MG/ML
40 INJECTION INTRAMUSCULAR; INTRAVENOUS ONCE
Status: DISCONTINUED | OUTPATIENT
Start: 2024-09-30 | End: 2024-09-30

## 2024-09-30 RX ADMIN — Medication 2 G: at 09:00

## 2024-09-30 RX ADMIN — Medication 2 G: at 12:22

## 2024-09-30 RX ADMIN — TOLVAPTAN 15 MG: 15 TABLET ORAL at 16:22

## 2024-09-30 RX ADMIN — SODIUM CHLORIDE, PRESERVATIVE FREE 10 ML: 5 INJECTION INTRAVENOUS at 09:00

## 2024-09-30 RX ADMIN — SODIUM CHLORIDE, PRESERVATIVE FREE 10 ML: 5 INJECTION INTRAVENOUS at 21:46

## 2024-09-30 RX ADMIN — ASPIRIN 81 MG 81 MG: 81 TABLET ORAL at 09:01

## 2024-09-30 ASSESSMENT — PAIN DESCRIPTION - ORIENTATION: ORIENTATION: RIGHT;LEFT

## 2024-09-30 ASSESSMENT — PAIN DESCRIPTION - LOCATION: LOCATION: FOOT

## 2024-09-30 ASSESSMENT — PAIN SCALES - GENERAL
PAINLEVEL_OUTOF10: 8
PAINLEVEL_OUTOF10: 0

## 2024-09-30 NOTE — PLAN OF CARE
Problem: Discharge Planning  Goal: Discharge to home or other facility with appropriate resources  9/29/2024 2319 by Jona Jean, RN  Outcome: Progressing  9/29/2024 1823 by Rafi Morales, RN  Outcome: Progressing     Problem: Pain  Goal: Verbalizes/displays adequate comfort level or baseline comfort level  9/29/2024 2319 by Jona Jean RN  Outcome: Progressing  9/29/2024 1823 by Rafi Morales, RN  Outcome: Progressing     Problem: Safety - Adult  Goal: Free from fall injury  9/29/2024 2319 by Jona Jean RN  Outcome: Progressing  9/29/2024 1823 by Rafi Morales, RN  Outcome: Progressing

## 2024-09-30 NOTE — CONSULTS
injection 4 mg  4 mg IntraVENous Q6H PRN    polyethylene glycol (GLYCOLAX) packet 17 g  17 g Oral Daily PRN    acetaminophen (TYLENOL) tablet 650 mg  650 mg Oral Q6H PRN    Or    acetaminophen (TYLENOL) suppository 650 mg  650 mg Rectal Q6H PRN     Allergies   Allergen Reactions    Sulfa Antibiotics Anaphylaxis     Shortness of breath & hives    Atorvastatin Other (See Comments)    Augmentin [Amoxicillin-Pot Clavulanate] Nausea And Vomiting     \"Sick stomach\"      Social History     Tobacco Use    Smoking status: Former     Current packs/day: 0.00     Types: Cigarettes     Quit date: 1991     Years since quittin.7    Smokeless tobacco: Never   Substance Use Topics    Alcohol use: Yes     Alcohol/week: 1.0 standard drink of alcohol     Types: 1 Glasses of wine per week      Family History   Problem Relation Age of Onset    Mitral Valve Prolapse Mother     Lung Cancer Mother     Mitral Valve Prolapse Maternal Aunt     Mitral Valve Prolapse Maternal Grandfather         Review of Systems  Gen - no fever, no chills, appetite poor  HEENT - no sore throat, no decreased vision, no hearing loss  Neck - no neck mass  CV - no chest pain, no palpitation, no orthopnea  Lung - no shortness of breath, no cough, no hemoptysis  Abd - no tenderness, no nausea/vomiting, no bloody stool  Ext - no edema, no clubbing, no cyanosis  Musculoskeletal - no joint pain, no back pain  Neurologic - no headaches, no dizziness, no seizures  Psychiatric - no anxiety, no depression  Skin - no rashes, no pupura  Genitourinary - no decreased urine output, no hematuria, no foamy urine    Objective:     Vitals:    24 0700 24 0819   BP: (!) 83/62 94/63 (!) 84/63 95/73   Pulse: 80 78 83 80   Resp: 18 17 16 15   Temp: 98.3 °F (36.8 °C) 98 °F (36.7 °C) 97.2 °F (36.2 °C) 97.3 °F (36.3 °C)   TempSrc: Oral Oral Oral    SpO2:  98% 98% 99%       Intake/Output Summary (Last 24 hours) at 2024 1342  Last data

## 2024-09-30 NOTE — CARE COORDINATION
Pt chart reviewed and discussed in AM IDR for continued stay. LOS 2 days. Pt admitted with hyponatremia. Per MD, nephrology consulted. Pt not medically stable for discharge.    DC/POC to return home with family when medically stable.    CM team will continue to follow for potential discharge needs that may arise.

## 2024-10-01 LAB
ANION GAP SERPL CALC-SCNC: 11 MMOL/L (ref 9–18)
BUN SERPL-MCNC: 16 MG/DL (ref 8–23)
CALCIUM SERPL-MCNC: 9.4 MG/DL (ref 8.8–10.2)
CHLORIDE SERPL-SCNC: 90 MMOL/L (ref 98–107)
CO2 SERPL-SCNC: 27 MMOL/L (ref 20–28)
CREAT SERPL-MCNC: 0.89 MG/DL (ref 0.6–1.1)
GLUCOSE SERPL-MCNC: 91 MG/DL (ref 70–99)
POTASSIUM SERPL-SCNC: 4.5 MMOL/L (ref 3.5–5.1)
SODIUM SERPL-SCNC: 128 MMOL/L (ref 136–145)
SODIUM SERPL-SCNC: 130 MMOL/L (ref 136–145)
SODIUM SERPL-SCNC: 130 MMOL/L (ref 136–145)

## 2024-10-01 PROCEDURE — 97161 PT EVAL LOW COMPLEX 20 MIN: CPT

## 2024-10-01 PROCEDURE — 2580000003 HC RX 258: Performed by: HOSPITALIST

## 2024-10-01 PROCEDURE — 36415 COLL VENOUS BLD VENIPUNCTURE: CPT

## 2024-10-01 PROCEDURE — 6360000002 HC RX W HCPCS

## 2024-10-01 PROCEDURE — 1100000000 HC RM PRIVATE

## 2024-10-01 PROCEDURE — 6370000000 HC RX 637 (ALT 250 FOR IP): Performed by: NURSE PRACTITIONER

## 2024-10-01 PROCEDURE — 6370000000 HC RX 637 (ALT 250 FOR IP): Performed by: HOSPITALIST

## 2024-10-01 PROCEDURE — 84295 ASSAY OF SERUM SODIUM: CPT

## 2024-10-01 PROCEDURE — 80048 BASIC METABOLIC PNL TOTAL CA: CPT

## 2024-10-01 PROCEDURE — 97530 THERAPEUTIC ACTIVITIES: CPT

## 2024-10-01 PROCEDURE — 97165 OT EVAL LOW COMPLEX 30 MIN: CPT

## 2024-10-01 RX ORDER — METOPROLOL SUCCINATE 25 MG/1
25 TABLET, EXTENDED RELEASE ORAL DAILY
Status: DISCONTINUED | OUTPATIENT
Start: 2024-10-01 | End: 2024-10-02 | Stop reason: HOSPADM

## 2024-10-01 RX ORDER — TOLVAPTAN 15 MG/1
15 TABLET ORAL ONCE
Status: COMPLETED | OUTPATIENT
Start: 2024-10-01 | End: 2024-10-01

## 2024-10-01 RX ORDER — SPIRONOLACTONE 25 MG/1
12.5 TABLET ORAL DAILY
Status: DISCONTINUED | OUTPATIENT
Start: 2024-10-01 | End: 2024-10-02 | Stop reason: HOSPADM

## 2024-10-01 RX ORDER — ENOXAPARIN SODIUM 100 MG/ML
40 INJECTION SUBCUTANEOUS EVERY 24 HOURS
Status: DISCONTINUED | OUTPATIENT
Start: 2024-10-01 | End: 2024-10-02 | Stop reason: HOSPADM

## 2024-10-01 RX ADMIN — SODIUM CHLORIDE, PRESERVATIVE FREE 10 ML: 5 INJECTION INTRAVENOUS at 21:32

## 2024-10-01 RX ADMIN — ENOXAPARIN SODIUM 40 MG: 100 INJECTION SUBCUTANEOUS at 17:22

## 2024-10-01 RX ADMIN — TOLVAPTAN 15 MG: 15 TABLET ORAL at 16:02

## 2024-10-01 RX ADMIN — SODIUM CHLORIDE, PRESERVATIVE FREE 10 ML: 5 INJECTION INTRAVENOUS at 08:50

## 2024-10-01 RX ADMIN — ASPIRIN 81 MG 81 MG: 81 TABLET ORAL at 08:50

## 2024-10-01 NOTE — PLAN OF CARE
Problem: Discharge Planning  Goal: Discharge to home or other facility with appropriate resources  9/30/2024 2016 by Jona Jean RN  Outcome: Progressing  9/30/2024 1641 by Manjit Brown RN  Outcome: Progressing     Problem: Pain  Goal: Verbalizes/displays adequate comfort level or baseline comfort level  9/30/2024 2016 by Jona Jean RN  Outcome: Progressing  9/30/2024 1641 by Manjit Brown RN  Outcome: Progressing     Problem: Safety - Adult  Goal: Free from fall injury  9/30/2024 2016 by Jona Jean RN  Outcome: Progressing  9/30/2024 1641 by Manjit Brown RN  Outcome: Progressing     Problem: Chronic Conditions and Co-morbidities  Goal: Patient's chronic conditions and co-morbidity symptoms are monitored and maintained or improved  9/30/2024 2016 by Jona Jean RN  Outcome: Progressing  9/30/2024 1641 by Manjit Brown RN  Outcome: Progressing

## 2024-10-02 VITALS
RESPIRATION RATE: 16 BRPM | WEIGHT: 200 LBS | SYSTOLIC BLOOD PRESSURE: 102 MMHG | DIASTOLIC BLOOD PRESSURE: 71 MMHG | OXYGEN SATURATION: 97 % | BODY MASS INDEX: 36.8 KG/M2 | HEIGHT: 62 IN | TEMPERATURE: 98.8 F | HEART RATE: 88 BPM

## 2024-10-02 PROBLEM — E87.1 HYPONATREMIA: Status: RESOLVED | Noted: 2024-09-28 | Resolved: 2024-10-02

## 2024-10-02 LAB — SODIUM SERPL-SCNC: 133 MMOL/L (ref 136–145)

## 2024-10-02 PROCEDURE — 2580000003 HC RX 258: Performed by: HOSPITALIST

## 2024-10-02 PROCEDURE — 6370000000 HC RX 637 (ALT 250 FOR IP): Performed by: HOSPITALIST

## 2024-10-02 PROCEDURE — 36415 COLL VENOUS BLD VENIPUNCTURE: CPT

## 2024-10-02 PROCEDURE — 6370000000 HC RX 637 (ALT 250 FOR IP)

## 2024-10-02 PROCEDURE — 84295 ASSAY OF SERUM SODIUM: CPT

## 2024-10-02 RX ORDER — MIDODRINE HYDROCHLORIDE 5 MG/1
5 TABLET ORAL
Qty: 90 TABLET | Refills: 0 | Status: SHIPPED | OUTPATIENT
Start: 2024-10-02 | End: 2024-10-02

## 2024-10-02 RX ORDER — MIDODRINE HYDROCHLORIDE 5 MG/1
5 TABLET ORAL
Qty: 90 TABLET | Refills: 0 | Status: SHIPPED | OUTPATIENT
Start: 2024-10-02

## 2024-10-02 RX ORDER — MIDODRINE HYDROCHLORIDE 5 MG/1
5 TABLET ORAL
Status: DISCONTINUED | OUTPATIENT
Start: 2024-10-02 | End: 2024-10-02 | Stop reason: HOSPADM

## 2024-10-02 RX ADMIN — ASPIRIN 81 MG 81 MG: 81 TABLET ORAL at 09:33

## 2024-10-02 RX ADMIN — MIDODRINE HYDROCHLORIDE 5 MG: 5 TABLET ORAL at 12:39

## 2024-10-02 RX ADMIN — MIDODRINE HYDROCHLORIDE 5 MG: 5 TABLET ORAL at 10:02

## 2024-10-02 RX ADMIN — SODIUM CHLORIDE, PRESERVATIVE FREE 10 ML: 5 INJECTION INTRAVENOUS at 09:33

## 2024-10-02 RX ADMIN — METOPROLOL SUCCINATE 25 MG: 25 TABLET, EXTENDED RELEASE ORAL at 09:35

## 2024-10-02 NOTE — PLAN OF CARE
Problem: Discharge Planning  Goal: Discharge to home or other facility with appropriate resources  10/2/2024 1157 by Manjit Brown RN  Outcome: Progressing  10/2/2024 0507 by Goldy Garcia RN  Outcome: Progressing     Problem: Pain  Goal: Verbalizes/displays adequate comfort level or baseline comfort level  10/2/2024 1157 by Manjit Brown RN  Outcome: Progressing  10/2/2024 0507 by Goldy Garcia RN  Outcome: Progressing     Problem: Safety - Adult  Goal: Free from fall injury  10/2/2024 1157 by Manjit Brown RN  Outcome: Progressing  10/2/2024 0507 by Goldy Garcia RN  Outcome: Adequate for Discharge     Problem: Chronic Conditions and Co-morbidities  Goal: Patient's chronic conditions and co-morbidity symptoms are monitored and maintained or improved  10/2/2024 1157 by Manjit Brown RN  Outcome: Progressing  10/2/2024 0507 by Goldy Garcia RN  Outcome: Progressing

## 2024-10-02 NOTE — PLAN OF CARE
Problem: Discharge Planning  Goal: Discharge to home or other facility with appropriate resources  Outcome: Progressing     Problem: Pain  Goal: Verbalizes/displays adequate comfort level or baseline comfort level  Outcome: Progressing     Problem: Chronic Conditions and Co-morbidities  Goal: Patient's chronic conditions and co-morbidity symptoms are monitored and maintained or improved  Outcome: Progressing     Problem: Safety - Adult  Goal: Free from fall injury  Outcome: Adequate for Discharge

## 2024-10-02 NOTE — CARE COORDINATION
Pt is for discharge home today with family and no needs/supportive care orders recieved for CM at this time. 2nd IMM delivered and signed. Patient in agreement with discharge today.  to transport home.       10/02/24 1254   Services At/After Discharge   Transition of Care Consult (CM Consult) N/A   Services At/After Discharge None    Resource Information Provided? No   Mode of Transport at Discharge Self   Confirm Follow Up Transport Family   Condition of Participation: Discharge Planning   The Plan for Transition of Care is related to the following treatment goals: The patient is returning home with spouse.   The Patient and/Or Patient Representative agree with the Discharge Plan? Yes

## 2024-10-02 NOTE — PROGRESS NOTES
Kely Quach  Admission Date: 9/28/2024         [facility] Progress Note: 10/1/2024    Follow-up for: SIADH    The patient's chart is reviewed and the patient is discussed with the staff.    Subjective:   Pt seen and examined in room pt walking around the room reports increase in uop over the last 24 hours, still with LE pitting edema. Weakness better with Sna improving.     ROS:  Gen - no fever, no chills, appetite unchanged  CV - no chest pain  Lung - no shortness of breath, no cough  Abd - no tenderness, no nausea/vomiting, no diarrhea  Ext - + edema    Current Facility-Administered Medications   Medication Dose Route Frequency    tolvaptan (SAMSCA) tablet 15 mg  15 mg Oral Once    zolpidem (AMBIEN) tablet 5 mg  5 mg Oral Nightly PRN    aspirin chewable tablet 81 mg  81 mg Oral Daily    sodium chloride flush 0.9 % injection 5-40 mL  5-40 mL IntraVENous 2 times per day    sodium chloride flush 0.9 % injection 5-40 mL  5-40 mL IntraVENous PRN    0.9 % sodium chloride infusion   IntraVENous PRN    ondansetron (ZOFRAN-ODT) disintegrating tablet 4 mg  4 mg Oral Q8H PRN    Or    ondansetron (ZOFRAN) injection 4 mg  4 mg IntraVENous Q6H PRN    polyethylene glycol (GLYCOLAX) packet 17 g  17 g Oral Daily PRN    acetaminophen (TYLENOL) tablet 650 mg  650 mg Oral Q6H PRN    Or    acetaminophen (TYLENOL) suppository 650 mg  650 mg Rectal Q6H PRN         Objective:     Vitals:    09/30/24 2134 09/30/24 2137 10/01/24 0021 10/01/24 0736   BP: (!) 81/51 (!) 84/57 97/65 (!) 90/50   Pulse: 85 87 88 78   Resp:    18   Temp:    97.7 °F (36.5 °C)   TempSrc:    Oral   SpO2: 96%   98%     Intake and Output:   09/29 1901 - 10/01 0700  In: 120 [P.O.:120]  Out: 840 [Urine:840]  No intake/output data recorded.    Physical Exam:   Constitutional:  the patient is well developed and in no acute distress, alert and oriented   HEENT:  Sclera clear, pupils equal, oral mucosa moist  Lungs: clear bilaterally 
                     Kely Quach  Admission Date: 9/28/2024         [facility] Progress Note: 10/2/2024    Follow-up for: Acute Hyponatremia Dilutional    The patient's chart is reviewed and the patient is discussed with the staff.    Subjective:   Pt seen and examined in room . Her edema has improved.      ROS:  Gen - no fever, no chills, appetite unchanged  CV - no chest pain  Lung - no shortness of breath, no cough  Abd - no tenderness, no nausea/vomiting, no diarrhea  Ext - + edema    Current Facility-Administered Medications   Medication Dose Route Frequency    midodrine (PROAMATINE) tablet 5 mg  5 mg Oral TID WC    [Held by provider] spironolactone (ALDACTONE) tablet 12.5 mg  12.5 mg Oral Daily    metoprolol succinate (TOPROL XL) extended release tablet 25 mg  25 mg Oral Daily    [Held by provider] empagliflozin (JARDIANCE) tablet 10 mg  10 mg Oral Daily    enoxaparin (LOVENOX) injection 40 mg  40 mg SubCUTAneous Q24H    zolpidem (AMBIEN) tablet 5 mg  5 mg Oral Nightly PRN    aspirin chewable tablet 81 mg  81 mg Oral Daily    sodium chloride flush 0.9 % injection 5-40 mL  5-40 mL IntraVENous 2 times per day    sodium chloride flush 0.9 % injection 5-40 mL  5-40 mL IntraVENous PRN    0.9 % sodium chloride infusion   IntraVENous PRN    ondansetron (ZOFRAN-ODT) disintegrating tablet 4 mg  4 mg Oral Q8H PRN    Or    ondansetron (ZOFRAN) injection 4 mg  4 mg IntraVENous Q6H PRN    polyethylene glycol (GLYCOLAX) packet 17 g  17 g Oral Daily PRN    acetaminophen (TYLENOL) tablet 650 mg  650 mg Oral Q6H PRN    Or    acetaminophen (TYLENOL) suppository 650 mg  650 mg Rectal Q6H PRN         Objective:     Vitals:    10/01/24 2009 10/01/24 2115 10/02/24 0855 10/02/24 0935   BP: (!) 74/50 (!) 79/67 102/71 102/71   Pulse: 82  88 88   Resp: 16  16    Temp: 97.6 °F (36.4 °C)  98.8 °F (37.1 °C)    TempSrc: Oral  Oral    SpO2: 98%  97%      Intake and Output:   09/30 1901 - 10/02 0700  In: 120 [P.O.:120]  Out: 
[unfilled]    INTERNAL MEDICINE    Subjective:     64-year-old w/ hx of Chronic hyponatremia, CHF and MVR, present w/ hyponatremia concerns, she has some blood work drawn through the office 2 days ago on the 25th and sodium was found to be 123 her doctor directed her to the ER for repeat testing she has had trouble with hyponatremia in the past and feels similar symptoms with dizziness and fatigue. She is not on a SSRI, she is on as needed Lasix which she estimates she takes maybe 3 times a week with the instructions to use it whenever she notices rapid weight gain. She does have peripheral edema in her legs. Patient reports polydipsia and actually has a bottle of water in her pocket right now. No shortness of breath currently.   On further questioning, patient states that she has been told to drink lot of water and she is doing. On the other hand she has prescription of lasix as needed. Patient has very significant legs swelling.    Today, no ac events, Na down to 117 then up again to 119    Objective:     Intake / Output:  No intake/output data recorded.  09/27 1901 - 09/29 0700  In: 100 [P.O.:100]  Out: 450 [Urine:450]    Physical Exam:  BP 94/63   Pulse 78   Temp 98 °F (36.7 °C) (Oral)   Resp 17   SpO2 98%   General appearance: awake, alert, cooperative, moderate distress, appears stated age - Pale, No icterus  Lungs: clear to auscultation bilaterally - Diminished bs bibasilar.  Heart: regular rate and rhythm, S1, S2 normal, no murmur, click, rub or gallop.   Abdomen: soft, no tenderness, no distension, normal bowel sound, no masses, no organomegaly  Extremities: atraumatic, no cyanosis - Bilateral lower limbs edema +3  Skin: No rashes or ulceration.  Neurologic: Grossly intact     ECG: sinus rhythm     Data Review (Labs):   Recent Results (from the past 24 hour(s))   Sodium, urine, random    Collection Time: 09/28/24  4:46 PM   Result Value Ref Range    SODIUM, RANDOM URINE <20 MMOL/L   Sodium    
[unfilled]    INTERNAL MEDICINE    Subjective:     64-year-old w/ hx of Chronic hyponatremia, CHF and MVR, present w/ hyponatremia concerns, she has some blood work drawn through the office 2 days ago on the 25th and sodium was found to be 123 her doctor directed her to the ER for repeat testing she has had trouble with hyponatremia in the past and feels similar symptoms with dizziness and fatigue. She is not on a SSRI, she is on as needed Lasix which she estimates she takes maybe 3 times a week with the instructions to use it whenever she notices rapid weight gain. She does have peripheral edema in her legs. Patient reports polydipsia and actually has a bottle of water in her pocket right now. No shortness of breath currently.   On further questioning, patient states that she has been told to drink lot of water and she is doing. On the other hand she has prescription of lasix as needed. Patient has very significant legs swelling.    Today, no ac events, Na up to 121, has better appetite today for first time in 1.5 months    Objective:     Intake / Output:  09/30 0701 - 09/30 1900  In: -   Out: 300 [Urine:300]  09/28 1901 - 09/30 0700  In: 100 [P.O.:100]  Out: 450 [Urine:450]    Physical Exam:  BP 95/73   Pulse 80   Temp 97.3 °F (36.3 °C)   Resp 15   SpO2 99%   General appearance: awake, alert, cooperative, moderate distress, appears stated age - Pale, No icterus  Lungs: clear to auscultation bilaterally - Diminished bs bibasilar.  Heart: regular rate and rhythm, S1, S2 normal, no murmur, click, rub or gallop.   Abdomen: soft, no tenderness, no distension, normal bowel sound, no masses, no organomegaly  Extremities: atraumatic, no cyanosis - Bilateral lower limbs edema +3 persisting  Skin: No rashes or ulceration.  Neurologic: Grossly intact     ECG: sinus rhythm     Data Review (Labs):   Recent Results (from the past 24 hour(s))   Sodium    Collection Time: 09/29/24  3:33 PM   Result Value Ref Range    
ACUTE OCCUPATIONAL THERAPY GOALS:   (Developed with and agreed upon by patient and/or caregiver.)  Pt will be supervision to (I) with ADL's and ambulating short distances.      OCCUPATIONAL THERAPY Initial Assessment, Daily Note, Discharge, and PM       OT Visit Days: 1  Acknowledge Orders  Time  OT Charge Capture  Rehab Caseload Tracker      Kely Quach is a 64 y.o. female   PRIMARY DIAGNOSIS: Hyponatremia  Hyponatremia [E87.1]       Reason for Referral: Generalized Muscle Weakness (M62.81)  Inpatient: Payor: [employer] MEDICARE / Plan: [employer] CHOICE-PPO MEDICARE / Product Type: *No Product type* /     ASSESSMENT:     REHAB RECOMMENDATIONS:   Recommendation to date pending progress:  Setting:  No further skilled occupational therapy after discharge from hospital    Equipment:    None     ASSESSMENT:  Ms. Quach was admitted with above diagnosis and seen in room. Pt is noted up on edge of bed, alert and oriented. Pt lives with her  and is normally independent with all self care and mobility. Pt is noted with very slight decreased balance with walking due to LE swelling. Pt did better with a RW and has one at home she can use. Pt is up in room taking care of her self care needs. No further OT warranted      [facility] AM-PAC™ “6 Clicks” Daily Activity Inpatient Short Form:                SUBJECTIVE:     Ms. Quach states, she has been up in her room on her own     Social/Functional Lives With: Spouse  Type of Home: House  Home Access: Stairs to enter with rails  Entrance Stairs - Number of Steps: 5  ADL Assistance: Independent  Homemaking Assistance: Independent  Homemaking Responsibilities: Yes  Ambulation Assistance: Independent  Transfer Assistance: Independent  Active : Yes  Mode of Transportation: Car  Occupation: On disability    OBJECTIVE:     LINES / DRAINS / AIRWAY: None    RESTRICTIONS/PRECAUTIONS:       PAIN: VITALS / O2:   Pre Treatment:          Post Treatment: none       Vitals 
Discharge instructions reviewed with Pt. Opportunity for questions and clarification given. IV removed and cath tip intact. Scripts sent to pharmacy. Education given to pt and pt was able to teach back. No needs at this time and pt waiting on ride to arrive.    
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     
blood pressures  -Continue metoprolol succinate 25 mg daily  -Continue aspirin    Insomnia  -Continue as needed Ambien    Anticipated Discharge Arrangements:   Therapy has not evaluated yet    PT/OT evals ordered?  Therapy evals ordered  Diet:  ADULT DIET; Regular  VTE prophylaxis: Lovenox  Code status: Full Code      Non-peripheral Lines and Tubes (if present):          Telemetry (if present):  Cardiac/Telemetry Monitor On: No        Hospital Problems:  Principal Problem:    Hyponatremia  Resolved Problems:    * No resolved hospital problems. *      Objective:   Patient Vitals for the past 24 hrs:   Temp Pulse Resp BP SpO2   10/01/24 0736 97.7 °F (36.5 °C) 78 18 (!) 90/50 98 %   10/01/24 0021 -- 88 -- 97/65 --   09/30/24 2137 -- 87 -- (!) 84/57 --   09/30/24 2134 -- 85 -- (!) 81/51 96 %   09/30/24 2112 98.5 °F (36.9 °C) 83 16 (!) 86/50 97 %       Oxygen Therapy  SpO2: 98 %  O2 Device: None (Room air)    Estimated body mass index is 36.58 kg/m² as calculated from the following:    Height as of 9/27/24: 1.575 m (5' 2\").    Weight as of 9/27/24: 90.7 kg (200 lb).    Intake/Output Summary (Last 24 hours) at 10/1/2024 1618  Last data filed at 10/1/2024 0000  Gross per 24 hour   Intake 120 ml   Output 540 ml   Net -420 ml         Physical Exam:     General:    Well nourished.    Head:  Normocephalic, atraumatic  Eyes:  Sclerae appear normal.  Pupils equally round.  ENT:  Nares appear normal.  Moist oral mucosa  Neck:  No restricted ROM.  Trachea midline   CV:   RRR.  No m/r/g.  No jugular venous distension.  Lungs:   CTAB.  No wheezing, rhonchi, or rales.  Symmetric expansion.  Abdomen:   Soft, nontender, nondistended.  Extremities: Pitting edmea in lower extrem  Skin:     No rashes.  Normal coloration.   Warm and dry.    Neuro:  CN II-XII grossly intact.    Psych:  Normal mood and affect.      I have personally reviewed labs and tests:  Recent Labs:  Recent Results (from the past 48 hour(s))   Sodium    Collection Time: 
Assistance: Independent  Active : Yes  Mode of Transportation: Car  Occupation: On disability    OBJECTIVE:     PAIN: VITALS / O2: PRECAUTION / LINES / DRAINS:   Pre Treatment: no reports of pain at rest       Sore legs when walking  Post Treatment: no reports of pain at rest Vitals        Oxygen on room air      None    RESTRICTIONS/PRECAUTIONS:                    GROSS EVALUATION:  Intact Impaired (Comments):   AROM []  Generally decreased, functional    PROM []    Strength []  Generally decreased, functional    Balance [] Posture: Fair  Sitting - Static: Good  Sitting - Dynamic: Fair  Standing - Static: Good  Standing - Dynamic: Fair   Posture [] WFL   Sensation []     Coordination []   Generally decreased, WFL   Tone []     Edema [] Increased swelling bilateral lower extremities   Activity Tolerance [] Patient Tolerated treatment well, Patient limited by fatigue    []      COGNITION/  PERCEPTION: Intact Impaired (Comments):   Orientation [x]     Vision [x]     Hearing [x]     Cognition  [x]       MOBILITY: I Mod I S SBA CGA Min Mod Max Total  NT x2 Comments:   Bed Mobility    Rolling [] [] [] [] [] [] [] [] [] [] []    Supine to Sit [] [] [] [] [] [] [] [] [] [] []    Scooting [] [] [] [] [] [] [] [] [] [] []    Sit to Supine [] [] [] [] [] [] [] [] [] [] []    Transfers    Sit to Stand [x] [] [x] [] [] [] [] [] [] [] []    Bed to Chair [] [] [] [] [] [] [] [] [] [] []    Stand to Sit [x] [] [x] [] [] [] [] [] [] [] []     [] [] [] [] [] [] [] [] [] [] []    I=Independent, Mod I=Modified Independent, S=Supervision, SBA=Standby Assistance, CGA=Contact Guard Assistance,   Min=Minimal Assistance, Mod=Moderate Assistance, Max=Maximal Assistance, Total=Total Assistance, NT=Not Tested    GAIT: I Mod I S SBA CGA Min Mod Max Total  NT x2 Comments:   Level of Assistance [] [] [x] [] [] [] [] [] [] [] []    Distance 125 feet, then walking in room ad kaitlynn    DME None first walk, then tried RW    Gait Quality Decreased

## 2024-10-02 NOTE — DISCHARGE INSTRUCTIONS
You were admitted to the hospital for symptoms associated with a low sodium.  You were given a medication called tolvaptan which helped raise your sodium.  You will be discharged with plans to continue to have your sodium rechecked with your PCP on Friday.  Continue holding your Lasix until your sodium reaches in normal range and you are instructed to resume by your primary care physician.  We are also holding your spironolactone and Jardiance given your low blood pressures.  We started on midodrine 5 mg 3 times a day to help increase your blood pressure    Medications to Hold until seen by PCP  Valsartan  Spironolactone  Jardiance  Lasix    Medicaitons to start   Midodrine 5 mg Three times per day

## 2024-10-02 NOTE — DISCHARGE SUMMARY
Hospitalist Discharge Summary   Admit Date:  2024  4:18 AM   DC Note date: 10/2/2024  Name:  Kely Quach   Age:  64 y.o.  Sex:  female  :  1960   MRN:  [id number]   Room:  [facility]  PCP:  Sabine Ordonez MD    Presenting Complaint: No chief complaint on file.     Initial Admission Diagnosis: Hyponatremia [E87.1]     Problem List for this Hospitalization (present on admission):    Principal Problem (Resolved):    Hyponatremia  Active Problems:    * No active hospital problems. *      Hospital Course:  Kely Quach is a 64-year-old w/ hx of Chronic hyponatremia, CHF and MVR, present w/ hyponatremia concerns, she has some blood work drawn through the office 2 days ago on the  and sodium was found to be 123 her doctor directed her to the ER for repeat testing she has had trouble with hyponatremia in the past and feels similar symptoms with dizziness and fatigue. She is not on a SSRI, she is on as needed Lasix which she estimates she takes maybe 3 times a week with the instructions to use it whenever she notices rapid weight gain. She does have peripheral edema in her legs. Patient reports polydipsia and actually has a bottle of water in her pocket right now. No shortness of breath currently.     Patient was seen and evaluated by an nephrology during this admission.  Hyponatremia consistent with SIADH (work up shows sna <20, Uosmo 489, Sosmo 248, TSH 2.790, cortisol 15.1).  Treated with 2 doses of tolvaptan on  and 10/1.  Sodium improved to 130.  Patient has follow-up with PCP on Friday once having sodium checked in as well.    During the admission, patient's blood pressure is borderline low.  Followed by cardiology for heart failure and history of mitral valve repair.  Current GDMT includes valsartan/Jardiance/metoprolol/spironolactone.  Will discontinue these medications excluding metoprolol.  Start patient on midodrine 5 mg 3 times daily until follow-up with PCP and can follow-up blood

## 2024-10-09 ENCOUNTER — APPOINTMENT (OUTPATIENT)
Dept: GENERAL RADIOLOGY | Age: 64
DRG: 643 | End: 2024-10-09
Payer: MEDICARE

## 2024-10-09 ENCOUNTER — HOSPITAL ENCOUNTER (EMERGENCY)
Age: 64
Discharge: ANOTHER HOSPITAL | DRG: 643 | End: 2024-10-09
Attending: EMERGENCY MEDICINE
Payer: MEDICARE

## 2024-10-09 ENCOUNTER — HOSPITAL ENCOUNTER (INPATIENT)
Age: 64
LOS: 16 days | Discharge: HOME | DRG: 643 | End: 2024-10-25
Attending: HOSPITALIST | Admitting: FAMILY MEDICINE
Payer: MEDICARE

## 2024-10-09 VITALS
HEART RATE: 93 BPM | DIASTOLIC BLOOD PRESSURE: 58 MMHG | RESPIRATION RATE: 11 BRPM | TEMPERATURE: 97.5 F | SYSTOLIC BLOOD PRESSURE: 106 MMHG | OXYGEN SATURATION: 100 % | BODY MASS INDEX: 36.8 KG/M2 | WEIGHT: 200 LBS | HEIGHT: 62 IN

## 2024-10-09 DIAGNOSIS — R06.02 SHORTNESS OF BREATH: ICD-10-CM

## 2024-10-09 DIAGNOSIS — I50.22 CHRONIC SYSTOLIC HF (HEART FAILURE) (HCC): ICD-10-CM

## 2024-10-09 DIAGNOSIS — I95.9 HYPOTENSION, UNSPECIFIED HYPOTENSION TYPE: ICD-10-CM

## 2024-10-09 DIAGNOSIS — E87.1 HYPONATREMIA: Primary | ICD-10-CM

## 2024-10-09 DIAGNOSIS — R60.9 EDEMA, UNSPECIFIED TYPE: Primary | ICD-10-CM

## 2024-10-09 PROBLEM — R77.1 HYPOGLOBULINEMIA: Status: ACTIVE | Noted: 2024-10-09

## 2024-10-09 PROBLEM — R74.01 TRANSAMINITIS: Status: ACTIVE | Noted: 2024-10-09

## 2024-10-09 LAB
ALBUMIN SERPL-MCNC: 4.1 G/DL (ref 3.2–4.6)
ALBUMIN/GLOB SERPL: 1.8 (ref 0.4–1.6)
ALP SERPL-CCNC: 96 U/L (ref 45–117)
ALT SERPL-CCNC: 165 U/L (ref 13–61)
AMMONIA PLAS-SCNC: <10 UMOL/L (ref 11–32)
ANION GAP SERPL CALC-SCNC: 14 MMOL/L (ref 9–18)
AST SERPL-CCNC: 147 U/L (ref 15–37)
BASOPHILS # BLD: 0 K/UL (ref 0–0.2)
BASOPHILS NFR BLD: 0 % (ref 0–2)
BILIRUB SERPL-MCNC: 2.6 MG/DL (ref 0.2–1.1)
BUN SERPL-MCNC: 35 MG/DL (ref 8–23)
CALCIUM SERPL-MCNC: 9.9 MG/DL (ref 8.3–10.4)
CHLORIDE SERPL-SCNC: 85 MMOL/L (ref 98–107)
CO2 SERPL-SCNC: 24 MMOL/L (ref 21–32)
CREAT SERPL-MCNC: 0.99 MG/DL (ref 0.6–1)
DIFFERENTIAL METHOD BLD: ABNORMAL
EOSINOPHIL # BLD: 0 K/UL (ref 0–0.8)
EOSINOPHIL NFR BLD: 0 % (ref 0.5–7.8)
ERYTHROCYTE [DISTWIDTH] IN BLOOD BY AUTOMATED COUNT: 15.5 % (ref 11.9–14.6)
FERRITIN SERPL-MCNC: 178 NG/ML (ref 8–388)
GLOBULIN SER CALC-MCNC: 2.3 G/DL (ref 2.8–4.5)
GLUCOSE SERPL-MCNC: 113 MG/DL (ref 65–100)
HAV IGM SER QL: NONREACTIVE
HBV CORE IGM SER QL: NONREACTIVE
HBV SURFACE AG SER QL: NONREACTIVE
HCT VFR BLD AUTO: 39.7 % (ref 35.8–46.3)
HCV AB SER QL: NONREACTIVE
HGB BLD-MCNC: 13.7 G/DL (ref 11.7–15.4)
IMM GRANULOCYTES # BLD AUTO: 0 K/UL (ref 0–0.5)
IMM GRANULOCYTES NFR BLD AUTO: 0 % (ref 0–5)
IRON SATN MFR SERPL: 12 % (ref 20–50)
IRON SERPL-MCNC: 35 UG/DL (ref 35–100)
LYMPHOCYTES # BLD: 0.8 K/UL (ref 0.5–4.6)
LYMPHOCYTES NFR BLD: 12 % (ref 13–44)
MAGNESIUM SERPL-MCNC: 2.2 MG/DL (ref 1.2–2.6)
MCH RBC QN AUTO: 30.1 PG (ref 26.1–32.9)
MCHC RBC AUTO-ENTMCNC: 34.5 G/DL (ref 31.4–35)
MCV RBC AUTO: 87.3 FL (ref 82–102)
MONOCYTES # BLD: 0.4 K/UL (ref 0.1–1.3)
MONOCYTES NFR BLD: 6 % (ref 4–12)
NEUTS SEG # BLD: 5.7 K/UL (ref 1.7–8.2)
NEUTS SEG NFR BLD: 82 % (ref 43–78)
NRBC # BLD: 0 K/UL (ref 0–0.2)
NT PRO BNP: ABNORMAL PG/ML (ref 0–450)
PLATELET # BLD AUTO: 202 K/UL (ref 150–450)
PMV BLD AUTO: 9.8 FL (ref 9.4–12.3)
POTASSIUM SERPL-SCNC: 4.5 MMOL/L (ref 3.5–5.1)
PROT SERPL-MCNC: 6.4 G/DL (ref 6.4–8.2)
RBC # BLD AUTO: 4.55 M/UL (ref 4.05–5.2)
SODIUM SERPL-SCNC: 123 MMOL/L (ref 133–143)
TIBC SERPL-MCNC: 300 UG/DL (ref 240–450)
UIBC SERPL-MCNC: 265 UG/DL (ref 112–347)
WBC # BLD AUTO: 6.9 K/UL (ref 4.3–11.1)

## 2024-10-09 PROCEDURE — 83540 ASSAY OF IRON: CPT

## 2024-10-09 PROCEDURE — 83550 IRON BINDING TEST: CPT

## 2024-10-09 PROCEDURE — 71045 X-RAY EXAM CHEST 1 VIEW: CPT

## 2024-10-09 PROCEDURE — 85025 COMPLETE CBC W/AUTO DIFF WBC: CPT

## 2024-10-09 PROCEDURE — 6370000000 HC RX 637 (ALT 250 FOR IP): Performed by: FAMILY MEDICINE

## 2024-10-09 PROCEDURE — 82728 ASSAY OF FERRITIN: CPT

## 2024-10-09 PROCEDURE — 82140 ASSAY OF AMMONIA: CPT

## 2024-10-09 PROCEDURE — 6360000002 HC RX W HCPCS: Performed by: FAMILY MEDICINE

## 2024-10-09 PROCEDURE — 83735 ASSAY OF MAGNESIUM: CPT

## 2024-10-09 PROCEDURE — 80053 COMPREHEN METABOLIC PANEL: CPT

## 2024-10-09 PROCEDURE — 36415 COLL VENOUS BLD VENIPUNCTURE: CPT

## 2024-10-09 PROCEDURE — 80074 ACUTE HEPATITIS PANEL: CPT

## 2024-10-09 PROCEDURE — 1100000003 HC PRIVATE W/ TELEMETRY

## 2024-10-09 PROCEDURE — 83880 ASSAY OF NATRIURETIC PEPTIDE: CPT

## 2024-10-09 PROCEDURE — 99285 EMERGENCY DEPT VISIT HI MDM: CPT

## 2024-10-09 PROCEDURE — 2580000003 HC RX 258: Performed by: FAMILY MEDICINE

## 2024-10-09 RX ORDER — CETIRIZINE HYDROCHLORIDE 10 MG/1
10 TABLET ORAL DAILY
Status: DISCONTINUED | OUTPATIENT
Start: 2024-10-09 | End: 2024-10-25 | Stop reason: HOSPADM

## 2024-10-09 RX ORDER — ZOLPIDEM TARTRATE 5 MG/1
5 TABLET ORAL NIGHTLY PRN
Status: DISCONTINUED | OUTPATIENT
Start: 2024-10-09 | End: 2024-10-25 | Stop reason: HOSPADM

## 2024-10-09 RX ORDER — ONDANSETRON 4 MG/1
4 TABLET, ORALLY DISINTEGRATING ORAL EVERY 8 HOURS PRN
Status: DISCONTINUED | OUTPATIENT
Start: 2024-10-09 | End: 2024-10-25 | Stop reason: HOSPADM

## 2024-10-09 RX ORDER — SODIUM CHLORIDE 9 MG/ML
INJECTION, SOLUTION INTRAVENOUS PRN
Status: DISCONTINUED | OUTPATIENT
Start: 2024-10-09 | End: 2024-10-10

## 2024-10-09 RX ORDER — METOPROLOL SUCCINATE 25 MG/1
25 TABLET, EXTENDED RELEASE ORAL DAILY
Status: DISCONTINUED | OUTPATIENT
Start: 2024-10-09 | End: 2024-10-25 | Stop reason: HOSPADM

## 2024-10-09 RX ORDER — ONDANSETRON 2 MG/ML
4 INJECTION INTRAMUSCULAR; INTRAVENOUS EVERY 6 HOURS PRN
Status: DISCONTINUED | OUTPATIENT
Start: 2024-10-09 | End: 2024-10-25 | Stop reason: HOSPADM

## 2024-10-09 RX ORDER — POTASSIUM CHLORIDE 1500 MG/1
40 TABLET, EXTENDED RELEASE ORAL PRN
Status: DISCONTINUED | OUTPATIENT
Start: 2024-10-09 | End: 2024-10-25 | Stop reason: HOSPADM

## 2024-10-09 RX ORDER — ACETAMINOPHEN 650 MG/1
650 SUPPOSITORY RECTAL EVERY 6 HOURS PRN
Status: DISCONTINUED | OUTPATIENT
Start: 2024-10-09 | End: 2024-10-25 | Stop reason: HOSPADM

## 2024-10-09 RX ORDER — SODIUM CHLORIDE 0.9 % (FLUSH) 0.9 %
5-40 SYRINGE (ML) INJECTION EVERY 12 HOURS SCHEDULED
Status: DISCONTINUED | OUTPATIENT
Start: 2024-10-09 | End: 2024-10-25 | Stop reason: HOSPADM

## 2024-10-09 RX ORDER — BUMETANIDE 0.25 MG/ML
1 INJECTION, SOLUTION INTRAMUSCULAR; INTRAVENOUS ONCE
Status: COMPLETED | OUTPATIENT
Start: 2024-10-09 | End: 2024-10-09

## 2024-10-09 RX ORDER — ACETAMINOPHEN 325 MG/1
650 TABLET ORAL EVERY 6 HOURS PRN
Status: DISCONTINUED | OUTPATIENT
Start: 2024-10-09 | End: 2024-10-25 | Stop reason: HOSPADM

## 2024-10-09 RX ORDER — ENOXAPARIN SODIUM 100 MG/ML
40 INJECTION SUBCUTANEOUS DAILY
Status: DISCONTINUED | OUTPATIENT
Start: 2024-10-10 | End: 2024-10-10

## 2024-10-09 RX ORDER — POLYETHYLENE GLYCOL 3350 17 G/17G
17 POWDER, FOR SOLUTION ORAL DAILY PRN
Status: DISCONTINUED | OUTPATIENT
Start: 2024-10-09 | End: 2024-10-25 | Stop reason: HOSPADM

## 2024-10-09 RX ORDER — VITAMIN B COMPLEX
1000 TABLET ORAL DAILY
Status: DISCONTINUED | OUTPATIENT
Start: 2024-10-09 | End: 2024-10-25 | Stop reason: HOSPADM

## 2024-10-09 RX ORDER — LANOLIN ALCOHOL/MO/W.PET/CERES
200 CREAM (GRAM) TOPICAL DAILY
Status: DISCONTINUED | OUTPATIENT
Start: 2024-10-09 | End: 2024-10-18

## 2024-10-09 RX ORDER — MIDODRINE HYDROCHLORIDE 5 MG/1
5 TABLET ORAL
Status: DISCONTINUED | OUTPATIENT
Start: 2024-10-10 | End: 2024-10-11

## 2024-10-09 RX ORDER — ASPIRIN 81 MG/1
81 TABLET ORAL DAILY
Status: DISCONTINUED | OUTPATIENT
Start: 2024-10-09 | End: 2024-10-25 | Stop reason: HOSPADM

## 2024-10-09 RX ORDER — FAMOTIDINE 20 MG/1
20 TABLET, FILM COATED ORAL 2 TIMES DAILY
Status: DISCONTINUED | OUTPATIENT
Start: 2024-10-09 | End: 2024-10-25 | Stop reason: HOSPADM

## 2024-10-09 RX ORDER — POTASSIUM CHLORIDE 7.45 MG/ML
10 INJECTION INTRAVENOUS PRN
Status: DISCONTINUED | OUTPATIENT
Start: 2024-10-09 | End: 2024-10-25 | Stop reason: HOSPADM

## 2024-10-09 RX ORDER — SODIUM CHLORIDE 0.9 % (FLUSH) 0.9 %
5-40 SYRINGE (ML) INJECTION PRN
Status: DISCONTINUED | OUTPATIENT
Start: 2024-10-09 | End: 2024-10-25 | Stop reason: HOSPADM

## 2024-10-09 RX ORDER — MAGNESIUM SULFATE IN WATER 40 MG/ML
2000 INJECTION, SOLUTION INTRAVENOUS PRN
Status: DISCONTINUED | OUTPATIENT
Start: 2024-10-09 | End: 2024-10-25 | Stop reason: HOSPADM

## 2024-10-09 RX ADMIN — FAMOTIDINE 20 MG: 20 TABLET, FILM COATED ORAL at 22:57

## 2024-10-09 RX ADMIN — CETIRIZINE HYDROCHLORIDE 10 MG: 10 TABLET, FILM COATED ORAL at 22:58

## 2024-10-09 RX ADMIN — ZOLPIDEM TARTRATE 5 MG: 5 TABLET, COATED ORAL at 22:58

## 2024-10-09 RX ADMIN — METOPROLOL SUCCINATE 25 MG: 25 TABLET, EXTENDED RELEASE ORAL at 22:58

## 2024-10-09 RX ADMIN — MAGNESIUM GLUCONATE 500 MG ORAL TABLET 200 MG: 500 TABLET ORAL at 22:57

## 2024-10-09 RX ADMIN — SODIUM CHLORIDE, PRESERVATIVE FREE 10 ML: 5 INJECTION INTRAVENOUS at 22:59

## 2024-10-09 RX ADMIN — BUMETANIDE 1 MG: 0.25 INJECTION INTRAMUSCULAR; INTRAVENOUS at 22:57

## 2024-10-09 RX ADMIN — VITAMIN D, TAB 1000IU (100/BT) 1000 UNITS: 25 TAB at 22:57

## 2024-10-09 ASSESSMENT — PAIN DESCRIPTION - LOCATION: LOCATION: LEG

## 2024-10-09 ASSESSMENT — PAIN - FUNCTIONAL ASSESSMENT: PAIN_FUNCTIONAL_ASSESSMENT: 0-10

## 2024-10-09 ASSESSMENT — PAIN DESCRIPTION - DESCRIPTORS: DESCRIPTORS: THROBBING

## 2024-10-09 ASSESSMENT — PAIN SCALES - GENERAL: PAINLEVEL_OUTOF10: 9

## 2024-10-09 NOTE — PROGRESS NOTES
TRANSFER - IN REPORT:    Verbal report received from Stephanie wray Kely Quach  being received from [facility] for urgent transfer      Report consisted of patient's Situation, Background, Assessment and   Recommendations(SBAR).     Information from the following report(s) Nurse Handoff Report and MAR was reviewed with the receiving nurse.    Opportunity for questions and clarification was provided.      Assessment completed upon patient's arrival to unit and care assumed.

## 2024-10-09 NOTE — PROGRESS NOTES
4 Eyes Skin Assessment     NAME:  Kely Quach  YOB: 1960  MEDICAL RECORD NUMBER:  [id number]    The patient is being assessed for  Admission    I agree that at least one RN has performed a thorough Head to Toe Skin Assessment on the patient. ALL assessment sites listed below have been assessed.      Areas assessed by both nurses:    Head, Face, Ears, Shoulders, Back, Chest, Arms, Elbows, Hands, Sacrum. Buttock, Coccyx, Ischium, and Legs. Feet and Heels        Does the Patient have a Wound? No noted wound(s)       Abraham Prevention initiated by RN: Yes  Wound Care Orders initiated by RN: No    Pressure Injury (Stage 3,4, Unstageable, DTI, NWPT, and Complex wounds) if present, place Wound referral order by RN under : No    New Ostomies, if present place, Ostomy referral order under : No     Nurse 1 eSignature: Electronically signed by Marine Concepcion RN on 10/9/24 at 6:56 PM EDT    **SHARE this note so that the co-signing nurse can place an eSignature**    Nurse 2 eSignature: Electronically signed by GRISELDA CARDOZA RN on 10/9/24 at 7:11 PM EDT

## 2024-10-09 NOTE — PROGRESS NOTES
Skin assessment completed patient has dime size purple discoloration in gluteal cleft skin is blanchable and pink. Abdominal fold has excoriation noted.

## 2024-10-09 NOTE — ED TRIAGE NOTES
Pt presents to triage in a wheelchair with spouse. Pt states she was here 1.5 week ago for low sodium and was admitted to the hospital. Pt reports generalized weakness, increased gag reflex, and bilateral leg swelling.

## 2024-10-09 NOTE — PROGRESS NOTES
TRANSFER - IN REPORT:    Verbal report received from YULISA Brown on Kely Quach  being received from [facility] for routine progression of patient care      Report consisted of patient's Situation, Background, Assessment and   Recommendations(SBAR).     Information from the following report(s) Nurse Handoff Report, ED Encounter Summary, and ED SBAR was reviewed with the receiving nurse.    Opportunity for questions and clarification was provided.      Assessment completed upon patient's arrival to unit and care assumed.

## 2024-10-09 NOTE — ED PROVIDER NOTES
Emergency Department Provider Note       PCP: Sabine Ordonez MD   Age: 64 y.o.   Sex: female     DISPOSITION Decision To Transfer 10/09/2024 05:03:12 PM  Condition at Disposition: Data Unavailable       ICD-10-CM    1. Hyponatremia  E87.1       2. Hypotension, unspecified hypotension type  I95.9           Medical Decision Making     69-year-old female with a history of hyponatremia CHF with worsening fatigue weakness and swelling.  Ammonia level is less than 10 today, CBC unremarkable CMP shows a sodium of 123, bilirubin 2.6 elevated ALT AST, BNP 22,384 this chest x-ray shows no effusions or pneumonia.  Patient is intermittently hypotensive with a systolic in the 90s.  Patient appears ill.  Patient seen and examined by Dr. Higgins who patient needs admission for fluid management careful diuresis [facility]     1 or more acute illnesses that pose a threat to life or bodily function.   Shared medical decision making was utilized in creating the patients health plan today.    I independently ordered and reviewed each unique test.  I reviewed external records: provider visit note from PCP.  I reviewed external records: provider visit note from outside specialist.     I interpreted the X-rays chest x-ray.  I interpreted the CBC CMP BNP magnesium ammonia.    The patient was admitted and I have discussed patient management with the admitting provider.          History     64-year-old female to ER with her  complaining of increasing weakness fatigue loss of appetite for the past several days.  Patient recently discharged from the hospital for episode of hyponatremia.  Patient has been on fluid restriction 1000 mL/day.  She has a history of CHF and feels like her swelling is getting worse.  She was seen at her primary care office on Monday had sodium level done at that time was 133.   states she is fallen twice since then was found on the floor last night and patient  by mouth daily as neededHistorical Med      zolpidem (AMBIEN) 5 MG tablet Take by mouth as needed.Historical Med              Results for orders placed or performed during the hospital encounter of 10/09/24   XR CHEST 1 VIEW    Narrative    Chest X-ray    INDICATION: swelling    COMPARISON: September 27, 2024.    TECHNIQUE: AP/PA view of the chest was obtained.    FINDINGS: The lungs are unchanged compared to prior study with prominent central  pulmonary vessels. There are no infiltrates or effusions.  The heart size is  stable enlarged.  The bony thorax is intact. There are 2 permanent pacemakers  seen in the chest wall. The sternotomy sutures are intact.      Impression    Prominent central pulmonary vessels with cardiomegaly. Recommend  clinical correlation to rule out early congestive heart failure.    No evidence of pneumonia or pneumothorax or pleural effusion.      Electronically signed by EMELI YOUNGER   CBC with Auto Differential   Result Value Ref Range    WBC 6.9 4.3 - 11.1 K/uL    RBC 4.55 4.05 - 5.20 M/uL    Hemoglobin 13.7 11.7 - 15.4 g/dL    Hematocrit 39.7 35.8 - 46.3 %    MCV 87.3 82.0 - 102.0 FL    MCH 30.1 26.1 - 32.9 PG    MCHC 34.5 31.4 - 35.0 g/dL    RDW 15.5 (H) 11.9 - 14.6 %    Platelets 202 150 - 450 K/uL    MPV 9.8 9.4 - 12.3 FL    nRBC 0.00 0.0 - 0.2 K/uL    Differential Type AUTOMATED      Neutrophils % 82 (H) 43 - 78 %    Lymphocytes % 12 (L) 13 - 44 %    Monocytes % 6 4.0 - 12.0 %    Eosinophils % 0 (L) 0.5 - 7.8 %    Basophils % 0 0.0 - 2.0 %    Immature Granulocytes % 0 0.0 - 5.0 %    Neutrophils Absolute 5.7 1.7 - 8.2 K/UL    Lymphocytes Absolute 0.8 0.5 - 4.6 K/UL    Monocytes Absolute 0.4 0.1 - 1.3 K/UL    Eosinophils Absolute 0.0 0.0 - 0.8 K/UL    Basophils Absolute 0.0 0.0 - 0.2 K/UL    Immature Granulocytes Absolute 0.0 0.0 - 0.5 K/UL   CMP   Result Value Ref Range    Sodium 123 (L) 133 - 143 mmol/L    Potassium 4.5 3.5 - 5.1 mmol/L    Chloride 85 (L) 98 - 107 mmol/L    CO2 24 21

## 2024-10-10 ENCOUNTER — APPOINTMENT (OUTPATIENT)
Dept: NON INVASIVE DIAGNOSTICS | Age: 64
DRG: 643 | End: 2024-10-10
Attending: INTERNAL MEDICINE
Payer: MEDICARE

## 2024-10-10 LAB
ALBUMIN SERPL-MCNC: 3.6 G/DL (ref 3.2–4.6)
ALBUMIN/GLOB SERPL: 1.5 (ref 1–1.9)
ALP SERPL-CCNC: 82 U/L (ref 35–104)
ALT SERPL-CCNC: 148 U/L (ref 8–45)
ANION GAP SERPL CALC-SCNC: 13 MMOL/L (ref 9–18)
AST SERPL-CCNC: 125 U/L (ref 15–37)
BILIRUB DIRECT SERPL-MCNC: 1 MG/DL (ref 0–0.4)
BILIRUB SERPL-MCNC: 2.1 MG/DL (ref 0–1.2)
BUN SERPL-MCNC: 32 MG/DL (ref 8–23)
CALCIUM SERPL-MCNC: 9.8 MG/DL (ref 8.8–10.2)
CHLORIDE SERPL-SCNC: 88 MMOL/L (ref 98–107)
CHOLEST SERPL-MCNC: 166 MG/DL (ref 0–200)
CO2 SERPL-SCNC: 23 MMOL/L (ref 20–28)
CREAT SERPL-MCNC: 1.01 MG/DL (ref 0.6–1.1)
ECHO AO ASC DIAM: 2.9 CM
ECHO AO ASCENDING AORTA INDEX: 1.43 CM/M2
ECHO AO ROOT DIAM: 2.9 CM
ECHO AO ROOT INDEX: 1.43 CM/M2
ECHO AV AREA PEAK VELOCITY: 1.3 CM2
ECHO AV AREA VTI: 1.3 CM2
ECHO AV AREA/BSA PEAK VELOCITY: 0.6 CM2/M2
ECHO AV AREA/BSA VTI: 0.6 CM2/M2
ECHO AV MEAN GRADIENT: 2 MMHG
ECHO AV MEAN GRADIENT: 2 MMHG
ECHO AV MEAN VELOCITY: 0.7 M/S
ECHO AV PEAK GRADIENT: 4 MMHG
ECHO AV PEAK VELOCITY: 1 M/S
ECHO AV VELOCITY RATIO: 0.5
ECHO AV VTI: 17.7 CM
ECHO BSA: 1.99 M2
ECHO EST RA PRESSURE: 15 MMHG
ECHO IVC PROX: 2.6 CM
ECHO LA AREA 2C: 24.7 CM2
ECHO LA AREA 4C: 23.8 CM2
ECHO LA DIAMETER INDEX: 2.22 CM/M2
ECHO LA DIAMETER: 4.5 CM
ECHO LA MAJOR AXIS: 5.5 CM
ECHO LA MINOR AXIS: 5.7 CM
ECHO LA TO AORTIC ROOT RATIO: 1.55
ECHO LA VOL BP: 88 ML (ref 22–52)
ECHO LA VOL MOD A2C: 90 ML (ref 22–52)
ECHO LA VOL MOD A4C: 84 ML (ref 22–52)
ECHO LA VOL/BSA BIPLANE: 43 ML/M2 (ref 16–34)
ECHO LA VOLUME INDEX MOD A2C: 44 ML/M2 (ref 16–34)
ECHO LA VOLUME INDEX MOD A4C: 41 ML/M2 (ref 16–34)
ECHO LV EDV A2C: 289 ML
ECHO LV EDV A4C: 300 ML
ECHO LV EDV INDEX A4C: 148 ML/M2
ECHO LV EDV NDEX A2C: 142 ML/M2
ECHO LV EJECTION FRACTION A2C: 10 %
ECHO LV EJECTION FRACTION A4C: 13 %
ECHO LV EJECTION FRACTION BIPLANE: 12 % (ref 55–100)
ECHO LV ESV A2C: 260 ML
ECHO LV ESV A4C: 260 ML
ECHO LV ESV INDEX A2C: 128 ML/M2
ECHO LV ESV INDEX A4C: 128 ML/M2
ECHO LV FRACTIONAL SHORTENING: 6 % (ref 28–44)
ECHO LV INTERNAL DIMENSION DIASTOLE INDEX: 3.1 CM/M2
ECHO LV INTERNAL DIMENSION DIASTOLIC: 6.3 CM (ref 3.9–5.3)
ECHO LV INTERNAL DIMENSION SYSTOLIC INDEX: 2.91 CM/M2
ECHO LV INTERNAL DIMENSION SYSTOLIC: 5.9 CM
ECHO LV IVSD: 0.6 CM (ref 0.6–0.9)
ECHO LV MASS 2D: 157.8 G (ref 67–162)
ECHO LV MASS INDEX 2D: 77.7 G/M2 (ref 43–95)
ECHO LV POSTERIOR WALL DIASTOLIC: 0.7 CM (ref 0.6–0.9)
ECHO LV RELATIVE WALL THICKNESS RATIO: 0.22
ECHO LVOT AREA: 2.5 CM2
ECHO LVOT AV VTI INDEX: 0.5
ECHO LVOT DIAM: 1.8 CM
ECHO LVOT MEAN GRADIENT: 1 MMHG
ECHO LVOT PEAK GRADIENT: 1 MMHG
ECHO LVOT PEAK VELOCITY: 0.5 M/S
ECHO LVOT STROKE VOLUME INDEX: 11.2 ML/M2
ECHO LVOT SV: 22.6 ML
ECHO LVOT VTI: 8.9 CM
ECHO MV AREA VTI: 0.8 CM2
ECHO MV LVOT VTI INDEX: 3.13
ECHO MV MAX VELOCITY: 1.3 M/S
ECHO MV MEAN GRADIENT: 3 MMHG
ECHO MV MEAN VELOCITY: 0.7 M/S
ECHO MV PEAK GRADIENT: 7 MMHG
ECHO MV VTI: 27.9 CM
ECHO PV ACCELERATION TIME (AT): 77 MS
ECHO PV MAX VELOCITY: 0.6 M/S
ECHO PV PEAK GRADIENT: 2 MMHG
ECHO RIGHT VENTRICULAR SYSTOLIC PRESSURE (RVSP): 37 MMHG
ECHO RV BASAL DIMENSION: 4.7 CM
ECHO RV FREE WALL PEAK S': 4.2 CM/S
ECHO RV INTERNAL DIMENSION: 3.4 CM
ECHO TV REGURGITANT MAX VELOCITY: 2.35 M/S
ECHO TV REGURGITANT PEAK GRADIENT: 22 MMHG
GLOBULIN SER CALC-MCNC: 2.3 G/DL (ref 2.3–3.5)
GLUCOSE SERPL-MCNC: 98 MG/DL (ref 70–99)
HDLC SERPL-MCNC: 35 MG/DL (ref 40–60)
HDLC SERPL: 4.7 (ref 0–5)
LDLC SERPL CALC-MCNC: 115 MG/DL (ref 0–100)
MAGNESIUM SERPL-MCNC: 2.1 MG/DL (ref 1.8–2.4)
POTASSIUM SERPL-SCNC: 4.3 MMOL/L (ref 3.5–5.1)
PROT SERPL-MCNC: 5.9 G/DL (ref 6.3–8.2)
SODIUM SERPL-SCNC: 124 MMOL/L (ref 136–145)
TRIGL SERPL-MCNC: 81 MG/DL (ref 0–150)
VLDLC SERPL CALC-MCNC: 16 MG/DL (ref 6–23)

## 2024-10-10 PROCEDURE — 6360000002 HC RX W HCPCS: Performed by: FAMILY MEDICINE

## 2024-10-10 PROCEDURE — C8929 TTE W OR WO FOL WCON,DOPPLER: HCPCS

## 2024-10-10 PROCEDURE — 2580000003 HC RX 258: Performed by: INTERNAL MEDICINE

## 2024-10-10 PROCEDURE — 80048 BASIC METABOLIC PNL TOTAL CA: CPT

## 2024-10-10 PROCEDURE — 83735 ASSAY OF MAGNESIUM: CPT

## 2024-10-10 PROCEDURE — 6360000004 HC RX CONTRAST MEDICATION: Performed by: INTERNAL MEDICINE

## 2024-10-10 PROCEDURE — 80076 HEPATIC FUNCTION PANEL: CPT

## 2024-10-10 PROCEDURE — 6370000000 HC RX 637 (ALT 250 FOR IP): Performed by: FAMILY MEDICINE

## 2024-10-10 PROCEDURE — 93306 TTE W/DOPPLER COMPLETE: CPT | Performed by: INTERNAL MEDICINE

## 2024-10-10 PROCEDURE — 36415 COLL VENOUS BLD VENIPUNCTURE: CPT

## 2024-10-10 PROCEDURE — 2580000003 HC RX 258: Performed by: FAMILY MEDICINE

## 2024-10-10 PROCEDURE — 1100000003 HC PRIVATE W/ TELEMETRY

## 2024-10-10 PROCEDURE — 6360000002 HC RX W HCPCS: Performed by: INTERNAL MEDICINE

## 2024-10-10 PROCEDURE — 80061 LIPID PANEL: CPT

## 2024-10-10 RX ORDER — ENOXAPARIN SODIUM 100 MG/ML
30 INJECTION SUBCUTANEOUS 2 TIMES DAILY
Status: DISCONTINUED | OUTPATIENT
Start: 2024-10-11 | End: 2024-10-14

## 2024-10-10 RX ORDER — BUMETANIDE 0.25 MG/ML
1 INJECTION, SOLUTION INTRAMUSCULAR; INTRAVENOUS 2 TIMES DAILY
Status: DISCONTINUED | OUTPATIENT
Start: 2024-10-10 | End: 2024-10-15

## 2024-10-10 RX ADMIN — ZOLPIDEM TARTRATE 5 MG: 5 TABLET, COATED ORAL at 20:16

## 2024-10-10 RX ADMIN — SODIUM CHLORIDE, PRESERVATIVE FREE 10 ML: 5 INJECTION INTRAVENOUS at 19:46

## 2024-10-10 RX ADMIN — FAMOTIDINE 20 MG: 20 TABLET, FILM COATED ORAL at 08:10

## 2024-10-10 RX ADMIN — BUMETANIDE 1 MG: 0.25 INJECTION INTRAMUSCULAR; INTRAVENOUS at 11:13

## 2024-10-10 RX ADMIN — ASPIRIN 81 MG: 81 TABLET, COATED ORAL at 08:10

## 2024-10-10 RX ADMIN — VITAMIN D, TAB 1000IU (100/BT) 1000 UNITS: 25 TAB at 08:10

## 2024-10-10 RX ADMIN — ACETAMINOPHEN 650 MG: 325 TABLET ORAL at 08:20

## 2024-10-10 RX ADMIN — METOPROLOL SUCCINATE 25 MG: 25 TABLET, EXTENDED RELEASE ORAL at 08:11

## 2024-10-10 RX ADMIN — SODIUM CHLORIDE, PRESERVATIVE FREE 10 ML: 5 INJECTION INTRAVENOUS at 08:13

## 2024-10-10 RX ADMIN — MIDODRINE HYDROCHLORIDE 5 MG: 5 TABLET ORAL at 11:14

## 2024-10-10 RX ADMIN — MIDODRINE HYDROCHLORIDE 5 MG: 5 TABLET ORAL at 17:01

## 2024-10-10 RX ADMIN — SODIUM CHLORIDE 0.45 ML: 9 INJECTION INTRAMUSCULAR; INTRAVENOUS; SUBCUTANEOUS at 16:14

## 2024-10-10 RX ADMIN — MAGNESIUM GLUCONATE 500 MG ORAL TABLET 200 MG: 500 TABLET ORAL at 08:11

## 2024-10-10 RX ADMIN — ACETAMINOPHEN 650 MG: 325 TABLET ORAL at 16:12

## 2024-10-10 RX ADMIN — CETIRIZINE HYDROCHLORIDE 10 MG: 10 TABLET, FILM COATED ORAL at 08:10

## 2024-10-10 RX ADMIN — ENOXAPARIN SODIUM 40 MG: 100 INJECTION SUBCUTANEOUS at 08:10

## 2024-10-10 RX ADMIN — MIDODRINE HYDROCHLORIDE 5 MG: 5 TABLET ORAL at 08:11

## 2024-10-10 RX ADMIN — FAMOTIDINE 20 MG: 20 TABLET, FILM COATED ORAL at 19:46

## 2024-10-10 RX ADMIN — BUMETANIDE 1 MG: 0.25 INJECTION INTRAMUSCULAR; INTRAVENOUS at 17:01

## 2024-10-10 ASSESSMENT — PAIN SCALES - GENERAL
PAINLEVEL_OUTOF10: 3

## 2024-10-10 ASSESSMENT — PAIN DESCRIPTION - DESCRIPTORS
DESCRIPTORS: ACHING;TENDER
DESCRIPTORS: TENDER;ACHING

## 2024-10-10 ASSESSMENT — PAIN DESCRIPTION - LOCATION
LOCATION: LEG
LOCATION: LEG

## 2024-10-10 ASSESSMENT — PAIN - FUNCTIONAL ASSESSMENT
PAIN_FUNCTIONAL_ASSESSMENT: ACTIVITIES ARE NOT PREVENTED
PAIN_FUNCTIONAL_ASSESSMENT: ACTIVITIES ARE NOT PREVENTED

## 2024-10-10 ASSESSMENT — PAIN DESCRIPTION - ORIENTATION
ORIENTATION: RIGHT;LEFT
ORIENTATION: LEFT;ANTERIOR

## 2024-10-10 NOTE — H&P
Hospitalist History and Physical   Admit Date:  10/9/2024  6:47 PM   Name:  Kely Quach   Age:  64 y.o.  Sex:  female  :  1960   MRN:  [id number]   Room:      Presenting/Chief Complaint: No chief complaint on file.   Worsening fatigue, weakness and peripheral swelling since D/C 10/2/24    Reason(s) for Admission: Hyponatremia with excess extracellular fluid volume [E87.1]     History of Present Illness:   Kely Quach is a 64 y.o. female with medical history of CHF, HLD, mood disorder who presented with complaint of worsening weakness and fatigue and peripheral edema since discharge.  That admit was also for hyponatremia. 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 Entresto had been stopped prior also. She received 2 doses of tolvaptan and her sodium was 133 at discharge. She felt good for several days, but her peripheral edema \"blew up\", though she did not weigh herself, she lost her appetite, then the weakness and fatigue returned.No fever, no URI sx. Did fall twice prior to ED visit.  She reports she had only been drinking the recommended 1000ml(mostly water).  ED eval showed Sodium of 123, BNP -90828 up from 5000+ last admit, and new transaminitis. CXR with cardiomegaly with central pulm. Vasc. Congestion not changed from prior.  Hosp. Med is consulted to admit for further eval and management.    Assessment & Plan:     Principal Problem:    Hyponatremia with excess extracellular fluid volume  Plan: Will give a dose of Bumex to try ask for her fluids to have solute in them with same volume restriction  Active Problems:    Chronic systolic congestive heart failure (HCC)  Plan: Continue the 25 mg metoprolol dose    DCM (dilated cardiomyopathy) with hypotension  Plan: continue midodrine for BP support and metoprolol got HR control    H/O mitral valve repair  Plan: Just redone May 2024    Transaminitis  Plan: Acute hepatitis panel  10/09/24  3:11 PM   Result Value Ref Range    WBC 6.9 4.3 - 11.1 K/uL    RBC 4.55 4.05 - 5.20 M/uL    Hemoglobin 13.7 11.7 - 15.4 g/dL    Hematocrit 39.7 35.8 - 46.3 %    MCV 87.3 82.0 - 102.0 FL    MCH 30.1 26.1 - 32.9 PG    MCHC 34.5 31.4 - 35.0 g/dL    RDW 15.5 (H) 11.9 - 14.6 %    Platelets 202 150 - 450 K/uL    MPV 9.8 9.4 - 12.3 FL    nRBC 0.00 0.0 - 0.2 K/uL    Differential Type AUTOMATED      Neutrophils % 82 (H) 43 - 78 %    Lymphocytes % 12 (L) 13 - 44 %    Monocytes % 6 4.0 - 12.0 %    Eosinophils % 0 (L) 0.5 - 7.8 %    Basophils % 0 0.0 - 2.0 %    Immature Granulocytes % 0 0.0 - 5.0 %    Neutrophils Absolute 5.7 1.7 - 8.2 K/UL    Lymphocytes Absolute 0.8 0.5 - 4.6 K/UL    Monocytes Absolute 0.4 0.1 - 1.3 K/UL    Eosinophils Absolute 0.0 0.0 - 0.8 K/UL    Basophils Absolute 0.0 0.0 - 0.2 K/UL    Immature Granulocytes Absolute 0.0 0.0 - 0.5 K/UL   CMP    Collection Time: 10/09/24  3:11 PM   Result Value Ref Range    Sodium 123 (L) 133 - 143 mmol/L    Potassium 4.5 3.5 - 5.1 mmol/L    Chloride 85 (L) 98 - 107 mmol/L    CO2 24 21 - 32 mmol/L    Anion Gap 14 9 - 18 mmol/L    Glucose 113 (H) 65 - 100 mg/dL    BUN 35 (H) 8 - 23 MG/DL    Creatinine 0.99 0.6 - 1.0 MG/DL    Est, Glom Filt Rate 64 >60 ml/min/1.73m2    Calcium 9.9 8.3 - 10.4 MG/DL    Total Bilirubin 2.6 (H) 0.2 - 1.1 MG/DL     (H) 13.0 - 61.0 U/L     (H) 15 - 37 U/L    Alk Phosphatase 96 45.0 - 117.0 U/L    Total Protein 6.4 6.4 - 8.2 g/dL    Albumin 4.1 3.2 - 4.6 g/dL    Globulin 2.3 (L) 2.8 - 4.5 g/dL    Albumin/Globulin Ratio 1.8 (H) 0.4 - 1.6     Magnesium    Collection Time: 10/09/24  3:11 PM   Result Value Ref Range    Magnesium 2.2 1.2 - 2.6 mg/dL   Brain Natriuretic Peptide    Collection Time: 10/09/24  3:11 PM   Result Value Ref Range    NT Pro-BNP 22,384 (H) 0 - 450 PG/ML   Ammonia    Collection Time: 10/09/24  4:08 PM   Result Value Ref Range    Ammonia <10 (L) 11.0 - 32.0 umol/L       No results for input(s): \"COVID19\" in

## 2024-10-10 NOTE — CARE COORDINATION
CM spoke to patient at bedside on this day. Patient confirmed demographic information and insurance information. Patient reports that she lives with her spouse, in a 1 story house, and has 5 steps to enter home (with handrails). Patient reports that she is independent with ADLs, uses a walker, and is an active . Patient reports that she has no home care services. Patient confirmed PCP information. Discharge plan is for patient to return home.     No CM needs voiced or noted at this time.     ASSESSMENT NOTE    Attending Physician: Shanique Leung MD  Admit Problem: Hyponatremia with excess extracellular fluid volume [E87.1]  Date/Time of Admission: 10/9/2024  6:47 PM  Problem List:  Patient Active Problem List   Diagnosis    Tachycardia    Coronary artery disease involving native coronary artery of native heart without angina pectoris    Chronic systolic congestive heart failure (HCC)    ICD (implantable cardioverter-defibrillator) in place    Chronic systolic HF (heart failure) ([facility])    S/P MVR (mitral valve repair)    COVID-19    Non-rheumatic mitral regurgitation    DCM (dilated cardiomyopathy) ([facility])    Pure hypercholesterolemia    Encounter due to AICD at end of battery life    H/O mitral valve repair    Accelerating angina ([facility])    HFrEF (heart failure with reduced ejection fraction) ([facility])    Iron deficiency anemia secondary to inadequate dietary iron intake    Status post placement of leadless cardiac pacemaker    Hyponatremia    Transaminitis    Hyponatremia with excess extracellular fluid volume    Hypoglobulinemia    Encounter for palliative care       Service Assessment  Patient Orientation Alert and Oriented   Cognition Alert   History Provided By Patient   Primary Caregiver Self   Accompanied By/Relationship     Support Systems Spouse/Significant Other   Patient's Healthcare Decision Maker is: Legal Next of Kin   PCP Verified by CM Yes (confirmed PCP is Dr. Sabine Ordonez)   Last Visit to

## 2024-10-10 NOTE — PROGRESS NOTES
Hospitalist Progress Note   Admit Date:  10/9/2024  6:47 PM   Name:  Kely Quach   Age:  64 y.o.  Sex:  female  :  1960   MRN:  [id number]   Room:      Presenting/Chief Complaint: No chief complaint on file.     Reason(s) for Admission: Hyponatremia with excess extracellular fluid volume [E87.1]     Hospital Course:   As per prior documentation:  Kely Quach is a 64 y.o. female with medical history of CHF, HLD, mood disorder who presented with complaint of worsening weakness and fatigue and peripheral edema since discharge.  That admit was also for hyponatremia. 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 Entresto had been stopped prior also. She received 2 doses of tolvaptan and her sodium was 133 at discharge. She felt good for several days, but her peripheral edema \"blew up\", though she did not weigh herself, she lost her appetite, then the weakness and fatigue returned.No fever, no URI sx. Did fall twice prior to ED visit.  She reports she had only been drinking the recommended 1000ml(mostly water).  ED eval showed Sodium of 123, BNP -30638 up from 5000+ last admit, and new transaminitis. CXR with cardiomegaly with central pulm. Vasc. Congestion not changed from prior.  Hosp. Med is consulted to admit for further eval and management.    Subjective & 24hr Events:   Patient seen and evaluated.  New patient for me today.  Feeling better  Feels that swelling has improved  Feeling happier after talking to nephrology      Assessment & Plan:     Principal Problem:    Hyponatremia with excess extracellular fluid volume  Continue Bumex   Continue 1000 mL fluid restriction   Daily weights   Strict I's and O's   Monitor renal function   Nephrology input appreciated     Active Problems:    Chronic systolic congestive heart failure (HCC)    DCM (dilated cardiomyopathy) (HCC)    H/O mitral valve repair  BNP is elevated greater than

## 2024-10-10 NOTE — CONSULTS
[facility] CONSULT NOTE    Admission Date:  10/9/2024    Admission Diagnosis:  Hyponatremia with excess extracellular fluid volume [E87.1]    Consulting physician: Haley  Reason for consult: Hyponatremia    Subjective:   History of Present Illness: Kely Quach is a 64 y.o. female with medical history of CHF, HLD, mood disorder who presented with complaint of worsening weakness and fatigue and peripheral edema since discharge.  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 Entresto had been stopped prior also. She received 2 doses of tolvaptan and her sodium was 133 at discharge. She continued to have worsening edema at home and began to feel generally poorly after a few days. She comes in with a sodium of 123 which has improved slightly since last night. She has been trying to restrict fluids at home.     Past Medical History:   Diagnosis Date    Heart failure (HCC)     Hyperlipidemia     Psychiatric disorder     patient reports mild      Past Surgical History:   Procedure Laterality Date    CARDIAC PROCEDURE N/A 6/23/2023    Left heart cath / coronary angiography performed by Sal Graf MD at [facility] CARDIAC CATH LAB    CARDIAC PROCEDURE N/A 6/23/2023    Fractional flow reserve (FFR) performed by Sal Graf MD at [facility] CARDIAC CATH LAB    EP DEVICE PROCEDURE N/A 11/23/2022    Remove & replace ICD single lead performed by Jassi Mahan MD at [facility] CARDIAC CATH LAB    EP DEVICE PROCEDURE N/A 6/19/2024    Insert CCM Device performed by Landon Mills MD at [facility] CARDIAC CATH LAB    SC UNLISTED PROCEDURE CARDIAC SURGERY      MVR 07/2017    VASCULAR SURGERY      MVR 2017      Current Facility-Administered Medications   Medication Dose Route Frequency    bumetanide (BUMEX) injection 1 mg  1 mg IntraVENous BID    aspirin EC tablet 81 mg  81 mg Oral Daily    cetirizine (ZYRTEC) tablet 10 mg  10 mg Oral Daily    magnesium oxide (MAG-OX) tablet 200  Cancer Mother     Mitral Valve Prolapse Maternal Aunt     Mitral Valve Prolapse Maternal Grandfather         Review of Systems  Gen - no fever, no chills, appetite poor  HEENT - no sore throat, no decreased vision, no hearing loss  Neck - no neck mass  CV - no chest pain, no palpitation, no orthopnea  Lung - + shortness of breath, no cough, no hemoptysis  Abd - no tenderness, no nausea/vomiting, no bloody stool  Ext - + edema   Musculoskeletal - no joint pain, no back pain  Neurologic - no headaches, no dizziness, no seizures  Psychiatric - no anxiety, no depression  Skin - no rashes, no pupura  Genitourinary - no decreased urine output, no hematuria, no foamy urine    Objective:   Vitals:    10/10/24 0102 10/10/24 0215 10/10/24 0324 10/10/24 0651   BP:   105/74 93/79   Pulse:  88 86 79   Resp:   18 18   Temp:    98.6 °F (37 °C)   TempSrc:   Temporal Temporal   SpO2:   99% 99%   Weight: 104.9 kg (231 lb 4.2 oz)      Height:           Intake/Output Summary (Last 24 hours) at 10/10/2024 0957  Last data filed at 10/10/2024 0826  Gross per 24 hour   Intake 925 ml   Output 450 ml   Net 475 ml       Physical Exam  GEN :in no distress, alert and oriented  HEENT: anicteric sclerae, Mucous membranes are moist.  Neck - supple without JVD, no thyromegaly.  No lymphadenopathy.  CV - regular rate and rhythm, no murmur, no rub  Lung - clear bilaterally, lungs expand symmetrically  Chest wall - normal appearance  Abd - soft, nontender, bowel sounds present, no hepatosplenomegaly  Ext - ++ edema  Neurologic - nonfocal  Genitourinary - bladder nonpalpable  Psychiatric: Normal mood and affect.      Data Review:   Recent Labs     10/09/24  1511   WBC 6.9   HGB 13.7   HCT 39.7        Recent Labs     10/09/24  1511 10/10/24  0508   * 124*   K 4.5 4.3   CL 85* 88*   CO2 24 23   BUN 35* 32*   MG 2.2 2.1     No results for input(s): \"PH\", \"PCO2\", \"PO2\" in the last 72 hours.    Problem List:     Patient Active Problem List

## 2024-10-10 NOTE — PLAN OF CARE
Problem: Chronic Conditions and Co-morbidities  Goal: Patient's chronic conditions and co-morbidity symptoms are monitored and maintained or improved  Outcome: Progressing  Flowsheets (Taken 10/10/2024 0820)  Care Plan - Patient's Chronic Conditions and Co-Morbidity Symptoms are Monitored and Maintained or Improved: Monitor and assess patient's chronic conditions and comorbid symptoms for stability, deterioration, or improvement     Problem: Discharge Planning  Goal: Discharge to home or other facility with appropriate resources  Outcome: Progressing  Flowsheets (Taken 10/10/2024 0820)  Discharge to home or other facility with appropriate resources:   Identify barriers to discharge with patient and caregiver   Arrange for needed discharge resources and transportation as appropriate     Problem: Safety - Adult  Goal: Free from fall injury  Outcome: Progressing  Flowsheets (Taken 10/10/2024 0820)  Free From Fall Injury: Instruct family/caregiver on patient safety     Problem: Pain  Goal: Verbalizes/displays adequate comfort level or baseline comfort level  Outcome: Progressing  Flowsheets (Taken 10/10/2024 0820)  Verbalizes/displays adequate comfort level or baseline comfort level:   Encourage patient to monitor pain and request assistance   Assess pain using appropriate pain scale

## 2024-10-11 PROBLEM — Z51.5 ENCOUNTER FOR PALLIATIVE CARE: Status: ACTIVE | Noted: 2024-10-11

## 2024-10-11 LAB
ANION GAP SERPL CALC-SCNC: 12 MMOL/L (ref 9–18)
BUN SERPL-MCNC: 34 MG/DL (ref 8–23)
CALCIUM SERPL-MCNC: 9.4 MG/DL (ref 8.8–10.2)
CHLORIDE SERPL-SCNC: 87 MMOL/L (ref 98–107)
CO2 SERPL-SCNC: 23 MMOL/L (ref 20–28)
CREAT SERPL-MCNC: 1.12 MG/DL (ref 0.6–1.1)
GLUCOSE SERPL-MCNC: 95 MG/DL (ref 70–99)
MAGNESIUM SERPL-MCNC: 2.2 MG/DL (ref 1.8–2.4)
NT PRO BNP: ABNORMAL PG/ML (ref 0–125)
POTASSIUM SERPL-SCNC: 4.5 MMOL/L (ref 3.5–5.1)
SODIUM SERPL-SCNC: 122 MMOL/L (ref 136–145)
SODIUM SERPL-SCNC: 124 MMOL/L (ref 136–145)
SODIUM SERPL-SCNC: 130 MMOL/L (ref 136–145)

## 2024-10-11 PROCEDURE — 6370000000 HC RX 637 (ALT 250 FOR IP): Performed by: FAMILY MEDICINE

## 2024-10-11 PROCEDURE — 6360000002 HC RX W HCPCS: Performed by: INTERNAL MEDICINE

## 2024-10-11 PROCEDURE — 2580000003 HC RX 258: Performed by: FAMILY MEDICINE

## 2024-10-11 PROCEDURE — 83735 ASSAY OF MAGNESIUM: CPT

## 2024-10-11 PROCEDURE — 99223 1ST HOSP IP/OBS HIGH 75: CPT | Performed by: INTERNAL MEDICINE

## 2024-10-11 PROCEDURE — 83880 ASSAY OF NATRIURETIC PEPTIDE: CPT

## 2024-10-11 PROCEDURE — 2580000003 HC RX 258: Performed by: INTERNAL MEDICINE

## 2024-10-11 PROCEDURE — 36415 COLL VENOUS BLD VENIPUNCTURE: CPT

## 2024-10-11 PROCEDURE — 1100000003 HC PRIVATE W/ TELEMETRY

## 2024-10-11 PROCEDURE — 84295 ASSAY OF SERUM SODIUM: CPT

## 2024-10-11 PROCEDURE — 6370000000 HC RX 637 (ALT 250 FOR IP): Performed by: INTERNAL MEDICINE

## 2024-10-11 PROCEDURE — 80048 BASIC METABOLIC PNL TOTAL CA: CPT

## 2024-10-11 RX ORDER — TOLVAPTAN 15 MG/1
15 TABLET ORAL ONCE
Status: COMPLETED | OUTPATIENT
Start: 2024-10-11 | End: 2024-10-11

## 2024-10-11 RX ORDER — DEXTROSE MONOHYDRATE 50 MG/ML
INJECTION, SOLUTION INTRAVENOUS CONTINUOUS
Status: ACTIVE | OUTPATIENT
Start: 2024-10-12 | End: 2024-10-12

## 2024-10-11 RX ADMIN — TOLVAPTAN 15 MG: 15 TABLET ORAL at 09:56

## 2024-10-11 RX ADMIN — CETIRIZINE HYDROCHLORIDE 10 MG: 10 TABLET, FILM COATED ORAL at 09:02

## 2024-10-11 RX ADMIN — BUMETANIDE 1 MG: 0.25 INJECTION INTRAMUSCULAR; INTRAVENOUS at 09:04

## 2024-10-11 RX ADMIN — FAMOTIDINE 20 MG: 20 TABLET, FILM COATED ORAL at 20:54

## 2024-10-11 RX ADMIN — EMPAGLIFLOZIN 10 MG: 10 TABLET, FILM COATED ORAL at 13:55

## 2024-10-11 RX ADMIN — DEXTROSE MONOHYDRATE: 50 INJECTION, SOLUTION INTRAVENOUS at 23:53

## 2024-10-11 RX ADMIN — SACUBITRIL AND VALSARTAN 1 TABLET: 24; 26 TABLET, FILM COATED ORAL at 20:54

## 2024-10-11 RX ADMIN — METOPROLOL SUCCINATE 25 MG: 25 TABLET, EXTENDED RELEASE ORAL at 09:02

## 2024-10-11 RX ADMIN — BUMETANIDE 1 MG: 0.25 INJECTION INTRAMUSCULAR; INTRAVENOUS at 18:25

## 2024-10-11 RX ADMIN — VITAMIN D, TAB 1000IU (100/BT) 1000 UNITS: 25 TAB at 09:02

## 2024-10-11 RX ADMIN — ENOXAPARIN SODIUM 30 MG: 100 INJECTION SUBCUTANEOUS at 20:54

## 2024-10-11 RX ADMIN — MAGNESIUM GLUCONATE 500 MG ORAL TABLET 200 MG: 500 TABLET ORAL at 09:01

## 2024-10-11 RX ADMIN — ASPIRIN 81 MG: 81 TABLET, COATED ORAL at 09:01

## 2024-10-11 RX ADMIN — MIDODRINE HYDROCHLORIDE 5 MG: 5 TABLET ORAL at 12:18

## 2024-10-11 RX ADMIN — SACUBITRIL AND VALSARTAN 1 TABLET: 24; 26 TABLET, FILM COATED ORAL at 13:55

## 2024-10-11 RX ADMIN — ENOXAPARIN SODIUM 30 MG: 100 INJECTION SUBCUTANEOUS at 09:01

## 2024-10-11 RX ADMIN — SODIUM CHLORIDE, PRESERVATIVE FREE 10 ML: 5 INJECTION INTRAVENOUS at 20:54

## 2024-10-11 RX ADMIN — FAMOTIDINE 20 MG: 20 TABLET, FILM COATED ORAL at 09:02

## 2024-10-11 RX ADMIN — MIDODRINE HYDROCHLORIDE 5 MG: 5 TABLET ORAL at 09:01

## 2024-10-11 RX ADMIN — SODIUM CHLORIDE, PRESERVATIVE FREE 10 ML: 5 INJECTION INTRAVENOUS at 09:06

## 2024-10-11 NOTE — CONSULTS
[facility] [email or web address]] Cardiology Initial Cardiac Evaluation                Date of  Admission: 10/9/2024  6:47 PM     PCP: Sabine Ordonez MD  Requested by: Dr Leung  Primary Cardiologist: Dr Wills  [facility] Attending: Dr Tovar    Reason for Evaluation: Worsening HFrEF      Kely Quach is a 64 y.o. female with hx of NICM, ASD repair, tricuspid annuloplasty ring 1/5/2024, MV repair 2017, MV [facility], HFrEF s/p ICD, s/p optimizer, HLD, and mood disorder.  The patinet was recently seen in the hospital for hyponatremia and had noted hypotension as well.  She was on GDMT that included valsartan/Jardiance/metoprolol/spironolactone. At dischared all these medications were discontinued excluding metoprolol and was started  patient on midodrine 5 mg 3 times daily until follow-up with PCP and can follow-up blood pressures. Since discharge the patient has hd a worsening fatigue, and perepherial.  The patient received 2 doses of tolvaptan and her sodium was 133 at discharge.  The patient has also fallen twice since her last admission and has been only drinking 1000 ml a day.  Labs on arrival included  now 122, NT Pro BNP 22k , Cr 1.01 now 1.12, GFR 63 now 56, BUN 32 now 34,  now 148, and Normal CBC.  CXR showed prominent vessels but no pulmonary edema.      Recent Cardiac Synopsis    Echo: 10/09/24    ECHO (TTE) COMPLETE (PRN CONTRAST/BUBBLE/STRAIN/3D) 10/10/2024  4:50 PM (Final)    Interpretation Summary    Left Ventricle: Severely reduced left ventricular systolic function with a visually estimated EF of 10 -15%. Left ventricle is severely dilated. Wall is thinner than normal. Severe global hypokinesis present. Abnormal diastolic function.    Right Ventricle: Reduced systolic function.    Mitral Valve: Bioprosthetic valve that is well-seated with leaflet motion that was not well visualized. Mild stenosis noted. MV mean gradient is 3 mmHg.    Tricuspid Valve:  Diameter 4.5 cm    AV Mean Velocity 0.7 m/s    AV Mean Gradient 2 mmHg    AV Mean Gradient 2 mmHg    AV VTI 17.7 cm    AV Peak Velocity 1.0 m/s    AV Peak Gradient 4 mmHg    AV Area by VTI 1.3 cm2    AV Area by Peak Velocity 1.3 cm2    Aortic Root 2.9 cm    Ascending Aorta 2.9 cm    IVC Proxmal 2.6 cm    MV Mean Gradient 3 mmHg    MV VTI 27.9 cm    MV Mean Velocity 0.7 m/s    MV Max Velocity 1.3 m/s    MV Peak Gradient 7 mmHg    MV Area by VTI 0.8 cm2    PV AT 77.0 ms    PV Max Velocity 0.6 m/s    PV Peak Gradient 2 mmHg    RVIDd 3.4 cm    RV Basal Dimension 4.7 cm    RV Free Wall Peak S' 4.2 cm/s    TR Max Velocity 2.35 m/s    TR Peak Gradient 22 mmHg    Body Surface Area 1.99 m2    Fractional Shortening 2D 6 28 - 44 %    LV ESV Index A4C 128 mL/m2    LV EDV Index A4C 148 mL/m2    LV ESV Index A2C 128 mL/m2    LV EDV Index A2C 142 mL/m2    LVIDd Index 3.10 cm/m2    LVIDs Index 2.91 cm/m2    LV RWT Ratio 0.22     LV Mass 2D 157.8 67 - 162 g    LV Mass 2D Index 77.7 43 - 95 g/m2    LA Volume Index BP 43 (A) 16 - 34 ml/m2    LVOT Stroke Volume Index 11.2 mL/m2    LA Volume Index MOD A2C 44 (A) 16 - 34 ml/m2    LA Volume Index MOD A4C 41 (A) 16 - 34 ml/m2    LA Size Index 2.22 cm/m2    LA/AO Root Ratio 1.55     Ao Root Index 1.43 cm/m2    Ascending Aorta Index 1.43 cm/m2    AV Velocity Ratio 0.50     LVOT:AV VTI Index 0.50     CHRISTIANA/BSA VTI 0.6 cm2/m2    CHRISTIANA/BSA Peak Velocity 0.6 cm2/m2    MV:LVOT VTI Index 3.13     Est. RA Pressure 15 mmHg    RVSP 37 mmHg   Basic Metabolic Panel    Collection Time: 10/11/24  6:08 AM   Result Value Ref Range    Sodium 122 (L) 136 - 145 mmol/L    Potassium 4.5 3.5 - 5.1 mmol/L    Chloride 87 (L) 98 - 107 mmol/L    CO2 23 20 - 28 mmol/L    Anion Gap 12 9 - 18 mmol/L    Glucose 95 70 - 99 mg/dL    BUN 34 (H) 8 - 23 MG/DL    Creatinine 1.12 (H) 0.60 - 1.10 MG/DL    Est, Glom Filt Rate 56 (L) >60 ml/min/1.73m2    Calcium 9.4 8.8 - 10.2 MG/DL   Magnesium    Collection Time: 10/11/24  6:08 AM

## 2024-10-11 NOTE — PLAN OF CARE
Problem: Chronic Conditions and Co-morbidities  Goal: Patient's chronic conditions and co-morbidity symptoms are monitored and maintained or improved  10/11/2024 1101 by El Cobian RN  Outcome: Progressing  10/10/2024 2337 by Belen Jimenez RN  Outcome: Progressing  Flowsheets (Taken 10/10/2024 1939)  Care Plan - Patient's Chronic Conditions and Co-Morbidity Symptoms are Monitored and Maintained or Improved: Monitor and assess patient's chronic conditions and comorbid symptoms for stability, deterioration, or improvement     Problem: Discharge Planning  Goal: Discharge to home or other facility with appropriate resources  10/11/2024 1101 by El Cobian RN  Outcome: Progressing  Flowsheets (Taken 10/11/2024 0703)  Discharge to home or other facility with appropriate resources: Identify barriers to discharge with patient and caregiver  10/10/2024 2337 by Belen Jimenez RN  Outcome: Progressing  Flowsheets (Taken 10/10/2024 1939)  Discharge to home or other facility with appropriate resources:   Identify barriers to discharge with patient and caregiver   Identify discharge learning needs (meds, wound care, etc)   Arrange for needed discharge resources and transportation as appropriate     Problem: Safety - Adult  Goal: Free from fall injury  10/11/2024 1101 by El Cobian RN  Outcome: Progressing  10/10/2024 2337 by Belen Jimenez RN  Outcome: Progressing     Problem: Pain  Goal: Verbalizes/displays adequate comfort level or baseline comfort level  10/11/2024 1101 by El Cobian RN  Outcome: Progressing  Flowsheets (Taken 10/11/2024 0655)  Verbalizes/displays adequate comfort level or baseline comfort level:   Encourage patient to monitor pain and request assistance   Assess pain using appropriate pain scale  10/10/2024 2337 by Belen Jimenez RN  Outcome: Progressing  Flowsheets (Taken 10/10/2024 1612 by Angie Manning RN)  Verbalizes/displays adequate comfort

## 2024-10-11 NOTE — PLAN OF CARE
Problem: Chronic Conditions and Co-morbidities  Goal: Patient's chronic conditions and co-morbidity symptoms are monitored and maintained or improved  10/10/2024 2337 by Belen Jimenez RN  Outcome: Progressing  Flowsheets (Taken 10/10/2024 1939)  Care Plan - Patient's Chronic Conditions and Co-Morbidity Symptoms are Monitored and Maintained or Improved: Monitor and assess patient's chronic conditions and comorbid symptoms for stability, deterioration, or improvement  10/10/2024 1151 by Angie Manning RN  Outcome: Progressing  Flowsheets (Taken 10/10/2024 0820)  Care Plan - Patient's Chronic Conditions and Co-Morbidity Symptoms are Monitored and Maintained or Improved: Monitor and assess patient's chronic conditions and comorbid symptoms for stability, deterioration, or improvement     Problem: Discharge Planning  Goal: Discharge to home or other facility with appropriate resources  10/10/2024 2337 by Belen Jimenez RN  Outcome: Progressing  Flowsheets (Taken 10/10/2024 1939)  Discharge to home or other facility with appropriate resources:   Identify barriers to discharge with patient and caregiver   Identify discharge learning needs (meds, wound care, etc)   Arrange for needed discharge resources and transportation as appropriate  10/10/2024 1151 by Angie Manning RN  Outcome: Progressing  Flowsheets (Taken 10/10/2024 0820)  Discharge to home or other facility with appropriate resources:   Identify barriers to discharge with patient and caregiver   Arrange for needed discharge resources and transportation as appropriate     Problem: Safety - Adult  Goal: Free from fall injury  10/10/2024 2337 by Belen Jimenez RN  Outcome: Progressing  10/10/2024 1151 by Angie Manning RN  Outcome: Progressing  Flowsheets (Taken 10/10/2024 0820)  Free From Fall Injury: Instruct family/caregiver on patient safety     Problem: Pain  Goal: Verbalizes/displays adequate comfort level or baseline

## 2024-10-11 NOTE — PROGRESS NOTES
Hospitalist Progress Note   Admit Date:  10/9/2024  6:47 PM   Name:  Kely Quach   Age:  64 y.o.  Sex:  female  :  1960   MRN:  [id number]   Room:      Presenting/Chief Complaint: No chief complaint on file.     Reason(s) for Admission: Hyponatremia with excess extracellular fluid volume [E87.1]     Hospital Course:   As per prior documentation:  Kely Quach is a 64 y.o. female with medical history of CHF, HLD, mood disorder who presented with complaint of worsening weakness and fatigue and peripheral edema since discharge.  That admit was also for hyponatremia. 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 Entresto had been stopped prior also. She received 2 doses of tolvaptan and her sodium was 133 at discharge. She felt good for several days, but her peripheral edema \"blew up\", though she did not weigh herself, she lost her appetite, then the weakness and fatigue returned.No fever, no URI sx. Did fall twice prior to ED visit.  She reports she had only been drinking the recommended 1000ml(mostly water).  ED eval showed Sodium of 123, BNP -70302 up from 5000+ last admit, and new transaminitis. CXR with cardiomegaly with central pulm. Vasc. Congestion not changed from prior.  Hosp. Med is consulted to admit for further eval and management.    Subjective & 24hr Events:   Patient seen and evaluated.  Feeling better  Feels that swelling has improved  Unfortunately sodium has trended down  Echo also shows worsening EF of 10-15 %-she states that she was taken off of her CHF medications secondary to low blood pressure; states that she was symptomatic at time with systolic blood pressure in the 80s and diastolics in the 40s      Assessment & Plan:     Principal Problem:    Hyponatremia with excess extracellular fluid volume  Continue Bumex   Continue 1000 mL fluid restriction   Daily weights   Strict I's and O's   Monitor renal function

## 2024-10-11 NOTE — CONSULTS
Comprehensive Nutrition Assessment    Type and Reason for Visit: Initial, Consult  General Nutrition Management/other reason (Hospitalists)    Nutrition Recommendations/Plan:   Meals and Snacks:  Diet: Liberalize remove sodium restriction  Oral Nutriton Supplement Therapy:   Medical food supplement therapy:  Initiate Gelatein (fortified gelatin) 80 calories, 20 grams protein per 4 ounce serving     Malnutrition Assessment:  Malnutrition Status: Insufficient data  Context: Chronic Illness  Findings of clinical characteristics of malnutrition:   Energy Intake:  Unable to assess (Reports bites of meals for months, mostly consuming fruits and vegetables)  Weight Loss:  Unable to assess     Body Fat Loss:  Unable to assess     Muscle Mass Loss:  Mild muscle mass loss Temples (temporalis), Hand (interosseous)  Fluid Accumulation:  Unable to assess     Strength:  Not Performed     Nutrition Assessment:  Nutrition History: Patient in bed with sister at bedside. She states that she has been trying to eat but is able to take a few bites of food at best. She states this has been ongoing for about 2 months. She figured out it was a lack of taste last admission when her sodium improved. She stated instantly was able to taste things normally. When her tastes are off she specifically is turned off by meats. She is able to tolerate a little bit of tuna salad and a little bit of chicken salad.      Do You Have Any Cultural, Orthodoxy, or Ethnic Food Preferences?: No   Weight History: 10/16/23 205# (cards). Per IM office 5/6/24 210#, 5/30/24  209#, 8/29/24 207#  Nutrition Background:       PMH significant for CHF, HLD, MVR, mood disorder. She was recently admitted for hyponatremia and diagnosed with new SIADH. She presented back 7 days later with worsening fatigue and peripheral edema. She was admitted with hyponatremia and transaminitis.   Nutrition Interval:  Patient sitting up in bed with visitor. She reports 3 bites of a  britney today. She c/o no tastes. Explained prolonged inadequate protein/energy intake and effects on muscle wasting, serum levels and swelling in extremities. She voiced understanding. Recommended her first bites of each meal need to be from the protein serving. Discussed nutrition supplement limited with fluid restriction. She was receptive to Gelatine.     Current Nutrition Therapies:  ADULT DIET; Regular; Low Fat/Low Chol/High Fiber/JARED    Current Intake:   Average Meal Intake: 1-25%        Anthropometric Measures:  Height: 157.5 cm (5' 2\")  Current Body Wt: 104.8 kg (231 lb 0.7 oz) (10/11), Weight source: Standing Scale  BMI: 42.2,  (Currently skewed due to fluid, UBW with BMIO 37)  Admission Body Weight: 104.9 kg (231 lb 4.2 oz) (10/10 standing scale)  Ideal Body Weight (Kg) (Calculated): 50 kg (110 lbs), 210 %  BMI Category  (Currently skewed due to fluid, UBW with BMIO 37)  Estimated Daily Nutrient Needs:  Energy (kcal/day): 5974-0286 (25-30 kcal/kg) (Kcal/kg Weight used: 50 kg Ideal  Protein (g/day): 65-75 (1.3-1.5 g/kg) Weight Used: (Ideal) 50 kg  Fluid (ml/day): 1000 ml per MD FR ( )    Nutrition Diagnosis:   Inadequate oral intake related to altered taste perception as evidenced by  (reported barrier to PO above, recall)  Nutrition Interventions:   Food and/or Nutrient Delivery: Continue Current Diet, Start Oral Nutrition Supplement     Coordination of Nutrition Care: Continue to monitor while inpatient      Goals:      Active Goal: PO intake 50% or greater, by next RD assessment       Nutrition Monitoring and Evaluation:      Food/Nutrient Intake Outcomes: Food and Nutrient Intake, Supplement Intake  Physical Signs/Symptoms Outcomes: Biochemical Data, Fluid Status or Edema, Meal Time Behavior, Weight    Discharge Planning:    Too soon to determine    ADELA NUNEZ, IGNACIA

## 2024-10-11 NOTE — PROGRESS NOTES
CHF teaching provide to patient in both verbal and written format.    Discussed the important of weighing daily and recording the weight daily in monthly calendar provide.  Instructed pt to carry the calendar to Cardiologist visits.    Cardiac Diet reviewed.  (Salt/fluid restrictions)    Reinforce when to call Cardiology STAT, if any of following occurs:  Shortness of Breath: with or without activity   Generalized weakness  Edema  Weight gain >=2 lbs or more a day or >=5 lbs or more per week     Instructed pt to call the Heart Failure Navigator with any questions.  Phone number provided for HF Navigator.     Pt verbalized understanding understanding and denied any questions

## 2024-10-12 LAB
ANION GAP SERPL CALC-SCNC: 12 MMOL/L (ref 9–18)
BUN SERPL-MCNC: 30 MG/DL (ref 8–23)
CALCIUM SERPL-MCNC: 9.2 MG/DL (ref 8.8–10.2)
CHLORIDE SERPL-SCNC: 94 MMOL/L (ref 98–107)
CO2 SERPL-SCNC: 26 MMOL/L (ref 20–28)
CREAT SERPL-MCNC: 0.98 MG/DL (ref 0.6–1.1)
GLUCOSE SERPL-MCNC: 98 MG/DL (ref 70–99)
MAGNESIUM SERPL-MCNC: 2 MG/DL (ref 1.8–2.4)
POTASSIUM SERPL-SCNC: 3.3 MMOL/L (ref 3.5–5.1)
SODIUM SERPL-SCNC: 133 MMOL/L (ref 136–145)
SODIUM SERPL-SCNC: 136 MMOL/L (ref 136–145)

## 2024-10-12 PROCEDURE — 2580000003 HC RX 258: Performed by: INTERNAL MEDICINE

## 2024-10-12 PROCEDURE — 6370000000 HC RX 637 (ALT 250 FOR IP): Performed by: CASE MANAGER/CARE COORDINATOR

## 2024-10-12 PROCEDURE — 83735 ASSAY OF MAGNESIUM: CPT

## 2024-10-12 PROCEDURE — 36415 COLL VENOUS BLD VENIPUNCTURE: CPT

## 2024-10-12 PROCEDURE — 1100000003 HC PRIVATE W/ TELEMETRY

## 2024-10-12 PROCEDURE — 6360000002 HC RX W HCPCS: Performed by: INTERNAL MEDICINE

## 2024-10-12 PROCEDURE — 84295 ASSAY OF SERUM SODIUM: CPT

## 2024-10-12 PROCEDURE — 2580000003 HC RX 258: Performed by: FAMILY MEDICINE

## 2024-10-12 PROCEDURE — 99232 SBSQ HOSP IP/OBS MODERATE 35: CPT | Performed by: INTERNAL MEDICINE

## 2024-10-12 PROCEDURE — 80048 BASIC METABOLIC PNL TOTAL CA: CPT

## 2024-10-12 PROCEDURE — 6370000000 HC RX 637 (ALT 250 FOR IP)

## 2024-10-12 PROCEDURE — 6370000000 HC RX 637 (ALT 250 FOR IP): Performed by: FAMILY MEDICINE

## 2024-10-12 PROCEDURE — 6370000000 HC RX 637 (ALT 250 FOR IP): Performed by: INTERNAL MEDICINE

## 2024-10-12 RX ORDER — DEXTROSE MONOHYDRATE 50 MG/ML
INJECTION, SOLUTION INTRAVENOUS CONTINUOUS
Status: ACTIVE | OUTPATIENT
Start: 2024-10-12 | End: 2024-10-12

## 2024-10-12 RX ORDER — POTASSIUM CHLORIDE 1500 MG/1
40 TABLET, EXTENDED RELEASE ORAL ONCE
Status: DISCONTINUED | OUTPATIENT
Start: 2024-10-12 | End: 2024-10-12

## 2024-10-12 RX ORDER — MIDODRINE HYDROCHLORIDE 5 MG/1
5 TABLET ORAL
Status: COMPLETED | OUTPATIENT
Start: 2024-10-12 | End: 2024-10-12

## 2024-10-12 RX ORDER — TRAMADOL HYDROCHLORIDE 50 MG/1
50 TABLET ORAL EVERY 6 HOURS PRN
Status: DISCONTINUED | OUTPATIENT
Start: 2024-10-12 | End: 2024-10-13

## 2024-10-12 RX ADMIN — ACETAMINOPHEN 650 MG: 325 TABLET ORAL at 21:55

## 2024-10-12 RX ADMIN — FAMOTIDINE 20 MG: 20 TABLET, FILM COATED ORAL at 09:13

## 2024-10-12 RX ADMIN — ENOXAPARIN SODIUM 30 MG: 100 INJECTION SUBCUTANEOUS at 09:18

## 2024-10-12 RX ADMIN — ASPIRIN 81 MG: 81 TABLET, COATED ORAL at 09:13

## 2024-10-12 RX ADMIN — DEXTROSE MONOHYDRATE: 50 INJECTION, SOLUTION INTRAVENOUS at 05:01

## 2024-10-12 RX ADMIN — FAMOTIDINE 20 MG: 20 TABLET, FILM COATED ORAL at 21:51

## 2024-10-12 RX ADMIN — MIDODRINE HYDROCHLORIDE 5 MG: 5 TABLET ORAL at 21:51

## 2024-10-12 RX ADMIN — SODIUM CHLORIDE, PRESERVATIVE FREE 10 ML: 5 INJECTION INTRAVENOUS at 21:51

## 2024-10-12 RX ADMIN — EMPAGLIFLOZIN 10 MG: 10 TABLET, FILM COATED ORAL at 09:14

## 2024-10-12 RX ADMIN — ENOXAPARIN SODIUM 30 MG: 100 INJECTION SUBCUTANEOUS at 21:51

## 2024-10-12 RX ADMIN — SODIUM CHLORIDE, PRESERVATIVE FREE 10 ML: 5 INJECTION INTRAVENOUS at 09:19

## 2024-10-12 RX ADMIN — METOPROLOL SUCCINATE 25 MG: 25 TABLET, EXTENDED RELEASE ORAL at 09:18

## 2024-10-12 RX ADMIN — MAGNESIUM GLUCONATE 500 MG ORAL TABLET 200 MG: 500 TABLET ORAL at 09:13

## 2024-10-12 RX ADMIN — POTASSIUM CHLORIDE 40 MEQ: 1500 TABLET, EXTENDED RELEASE ORAL at 05:19

## 2024-10-12 RX ADMIN — CETIRIZINE HYDROCHLORIDE 10 MG: 10 TABLET, FILM COATED ORAL at 09:13

## 2024-10-12 RX ADMIN — ACETAMINOPHEN 650 MG: 325 TABLET ORAL at 16:20

## 2024-10-12 RX ADMIN — VITAMIN D, TAB 1000IU (100/BT) 1000 UNITS: 25 TAB at 09:14

## 2024-10-12 RX ADMIN — BUMETANIDE 1 MG: 0.25 INJECTION INTRAMUSCULAR; INTRAVENOUS at 09:18

## 2024-10-12 RX ADMIN — TRAMADOL HYDROCHLORIDE 50 MG: 50 TABLET ORAL at 23:10

## 2024-10-12 RX ADMIN — SACUBITRIL AND VALSARTAN 1 TABLET: 24; 26 TABLET, FILM COATED ORAL at 09:14

## 2024-10-12 ASSESSMENT — PAIN SCALES - GENERAL
PAINLEVEL_OUTOF10: 5
PAINLEVEL_OUTOF10: 3
PAINLEVEL_OUTOF10: 6
PAINLEVEL_OUTOF10: 1

## 2024-10-12 ASSESSMENT — PAIN DESCRIPTION - LOCATION
LOCATION: LEG

## 2024-10-12 ASSESSMENT — PAIN DESCRIPTION - DESCRIPTORS: DESCRIPTORS: THROBBING

## 2024-10-12 ASSESSMENT — PAIN DESCRIPTION - ORIENTATION: ORIENTATION: RIGHT;LEFT

## 2024-10-12 NOTE — PROGRESS NOTES
MD De Leon advised to call if sodium changes more than 2 points from previous value. Latest sodium check up to 130 from 124. Spoke with Dina Becker NP, NP ordered d5w 25mL/hr for next 4 hrs, and recheck sodium as scheduled around 0430.     Update 0440: Pt sodium still increasing, up to 133 from 130. Spoke with Rosita NP, NP ordered d5w 100ml/hr for 4 hours and recheck sodium as scheduled around 10am. NP also advised to remove pt's 1000ml fluid restriction for right now. Patient updated on plan of care, voiced understanding.

## 2024-10-12 NOTE — PLAN OF CARE
Problem: Chronic Conditions and Co-morbidities  Goal: Patient's chronic conditions and co-morbidity symptoms are monitored and maintained or improved  Outcome: Progressing     Problem: Discharge Planning  Goal: Discharge to home or other facility with appropriate resources  Outcome: Progressing  Flowsheets (Taken 10/12/2024 0204)  Discharge to home or other facility with appropriate resources: Identify barriers to discharge with patient and caregiver     Problem: Safety - Adult  Goal: Free from fall injury  Outcome: Progressing     Problem: Pain  Goal: Verbalizes/displays adequate comfort level or baseline comfort level  Outcome: Progressing     Problem: Skin/Tissue Integrity  Goal: Absence of new skin breakdown  Description: 1.  Monitor for areas of redness and/or skin breakdown  2.  Assess vascular access sites hourly  3.  Every 4-6 hours minimum:  Change oxygen saturation probe site  4.  Every 4-6 hours:  If on nasal continuous positive airway pressure, respiratory therapy assess nares and determine need for appliance change or resting period.  Outcome: Progressing

## 2024-10-12 NOTE — PLAN OF CARE
Problem: Chronic Conditions and Co-morbidities  Goal: Patient's chronic conditions and co-morbidity symptoms are monitored and maintained or improved  10/11/2024 2247 by Marylu Hines RN  Outcome: Progressing  Flowsheets (Taken 10/11/2024 1943)  Care Plan - Patient's Chronic Conditions and Co-Morbidity Symptoms are Monitored and Maintained or Improved: Monitor and assess patient's chronic conditions and comorbid symptoms for stability, deterioration, or improvement  10/11/2024 1101 by El Cobian RN  Outcome: Progressing     Problem: Discharge Planning  Goal: Discharge to home or other facility with appropriate resources  10/11/2024 2247 by aMrylu Hines RN  Outcome: Progressing  Flowsheets (Taken 10/11/2024 1943)  Discharge to home or other facility with appropriate resources: Identify barriers to discharge with patient and caregiver  10/11/2024 1101 by El Cobian RN  Outcome: Progressing  Flowsheets (Taken 10/11/2024 0703)  Discharge to home or other facility with appropriate resources: Identify barriers to discharge with patient and caregiver     Problem: Safety - Adult  Goal: Free from fall injury  10/11/2024 2247 by Marylu iHnes RN  Outcome: Progressing  10/11/2024 1101 by El Cobian RN  Outcome: Progressing     Problem: Pain  Goal: Verbalizes/displays adequate comfort level or baseline comfort level  10/11/2024 2247 by Marylu Hines RN  Outcome: Progressing  Flowsheets (Taken 10/11/2024 1943)  Verbalizes/displays adequate comfort level or baseline comfort level:   Encourage patient to monitor pain and request assistance   Assess pain using appropriate pain scale   Administer analgesics based on type and severity of pain and evaluate response   Implement non-pharmacological measures as appropriate and evaluate response  10/11/2024 1101 by El Cobian, RN  Outcome: Progressing  Flowsheets (Taken 10/11/2024 0655)  Verbalizes/displays adequate comfort level or baseline comfort

## 2024-10-12 NOTE — PROGRESS NOTES
Av.8 °F (36.6 °C), Min:97.3 °F (36.3 °C), Max:98.1 °F (36.7 °C)      10/12 0701 - 10/12 1900  In: -   Out: 700 [Urine:700]  10/10 1901 - 10/12 0700  In: 925 [P.O.:825; I.V.:100]  Out: 6095 [Urine:6095]      BP (!) 94/56   Pulse 79   Temp 97.7 °F (36.5 °C) (Oral)   Resp 20   Ht 1.575 m (5' 2\")   Wt 100.2 kg (220 lb 14.4 oz)   SpO2 96%   BMI 40.40 kg/m²   Head: Normocephalic, without obvious abnormality  Lungs: clear to auscultation bilaterally  Heart: regular rate and rhythm  Abdomen: soft, non-tender.  Extremities: +++ edema    Data Review    Recent Results (from the past 48 hour(s))   Echo (TTE) complete (PRN contrast/bubble/strain/3D)    Collection Time: 10/10/24  3:55 PM   Result Value Ref Range    LV EDV A2C 289 mL    LV EDV A4C 300 mL    LV ESV A2C 260 mL    LV ESV A4C 260 mL    IVSd 0.6 0.6 - 0.9 cm    LVIDd 6.3 (A) 3.9 - 5.3 cm    LVIDs 5.9 cm    LVOT Diameter 1.8 cm    LVOT Mean Gradient 1 mmHg    LVOT VTI 8.9 cm    LVOT Peak Velocity 0.5 m/s    LVOT Peak Gradient 1 mmHg    LVPWd 0.7 0.6 - 0.9 cm    LV Ejection Fraction A2C 10 %    LV Ejection Fraction A4C 13 %    EF BP 12 (A) 55 - 100 %    LVOT Area 2.5 cm2    LVOT SV 22.6 ml    LA Minor Axis 5.7 cm    LA Major Axis 5.5 cm    LA Area 2C 24.7 cm2    LA Area 4C 23.8 cm2    LA Volume MOD A2C 90 (A) 22 - 52 mL    LA Volume MOD A4C 84 (A) 22 - 52 mL    LA Volume BP 88 (A) 22 - 52 mL    LA Diameter 4.5 cm    AV Mean Velocity 0.7 m/s    AV Mean Gradient 2 mmHg    AV Mean Gradient 2 mmHg    AV VTI 17.7 cm    AV Peak Velocity 1.0 m/s    AV Peak Gradient 4 mmHg    AV Area by VTI 1.3 cm2    AV Area by Peak Velocity 1.3 cm2    Aortic Root 2.9 cm    Ascending Aorta 2.9 cm    IVC Proxmal 2.6 cm    MV Mean Gradient 3 mmHg    MV VTI 27.9 cm    MV Mean Velocity 0.7 m/s    MV Max Velocity 1.3 m/s    MV Peak Gradient 7 mmHg    MV Area by VTI 0.8 cm2    PV AT 77.0 ms    PV Max Velocity 0.6 m/s    PV Peak Gradient 2 mmHg    RVIDd 3.4 cm    RV Basal Dimension 4.7 cm     RV Free Wall Peak S' 4.2 cm/s    TR Max Velocity 2.35 m/s    TR Peak Gradient 22 mmHg    Body Surface Area 1.99 m2    Fractional Shortening 2D 6 28 - 44 %    LV ESV Index A4C 128 mL/m2    LV EDV Index A4C 148 mL/m2    LV ESV Index A2C 128 mL/m2    LV EDV Index A2C 142 mL/m2    LVIDd Index 3.10 cm/m2    LVIDs Index 2.91 cm/m2    LV RWT Ratio 0.22     LV Mass 2D 157.8 67 - 162 g    LV Mass 2D Index 77.7 43 - 95 g/m2    LA Volume Index BP 43 (A) 16 - 34 ml/m2    LVOT Stroke Volume Index 11.2 mL/m2    LA Volume Index MOD A2C 44 (A) 16 - 34 ml/m2    LA Volume Index MOD A4C 41 (A) 16 - 34 ml/m2    LA Size Index 2.22 cm/m2    LA/AO Root Ratio 1.55     Ao Root Index 1.43 cm/m2    Ascending Aorta Index 1.43 cm/m2    AV Velocity Ratio 0.50     LVOT:AV VTI Index 0.50     CHRISTIANA/BSA VTI 0.6 cm2/m2    CHRISTIANA/BSA Peak Velocity 0.6 cm2/m2    MV:LVOT VTI Index 3.13     Est. RA Pressure 15 mmHg    RVSP 37 mmHg   Basic Metabolic Panel    Collection Time: 10/11/24  6:08 AM   Result Value Ref Range    Sodium 122 (L) 136 - 145 mmol/L    Potassium 4.5 3.5 - 5.1 mmol/L    Chloride 87 (L) 98 - 107 mmol/L    CO2 23 20 - 28 mmol/L    Anion Gap 12 9 - 18 mmol/L    Glucose 95 70 - 99 mg/dL    BUN 34 (H) 8 - 23 MG/DL    Creatinine 1.12 (H) 0.60 - 1.10 MG/DL    Est, Glom Filt Rate 56 (L) >60 ml/min/1.73m2    Calcium 9.4 8.8 - 10.2 MG/DL   Magnesium    Collection Time: 10/11/24  6:08 AM   Result Value Ref Range    Magnesium 2.2 1.8 - 2.4 mg/dL   Brain Natriuretic Peptide    Collection Time: 10/11/24  6:08 AM   Result Value Ref Range    NT Pro-BNP 18,331 (H) 0 - 125 PG/ML   Sodium    Collection Time: 10/11/24  4:05 PM   Result Value Ref Range    Sodium 124 (L) 136 - 145 mmol/L   Sodium    Collection Time: 10/11/24 10:21 PM   Result Value Ref Range    Sodium 130 (L) 136 - 145 mmol/L   Basic Metabolic Panel    Collection Time: 10/12/24  3:57 AM   Result Value Ref Range    Sodium 133 (L) 136 - 145 mmol/L    Potassium 3.3 (L) 3.5 - 5.1 mmol/L    Chloride

## 2024-10-12 NOTE — PROGRESS NOTES
[facility] CARDIOLOGY PROGRESS NOTE           10/12/2024 7:59 AM    Admit Date: 10/9/2024      Subjective:   Sitting in chair.Reports feeling better.    ROS:  GEN:  No fever or chills  Cardiovascular:  As noted above:no chest pain.  Pulmonary:  As noted above:no SOB.  Neuro:  No new focal motor or sensory loss    Objective:      Vitals:    10/11/24 1943 10/11/24 2246 10/12/24 0334 10/12/24 0729   BP: (!) 95/58 (!) 92/59 (!) 92/55 (!) 94/56   Pulse: 77 73 78 79   Resp: 18 20 18 20   Temp: 98.1 °F (36.7 °C) 97.9 °F (36.6 °C) 97.5 °F (36.4 °C) 97.7 °F (36.5 °C)   TempSrc: Oral Oral Oral Oral   SpO2: 97% 100% 98% 96%   Weight:   100.2 kg (220 lb 14.4 oz)    Height:           Physical Exam:  General-NAD  Neck- supple, no JVD  CV- regular rate and rhythm no MRG  Lung- clear bilaterally  Abd- soft, nontender, nondistended  Ext- 3-4 +  edema bilaterally.  Skin- warm and dry  Psychiatric:  Normal mood and affect.  Neurologic:  Alert and oriented X 3      Data Review:   Recent Labs     10/09/24  1511 10/10/24  0508 10/11/24  0608 10/11/24  1605 10/11/24  2221 10/12/24  0357   * 124* 122*   < > 130* 133*   K 4.5 4.3 4.5  --   --  3.3*   MG 2.2 2.1 2.2  --   --  2.0   BUN 35* 32* 34*  --   --  30*   WBC 6.9  --   --   --   --   --    HGB 13.7  --   --   --   --   --    HCT 39.7  --   --   --   --   --      --   --   --   --   --    LDL  --  115*  --   --   --   --    HDL  --  35*  --   --   --   --     < > = values in this interval not displayed.       TELEMETRY:  off telemetry at present.    Assessment/Plan:     Principal Problem:    Hyponatremia with excess extracellular fluid volume:Improved after Samsca.Appreciate Nephrology imput.  Active Problems:    Encounter for palliative care    Chronic systolic congestive heart failure (HCC):Attempt to continue Toprol,Jardiance,Entresto,and iv Bumex.Remains very edematous.Needs referral to advanced CHF clinic at discharge.    DCM (dilated  cardiomyopathy) (HCC)    H/O mitral valve repair:normal MV fx.    Hyponatremia:Improved with Samsca.Appreciate Nephrology's help.                JENISE WAGNER MD  10/12/2024 7:59 AM

## 2024-10-12 NOTE — PROGRESS NOTES
3 mmHg    MV VTI 27.9 cm    MV Mean Velocity 0.7 m/s    MV Max Velocity 1.3 m/s    MV Peak Gradient 7 mmHg    MV Area by VTI 0.8 cm2    PV AT 77.0 ms    PV Max Velocity 0.6 m/s    PV Peak Gradient 2 mmHg    RVIDd 3.4 cm    RV Basal Dimension 4.7 cm    RV Free Wall Peak S' 4.2 cm/s    TR Max Velocity 2.35 m/s    TR Peak Gradient 22 mmHg    Body Surface Area 1.99 m2    Fractional Shortening 2D 6 28 - 44 %    LV ESV Index A4C 128 mL/m2    LV EDV Index A4C 148 mL/m2    LV ESV Index A2C 128 mL/m2    LV EDV Index A2C 142 mL/m2    LVIDd Index 3.10 cm/m2    LVIDs Index 2.91 cm/m2    LV RWT Ratio 0.22     LV Mass 2D 157.8 67 - 162 g    LV Mass 2D Index 77.7 43 - 95 g/m2    LA Volume Index BP 43 (A) 16 - 34 ml/m2    LVOT Stroke Volume Index 11.2 mL/m2    LA Volume Index MOD A2C 44 (A) 16 - 34 ml/m2    LA Volume Index MOD A4C 41 (A) 16 - 34 ml/m2    LA Size Index 2.22 cm/m2    LA/AO Root Ratio 1.55     Ao Root Index 1.43 cm/m2    Ascending Aorta Index 1.43 cm/m2    AV Velocity Ratio 0.50     LVOT:AV VTI Index 0.50     CHRISTIANA/BSA VTI 0.6 cm2/m2    CHRISTIANA/BSA Peak Velocity 0.6 cm2/m2    MV:LVOT VTI Index 3.13     Est. RA Pressure 15 mmHg    RVSP 37 mmHg   Basic Metabolic Panel    Collection Time: 10/11/24  6:08 AM   Result Value Ref Range    Sodium 122 (L) 136 - 145 mmol/L    Potassium 4.5 3.5 - 5.1 mmol/L    Chloride 87 (L) 98 - 107 mmol/L    CO2 23 20 - 28 mmol/L    Anion Gap 12 9 - 18 mmol/L    Glucose 95 70 - 99 mg/dL    BUN 34 (H) 8 - 23 MG/DL    Creatinine 1.12 (H) 0.60 - 1.10 MG/DL    Est, Glom Filt Rate 56 (L) >60 ml/min/1.73m2    Calcium 9.4 8.8 - 10.2 MG/DL   Magnesium    Collection Time: 10/11/24  6:08 AM   Result Value Ref Range    Magnesium 2.2 1.8 - 2.4 mg/dL   Brain Natriuretic Peptide    Collection Time: 10/11/24  6:08 AM   Result Value Ref Range    NT Pro-BNP 18,331 (H) 0 - 125 PG/ML   Sodium    Collection Time: 10/11/24  4:05 PM   Result Value Ref Range    Sodium 124 (L) 136 - 145 mmol/L   Sodium    Collection Time:  10/11/24 10:21 PM   Result Value Ref Range    Sodium 130 (L) 136 - 145 mmol/L   Basic Metabolic Panel    Collection Time: 10/12/24  3:57 AM   Result Value Ref Range    Sodium 133 (L) 136 - 145 mmol/L    Potassium 3.3 (L) 3.5 - 5.1 mmol/L    Chloride 94 (L) 98 - 107 mmol/L    CO2 26 20 - 28 mmol/L    Anion Gap 12 9 - 18 mmol/L    Glucose 98 70 - 99 mg/dL    BUN 30 (H) 8 - 23 MG/DL    Creatinine 0.98 0.60 - 1.10 MG/DL    Est, Glom Filt Rate 65 >60 ml/min/1.73m2    Calcium 9.2 8.8 - 10.2 MG/DL   Magnesium    Collection Time: 10/12/24  3:57 AM   Result Value Ref Range    Magnesium 2.0 1.8 - 2.4 mg/dL       No results for input(s): \"COVID19\" in the last 72 hours.    Current Meds:  Current Facility-Administered Medications   Medication Dose Route Frequency    dextrose 5 % solution   IntraVENous Continuous    empagliflozin (JARDIANCE) tablet 10 mg  10 mg Oral Daily    sacubitril-valsartan (ENTRESTO) 24-26 MG per tablet 1 tablet  1 tablet Oral BID    bumetanide (BUMEX) injection 1 mg  1 mg IntraVENous BID    enoxaparin Sodium (LOVENOX) injection 30 mg  30 mg SubCUTAneous BID    aspirin EC tablet 81 mg  81 mg Oral Daily    cetirizine (ZYRTEC) tablet 10 mg  10 mg Oral Daily    magnesium oxide (MAG-OX) tablet 200 mg  200 mg Oral Daily    metoprolol succinate (TOPROL XL) extended release tablet 25 mg  25 mg Oral Daily    Vitamin D (CHOLECALCIFEROL) tablet 1,000 Units  1,000 Units Oral Daily    zolpidem (AMBIEN) tablet 5 mg  5 mg Oral Nightly PRN    sodium chloride flush 0.9 % injection 5-40 mL  5-40 mL IntraVENous 2 times per day    sodium chloride flush 0.9 % injection 5-40 mL  5-40 mL IntraVENous PRN    ondansetron (ZOFRAN-ODT) disintegrating tablet 4 mg  4 mg Oral Q8H PRN    Or    ondansetron (ZOFRAN) injection 4 mg  4 mg IntraVENous Q6H PRN    polyethylene glycol (GLYCOLAX) packet 17 g  17 g Oral Daily PRN    acetaminophen (TYLENOL) tablet 650 mg  650 mg Oral Q6H PRN    Or    acetaminophen (TYLENOL) suppository 650 mg  650

## 2024-10-13 LAB
ANION GAP SERPL CALC-SCNC: 10 MMOL/L (ref 9–18)
BUN SERPL-MCNC: 26 MG/DL (ref 8–23)
CALCIUM SERPL-MCNC: 9.4 MG/DL (ref 8.8–10.2)
CHLORIDE SERPL-SCNC: 97 MMOL/L (ref 98–107)
CO2 SERPL-SCNC: 30 MMOL/L (ref 20–28)
CREAT SERPL-MCNC: 0.92 MG/DL (ref 0.6–1.1)
GLUCOSE SERPL-MCNC: 84 MG/DL (ref 70–99)
MAGNESIUM SERPL-MCNC: 2.2 MG/DL (ref 1.8–2.4)
NT PRO BNP: 8889 PG/ML (ref 0–125)
POTASSIUM SERPL-SCNC: 3.3 MMOL/L (ref 3.5–5.1)
SODIUM SERPL-SCNC: 135 MMOL/L (ref 136–145)
SODIUM SERPL-SCNC: 136 MMOL/L (ref 136–145)
SODIUM SERPL-SCNC: 136 MMOL/L (ref 136–145)
SODIUM SERPL-SCNC: 137 MMOL/L (ref 136–145)

## 2024-10-13 PROCEDURE — 83880 ASSAY OF NATRIURETIC PEPTIDE: CPT

## 2024-10-13 PROCEDURE — 6360000002 HC RX W HCPCS: Performed by: INTERNAL MEDICINE

## 2024-10-13 PROCEDURE — 99232 SBSQ HOSP IP/OBS MODERATE 35: CPT | Performed by: INTERNAL MEDICINE

## 2024-10-13 PROCEDURE — 84295 ASSAY OF SERUM SODIUM: CPT

## 2024-10-13 PROCEDURE — 6370000000 HC RX 637 (ALT 250 FOR IP): Performed by: FAMILY MEDICINE

## 2024-10-13 PROCEDURE — 80048 BASIC METABOLIC PNL TOTAL CA: CPT

## 2024-10-13 PROCEDURE — 6370000000 HC RX 637 (ALT 250 FOR IP): Performed by: INTERNAL MEDICINE

## 2024-10-13 PROCEDURE — 1100000003 HC PRIVATE W/ TELEMETRY

## 2024-10-13 PROCEDURE — 83735 ASSAY OF MAGNESIUM: CPT

## 2024-10-13 PROCEDURE — 36415 COLL VENOUS BLD VENIPUNCTURE: CPT

## 2024-10-13 PROCEDURE — 2580000003 HC RX 258: Performed by: FAMILY MEDICINE

## 2024-10-13 PROCEDURE — 2400000000

## 2024-10-13 RX ORDER — TRAMADOL HYDROCHLORIDE 50 MG/1
25 TABLET ORAL EVERY 6 HOURS PRN
Status: DISCONTINUED | OUTPATIENT
Start: 2024-10-13 | End: 2024-10-25 | Stop reason: HOSPADM

## 2024-10-13 RX ORDER — SENNA AND DOCUSATE SODIUM 50; 8.6 MG/1; MG/1
1 TABLET, FILM COATED ORAL 2 TIMES DAILY
Status: DISCONTINUED | OUTPATIENT
Start: 2024-10-13 | End: 2024-10-25 | Stop reason: HOSPADM

## 2024-10-13 RX ADMIN — CETIRIZINE HYDROCHLORIDE 10 MG: 10 TABLET, FILM COATED ORAL at 10:34

## 2024-10-13 RX ADMIN — POTASSIUM CHLORIDE 40 MEQ: 1500 TABLET, EXTENDED RELEASE ORAL at 10:42

## 2024-10-13 RX ADMIN — FAMOTIDINE 20 MG: 20 TABLET, FILM COATED ORAL at 10:34

## 2024-10-13 RX ADMIN — ENOXAPARIN SODIUM 30 MG: 100 INJECTION SUBCUTANEOUS at 10:35

## 2024-10-13 RX ADMIN — FAMOTIDINE 20 MG: 20 TABLET, FILM COATED ORAL at 20:17

## 2024-10-13 RX ADMIN — MAGNESIUM GLUCONATE 500 MG ORAL TABLET 200 MG: 500 TABLET ORAL at 10:34

## 2024-10-13 RX ADMIN — DOCUSATE SODIUM 50 MG AND SENNOSIDES 8.6 MG 1 TABLET: 8.6; 5 TABLET, FILM COATED ORAL at 20:17

## 2024-10-13 RX ADMIN — ENOXAPARIN SODIUM 30 MG: 100 INJECTION SUBCUTANEOUS at 21:38

## 2024-10-13 RX ADMIN — BUMETANIDE 1 MG: 0.25 INJECTION INTRAMUSCULAR; INTRAVENOUS at 17:43

## 2024-10-13 RX ADMIN — ASPIRIN 81 MG: 81 TABLET, COATED ORAL at 10:34

## 2024-10-13 RX ADMIN — SODIUM CHLORIDE, PRESERVATIVE FREE 10 ML: 5 INJECTION INTRAVENOUS at 20:17

## 2024-10-13 RX ADMIN — SACUBITRIL AND VALSARTAN 1 TABLET: 24; 26 TABLET, FILM COATED ORAL at 20:17

## 2024-10-13 RX ADMIN — VITAMIN D, TAB 1000IU (100/BT) 1000 UNITS: 25 TAB at 10:34

## 2024-10-13 ASSESSMENT — PAIN SCALES - GENERAL: PAINLEVEL_OUTOF10: 0

## 2024-10-13 NOTE — PROGRESS NOTES
Pt BP 72/54, rechecked 81/49, asymptomatic. Radha Mcphersons NP notified of findings, NP to bedside. Order for 1x dose of midodrine placed per MAR, advised to recheck BP again an hour after medication. Patient updated on plan of care, voiced understanding.     BP recheck 2255: 81/54, MAP 63. Cardiology aware and okay with this for now. Pt remains asymptomatic.

## 2024-10-13 NOTE — PLAN OF CARE
Problem: Chronic Conditions and Co-morbidities  Goal: Patient's chronic conditions and co-morbidity symptoms are monitored and maintained or improved  10/12/2024 2211 by Marylu Hines RN  Outcome: Progressing  Flowsheets (Taken 10/12/2024 1937)  Care Plan - Patient's Chronic Conditions and Co-Morbidity Symptoms are Monitored and Maintained or Improved: Monitor and assess patient's chronic conditions and comorbid symptoms for stability, deterioration, or improvement  10/12/2024 1654 by El Cobian RN  Outcome: Progressing     Problem: Discharge Planning  Goal: Discharge to home or other facility with appropriate resources  10/12/2024 2211 by Marylu Hines RN  Outcome: Progressing  Flowsheets (Taken 10/12/2024 1937)  Discharge to home or other facility with appropriate resources: Identify barriers to discharge with patient and caregiver  10/12/2024 1654 by El Cobian RN  Outcome: Progressing  Flowsheets (Taken 10/12/2024 0729)  Discharge to home or other facility with appropriate resources: Identify barriers to discharge with patient and caregiver     Problem: Safety - Adult  Goal: Free from fall injury  10/12/2024 2211 by Marylu Hines RN  Outcome: Progressing  10/12/2024 1654 by El Cobian RN  Outcome: Progressing     Problem: Pain  Goal: Verbalizes/displays adequate comfort level or baseline comfort level  10/12/2024 2211 by Marylu Hines RN  Outcome: Progressing  10/12/2024 1654 by El Cobian RN  Outcome: Progressing     Problem: Skin/Tissue Integrity  Goal: Absence of new skin breakdown  Description: 1.  Monitor for areas of redness and/or skin breakdown  2.  Assess vascular access sites hourly  3.  Every 4-6 hours minimum:  Change oxygen saturation probe site  4.  Every 4-6 hours:  If on nasal continuous positive airway pressure, respiratory therapy assess nares and determine need for appliance change or resting period.  10/12/2024 2211 by Marylu Hines RN  Outcome:

## 2024-10-13 NOTE — PROGRESS NOTES
Hospitalist Progress Note   Admit Date:  10/9/2024  6:47 PM   Name:  Kely Quach   Age:  64 y.o.  Sex:  female  :  1960   MRN:  [id number]   Room:      Presenting/Chief Complaint: No chief complaint on file.     Reason(s) for Admission: Hyponatremia with excess extracellular fluid volume [E87.1]     Hospital Course:   As per prior documentation:  Kely Quach is a 64 y.o. female with medical history of CHF, HLD, mood disorder who presented with complaint of worsening weakness and fatigue and peripheral edema since discharge.  That admit was also for hyponatremia. 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 Entresto had been stopped prior also. She received 2 doses of tolvaptan and her sodium was 133 at discharge. She felt good for several days, but her peripheral edema \"blew up\", though she did not weigh herself, she lost her appetite, then the weakness and fatigue returned.No fever, no URI sx. Did fall twice prior to ED visit.  She reports she had only been drinking the recommended 1000ml(mostly water).  ED eval showed Sodium of 123, BNP -73102 up from 5000+ last admit, and new transaminitis. CXR with cardiomegaly with central pulm. Vasc. Congestion not changed from prior.  Hosp. Med is consulted to admit for further eval and management.    Subjective & 24hr Events:   Patient seen and evaluated.  No adverse overnight events were  Denies chest pain nausea vomiting fevers or chills  Did have to receive IV fluids 10/11/2024 secondary to sodium rising too quickly  Did get midodrine 5 mg last night secondary to hypotension  She did get tramadol last night secondary to leg pain  She is currently asymptomatic  Discussed with cardiology and as long as she is asymptomatic we can accept systolic blood pressures in the 80's  Fluid restriction now on hold  Tolvaptan now on hold  Sodium is appropriate      Assessment & Plan:     Principal  sacubitril-valsartan (ENTRESTO) 24-26 MG per tablet 1 tablet  1 tablet Oral BID    bumetanide (BUMEX) injection 1 mg  1 mg IntraVENous BID    enoxaparin Sodium (LOVENOX) injection 30 mg  30 mg SubCUTAneous BID    aspirin EC tablet 81 mg  81 mg Oral Daily    cetirizine (ZYRTEC) tablet 10 mg  10 mg Oral Daily    magnesium oxide (MAG-OX) tablet 200 mg  200 mg Oral Daily    metoprolol succinate (TOPROL XL) extended release tablet 25 mg  25 mg Oral Daily    Vitamin D (CHOLECALCIFEROL) tablet 1,000 Units  1,000 Units Oral Daily    zolpidem (AMBIEN) tablet 5 mg  5 mg Oral Nightly PRN    sodium chloride flush 0.9 % injection 5-40 mL  5-40 mL IntraVENous 2 times per day    sodium chloride flush 0.9 % injection 5-40 mL  5-40 mL IntraVENous PRN    ondansetron (ZOFRAN-ODT) disintegrating tablet 4 mg  4 mg Oral Q8H PRN    Or    ondansetron (ZOFRAN) injection 4 mg  4 mg IntraVENous Q6H PRN    polyethylene glycol (GLYCOLAX) packet 17 g  17 g Oral Daily PRN    acetaminophen (TYLENOL) tablet 650 mg  650 mg Oral Q6H PRN    Or    acetaminophen (TYLENOL) suppository 650 mg  650 mg Rectal Q6H PRN    potassium chloride (KLOR-CON M) extended release tablet 40 mEq  40 mEq Oral PRN    Or    potassium bicarb-citric acid (EFFER-K) effervescent tablet 40 mEq  40 mEq Oral PRN    Or    potassium chloride 10 mEq/100 mL IVPB (Peripheral Line)  10 mEq IntraVENous PRN    magnesium sulfate 2000 mg in 50 mL IVPB premix  2,000 mg IntraVENous PRN    famotidine (PEPCID) tablet 20 mg  20 mg Oral BID       Signed:  Shanique Leung MD    Part of this note may have been written by using a voice dictation software.  The note has been proof read but may still contain some grammatical/other typographical errors.

## 2024-10-13 NOTE — PROGRESS NOTES
Patient called RN to room with questions about medications and confusion about the plan with them, RN spoke with Dr. Leung, per MD as long as SBP 80's and patient remains asymptomatic give toprol and bumex, monitor BP and give entresto if BP remains unchanged. Also OK to give jardiance at any point.  Nursing misc order placed. Updated patient.

## 2024-10-13 NOTE — PROGRESS NOTES
Subjective:   Daily Progress Note: 10/13/2024 9:30 AM    No complaints, feels better    Comfortable  No CP No SOB  No Abd pain  MS -      Current Facility-Administered Medications   Medication Dose Route Frequency    traMADol (ULTRAM) tablet 50 mg  50 mg Oral Q6H PRN    empagliflozin (JARDIANCE) tablet 10 mg  10 mg Oral Daily    sacubitril-valsartan (ENTRESTO) 24-26 MG per tablet 1 tablet  1 tablet Oral BID    bumetanide (BUMEX) injection 1 mg  1 mg IntraVENous BID    enoxaparin Sodium (LOVENOX) injection 30 mg  30 mg SubCUTAneous BID    aspirin EC tablet 81 mg  81 mg Oral Daily    cetirizine (ZYRTEC) tablet 10 mg  10 mg Oral Daily    magnesium oxide (MAG-OX) tablet 200 mg  200 mg Oral Daily    metoprolol succinate (TOPROL XL) extended release tablet 25 mg  25 mg Oral Daily    Vitamin D (CHOLECALCIFEROL) tablet 1,000 Units  1,000 Units Oral Daily    zolpidem (AMBIEN) tablet 5 mg  5 mg Oral Nightly PRN    sodium chloride flush 0.9 % injection 5-40 mL  5-40 mL IntraVENous 2 times per day    sodium chloride flush 0.9 % injection 5-40 mL  5-40 mL IntraVENous PRN    ondansetron (ZOFRAN-ODT) disintegrating tablet 4 mg  4 mg Oral Q8H PRN    Or    ondansetron (ZOFRAN) injection 4 mg  4 mg IntraVENous Q6H PRN    polyethylene glycol (GLYCOLAX) packet 17 g  17 g Oral Daily PRN    acetaminophen (TYLENOL) tablet 650 mg  650 mg Oral Q6H PRN    Or    acetaminophen (TYLENOL) suppository 650 mg  650 mg Rectal Q6H PRN    potassium chloride (KLOR-CON M) extended release tablet 40 mEq  40 mEq Oral PRN    Or    potassium bicarb-citric acid (EFFER-K) effervescent tablet 40 mEq  40 mEq Oral PRN    Or    potassium chloride 10 mEq/100 mL IVPB (Peripheral Line)  10 mEq IntraVENous PRN    magnesium sulfate 2000 mg in 50 mL IVPB premix  2,000 mg IntraVENous PRN    famotidine (PEPCID) tablet 20 mg  20 mg Oral BID         Objective:     BP (!) 89/62   Pulse 78   Temp 97.8 °F (36.6 °C) (Oral)   Resp 18   Ht 1.575 m (5' 2\")   Wt 98.8 kg (217  7.5mg tomorrow and continue daily. Without it, I think she would be at higher risk of readmission for the same process.

## 2024-10-13 NOTE — PROGRESS NOTES
TRANSFER - IN REPORT:    Verbal report received from YULISA Gallegos on Kely Quach being received from [facility] for routine progression of patient care      Report consisted of patient’s Situation, Background, Assessment and Recommendations(SBAR).     Information from the following report(s) SBAR, Kardex, Intake/Output, MAR, and Recent Results was reviewed with the receiving nurse.    Opportunity for questions and clarification was provided.      Assessment completed upon patient’s arrival to unit and care assumed.     Oriented to room, bed low and locked, call light within reach.

## 2024-10-13 NOTE — PROGRESS NOTES
[facility] CARDIOLOGY PROGRESS NOTE           10/13/2024 8:19 AM    Admit Date: 10/9/2024      Subjective:   Feels better.Leg edema persists.    ROS:  GEN:  No fever or chills  Cardiovascular:  As noted above:no CP or palpitations.  Pulmonary:  As noted above:No SOB.  Neuro:  No new focal motor or sensory loss    Objective:      Vitals:    10/13/24 0120 10/13/24 0429 10/13/24 0515 10/13/24 0713   BP: (!) 85/56 (!) 86/67  (!) 89/62   Pulse: 77 75  78   Resp:  18  18   Temp:  97.8 °F (36.6 °C)  97.8 °F (36.6 °C)   TempSrc:  Oral  Oral   SpO2:  100%  99%   Weight:   98.8 kg (217 lb 13 oz)    Height:           Physical Exam:  General-NAD  Neck- supple, no JVD  CV- regular rate and rhythm no MRG  Lung- clear bilaterally  Abd- soft, nontender, nondistended  Ext- 3-4 +edema bilaterally.  Skin- warm and dry  Psychiatric:  Normal mood and affect.  Neurologic:  Alert and oriented X 3      Data Review:   Recent Labs     10/12/24  0357 10/12/24  1142 10/12/24  2209 10/13/24  0357   *   < > 136 137   K 3.3*  --   --  3.3*   MG 2.0  --   --  2.2   BUN 30*  --   --  26*    < > = values in this interval not displayed.       TELEMETRY:  NSR    Assessment/Plan:     Principal Problem:    Hyponatremia with excess extracellular fluid volume:Improved after single dose of Samsca.Nephrology following.  Active Problems:    Encounter for palliative care    Chronic systolic congestive heart failure (HCC):Chronic volume overload with 3-4 + lower extremity edema.Continue Bumex.Consider Entresto.Accept SBP in 80's as long as symptoms allow.BP will not tolerate BB or MRA at this time.Needs referrakl to advanced heart failure clinic at discharge.Poor longterm prognosis.    DCM (dilated cardiomyopathy) ([facility])    H/O mitral valve repair:Normal MV function.              JENISE WAGNER MD  10/13/2024 8:19 AM

## 2024-10-14 PROBLEM — Z98.890 H/O MITRAL VALVE REPAIR: Chronic | Status: ACTIVE | Noted: 2023-06-12

## 2024-10-14 PROBLEM — I42.0 DCM (DILATED CARDIOMYOPATHY) (HCC): Chronic | Status: ACTIVE | Noted: 2017-11-10

## 2024-10-14 PROBLEM — I50.23 ACUTE ON CHRONIC SYSTOLIC CONGESTIVE HEART FAILURE (HCC): Status: ACTIVE | Noted: 2017-11-29

## 2024-10-14 LAB
ANION GAP SERPL CALC-SCNC: 12 MMOL/L (ref 9–18)
BUN SERPL-MCNC: 20 MG/DL (ref 8–23)
CALCIUM SERPL-MCNC: 8.9 MG/DL (ref 8.8–10.2)
CHLORIDE SERPL-SCNC: 97 MMOL/L (ref 98–107)
CO2 SERPL-SCNC: 28 MMOL/L (ref 20–28)
CREAT SERPL-MCNC: 0.82 MG/DL (ref 0.6–1.1)
GLUCOSE SERPL-MCNC: 105 MG/DL (ref 70–99)
MAGNESIUM SERPL-MCNC: 2 MG/DL (ref 1.8–2.4)
POTASSIUM SERPL-SCNC: 3.6 MMOL/L (ref 3.5–5.1)
SODIUM SERPL-SCNC: 136 MMOL/L (ref 136–145)

## 2024-10-14 PROCEDURE — 6360000002 HC RX W HCPCS: Performed by: INTERNAL MEDICINE

## 2024-10-14 PROCEDURE — 83735 ASSAY OF MAGNESIUM: CPT

## 2024-10-14 PROCEDURE — 1100000003 HC PRIVATE W/ TELEMETRY

## 2024-10-14 PROCEDURE — 36415 COLL VENOUS BLD VENIPUNCTURE: CPT

## 2024-10-14 PROCEDURE — 99222 1ST HOSP IP/OBS MODERATE 55: CPT | Performed by: INTERNAL MEDICINE

## 2024-10-14 PROCEDURE — 6370000000 HC RX 637 (ALT 250 FOR IP): Performed by: NURSE PRACTITIONER

## 2024-10-14 PROCEDURE — 80048 BASIC METABOLIC PNL TOTAL CA: CPT

## 2024-10-14 PROCEDURE — 6370000000 HC RX 637 (ALT 250 FOR IP): Performed by: INTERNAL MEDICINE

## 2024-10-14 PROCEDURE — 2580000003 HC RX 258: Performed by: FAMILY MEDICINE

## 2024-10-14 PROCEDURE — 6370000000 HC RX 637 (ALT 250 FOR IP): Performed by: FAMILY MEDICINE

## 2024-10-14 PROCEDURE — 99497 ADVNCD CARE PLAN 30 MIN: CPT | Performed by: INTERNAL MEDICINE

## 2024-10-14 RX ORDER — ENOXAPARIN SODIUM 100 MG/ML
40 INJECTION SUBCUTANEOUS DAILY
Status: DISCONTINUED | OUTPATIENT
Start: 2024-10-15 | End: 2024-10-25 | Stop reason: HOSPADM

## 2024-10-14 RX ORDER — TOLVAPTAN 15 MG/1
7.5 TABLET ORAL DAILY
Status: DISCONTINUED | OUTPATIENT
Start: 2024-10-15 | End: 2024-10-15

## 2024-10-14 RX ADMIN — SODIUM CHLORIDE, PRESERVATIVE FREE 10 ML: 5 INJECTION INTRAVENOUS at 20:03

## 2024-10-14 RX ADMIN — SACUBITRIL AND VALSARTAN 1 TABLET: 24; 26 TABLET, FILM COATED ORAL at 10:32

## 2024-10-14 RX ADMIN — BUMETANIDE 1 MG: 0.25 INJECTION INTRAMUSCULAR; INTRAVENOUS at 07:58

## 2024-10-14 RX ADMIN — VITAMIN D, TAB 1000IU (100/BT) 1000 UNITS: 25 TAB at 07:58

## 2024-10-14 RX ADMIN — Medication 15 G: at 10:32

## 2024-10-14 RX ADMIN — ASPIRIN 81 MG: 81 TABLET, COATED ORAL at 07:58

## 2024-10-14 RX ADMIN — POLYETHYLENE GLYCOL 3350 17 G: 17 POWDER, FOR SOLUTION ORAL at 01:39

## 2024-10-14 RX ADMIN — METOPROLOL SUCCINATE 25 MG: 25 TABLET, EXTENDED RELEASE ORAL at 07:58

## 2024-10-14 RX ADMIN — TRAMADOL HYDROCHLORIDE 25 MG: 50 TABLET ORAL at 01:39

## 2024-10-14 RX ADMIN — ENOXAPARIN SODIUM 30 MG: 100 INJECTION SUBCUTANEOUS at 07:59

## 2024-10-14 RX ADMIN — EMPAGLIFLOZIN 10 MG: 10 TABLET, FILM COATED ORAL at 07:58

## 2024-10-14 RX ADMIN — CETIRIZINE HYDROCHLORIDE 10 MG: 10 TABLET, FILM COATED ORAL at 07:58

## 2024-10-14 RX ADMIN — FAMOTIDINE 20 MG: 20 TABLET, FILM COATED ORAL at 20:03

## 2024-10-14 RX ADMIN — MAGNESIUM GLUCONATE 500 MG ORAL TABLET 200 MG: 500 TABLET ORAL at 07:57

## 2024-10-14 RX ADMIN — SACUBITRIL AND VALSARTAN 1 TABLET: 24; 26 TABLET, FILM COATED ORAL at 22:27

## 2024-10-14 RX ADMIN — DOCUSATE SODIUM 50 MG AND SENNOSIDES 8.6 MG 1 TABLET: 8.6; 5 TABLET, FILM COATED ORAL at 07:58

## 2024-10-14 RX ADMIN — FAMOTIDINE 20 MG: 20 TABLET, FILM COATED ORAL at 07:58

## 2024-10-14 RX ADMIN — SODIUM CHLORIDE, PRESERVATIVE FREE 10 ML: 5 INJECTION INTRAVENOUS at 07:59

## 2024-10-14 RX ADMIN — DOCUSATE SODIUM 50 MG AND SENNOSIDES 8.6 MG 1 TABLET: 8.6; 5 TABLET, FILM COATED ORAL at 20:02

## 2024-10-14 RX ADMIN — BUMETANIDE 1 MG: 0.25 INJECTION INTRAMUSCULAR; INTRAVENOUS at 17:40

## 2024-10-14 ASSESSMENT — PAIN SCALES - GENERAL: PAINLEVEL_OUTOF10: 7

## 2024-10-14 ASSESSMENT — PAIN DESCRIPTION - ORIENTATION: ORIENTATION: LEFT;RIGHT

## 2024-10-14 ASSESSMENT — PAIN DESCRIPTION - LOCATION: LOCATION: LEG

## 2024-10-14 NOTE — CONSULTS
Patient: Kely Quach MRN: [id number]  SSN: [id number]    YOB: 1960  Age: 64 y.o.  Sex: female       Date of Request: 10/14/2024  Date of Consult:  10/14/2024  Reason for Consult:  goals of care and medical decision making  Requesting Physician: Dr. Leung     Assessment/Plan:     Principal Diagnosis:    Dyspnea  R06.00    Additional Diagnoses:   Debility, Unspecified  R53.81  Frailty  R54  Counseling, Encounter for Medical Advice  Z71.9  Encounter for Palliative Care  Z51.5    Palliative Performance Scale (PPS):       Medical Decision Making:   Reviewed and summarized labs and imaging from admission.  Pt out of bed to chair this am. Feels overall she is improving but is frustrated by confusion with medications.  I reviewed concerns given heart condition and issues with fluid balance.  Pt expressed her understanding.  She states she is a fighter and will keep going.  She has a  who would be decision maker should she ever be unable to make decisions.  She wishes to remain a FULL code at this time but would not want to be stuck on machines if not improving.  She plans to discuss this further with her .  Will sign off but remain available if needed    I spent greater than 25 minutes on advanced care planning.     Will discuss findings with members of the interdisciplinary team.      Thank you for this referral.         Subjective:     History obtained from:  Patient and Chart    Chief Complaint: dyspnea  History of Present Illness:  64 y.o. female with medical history of CHF, HLD, mood disorder who presented with complaint of worsening weakness and fatigue and peripheral edema since discharge.  That admit was also for hyponatremia. 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 Entresto had been stopped prior also. She received 2 doses of tolvaptan and her sodium was 133 at discharge. She felt good for several days, but her

## 2024-10-14 NOTE — PROGRESS NOTES
Kely Quach  Admission Date: 10/9/2024         [facility] Progress Note: 10/14/2024    Follow-up for: Hyponatremia     The patient's chart is reviewed and the patient is discussed with the staff.    Subjective:   Pt seen and examined in room reclining in chair ongoing LE pitting edema, reports GI loss symptoms better with improving appetite this AM    ROS:  Gen - no fever, no chills  CV - no chest pain  Lung - no shortness of breath, no cough  Abd - no tenderness, no nausea/vomiting  Ext - + edema    Current Facility-Administered Medications   Medication Dose Route Frequency    traMADol (ULTRAM) tablet 25 mg  25 mg Oral Q6H PRN    sennosides-docusate sodium (SENOKOT-S) 8.6-50 MG tablet 1 tablet  1 tablet Oral BID    empagliflozin (JARDIANCE) tablet 10 mg  10 mg Oral Daily    sacubitril-valsartan (ENTRESTO) 24-26 MG per tablet 1 tablet  1 tablet Oral BID    bumetanide (BUMEX) injection 1 mg  1 mg IntraVENous BID    enoxaparin Sodium (LOVENOX) injection 30 mg  30 mg SubCUTAneous BID    aspirin EC tablet 81 mg  81 mg Oral Daily    cetirizine (ZYRTEC) tablet 10 mg  10 mg Oral Daily    magnesium oxide (MAG-OX) tablet 200 mg  200 mg Oral Daily    metoprolol succinate (TOPROL XL) extended release tablet 25 mg  25 mg Oral Daily    Vitamin D (CHOLECALCIFEROL) tablet 1,000 Units  1,000 Units Oral Daily    zolpidem (AMBIEN) tablet 5 mg  5 mg Oral Nightly PRN    sodium chloride flush 0.9 % injection 5-40 mL  5-40 mL IntraVENous 2 times per day    sodium chloride flush 0.9 % injection 5-40 mL  5-40 mL IntraVENous PRN    ondansetron (ZOFRAN-ODT) disintegrating tablet 4 mg  4 mg Oral Q8H PRN    Or    ondansetron (ZOFRAN) injection 4 mg  4 mg IntraVENous Q6H PRN    polyethylene glycol (GLYCOLAX) packet 17 g  17 g Oral Daily PRN    acetaminophen (TYLENOL) tablet 650 mg  650 mg Oral Q6H PRN    Or    acetaminophen (TYLENOL) suppository 650 mg  650 mg Rectal Q6H PRN    potassium chloride (KLOR-CON M)

## 2024-10-14 NOTE — PROGRESS NOTES
Hospitalist Progress Note   Admit Date:  10/9/2024  6:47 PM   Name:  Kely Quach   Age:  64 y.o.  Sex:  female  :  1960   MRN:  [id number]   Room:  [facility]/    Presenting/Chief Complaint: No chief complaint on file.     Reason(s) for Admission: Hyponatremia with excess extracellular fluid volume [E87.1]     Hospital Day #5      Hospital Course:   Kely Quach is a 64 y.o. female with medical history of CHF, HLD, mood disorder, and chronic hyponatremia who presented to the ER with complaint of worsening weakness, fatigue, and peripheral edema since discharge from this facility on 10/2/24.  She was admitted  for sodium of 123.  Workup at the time was consistent with SIADH.  BP was borderline during the hospitalization, per my personal review of discharge summary, and so valsartan, metoprolol, and Aldactone were discontinued and midodrine 5 mg 3 times daily started.  Since her recent discharge she endorsed compliance with all prescribed medications and 1000 cc fluid restriction.    ED eval showed sodium of 123, BNP 22,384 (up from 5000+ last admit), and new transaminitis. CXR with cardiomegaly with central pulmonary vascular congestion not changed from prior.  Hospitalist service was requested to admit for further management.      Subjective & 24hr Events:   Patient seated on edge of recliner, resting comfortably.  She denies shortness of breath.  No significant change in bilateral lower extremity.  Endorses frequent voiding with ongoing IV diuretics.    Assessment & Plan:     Hyponatremia  Resolved.  Remains on tolvaptan daily.  Nephrology following.    Acute on chronic systolic congestive heart failure (EF 10-15%; 10/10/2024)/dilated cardiomyopathy/history of mitral valve repair  Fluid balance -10.5 L since admission.  Continue Bumex 1 mg twice daily.  Cardiology following.  Remains on ASA 81, Jardiance, metoprolol, Entresto.  Borderline to low BP precludes use of mineralocorticoid receptor

## 2024-10-14 NOTE — CARE COORDINATION
Pt transferred up from the 2nd flr. Was admitted on 10/9/24 for worsening weakness, fatigue and peripheral edema since last discharge on 10/2/24. Readmission Assessment completed. Is on RA. No anticipated discharge needs identified by CM. Will remain available.

## 2024-10-15 LAB
ALBUMIN SERPL-MCNC: 3.3 G/DL (ref 3.2–4.6)
ALBUMIN/GLOB SERPL: 1.1 (ref 1–1.9)
ALP SERPL-CCNC: 93 U/L (ref 35–104)
ALT SERPL-CCNC: 71 U/L (ref 8–45)
ANION GAP SERPL CALC-SCNC: 12 MMOL/L (ref 9–18)
AST SERPL-CCNC: 53 U/L (ref 15–37)
BILIRUB SERPL-MCNC: 1.5 MG/DL (ref 0–1.2)
BUN SERPL-MCNC: 22 MG/DL (ref 8–23)
CALCIUM SERPL-MCNC: 9 MG/DL (ref 8.8–10.2)
CHLORIDE SERPL-SCNC: 95 MMOL/L (ref 98–107)
CO2 SERPL-SCNC: 29 MMOL/L (ref 20–28)
CREAT SERPL-MCNC: 0.84 MG/DL (ref 0.6–1.1)
GLOBULIN SER CALC-MCNC: 2.9 G/DL (ref 2.3–3.5)
GLUCOSE SERPL-MCNC: 92 MG/DL (ref 70–99)
NT PRO BNP: 9690 PG/ML (ref 0–125)
POTASSIUM SERPL-SCNC: 3.8 MMOL/L (ref 3.5–5.1)
PROT SERPL-MCNC: 6.1 G/DL (ref 6.3–8.2)
SODIUM SERPL-SCNC: 136 MMOL/L (ref 136–145)

## 2024-10-15 PROCEDURE — 2580000003 HC RX 258: Performed by: FAMILY MEDICINE

## 2024-10-15 PROCEDURE — 6370000000 HC RX 637 (ALT 250 FOR IP): Performed by: INTERNAL MEDICINE

## 2024-10-15 PROCEDURE — 99232 SBSQ HOSP IP/OBS MODERATE 35: CPT | Performed by: INTERNAL MEDICINE

## 2024-10-15 PROCEDURE — 6370000000 HC RX 637 (ALT 250 FOR IP): Performed by: FAMILY MEDICINE

## 2024-10-15 PROCEDURE — 6360000002 HC RX W HCPCS: Performed by: INTERNAL MEDICINE

## 2024-10-15 PROCEDURE — 80053 COMPREHEN METABOLIC PANEL: CPT

## 2024-10-15 PROCEDURE — 83880 ASSAY OF NATRIURETIC PEPTIDE: CPT

## 2024-10-15 PROCEDURE — 36415 COLL VENOUS BLD VENIPUNCTURE: CPT

## 2024-10-15 PROCEDURE — 97165 OT EVAL LOW COMPLEX 30 MIN: CPT

## 2024-10-15 PROCEDURE — 97161 PT EVAL LOW COMPLEX 20 MIN: CPT

## 2024-10-15 PROCEDURE — 97530 THERAPEUTIC ACTIVITIES: CPT

## 2024-10-15 PROCEDURE — 6370000000 HC RX 637 (ALT 250 FOR IP): Performed by: NURSE PRACTITIONER

## 2024-10-15 PROCEDURE — 1100000003 HC PRIVATE W/ TELEMETRY

## 2024-10-15 PROCEDURE — 97535 SELF CARE MNGMENT TRAINING: CPT

## 2024-10-15 PROCEDURE — 6360000002 HC RX W HCPCS: Performed by: FAMILY MEDICINE

## 2024-10-15 RX ORDER — BUMETANIDE 0.25 MG/ML
1 INJECTION, SOLUTION INTRAMUSCULAR; INTRAVENOUS 3 TIMES DAILY
Status: DISCONTINUED | OUTPATIENT
Start: 2024-10-15 | End: 2024-10-16

## 2024-10-15 RX ADMIN — DOCUSATE SODIUM 50 MG AND SENNOSIDES 8.6 MG 1 TABLET: 8.6; 5 TABLET, FILM COATED ORAL at 09:02

## 2024-10-15 RX ADMIN — FAMOTIDINE 20 MG: 20 TABLET, FILM COATED ORAL at 09:02

## 2024-10-15 RX ADMIN — SODIUM CHLORIDE, PRESERVATIVE FREE 5 ML: 5 INJECTION INTRAVENOUS at 20:57

## 2024-10-15 RX ADMIN — ACETAMINOPHEN 650 MG: 325 TABLET ORAL at 17:37

## 2024-10-15 RX ADMIN — TRAMADOL HYDROCHLORIDE 25 MG: 50 TABLET ORAL at 23:37

## 2024-10-15 RX ADMIN — ENOXAPARIN SODIUM 40 MG: 100 INJECTION SUBCUTANEOUS at 09:03

## 2024-10-15 RX ADMIN — VITAMIN D, TAB 1000IU (100/BT) 1000 UNITS: 25 TAB at 09:02

## 2024-10-15 RX ADMIN — SACUBITRIL AND VALSARTAN 1 TABLET: 24; 26 TABLET, FILM COATED ORAL at 11:40

## 2024-10-15 RX ADMIN — SODIUM CHLORIDE, PRESERVATIVE FREE 10 ML: 5 INJECTION INTRAVENOUS at 09:05

## 2024-10-15 RX ADMIN — METOPROLOL SUCCINATE 25 MG: 25 TABLET, EXTENDED RELEASE ORAL at 09:02

## 2024-10-15 RX ADMIN — SACUBITRIL AND VALSARTAN 1 TABLET: 24; 26 TABLET, FILM COATED ORAL at 23:38

## 2024-10-15 RX ADMIN — FAMOTIDINE 20 MG: 20 TABLET, FILM COATED ORAL at 21:12

## 2024-10-15 RX ADMIN — ASPIRIN 81 MG: 81 TABLET, COATED ORAL at 09:02

## 2024-10-15 RX ADMIN — ACETAMINOPHEN 650 MG: 325 TABLET ORAL at 04:05

## 2024-10-15 RX ADMIN — BUMETANIDE 1 MG: 0.25 INJECTION INTRAMUSCULAR; INTRAVENOUS at 17:37

## 2024-10-15 RX ADMIN — BUMETANIDE 1 MG: 0.25 INJECTION INTRAMUSCULAR; INTRAVENOUS at 09:03

## 2024-10-15 RX ADMIN — MAGNESIUM GLUCONATE 500 MG ORAL TABLET 200 MG: 500 TABLET ORAL at 09:02

## 2024-10-15 RX ADMIN — CETIRIZINE HYDROCHLORIDE 10 MG: 10 TABLET, FILM COATED ORAL at 09:02

## 2024-10-15 RX ADMIN — Medication 15 G: at 09:03

## 2024-10-15 RX ADMIN — EMPAGLIFLOZIN 10 MG: 10 TABLET, FILM COATED ORAL at 09:02

## 2024-10-15 RX ADMIN — BUMETANIDE 1 MG: 0.25 INJECTION INTRAMUSCULAR; INTRAVENOUS at 14:20

## 2024-10-15 ASSESSMENT — PAIN DESCRIPTION - ORIENTATION
ORIENTATION: RIGHT;LEFT
ORIENTATION: RIGHT;LEFT

## 2024-10-15 ASSESSMENT — PAIN DESCRIPTION - LOCATION
LOCATION: LEG
LOCATION: FOOT
LOCATION: BACK

## 2024-10-15 ASSESSMENT — PAIN SCALES - GENERAL
PAINLEVEL_OUTOF10: 3
PAINLEVEL_OUTOF10: 8
PAINLEVEL_OUTOF10: 4
PAINLEVEL_OUTOF10: 8

## 2024-10-15 ASSESSMENT — PAIN DESCRIPTION - DESCRIPTORS
DESCRIPTORS: ACHING;DISCOMFORT
DESCRIPTORS: THROBBING

## 2024-10-15 NOTE — CARE COORDINATION
Spoke with pt regarding therapies recommendation of HH and was agreeable. Requested [facility]. Referral sent for RN/PT.

## 2024-10-15 NOTE — PROGRESS NOTES
Dominance R [] L []      COGNITION/  PERCEPTION: INTACT IMPAIRED   (See Comments)   Orientation [x]     Vision []     Hearing []     Cognition  [x]     Perception []       MOBILITY: I Mod I S SBA CGA Min Mod Max Total  NT x2 Comments:   Bed Mobility    Rolling [] [] [] [] [] [] [] [] [] [] []    Supine to Sit [] [] [] [] [] [] [] [] [] [] []    Scooting [] [] [] [] [] [] [] [] [] [] []    Sit to Supine [] [] [] [] [] [] [] [] [] [] []    Transfers    Sit to Stand [] [] [] [x] [] [] [] [] [] [] []    Bed to Chair [] [] [] [x] [] [] [] [] [] [] []    Stand to Sit [] [] [] [x] [] [] [] [] [] [] []    Tub/Shower [] [] [] [] [] [] [] [] [] [] []     Toilet [] [] [] [x] [] [] [] [] [] [] []      [] [] [] [] [] [] [] [] [] [] []    I=Independent, Mod I=Modified Independent, S=Supervision/Setup, SBA=Standby Assistance, CGA=Contact Guard Assistance, Min=Minimal Assistance, Mod=Moderate Assistance, Max=Maximal Assistance, Total=Total Assistance, NT=Not Tested    ACTIVITIES OF DAILY LIVING: I Mod I S SBA CGA Min Mod Max Total NT Comments   BASIC ADLs:              Upper Body Bathing  [] [] [] [] [] [] [] [] [] []     Lower Body Bathing [] [] [] [] [] [] [] [] [] []     Toileting [x] [] [] [] [] [] [] [] [] []    Upper Body Dressing [] [] [] [] [] [] [] [] [] []    Lower Body Dressing [] [] [] [] [] [x] [] [] [] []    Feeding [] [] [] [] [] [] [] [] [] []    Grooming [x] [] [] [] [] [] [] [] [] []    Personal Device Care [] [] [] [] [] [] [] [] [] []    Functional Mobility [] [] [] [x] [] [] [] [] [] []    I=Independent, Mod I=Modified Independent, S=Supervision/Setup, SBA=Standby Assistance, CGA=Contact Guard Assistance, Min=Minimal Assistance, Mod=Moderate Assistance, Max=Maximal Assistance, Total=Total Assistance, NT=Not Tested      TREATMENT:     EVALUATION: LOW COMPLEXITY: (Untimed Charge)  The initial evaluation charge encompasses clinical chart review, objective assessment, interpretation of assessment, and skilled  monitoring of the patient's response to treatment in order to develop a plan of care.     TREATMENT:   Co-Treatment PT/OT necessary due to patient's decreased overall endurance/tolerance levels, as well as need for high level skilled assistance to complete functional transfers/mobility and functional tasks  Self Care (10 minutes): Patient participated in toileting, lower body dressing, and grooming ADLs in unsupported sitting and standing with minimal verbal cueing to increase independence, decrease assistance required, and increase activity tolerance. Patient also participated in functional mobility, bathroom mobility, energy conservation, adaptive equipment, assistive device, and compensatory technique training to increase independence, decrease assistance required, and increase activity tolerance. The patient was educated on role of occupational therapy, compensatory technique during ADL , proper use of assistive device, and recommended equipment and patient verbalized understanding.     TREATMENT GRID:  N/A    AFTER TREATMENT PRECAUTIONS: Call light within reach, Chair, Needs within reach, and RN notified    INTERDISCIPLINARY COLLABORATION:  RN/ PCT and PT/ PTA    EDUCATION:  Education Given To: Patient  Education Provided: Role of Therapy;Plan of Care;ADL Adaptive Strategies;Energy Conservation;Equipment    TOTAL TREATMENT DURATION AND TIME:  Time In: 0940  Time Out: 1000  Minutes: 20    Jennifer Lyles OT

## 2024-10-15 NOTE — PROGRESS NOTES
Comprehensive Nutrition Assessment    Type and Reason for Visit: Reassess  General Nutrition Management/other reason (Hospitalists)    Nutrition Recommendations/Plan:   Meals and Snacks:  Diet: Continue current order  Oral Nutriton Supplement Therapy:   Medical food supplement therapy:  Discontinue Gelatein (fortified gelatin) 80 calories, 20 grams protein per 4 ounce serving     Malnutrition Assessment:  Malnutrition Status: Insufficient data  Context: Chronic Illness  Findings of clinical characteristics of malnutrition:   Energy Intake:  Unable to assess (Reports bites of meals for months, mostly consuming fruits and vegetables)  Weight Loss:  Unable to assess     Body Fat Loss:  Unable to assess     Muscle Mass Loss:  Mild muscle mass loss Temples (temporalis), Hand (interosseous)  Fluid Accumulation:  Unable to assess     Strength:  Not Performed     Nutrition Assessment:  Nutrition History: Patient in bed with sister at bedside. She states that she has been trying to eat but is able to take a few bites of food at best. She states this has been ongoing for about 2 months. She figured out it was a lack of taste last admission when her sodium improved. She stated instantly was able to taste things normally. When her tastes are off she specifically is turned off by meats. She is able to tolerate a little bit of tuna salad and a little bit of chicken salad.      Do You Have Any Cultural, Sabianist, or Ethnic Food Preferences?: No   Weight History: 10/16/23 205# (cards). Per IM office 5/6/24 210#, 5/30/24  209#, 8/29/24 207#  Nutrition Background:       PMH significant for CHF, HLD, MVR, mood disorder. She was recently admitted for hyponatremia and diagnosed with new SIADH. She presented back 7 days later with worsening fatigue and peripheral edema. She was admitted with hyponatremia and transaminitis.   Nutrition Interval:  Pt is sitting up in the recliner during RD visit, no family present. Pt is in good  spirits, excited that her appetite is good again. Says that while her sodium was low everything tastes off, resulting in minimal oral intakes. Reports she worked with her  to get her meal preferences, and was able to eat some cream of wheat, 2 sausage links, and fruit this AM. Is tolerating it well w/o difficulty. She doesn't like the Gelatein, and asks to have it discontinued. Denies n/v/c/d. No other complaints for me.     Current Nutrition Therapies:  ADULT DIET; Regular  ADULT ORAL NUTRITION SUPPLEMENT; Breakfast, Lunch, Dinner; Fortified Gelatin Oral Supplement    Current Intake:   Average Meal Intake: 51-75% Average Supplements Intake: 0%      Anthropometric Measures:  Height: 157.5 cm (5' 2\")  Current Body Wt: 98.9 kg (218 lb) (10/15), Weight source: Standing Scale  BMI: 39.9, Obese Class 2 (BMI 35.0 -39.9)  Admission Body Weight: 104.9 kg (231 lb 4.2 oz) (10/10 standing scale)  Ideal Body Weight (Kg) (Calculated): 50 kg (110 lbs), 210 %  BMI Category Obese Class 2 (BMI 35.0 -39.9)  Estimated Daily Nutrient Needs:  Energy (kcal/day): 8411-1403 (25-30 kcal/kg) (Kcal/kg Weight used: 50 kg Ideal  Protein (g/day): 65-75 (1.3-1.5 g/kg) Weight Used: (Ideal) 50 kg  Fluid (ml/day): restrict free fluid intake per MD ( )    Nutrition Diagnosis:   Inadequate oral intake related to altered taste perception as evidenced by  (variable, but improving oral intake)    Nutrition Interventions:   Food and/or Nutrient Delivery: Continue Current Diet, Discontinue Oral Nutrition Supplement     Coordination of Nutrition Care: Interdisciplinary Rounds      Goals:   Previous Goal Met: Goal(s) Achieved  Active Goal: Meet at least 75% of estimated needs, by next RD assessment       Nutrition Monitoring and Evaluation:      Food/Nutrient Intake Outcomes: Food and Nutrient Intake  Physical Signs/Symptoms Outcomes: Fluid Status or Edema, Meal Time Behavior, Weight    Discharge Planning:    Continue current

## 2024-10-15 NOTE — PROGRESS NOTES
Kely Quach  Admission Date: 10/9/2024         [facility] Progress Note: 10/15/2024    Follow-up for: Hyponatremia     The patient's chart is reviewed and the patient is discussed with the staff.    Subjective:   Pt seen and examined in room reclining in chair ongoing LE pitting edema, reports GI loss symptoms better with improving appetite this AM    ROS:  Gen - no fever, no chills  CV - no chest pain  Lung - no shortness of breath, no cough  Abd - no tenderness, no nausea/vomiting  Ext - + edema    Current Facility-Administered Medications   Medication Dose Route Frequency    tolvaptan (SAMSCA) tablet 7.5 mg (Patient Supplied)  7.5 mg Oral Daily    urea (URE-NA) packet 15 g  15 g Oral Daily    enoxaparin (LOVENOX) injection 40 mg  40 mg SubCUTAneous Daily    traMADol (ULTRAM) tablet 25 mg  25 mg Oral Q6H PRN    sennosides-docusate sodium (SENOKOT-S) 8.6-50 MG tablet 1 tablet  1 tablet Oral BID    empagliflozin (JARDIANCE) tablet 10 mg  10 mg Oral Daily    sacubitril-valsartan (ENTRESTO) 24-26 MG per tablet 1 tablet  1 tablet Oral BID    bumetanide (BUMEX) injection 1 mg  1 mg IntraVENous BID    aspirin EC tablet 81 mg  81 mg Oral Daily    cetirizine (ZYRTEC) tablet 10 mg  10 mg Oral Daily    magnesium oxide (MAG-OX) tablet 200 mg  200 mg Oral Daily    metoprolol succinate (TOPROL XL) extended release tablet 25 mg  25 mg Oral Daily    Vitamin D (CHOLECALCIFEROL) tablet 1,000 Units  1,000 Units Oral Daily    zolpidem (AMBIEN) tablet 5 mg  5 mg Oral Nightly PRN    sodium chloride flush 0.9 % injection 5-40 mL  5-40 mL IntraVENous 2 times per day    sodium chloride flush 0.9 % injection 5-40 mL  5-40 mL IntraVENous PRN    ondansetron (ZOFRAN-ODT) disintegrating tablet 4 mg  4 mg Oral Q8H PRN    Or    ondansetron (ZOFRAN) injection 4 mg  4 mg IntraVENous Q6H PRN    polyethylene glycol (GLYCOLAX) packet 17 g  17 g Oral Daily PRN    acetaminophen (TYLENOL) tablet 650 mg  650 mg Oral Q6H

## 2024-10-15 NOTE — PROGRESS NOTES
[facility] CARDIOLOGY PROGRESS NOTE           10/15/2024 12:22 PM    Admit Date: 10/9/2024         Subjective: Continues to improve from a volume standpoint. Cr is stable.    ROS:  Cardiovascular:  As noted above    Objective:      Vitals:    10/14/24 2351 10/15/24 0420 10/15/24 0723 10/15/24 1130   BP: (!) 85/62 (!) 90/58 96/63 (!) 87/56   Pulse: 88 84 84 83   Resp: 16 16 16 17   Temp: 98.6 °F (37 °C) 97.3 °F (36.3 °C) 97.3 °F (36.3 °C) 97.9 °F (36.6 °C)   TempSrc: Oral Oral Oral Oral   SpO2: 95% 100% 96% 97%   Weight:  99.3 kg (218 lb 14.4 oz)     Height:               Physical Exam:  General: Well Developed, Well Nourished, No Acute Distress, Alert & Oriented x 3, Appropriate mood  Neck: supple, no JVD  Heart: S1S2 with RRR without murmurs or gallops  Lungs: Clear throughout auscultation bilaterally without adventitious sounds  Abd: soft, nontender, nondistended, with good bowel sounds  Ext: no edema bilaterally  Skin: warm and dry      Data Review:   Recent Labs     10/13/24  0357 10/13/24  1109 10/14/24  0420 10/15/24  0344      < > 136 136   K 3.3*  --  3.6 3.8   MG 2.2  --  2.0  --    BUN 26*  --  20 22    < > = values in this interval not displayed.       No results for input(s): \"TNIPOC\" in the last 72 hours.    Invalid input(s): \"TROIQ\"        Assessment/Plan:     Principal Problem:    Hyponatremia  Active Problems:    Acute on chronic systolic congestive heart failure (HCC)    DCM (dilated cardiomyopathy) (HCC)    H/O mitral valve repair    Transaminitis  Resolved Problems:    * No resolved hospital problems. *    A/P  1) sCHF -continue IV Bumex 1 mg 2 times daily with daily BMPs  BB -metoprolol XL 25 mg daily  ACE/ARB/ARNi -Entresto 24/26 mg twice daily  MRA -held due to hypotension  SGLT2i -Jardiance 10 mg daily  2) Hyponatermia - on samsca doing better today  3) Hypotension - limites our ability to titrate HF meds    Sal Graf MD  10/15/2024 12:22 PM

## 2024-10-15 NOTE — PROGRESS NOTES
Hospitalist Progress Note   Admit Date:  10/9/2024  6:47 PM   Name:  Kely Quach   Age:  64 y.o.  Sex:  female  :  1960   MRN:  [id number]   Room:  [facility]/    Presenting/Chief Complaint: No chief complaint on file.     Reason(s) for Admission: Hyponatremia with excess extracellular fluid volume [E87.1]     Hospital Day #6      Hospital Course:   Kely Quach is a 64 y.o. female with medical history of CHF, HLD, mood disorder, and chronic hyponatremia who presented to the ER with complaint of worsening weakness, fatigue, and peripheral edema since discharge from this facility on 10/2/24.  She was admitted  for sodium of 123.  Workup at the time was consistent with SIADH.  BP was borderline during the hospitalization, per my personal review of discharge summary, and so valsartan, metoprolol, and Aldactone were discontinued and midodrine 5 mg 3 times daily started.  Since her recent discharge she endorsed compliance with all prescribed medications and 1000 cc fluid restriction.    ED eval showed sodium of 123, BNP 22,384 (up from 5000+ last admit), and new transaminitis. CXR with cardiomegaly with central pulmonary vascular congestion not changed from prior.  Hospitalist service was requested to admit for further management.      Subjective & 24hr Events:   Patient seated in recliner at time of visit.  No new issues.  She reports that she feels as though she is not putting out as much urine on Bumex as she believes she was doing with Lasix that she was taking prior to presentation.  She reports her current condition remains vastly volume overloaded versus baseline.    Assessment & Plan:     Hyponatremia  Resolved.  Remains on tolvaptan and urea daily.  Nephrology following, discussed with Dr. Doe at bedside today.    Acute on chronic systolic congestive heart failure (EF 10-15%; 10/10/2024)/dilated cardiomyopathy/history of mitral valve repair  She remains markedly volume overloaded.  Fluid

## 2024-10-15 NOTE — THERAPY EVALUATION
ACUTE PHYSICAL THERAPY GOALS:   (Developed with and agreed upon by patient and/or caregiver.)    (1.) Kely Quach  will move from supine to sit and sit to supine , scoot up and down, and roll side to side with MODIFIED INDEPENDENCE within 7 treatment day(s).    (2.) Kely Quach will transfer from bed to chair and chair to bed with MODIFIED INDEPENDENCE using the least restrictive device within 7 treatment day(s).    (3.) Kely Quach will ambulate with MODIFIED INDEPENDENCE for 200 feet with the least restrictive device within 7 treatment day(s).   (4.) Kely Quach will perform standing static and dynamic balance activities x 8 minutes with MODIFIED INDEPENDENCE to improve safety within 7 treatment day(s).  (5.) Kely Quach will ascend and descend 5 stairs using 1 hand rail(s) with SUPERVISION to improve functional mobility and safety within 7 treatment day(s).  (6.) Kely Quach will perform therapeutic exercises x 15 min for HEP with MODIFIED INDEPENDENCE to improve strength, endurance, and functional mobility within 7 treatment day(s).      PHYSICAL THERAPY Initial Assessment, Daily Note, and AM  (Link to Caseload Tracking: PT Visit Days : 1  Acknowledge Orders  Time In/Out  PT Charge Capture  Rehab Caseload Tracker    Kely Quach is a 64 y.o. female   PRIMARY DIAGNOSIS: Hyponatremia  Hyponatremia with excess extracellular fluid volume [E87.1]       Reason for Referral: Generalized Muscle Weakness (M62.81)  Difficulty in walking, Not elsewhere classified (R26.2)  Inpatient: Payor: [employer] MEDICARE / Plan: [employer] CHOICE-O MEDICARE / Product Type: *No Product type* /     ASSESSMENT:     REHAB RECOMMENDATIONS:   Recommendation to date pending progress:  Setting:  Home Health Therapy    Equipment:    None (has 2WW)     ASSESSMENT:  Ms. Quach is a 64 year old female who presents with increased weakness, fatigue, and BLE swelling and is admitted with hyponatremia. At baseline she  None  Bathroom Accessibility: Accessible  Home Equipment: Walker - Rolling  Receives Help From: Family  ADL Assistance: Independent  Homemaking Assistance: Independent  Homemaking Responsibilities: Yes  Ambulation Assistance: Independent  Transfer Assistance: Independent  Active : Yes  Mode of Transportation: Car  Occupation: On disability  Additional Comments: Independent, drives. No AD, no falls. RW, BSC, grab bars, WIS, shower chair, elevated toilet, reacher    OBJECTIVE:     PAIN: VITALS / O2: PRECAUTION / LINES / DRAINS:   Pre Treatment:    6/10 in BLE (due to edema)      Post Treatment: 6/10 Vitals        Oxygen      Telemetry     RESTRICTIONS/PRECAUTIONS:  Restrictions/Precautions: Fall Risk                 GROSS EVALUATION:  Intact Impaired (Comments):   AROM [x]     PROM [x]    Strength []  Generalized weakness    Balance [] Sitting - Static: Good  Sitting - Dynamic: Good  Standing - Static: Fair  Standing - Dynamic: Fair, -   Posture [] Forward Head   Sensation [x]     Coordination [x]      Tone []     Edema [] Throughout BLE   Activity Tolerance []  Limited by fatigue     []      COGNITION/  PERCEPTION: Intact Impaired (Comments):   Orientation [x]     Vision [x]     Hearing [x]     Cognition  [x]       MOBILITY: I Mod I S SBA CGA Min Mod Max Total  NT x2 Comments:   Bed Mobility    Rolling [] [] [] [] [] [] [] [] [] [x] []    Supine to Sit [] [] [] [] [] [] [] [] [] [x] [] Up in chair upon arrival    Scooting [] [] [] [x] [] [] [] [] [] [] []    Sit to Supine [] [] [] [] [] [] [] [] [] [x] []    Transfers    Sit to Stand [] [] [] [x] [x] [] [] [] [] [] []    Bed to Chair [] [] [] [] [x] [] [] [] [] [] []    Stand to Sit [] [] [] [] [x] [] [] [] [] [] []     [] [] [] [] [] [] [] [] [] [] []    I=Independent, Mod I=Modified Independent, S=Supervision, SBA=Standby Assistance, CGA=Contact Guard Assistance,   Min=Minimal Assistance, Mod=Moderate Assistance, Max=Maximal Assistance, Total=Total

## 2024-10-16 LAB
ANION GAP SERPL CALC-SCNC: 10 MMOL/L (ref 9–18)
BUN SERPL-MCNC: 24 MG/DL (ref 8–23)
CALCIUM SERPL-MCNC: 8.8 MG/DL (ref 8.8–10.2)
CHLORIDE SERPL-SCNC: 95 MMOL/L (ref 98–107)
CO2 SERPL-SCNC: 31 MMOL/L (ref 20–28)
CREAT SERPL-MCNC: 0.86 MG/DL (ref 0.6–1.1)
GLUCOSE SERPL-MCNC: 90 MG/DL (ref 70–99)
POTASSIUM SERPL-SCNC: 3.2 MMOL/L (ref 3.5–5.1)
POTASSIUM SERPL-SCNC: 3.2 MMOL/L (ref 3.5–5.1)
POTASSIUM SERPL-SCNC: 3.4 MMOL/L (ref 3.5–5.1)
SODIUM SERPL-SCNC: 135 MMOL/L (ref 136–145)

## 2024-10-16 PROCEDURE — 2580000003 HC RX 258: Performed by: FAMILY MEDICINE

## 2024-10-16 PROCEDURE — 97530 THERAPEUTIC ACTIVITIES: CPT

## 2024-10-16 PROCEDURE — 99233 SBSQ HOSP IP/OBS HIGH 50: CPT | Performed by: INTERNAL MEDICINE

## 2024-10-16 PROCEDURE — 6360000002 HC RX W HCPCS: Performed by: INTERNAL MEDICINE

## 2024-10-16 PROCEDURE — 1100000003 HC PRIVATE W/ TELEMETRY

## 2024-10-16 PROCEDURE — 6370000000 HC RX 637 (ALT 250 FOR IP): Performed by: FAMILY MEDICINE

## 2024-10-16 PROCEDURE — 84132 ASSAY OF SERUM POTASSIUM: CPT

## 2024-10-16 PROCEDURE — 6370000000 HC RX 637 (ALT 250 FOR IP): Performed by: INTERNAL MEDICINE

## 2024-10-16 PROCEDURE — 80048 BASIC METABOLIC PNL TOTAL CA: CPT

## 2024-10-16 PROCEDURE — 6370000000 HC RX 637 (ALT 250 FOR IP): Performed by: NURSE PRACTITIONER

## 2024-10-16 PROCEDURE — 36415 COLL VENOUS BLD VENIPUNCTURE: CPT

## 2024-10-16 RX ADMIN — SACUBITRIL AND VALSARTAN 1 TABLET: 24; 26 TABLET, FILM COATED ORAL at 20:25

## 2024-10-16 RX ADMIN — SACUBITRIL AND VALSARTAN 1 TABLET: 24; 26 TABLET, FILM COATED ORAL at 11:07

## 2024-10-16 RX ADMIN — POTASSIUM BICARBONATE 40 MEQ: 782 TABLET, EFFERVESCENT ORAL at 19:22

## 2024-10-16 RX ADMIN — POTASSIUM CHLORIDE 40 MEQ: 1500 TABLET, EXTENDED RELEASE ORAL at 05:39

## 2024-10-16 RX ADMIN — SODIUM CHLORIDE, PRESERVATIVE FREE 10 ML: 5 INJECTION INTRAVENOUS at 20:25

## 2024-10-16 RX ADMIN — MAGNESIUM GLUCONATE 500 MG ORAL TABLET 200 MG: 500 TABLET ORAL at 09:14

## 2024-10-16 RX ADMIN — ENOXAPARIN SODIUM 40 MG: 100 INJECTION SUBCUTANEOUS at 09:14

## 2024-10-16 RX ADMIN — EMPAGLIFLOZIN 10 MG: 10 TABLET, FILM COATED ORAL at 09:14

## 2024-10-16 RX ADMIN — Medication 15 G: at 09:15

## 2024-10-16 RX ADMIN — CETIRIZINE HYDROCHLORIDE 10 MG: 10 TABLET, FILM COATED ORAL at 09:14

## 2024-10-16 RX ADMIN — FAMOTIDINE 20 MG: 20 TABLET, FILM COATED ORAL at 09:14

## 2024-10-16 RX ADMIN — DOCUSATE SODIUM 50 MG AND SENNOSIDES 8.6 MG 1 TABLET: 8.6; 5 TABLET, FILM COATED ORAL at 09:14

## 2024-10-16 RX ADMIN — FAMOTIDINE 20 MG: 20 TABLET, FILM COATED ORAL at 20:17

## 2024-10-16 RX ADMIN — TRAMADOL HYDROCHLORIDE 25 MG: 50 TABLET ORAL at 20:39

## 2024-10-16 RX ADMIN — BUMETANIDE 1 MG/HR: 0.25 INJECTION INTRAMUSCULAR; INTRAVENOUS at 19:21

## 2024-10-16 RX ADMIN — METOPROLOL SUCCINATE 25 MG: 25 TABLET, EXTENDED RELEASE ORAL at 09:14

## 2024-10-16 RX ADMIN — POTASSIUM CHLORIDE 40 MEQ: 1500 TABLET, EXTENDED RELEASE ORAL at 14:59

## 2024-10-16 RX ADMIN — SODIUM CHLORIDE, PRESERVATIVE FREE 10 ML: 5 INJECTION INTRAVENOUS at 09:44

## 2024-10-16 RX ADMIN — BUMETANIDE 1 MG/HR: 0.25 INJECTION INTRAMUSCULAR; INTRAVENOUS at 09:39

## 2024-10-16 RX ADMIN — DOCUSATE SODIUM 50 MG AND SENNOSIDES 8.6 MG 1 TABLET: 8.6; 5 TABLET, FILM COATED ORAL at 20:17

## 2024-10-16 RX ADMIN — VITAMIN D, TAB 1000IU (100/BT) 1000 UNITS: 25 TAB at 09:14

## 2024-10-16 RX ADMIN — ASPIRIN 81 MG: 81 TABLET, COATED ORAL at 09:14

## 2024-10-16 ASSESSMENT — PAIN SCALES - GENERAL
PAINLEVEL_OUTOF10: 0
PAINLEVEL_OUTOF10: 8
PAINLEVEL_OUTOF10: 5
PAINLEVEL_OUTOF10: 0
PAINLEVEL_OUTOF10: 0

## 2024-10-16 NOTE — PROGRESS NOTES
Hospitalist Progress Note   Admit Date:  10/9/2024  6:47 PM   Name:  Kely Quach   Age:  64 y.o.  Sex:  female  :  1960   MRN:  [id number]   Room:  [facility]/    Presenting/Chief Complaint: No chief complaint on file.     Reason(s) for Admission: Hyponatremia with excess extracellular fluid volume [E87.1]     Hospital Day #7      Hospital Course:   Kely Quach is a 64 y.o. female with medical history of CHF, HLD, mood disorder, and chronic hyponatremia who presented to the ER with complaint of worsening weakness, fatigue, and peripheral edema since discharge from this facility on 10/2/24.  She was admitted  for sodium of 123.  Workup at the time was consistent with SIADH.  BP was borderline during the hospitalization, per my personal review of discharge summary, and so valsartan, metoprolol, and Aldactone were discontinued and midodrine 5 mg 3 times daily started.  Since her recent discharge she endorsed compliance with all prescribed medications and 1000 cc fluid restriction.    ED eval showed sodium of 123, BNP 22,384 (up from 5000+ last admit), and new transaminitis. CXR with cardiomegaly with central pulmonary vascular congestion not changed from prior.  Hospitalist service was requested to admit for further management.      Subjective & 24hr Events:   Pt seated comfortably in recliner at time of visit.  Denies SOB.  Endorses frequent urination from diuretics.  Disappointed that peripheral edema isn't improving faster.      Assessment & Plan:     Hyponatremia  Improved.  Remains urea daily.  Nephrology following, discontinued tolvaptan yesterday.    Acute on chronic systolic congestive heart failure (EF 10-15%; 10/10/2024)/dilated cardiomyopathy/history of mitral valve repair  She remains significantly volume overloaded.  Fluid balance -16.0 L since admission.  Transitioned from Bumex 3 times daily to Bumex gtt. at 1 mg/h today per cardiology.  Remains on ASA 81, Jardiance, metoprolol,  polyethylene glycol (GLYCOLAX) packet 17 g  17 g Oral Daily PRN    acetaminophen (TYLENOL) tablet 650 mg  650 mg Oral Q6H PRN    Or    acetaminophen (TYLENOL) suppository 650 mg  650 mg Rectal Q6H PRN    potassium chloride (KLOR-CON M) extended release tablet 40 mEq  40 mEq Oral PRN    Or    potassium bicarb-citric acid (EFFER-K) effervescent tablet 40 mEq  40 mEq Oral PRN    Or    potassium chloride 10 mEq/100 mL IVPB (Peripheral Line)  10 mEq IntraVENous PRN    magnesium sulfate 2000 mg in 50 mL IVPB premix  2,000 mg IntraVENous PRN    famotidine (PEPCID) tablet 20 mg  20 mg Oral BID       Signed:  Jose Melton M.D.  Hospitalist  10/16/24   4:23 PM

## 2024-10-16 NOTE — PLAN OF CARE
Problem: Chronic Conditions and Co-morbidities  Goal: Patient's chronic conditions and co-morbidity symptoms are monitored and maintained or improved  10/16/2024 1009 by Rita Jackson RN  Outcome: Progressing  Flowsheets (Taken 10/16/2024 0900)  Care Plan - Patient's Chronic Conditions and Co-Morbidity Symptoms are Monitored and Maintained or Improved:   Collaborate with multidisciplinary team to address chronic and comorbid conditions and prevent exacerbation or deterioration   Monitor and assess patient's chronic conditions and comorbid symptoms for stability, deterioration, or improvement   Update acute care plan with appropriate goals if chronic or comorbid symptoms are exacerbated and prevent overall improvement and discharge  10/16/2024 0135 by Naomy Higgins RN  Outcome: Progressing     Problem: Discharge Planning  Goal: Discharge to home or other facility with appropriate resources  10/16/2024 1009 by Rita Jackson RN  Outcome: Progressing  Flowsheets (Taken 10/16/2024 0900)  Discharge to home or other facility with appropriate resources:   Identify barriers to discharge with patient and caregiver   Arrange for needed discharge resources and transportation as appropriate   Identify discharge learning needs (meds, wound care, etc)   Refer to discharge planning if patient needs post-hospital services based on physician order or complex needs related to functional status, cognitive ability or social support system  10/16/2024 0135 by Naomy Higgins RN  Outcome: Progressing     Problem: Safety - Adult  Goal: Free from fall injury  10/16/2024 1009 by Rita Jackson RN  Outcome: Progressing  10/16/2024 0135 by Naomy Higgins RN  Outcome: Progressing     Problem: Pain  Goal: Verbalizes/displays adequate comfort level or baseline comfort level  10/16/2024 1009 by Rita Jackson RN  Outcome: Progressing  10/16/2024 0135 by Naomy Higgins RN  Outcome: Progressing     Problem: Skin/Tissue  Integrity  Goal: Absence of new skin breakdown  Description: 1.  Monitor for areas of redness and/or skin breakdown  2.  Assess vascular access sites hourly  3.  Every 4-6 hours minimum:  Change oxygen saturation probe site  4.  Every 4-6 hours:  If on nasal continuous positive airway pressure, respiratory therapy assess nares and determine need for appliance change or resting period.  10/16/2024 1009 by Rita Jackson RN  Outcome: Progressing  10/16/2024 0135 by Naomy Higgins RN  Outcome: Progressing     Problem: Cardiovascular - Adult  Goal: Maintains optimal cardiac output and hemodynamic stability  10/16/2024 1009 by Rita Jackson RN  Outcome: Progressing  10/16/2024 0135 by Naomy Higgins RN  Reactivated  Goal: Absence of cardiac dysrhythmias or at baseline  10/16/2024 1009 by Rita Jackson RN  Outcome: Progressing  10/16/2024 0135 by Naomy Higgins RN  Reactivated     Problem: Gastrointestinal - Adult  Goal: Maintains or returns to baseline bowel function  10/16/2024 1009 by Rita Jackson RN  Outcome: Progressing  10/16/2024 0135 by Naomy Higgins RN  Reactivated     Problem: Metabolic/Fluid and Electrolytes - Adult  Goal: Electrolytes maintained within normal limits  10/16/2024 1009 by Rita Jackson RN  Outcome: Progressing  10/16/2024 0135 by Naomy Higgins RN  Reactivated  Goal: Hemodynamic stability and optimal renal function maintained  10/16/2024 1009 by Rita Jackson RN  Outcome: Progressing  Flowsheets (Taken 10/16/2024 0900)  Hemodynamic stability and optimal renal function maintained:   Monitor labs and assess for signs and symptoms of volume excess or deficit   Monitor intake, output and patient weight  10/16/2024 0135 by Naomy Higgins RN  Reactivated

## 2024-10-16 NOTE — PROGRESS NOTES
am  10/16/2024 7:26 AM    Admit Date: 10/9/2024    Admit Diagnosis: Hyponatremia with excess extracellular fluid volume [E87.1]      Subjective:    Patient : awake & sitting up in chloe. Stable overnight. Voices concerns over retained fluid, but states IV Bumex has been working to alleviate sxs. Pt voices no other questions or concerns at this time.    Objective:    BP (!) 82/56   Pulse 78   Temp 97.5 °F (36.4 °C) (Oral)   Resp 18   Ht 1.575 m (5' 2\")   Wt 98.1 kg (216 lb 4.8 oz)   SpO2 99%   BMI 39.56 kg/m²     ROS:  General ROS: negative for - chills  Hematological and Lymphatic ROS: negative for - blood clots or jaundice  Respiratory ROS: no cough, shortness of breath, or wheezing  Cardiovascular ROS: no chest pain or dyspnea on exertion  Gastrointestinal ROS: no abdominal pain, change in bowel habits, or black or bloody stools  Neurological ROS: no TIA or stroke symptoms    Physical Exam:      Physical Examination: General appearance - Appearance: alert, well appearing, and in no distress.   Neck/lymph - supple, no significant adenopathy  Chest/CV - clear to auscultation, no wheezes, rales or rhonchi, symmetric air entry  Heart - normal rate, regular rhythm, normal S1, S2, no murmurs, rubs, clicks or gallops  Abdomen/GI - soft, nontender, nondistended, no masses or organomegaly   Musculoskeletal - no joint tenderness, deformity or swelling  Extremities - peripheral pulses normal, no pedal edema, no clubbing or cyanosis, no pedal edema noted  Skin - normal coloration and turgor, no rashes, no suspicious skin lesions noted    Current Facility-Administered Medications   Medication Dose Route Frequency    bumetanide (BUMEX) injection 1 mg  1 mg IntraVENous TID    urea (URE-NA) packet 15 g  15 g Oral Daily    enoxaparin (LOVENOX) injection 40 mg  40 mg SubCUTAneous Daily    traMADol (ULTRAM) tablet 25 mg  25 mg Oral Q6H PRN    sennosides-docusate sodium (SENOKOT-S) 8.6-50 MG tablet 1 tablet  1 tablet Oral BID  regurgitation    DCM (dilated cardiomyopathy) (HCC)    Pure hypercholesterolemia    Encounter due to AICD at end of battery life    H/O mitral valve repair    Accelerating angina (HCC)    HFrEF (heart failure with reduced ejection fraction) ([facility])    Iron deficiency anemia secondary to inadequate dietary iron intake    Status post placement of leadless cardiac pacemaker    Hyponatremia    Transaminitis    Hypoglobulinemia    Encounter for palliative care     PLAN    1) sCHF -Bumex but changed to a Bumex infusion daily with daily BMPs  BB -metoprolol XL 25 mg daily  ACE/ARB/ARNi -Entresto 24/26 mg twice daily  MRA -held due to hypotension  SGLT2i -Jardiance 10 mg daily    2) Hyponatermia   - on samsca doing better today    3) Hypotension   - watch BPs, Pt denies any symptoms of dizziness.  - goal to up titrate BP meds as Pt able to tolerate      Joan Ty  I have personally seen and evaluated the patient and reviewed the students note and agree with the following assessment and plan and findings. I was present for and observed the key components of this note.  Any appropriate additions or editing of the information have been done by me.    Shaji Kennedy MD, FACC  Cardiology

## 2024-10-16 NOTE — PROGRESS NOTES
ACUTE PHYSICAL THERAPY GOALS:   (Developed with and agreed upon by patient and/or caregiver.)    (1.) Kely Quach  will move from supine to sit and sit to supine , scoot up and down, and roll side to side with MODIFIED INDEPENDENCE within 7 treatment day(s).    (2.) Kely Quach will transfer from bed to chair and chair to bed with MODIFIED INDEPENDENCE using the least restrictive device within 7 treatment day(s).    (3.) Kely Quach will ambulate with MODIFIED INDEPENDENCE for 200 feet with the least restrictive device within 7 treatment day(s).   (4.) Kely Quach will perform standing static and dynamic balance activities x 8 minutes with MODIFIED INDEPENDENCE to improve safety within 7 treatment day(s).  (5.) Kely Quach will ascend and descend 5 stairs using 1 hand rail(s) with SUPERVISION to improve functional mobility and safety within 7 treatment day(s).  (6.) Kely Quach will perform therapeutic exercises x 15 min for HEP with MODIFIED INDEPENDENCE to improve strength, endurance, and functional mobility within 7 treatment day(s).     PHYSICAL THERAPY: Daily Note AM   (Link to Caseload Tracking: PT Visit Days : 2  Time In/Out PT Charge Capture  Rehab Caseload Tracker  Orders    Kely Quach is a 64 y.o. female   PRIMARY DIAGNOSIS: Hyponatremia  Hyponatremia with excess extracellular fluid volume [E87.1]       Inpatient: Payor: [employer] MEDICARE / Plan: [employer] CHOICE-O MEDICARE / Product Type: *No Product type* /     ASSESSMENT:     REHAB RECOMMENDATIONS:   Recommendation to date pending progress:  Setting:  Home Health Therapy    Equipment:    None (has 2WW)     ASSESSMENT:  Ms. Quach is seated up in chair upon arrival and agreeable to mobility. She performs sit to stand from chair with CGA and ambulates into/out of bathroom with no AD, but does reach for handrails for support. She then ambulates in hallway for 150 ft with 2WW and demonstrates decreased step length,  Assistance, CGA=Contact Guard Assistance,   Min=Minimal Assistance, Mod=Moderate Assistance, Max=Maximal Assistance, Total=Total Assistance, NT=Not Tested    BALANCE: Good Fair+ Fair Fair- Poor NT Comments   Sitting Static [x] [] [] [] [] []    Sitting Dynamic [x] [] [] [] [] []              Standing Static [] [] [x] [] [] []    Standing Dynamic [] [] [] [x] [] []      GAIT: I Mod I S SBA CGA Min Mod Max Total  NT x2 Comments:   Level of Assistance [] [] [] [] [x] [] [] [] [] [] []    Distance   150 feet    DME Rolling Walker    Gait Quality Decreased step clearance, Decreased step length, and Trunk sway increased    Weightbearing Status      Stairs      I=Independent, Mod I=Modified Independent, S=Supervision, SBA=Standby Assistance, CGA=Contact Guard Assistance,   Min=Minimal Assistance, Mod=Moderate Assistance, Max=Maximal Assistance, Total=Total Assistance, NT=Not Tested    PLAN:   FREQUENCY AND DURATION: 3 times/week for duration of hospital stay or until stated goals are met, whichever comes first.    TREATMENT:   TREATMENT:   Therapeutic Activity (24 Minutes): Therapeutic activity included Scooting, Transfer Training, Ambulation on level ground, Sitting balance , and Standing balance to improve functional Activity tolerance, Balance, Mobility, and Strength.    TREATMENT GRID:  N/A    AFTER TREATMENT PRECAUTIONS: Bed/Chair Locked, Call light within reach, Chair, Needs within reach, and RN notified    INTERDISCIPLINARY COLLABORATION:  RN/ PCT and PT/ PTA    EDUCATION:      TIME IN/OUT:  Time In: 1138  Time Out: 1202  Minutes: 24    Brianda Rodarte, PT

## 2024-10-16 NOTE — PROGRESS NOTES
Kely Quach  Admission Date: 10/9/2024         [facility] Progress Note: 10/16/2024    Follow-up for: Hyponatremia     The patient's chart is reviewed and the patient is discussed with the staff.    Subjective:   Pt seen and examined in room     ROS:  Gen - no fever, no chills  CV - no chest pain  Lung - no shortness of breath, no cough  Abd - no tenderness, no nausea/vomiting  Ext - + edema    Current Facility-Administered Medications   Medication Dose Route Frequency    bumetanide (BUMEX) 12.5 mg in 50 mL infusion  1 mg/hr IntraVENous Continuous    urea (URE-NA) packet 15 g  15 g Oral Daily    enoxaparin (LOVENOX) injection 40 mg  40 mg SubCUTAneous Daily    traMADol (ULTRAM) tablet 25 mg  25 mg Oral Q6H PRN    sennosides-docusate sodium (SENOKOT-S) 8.6-50 MG tablet 1 tablet  1 tablet Oral BID    empagliflozin (JARDIANCE) tablet 10 mg  10 mg Oral Daily    sacubitril-valsartan (ENTRESTO) 24-26 MG per tablet 1 tablet  1 tablet Oral BID    aspirin EC tablet 81 mg  81 mg Oral Daily    cetirizine (ZYRTEC) tablet 10 mg  10 mg Oral Daily    magnesium oxide (MAG-OX) tablet 200 mg  200 mg Oral Daily    metoprolol succinate (TOPROL XL) extended release tablet 25 mg  25 mg Oral Daily    Vitamin D (CHOLECALCIFEROL) tablet 1,000 Units  1,000 Units Oral Daily    zolpidem (AMBIEN) tablet 5 mg  5 mg Oral Nightly PRN    sodium chloride flush 0.9 % injection 5-40 mL  5-40 mL IntraVENous 2 times per day    sodium chloride flush 0.9 % injection 5-40 mL  5-40 mL IntraVENous PRN    ondansetron (ZOFRAN-ODT) disintegrating tablet 4 mg  4 mg Oral Q8H PRN    Or    ondansetron (ZOFRAN) injection 4 mg  4 mg IntraVENous Q6H PRN    polyethylene glycol (GLYCOLAX) packet 17 g  17 g Oral Daily PRN    acetaminophen (TYLENOL) tablet 650 mg  650 mg Oral Q6H PRN    Or    acetaminophen (TYLENOL) suppository 650 mg  650 mg Rectal Q6H PRN    potassium chloride (KLOR-CON M) extended release tablet 40 mEq  40 mEq Oral  PRN    Or    potassium bicarb-citric acid (EFFER-K) effervescent tablet 40 mEq  40 mEq Oral PRN    Or    potassium chloride 10 mEq/100 mL IVPB (Peripheral Line)  10 mEq IntraVENous PRN    magnesium sulfate 2000 mg in 50 mL IVPB premix  2,000 mg IntraVENous PRN    famotidine (PEPCID) tablet 20 mg  20 mg Oral BID         Objective:     Vitals:    10/16/24 0511 10/16/24 0749 10/16/24 0934 10/16/24 1000   BP: (!) 82/56 (!) 85/54 (!) 89/54 (!) 87/56   Pulse: 78 83 91 79   Resp:  18     Temp:  97.3 °F (36.3 °C)     TempSrc:  Axillary     SpO2:  100%     Weight:       Height:         Intake and Output:   10/14 1901 - 10/16 0700  In: 485 [P.O.:480; I.V.:5]  Out: 5150 [Urine:5150]  10/16 0701 - 10/16 1900  In: 240 [P.O.:240]  Out: 100 [Urine:100]    Physical Exam:   Constitutional:  the patient is well developed and in no acute distress, alert and oriented   HEENT:  Sclera clear, pupils equal, oral mucosa moist  Lungs: clear bilaterally   Cardiovascular:  Regular rate, S1, S2, no Rub   Abd/GI: soft and non-tender; with positive bowel sounds.  Ext: warm without cyanosis. There is +3 pitting lower leg edema.  Skin:  no jaundice or rashes  Neuro: no gross neuro deficits   Psychiatric: Calm.       LAB  No results for input(s): \"WBC\", \"HGB\", \"HCT\", \"PLT\", \"INR\" in the last 72 hours.  Recent Labs     10/14/24  0420 10/15/24  0344 10/16/24  0427    136 135*   K 3.6 3.8 3.2*   CL 97* 95* 95*   CO2 28 29* 31*   BUN 20 22 24*   CREATININE 0.82 0.84 0.86   MG 2.0  --   --      No results for input(s): \"PH\", \"PCO2\", \"PO2\", \"HCO3\" in the last 72 hours.      Assessment/Plan:  (Medical Decision Making)   Hypervolemic hyponatremia -  down to 117 prior admission and this admission she was down to 123- improved with dose of tolvaptan.   - Responding well on urea na: with good UOP and S. Na remains at 135:  continue with it instead of tolvaptan       2. HFrEF, volume overload -   On Bumex drip now   Jarrdiance, Entresto      Hypotension

## 2024-10-16 NOTE — PROGRESS NOTES
Patient said she was tired of being in the hospital but spoke well of her care. She said she had good support from family and had no needs at this time.

## 2024-10-16 NOTE — PLAN OF CARE
Problem: Chronic Conditions and Co-morbidities  Goal: Patient's chronic conditions and co-morbidity symptoms are monitored and maintained or improved  Outcome: Progressing     Problem: Discharge Planning  Goal: Discharge to home or other facility with appropriate resources  Outcome: Progressing     Problem: Safety - Adult  Goal: Free from fall injury  Outcome: Progressing     Problem: Pain  Goal: Verbalizes/displays adequate comfort level or baseline comfort level  Outcome: Progressing     Problem: Skin/Tissue Integrity  Goal: Absence of new skin breakdown  Description: 1.  Monitor for areas of redness and/or skin breakdown  2.  Assess vascular access sites hourly  3.  Every 4-6 hours minimum:  Change oxygen saturation probe site  4.  Every 4-6 hours:  If on nasal continuous positive airway pressure, respiratory therapy assess nares and determine need for appliance change or resting period.  Outcome: Progressing     Problem: Cardiovascular - Adult  Goal: Maintains optimal cardiac output and hemodynamic stability  Reactivated  Goal: Absence of cardiac dysrhythmias or at baseline  Reactivated     Problem: Gastrointestinal - Adult  Goal: Maintains or returns to baseline bowel function  Reactivated     Problem: Metabolic/Fluid and Electrolytes - Adult  Goal: Electrolytes maintained within normal limits  Reactivated  Goal: Hemodynamic stability and optimal renal function maintained  Reactivated

## 2024-10-17 LAB
ALBUMIN SERPL-MCNC: 3.8 G/DL (ref 3.2–4.6)
ALBUMIN/GLOB SERPL: 1.2 (ref 1–1.9)
ALP SERPL-CCNC: 118 U/L (ref 35–104)
ALT SERPL-CCNC: 63 U/L (ref 8–45)
ANION GAP SERPL CALC-SCNC: 11 MMOL/L (ref 9–18)
AST SERPL-CCNC: 49 U/L (ref 15–37)
BILIRUB DIRECT SERPL-MCNC: 0.7 MG/DL (ref 0–0.4)
BILIRUB SERPL-MCNC: 1.5 MG/DL (ref 0–1.2)
BUN SERPL-MCNC: 23 MG/DL (ref 8–23)
CALCIUM SERPL-MCNC: 9.3 MG/DL (ref 8.8–10.2)
CHLORIDE SERPL-SCNC: 93 MMOL/L (ref 98–107)
CO2 SERPL-SCNC: 33 MMOL/L (ref 20–28)
CREAT SERPL-MCNC: 0.84 MG/DL (ref 0.6–1.1)
GLOBULIN SER CALC-MCNC: 3.1 G/DL (ref 2.3–3.5)
GLUCOSE SERPL-MCNC: 96 MG/DL (ref 70–99)
POTASSIUM SERPL-SCNC: 3.5 MMOL/L (ref 3.5–5.1)
PROT SERPL-MCNC: 6.8 G/DL (ref 6.3–8.2)
SODIUM SERPL-SCNC: 137 MMOL/L (ref 136–145)

## 2024-10-17 PROCEDURE — 36415 COLL VENOUS BLD VENIPUNCTURE: CPT

## 2024-10-17 PROCEDURE — 80048 BASIC METABOLIC PNL TOTAL CA: CPT

## 2024-10-17 PROCEDURE — 6370000000 HC RX 637 (ALT 250 FOR IP): Performed by: NURSE PRACTITIONER

## 2024-10-17 PROCEDURE — 6370000000 HC RX 637 (ALT 250 FOR IP): Performed by: FAMILY MEDICINE

## 2024-10-17 PROCEDURE — 6370000000 HC RX 637 (ALT 250 FOR IP): Performed by: INTERNAL MEDICINE

## 2024-10-17 PROCEDURE — 80076 HEPATIC FUNCTION PANEL: CPT

## 2024-10-17 PROCEDURE — 1100000003 HC PRIVATE W/ TELEMETRY

## 2024-10-17 PROCEDURE — 6360000002 HC RX W HCPCS: Performed by: INTERNAL MEDICINE

## 2024-10-17 PROCEDURE — 2580000003 HC RX 258: Performed by: FAMILY MEDICINE

## 2024-10-17 PROCEDURE — 99232 SBSQ HOSP IP/OBS MODERATE 35: CPT | Performed by: INTERNAL MEDICINE

## 2024-10-17 RX ADMIN — TRAMADOL HYDROCHLORIDE 25 MG: 50 TABLET ORAL at 21:16

## 2024-10-17 RX ADMIN — FAMOTIDINE 20 MG: 20 TABLET, FILM COATED ORAL at 20:51

## 2024-10-17 RX ADMIN — ASPIRIN 81 MG: 81 TABLET, COATED ORAL at 08:14

## 2024-10-17 RX ADMIN — Medication 15 G: at 08:13

## 2024-10-17 RX ADMIN — DOCUSATE SODIUM 50 MG AND SENNOSIDES 8.6 MG 1 TABLET: 8.6; 5 TABLET, FILM COATED ORAL at 20:51

## 2024-10-17 RX ADMIN — METOPROLOL SUCCINATE 25 MG: 25 TABLET, EXTENDED RELEASE ORAL at 08:14

## 2024-10-17 RX ADMIN — SACUBITRIL AND VALSARTAN 1 TABLET: 24; 26 TABLET, FILM COATED ORAL at 10:12

## 2024-10-17 RX ADMIN — VITAMIN D, TAB 1000IU (100/BT) 1000 UNITS: 25 TAB at 08:14

## 2024-10-17 RX ADMIN — POTASSIUM CHLORIDE 40 MEQ: 1500 TABLET, EXTENDED RELEASE ORAL at 06:03

## 2024-10-17 RX ADMIN — ENOXAPARIN SODIUM 40 MG: 100 INJECTION SUBCUTANEOUS at 08:14

## 2024-10-17 RX ADMIN — CETIRIZINE HYDROCHLORIDE 10 MG: 10 TABLET, FILM COATED ORAL at 08:14

## 2024-10-17 RX ADMIN — SODIUM CHLORIDE, PRESERVATIVE FREE 10 ML: 5 INJECTION INTRAVENOUS at 20:51

## 2024-10-17 RX ADMIN — MAGNESIUM GLUCONATE 500 MG ORAL TABLET 200 MG: 500 TABLET ORAL at 08:14

## 2024-10-17 RX ADMIN — SACUBITRIL AND VALSARTAN 1 TABLET: 24; 26 TABLET, FILM COATED ORAL at 20:51

## 2024-10-17 RX ADMIN — SODIUM CHLORIDE, PRESERVATIVE FREE 10 ML: 5 INJECTION INTRAVENOUS at 08:15

## 2024-10-17 RX ADMIN — TRAMADOL HYDROCHLORIDE 25 MG: 50 TABLET ORAL at 12:26

## 2024-10-17 RX ADMIN — EMPAGLIFLOZIN 10 MG: 10 TABLET, FILM COATED ORAL at 08:14

## 2024-10-17 RX ADMIN — FAMOTIDINE 20 MG: 20 TABLET, FILM COATED ORAL at 08:14

## 2024-10-17 RX ADMIN — BUMETANIDE 1 MG/HR: 0.25 INJECTION INTRAMUSCULAR; INTRAVENOUS at 18:48

## 2024-10-17 RX ADMIN — BUMETANIDE 1 MG/HR: 0.25 INJECTION INTRAMUSCULAR; INTRAVENOUS at 06:33

## 2024-10-17 RX ADMIN — DOCUSATE SODIUM 50 MG AND SENNOSIDES 8.6 MG 1 TABLET: 8.6; 5 TABLET, FILM COATED ORAL at 08:14

## 2024-10-17 ASSESSMENT — PAIN DESCRIPTION - DESCRIPTORS
DESCRIPTORS: ACHING;CRAMPING;DISCOMFORT
DESCRIPTORS: ACHING;SORE;DISCOMFORT

## 2024-10-17 ASSESSMENT — PAIN DESCRIPTION - LOCATION
LOCATION: LEG
LOCATION: BUTTOCKS;FOOT;LEG

## 2024-10-17 ASSESSMENT — PAIN SCALES - GENERAL
PAINLEVEL_OUTOF10: 8
PAINLEVEL_OUTOF10: 0
PAINLEVEL_OUTOF10: 0
PAINLEVEL_OUTOF10: 8
PAINLEVEL_OUTOF10: 8

## 2024-10-17 ASSESSMENT — PAIN DESCRIPTION - ORIENTATION: ORIENTATION: RIGHT;LEFT

## 2024-10-17 NOTE — PROGRESS NOTES
Kely Quach  Admission Date: 10/9/2024         [facility] Progress Note: 10/17/2024    Follow-up for: Hyponatremia     The patient's chart is reviewed and the patient is discussed with the staff.    Subjective:   Pt seen and examined in room     ROS:  Gen - no fever, no chills  CV - no chest pain  Lung - no shortness of breath, no cough  Abd - no tenderness, no nausea/vomiting  Ext - + edema    Current Facility-Administered Medications   Medication Dose Route Frequency    bumetanide (BUMEX) 12.5 mg in 50 mL infusion  1 mg/hr IntraVENous Continuous    urea (URE-NA) packet 15 g  15 g Oral Daily    enoxaparin (LOVENOX) injection 40 mg  40 mg SubCUTAneous Daily    traMADol (ULTRAM) tablet 25 mg  25 mg Oral Q6H PRN    sennosides-docusate sodium (SENOKOT-S) 8.6-50 MG tablet 1 tablet  1 tablet Oral BID    empagliflozin (JARDIANCE) tablet 10 mg  10 mg Oral Daily    sacubitril-valsartan (ENTRESTO) 24-26 MG per tablet 1 tablet  1 tablet Oral BID    aspirin EC tablet 81 mg  81 mg Oral Daily    cetirizine (ZYRTEC) tablet 10 mg  10 mg Oral Daily    magnesium oxide (MAG-OX) tablet 200 mg  200 mg Oral Daily    metoprolol succinate (TOPROL XL) extended release tablet 25 mg  25 mg Oral Daily    Vitamin D (CHOLECALCIFEROL) tablet 1,000 Units  1,000 Units Oral Daily    zolpidem (AMBIEN) tablet 5 mg  5 mg Oral Nightly PRN    sodium chloride flush 0.9 % injection 5-40 mL  5-40 mL IntraVENous 2 times per day    sodium chloride flush 0.9 % injection 5-40 mL  5-40 mL IntraVENous PRN    ondansetron (ZOFRAN-ODT) disintegrating tablet 4 mg  4 mg Oral Q8H PRN    Or    ondansetron (ZOFRAN) injection 4 mg  4 mg IntraVENous Q6H PRN    polyethylene glycol (GLYCOLAX) packet 17 g  17 g Oral Daily PRN    acetaminophen (TYLENOL) tablet 650 mg  650 mg Oral Q6H PRN    Or    acetaminophen (TYLENOL) suppository 650 mg  650 mg Rectal Q6H PRN    potassium chloride (KLOR-CON M) extended release tablet 40 mEq  40 mEq Oral

## 2024-10-17 NOTE — PLAN OF CARE
Problem: Chronic Conditions and Co-morbidities  Goal: Patient's chronic conditions and co-morbidity symptoms are monitored and maintained or improved  Outcome: Progressing  Flowsheets (Taken 10/17/2024 0815)  Care Plan - Patient's Chronic Conditions and Co-Morbidity Symptoms are Monitored and Maintained or Improved:   Monitor and assess patient's chronic conditions and comorbid symptoms for stability, deterioration, or improvement   Collaborate with multidisciplinary team to address chronic and comorbid conditions and prevent exacerbation or deterioration   Update acute care plan with appropriate goals if chronic or comorbid symptoms are exacerbated and prevent overall improvement and discharge     Problem: Discharge Planning  Goal: Discharge to home or other facility with appropriate resources  Outcome: Progressing  Flowsheets (Taken 10/17/2024 0815)  Discharge to home or other facility with appropriate resources:   Arrange for needed discharge resources and transportation as appropriate   Identify barriers to discharge with patient and caregiver   Identify discharge learning needs (meds, wound care, etc)   Refer to discharge planning if patient needs post-hospital services based on physician order or complex needs related to functional status, cognitive ability or social support system     Problem: Safety - Adult  Goal: Free from fall injury  Outcome: Progressing     Problem: Pain  Goal: Verbalizes/displays adequate comfort level or baseline comfort level  Outcome: Progressing  Flowsheets  Taken 10/17/2024 0610 by Opal Carcamo RN  Verbalizes/displays adequate comfort level or baseline comfort level: Encourage patient to monitor pain and request assistance  Taken 10/17/2024 0445 by Opal Carcamo RN  Verbalizes/displays adequate comfort level or baseline comfort level: Encourage patient to monitor pain and request assistance  Taken 10/17/2024 0242 by Opal Carcamo RN  Verbalizes/displays adequate  comfort level or baseline comfort level: Encourage patient to monitor pain and request assistance  Taken 10/17/2024 0035 by Opal Carcamo RN  Verbalizes/displays adequate comfort level or baseline comfort level: Encourage patient to monitor pain and request assistance     Problem: Skin/Tissue Integrity  Goal: Absence of new skin breakdown  Description: 1.  Monitor for areas of redness and/or skin breakdown  2.  Assess vascular access sites hourly  3.  Every 4-6 hours minimum:  Change oxygen saturation probe site  4.  Every 4-6 hours:  If on nasal continuous positive airway pressure, respiratory therapy assess nares and determine need for appliance change or resting period.  Outcome: Progressing     Problem: Cardiovascular - Adult  Goal: Maintains optimal cardiac output and hemodynamic stability  Outcome: Progressing  Goal: Absence of cardiac dysrhythmias or at baseline  Outcome: Progressing     Problem: Gastrointestinal - Adult  Goal: Maintains or returns to baseline bowel function  Outcome: Progressing     Problem: Metabolic/Fluid and Electrolytes - Adult  Goal: Electrolytes maintained within normal limits  Outcome: Progressing  Flowsheets (Taken 10/17/2024 0815)  Electrolytes maintained within normal limits:   Monitor labs and assess patient for signs and symptoms of electrolyte imbalances   Administer electrolyte replacement as ordered  Goal: Hemodynamic stability and optimal renal function maintained  Outcome: Progressing

## 2024-10-17 NOTE — PROGRESS NOTES
[facility] CARDIOLOGY PROGRESS NOTE           10/17/2024 10:02 AM    Admit Date: 10/9/2024         Subjective: Getting bumex gtt doing well, diuresed well over the last 24 hours.    ROS:  Cardiovascular:  As noted above    Objective:      Vitals:    10/17/24 0012 10/17/24 0427 10/17/24 0738 10/17/24 0815   BP: (!) 80/49 (!) 87/58 (!) 86/71 96/66   Pulse: 89 82 83    Resp: 18 18 18    Temp: 97.3 °F (36.3 °C) 97.3 °F (36.3 °C) 97.9 °F (36.6 °C)    TempSrc:   Oral    SpO2: 98% 99% 99%    Weight:  94.6 kg (208 lb 8 oz)     Height:             Physical Exam:  General: Well Developed, Well Nourished, No Acute Distress, Alert & Oriented x 3, Appropriate mood  Neck: supple, no JVD  Heart: S1S2 with RRR without murmurs or gallops  Lungs: Clear throughout auscultation bilaterally without adventitious sounds  Abd: soft, nontender, nondistended, with good bowel sounds  Ext: 3+ edema bilaterally  Skin: warm and dry      Data Review:   Recent Labs     10/16/24  0427 10/16/24  1242 10/16/24  1619 10/17/24  0341   *  --   --  137   K 3.2*   < > 3.4* 3.5   BUN 24*  --   --  23    < > = values in this interval not displayed.       No results for input(s): \"TNIPOC\" in the last 72 hours.    Invalid input(s): \"TROIQ\"      Assessment/Plan:     Principal Problem:    Hyponatremia  Active Problems:    Acute on chronic systolic congestive heart failure (HCC)    DCM (dilated cardiomyopathy) ([facility])    H/O mitral valve repair    Transaminitis  Resolved Problems:    * No resolved hospital problems. *    A/P  1) sCHF -Bumex but changed to a Bumex infusion daily with daily BMPs  BB -metoprolol XL 25 mg daily  ACE/ARB/ARNi -Entresto 24/26 mg twice daily  MRA -held due to hypotension  SGLT2i -Jardiance 10 mg daily   2) Hyponatermia - resolved  3) Hypotension - goal to up titrate BP meds as Pt able to tolerate    Sal Graf MD  10/17/2024 10:02 AM

## 2024-10-17 NOTE — PROGRESS NOTES
Hospitalist Progress Note   Admit Date:  10/9/2024  6:47 PM   Name:  Kely Quach   Age:  64 y.o.  Sex:  female  :  1960   MRN:  [id number]   Room:  [facility]/    Presenting/Chief Complaint: No chief complaint on file.     Reason(s) for Admission: Hyponatremia with excess extracellular fluid volume [E87.1]     Hospital Course:     Copied from prior provider HPI/summary:  Kely Quach is a 64 y.o. female with medical history of CHF, HLD, mood disorder, and chronic hyponatremia who presented to the ER with complaint of worsening weakness, fatigue, and peripheral edema since discharge from this facility on 10/2/24.  She was admitted  for sodium of 123.  Workup at the time was consistent with SIADH.  BP was borderline during the hospitalization, per my personal review of discharge summary, and so valsartan, metoprolol, and Aldactone were discontinued and midodrine 5 mg 3 times daily started.  Since her recent discharge she endorsed compliance with all prescribed medications and 1000 cc fluid restriction.     ED eval showed sodium of 123, BNP 22,384 (up from 5000+ last admit), and new transaminitis. CXR with cardiomegaly with central pulmonary vascular congestion not changed from prior.  Hospitalist service was requested to admit for further management.        Subjective & 24hr Events:     Now on Bumex drip and effective diuresis-appreciate cardiology following.  Hyponatremia improved now on 130s.  Potassium 3.5-monitoring daily lab work regarding Bumex drip.  LVEF 10 to 15%-no dyspnea at rest.     Assessment & Plan:      Hyponatremia  Improved.  Remains urea daily.  Nephrology following, discontinued tolvaptan yesterday.  -resolved     Acute on chronic systolic congestive heart failure (EF 10-15%; 10/10/2024)/dilated cardiomyopathy/history of mitral valve repair  She remains significantly volume overloaded.  Fluid balance -16.0 L since admission.  Transitioned from Bumex 3 times daily to  10/16/24 12:42 PM   Result Value Ref Range    Potassium 3.2 (L) 3.5 - 5.1 mmol/L   Potassium    Collection Time: 10/16/24  4:19 PM   Result Value Ref Range    Potassium 3.4 (L) 3.5 - 5.1 mmol/L   Basic Metabolic Panel    Collection Time: 10/17/24  3:41 AM   Result Value Ref Range    Sodium 137 136 - 145 mmol/L    Potassium 3.5 3.5 - 5.1 mmol/L    Chloride 93 (L) 98 - 107 mmol/L    CO2 33 (H) 20 - 28 mmol/L    Anion Gap 11 9 - 18 mmol/L    Glucose 96 70 - 99 mg/dL    BUN 23 8 - 23 MG/DL    Creatinine 0.84 0.60 - 1.10 MG/DL    Est, Glom Filt Rate 78 >60 ml/min/1.73m2    Calcium 9.3 8.8 - 10.2 MG/DL   Hepatic Function Panel    Collection Time: 10/17/24  3:41 AM   Result Value Ref Range    Total Protein 6.8 6.3 - 8.2 g/dL    Albumin 3.8 3.2 - 4.6 g/dL    Globulin 3.1 2.3 - 3.5 g/dL    Albumin/Globulin Ratio 1.2 1.0 - 1.9      Total Bilirubin 1.5 (H) 0.0 - 1.2 MG/DL    Bilirubin, Direct 0.7 (H) 0.0 - 0.4 MG/DL    Alk Phosphatase 118 (H) 35 - 104 U/L    AST 49 (H) 15 - 37 U/L    ALT 63 (H) 8 - 45 U/L       No results for input(s): \"COVID19\" in the last 72 hours.    Current Meds:  Current Facility-Administered Medications   Medication Dose Route Frequency    bumetanide (BUMEX) 12.5 mg in 50 mL infusion  1 mg/hr IntraVENous Continuous    urea (URE-NA) packet 15 g  15 g Oral Daily    enoxaparin (LOVENOX) injection 40 mg  40 mg SubCUTAneous Daily    traMADol (ULTRAM) tablet 25 mg  25 mg Oral Q6H PRN    sennosides-docusate sodium (SENOKOT-S) 8.6-50 MG tablet 1 tablet  1 tablet Oral BID    empagliflozin (JARDIANCE) tablet 10 mg  10 mg Oral Daily    sacubitril-valsartan (ENTRESTO) 24-26 MG per tablet 1 tablet  1 tablet Oral BID    aspirin EC tablet 81 mg  81 mg Oral Daily    cetirizine (ZYRTEC) tablet 10 mg  10 mg Oral Daily    magnesium oxide (MAG-OX) tablet 200 mg  200 mg Oral Daily    metoprolol succinate (TOPROL XL) extended release tablet 25 mg  25 mg Oral Daily    Vitamin D (CHOLECALCIFEROL) tablet 1,000 Units  1,000 Units

## 2024-10-18 PROBLEM — R06.02 SHORTNESS OF BREATH: Status: ACTIVE | Noted: 2024-10-18

## 2024-10-18 PROBLEM — R60.9 EDEMA: Status: ACTIVE | Noted: 2024-10-18

## 2024-10-18 LAB
ANION GAP SERPL CALC-SCNC: 10 MMOL/L (ref 9–18)
ANION GAP SERPL CALC-SCNC: 10 MMOL/L (ref 9–18)
BASOPHILS # BLD: 0 K/UL (ref 0–0.2)
BASOPHILS NFR BLD: 1 % (ref 0–2)
BUN SERPL-MCNC: 25 MG/DL (ref 8–23)
BUN SERPL-MCNC: 33 MG/DL (ref 8–23)
CALCIUM SERPL-MCNC: 9.1 MG/DL (ref 8.8–10.2)
CALCIUM SERPL-MCNC: 9.3 MG/DL (ref 8.8–10.2)
CHLORIDE SERPL-SCNC: 90 MMOL/L (ref 98–107)
CHLORIDE SERPL-SCNC: 92 MMOL/L (ref 98–107)
CO2 SERPL-SCNC: 35 MMOL/L (ref 20–28)
CO2 SERPL-SCNC: 35 MMOL/L (ref 20–28)
CREAT SERPL-MCNC: 0.83 MG/DL (ref 0.6–1.1)
CREAT SERPL-MCNC: 0.91 MG/DL (ref 0.6–1.1)
DIFFERENTIAL METHOD BLD: ABNORMAL
EOSINOPHIL # BLD: 0.2 K/UL (ref 0–0.8)
EOSINOPHIL NFR BLD: 5 % (ref 0.5–7.8)
ERYTHROCYTE [DISTWIDTH] IN BLOOD BY AUTOMATED COUNT: 16.5 % (ref 11.9–14.6)
GLUCOSE SERPL-MCNC: 106 MG/DL (ref 70–99)
GLUCOSE SERPL-MCNC: 89 MG/DL (ref 70–99)
HCT VFR BLD AUTO: 41.7 % (ref 35.8–46.3)
HGB BLD-MCNC: 13.8 G/DL (ref 11.7–15.4)
IMM GRANULOCYTES # BLD AUTO: 0 K/UL (ref 0–0.5)
IMM GRANULOCYTES NFR BLD AUTO: 1 % (ref 0–5)
LYMPHOCYTES # BLD: 0.8 K/UL (ref 0.5–4.6)
LYMPHOCYTES NFR BLD: 21 % (ref 13–44)
MAGNESIUM SERPL-MCNC: 2 MG/DL (ref 1.8–2.4)
MCH RBC QN AUTO: 30 PG (ref 26.1–32.9)
MCHC RBC AUTO-ENTMCNC: 33.1 G/DL (ref 31.4–35)
MCV RBC AUTO: 90.7 FL (ref 82–102)
MONOCYTES # BLD: 0.5 K/UL (ref 0.1–1.3)
MONOCYTES NFR BLD: 11 % (ref 4–12)
NEUTS SEG # BLD: 2.5 K/UL (ref 1.7–8.2)
NEUTS SEG NFR BLD: 61 % (ref 43–78)
NRBC # BLD: 0 K/UL (ref 0–0.2)
PLATELET # BLD AUTO: 226 K/UL (ref 150–450)
PMV BLD AUTO: 9.4 FL (ref 9.4–12.3)
POTASSIUM SERPL-SCNC: 3 MMOL/L (ref 3.5–5.1)
POTASSIUM SERPL-SCNC: 3.8 MMOL/L (ref 3.5–5.1)
RBC # BLD AUTO: 4.6 M/UL (ref 4.05–5.2)
SODIUM SERPL-SCNC: 135 MMOL/L (ref 136–145)
SODIUM SERPL-SCNC: 137 MMOL/L (ref 136–145)
WBC # BLD AUTO: 4 K/UL (ref 4.3–11.1)

## 2024-10-18 PROCEDURE — 6360000002 HC RX W HCPCS: Performed by: INTERNAL MEDICINE

## 2024-10-18 PROCEDURE — 6370000000 HC RX 637 (ALT 250 FOR IP): Performed by: INTERNAL MEDICINE

## 2024-10-18 PROCEDURE — 6370000000 HC RX 637 (ALT 250 FOR IP): Performed by: FAMILY MEDICINE

## 2024-10-18 PROCEDURE — 36415 COLL VENOUS BLD VENIPUNCTURE: CPT

## 2024-10-18 PROCEDURE — 97530 THERAPEUTIC ACTIVITIES: CPT

## 2024-10-18 PROCEDURE — 2580000003 HC RX 258: Performed by: FAMILY MEDICINE

## 2024-10-18 PROCEDURE — 99232 SBSQ HOSP IP/OBS MODERATE 35: CPT | Performed by: INTERNAL MEDICINE

## 2024-10-18 PROCEDURE — 6360000002 HC RX W HCPCS: Performed by: FAMILY MEDICINE

## 2024-10-18 PROCEDURE — 1100000003 HC PRIVATE W/ TELEMETRY

## 2024-10-18 PROCEDURE — 80048 BASIC METABOLIC PNL TOTAL CA: CPT

## 2024-10-18 PROCEDURE — 6370000000 HC RX 637 (ALT 250 FOR IP): Performed by: NURSE PRACTITIONER

## 2024-10-18 PROCEDURE — 85025 COMPLETE CBC W/AUTO DIFF WBC: CPT

## 2024-10-18 PROCEDURE — 83735 ASSAY OF MAGNESIUM: CPT

## 2024-10-18 RX ORDER — LANOLIN ALCOHOL/MO/W.PET/CERES
400 CREAM (GRAM) TOPICAL DAILY
Status: DISCONTINUED | OUTPATIENT
Start: 2024-10-18 | End: 2024-10-25 | Stop reason: HOSPADM

## 2024-10-18 RX ORDER — POTASSIUM CHLORIDE 1500 MG/1
40 TABLET, EXTENDED RELEASE ORAL EVERY 4 HOURS
Status: DISPENSED | OUTPATIENT
Start: 2024-10-18 | End: 2024-10-19

## 2024-10-18 RX ADMIN — DOCUSATE SODIUM 50 MG AND SENNOSIDES 8.6 MG 1 TABLET: 8.6; 5 TABLET, FILM COATED ORAL at 07:58

## 2024-10-18 RX ADMIN — MAGNESIUM GLUCONATE 500 MG ORAL TABLET 400 MG: 500 TABLET ORAL at 13:58

## 2024-10-18 RX ADMIN — ASPIRIN 81 MG: 81 TABLET, COATED ORAL at 07:58

## 2024-10-18 RX ADMIN — POTASSIUM CHLORIDE 40 MEQ: 1500 TABLET, EXTENDED RELEASE ORAL at 13:58

## 2024-10-18 RX ADMIN — ENOXAPARIN SODIUM 40 MG: 100 INJECTION SUBCUTANEOUS at 07:58

## 2024-10-18 RX ADMIN — CETIRIZINE HYDROCHLORIDE 10 MG: 10 TABLET, FILM COATED ORAL at 07:58

## 2024-10-18 RX ADMIN — FAMOTIDINE 20 MG: 20 TABLET, FILM COATED ORAL at 07:58

## 2024-10-18 RX ADMIN — EMPAGLIFLOZIN 10 MG: 10 TABLET, FILM COATED ORAL at 07:58

## 2024-10-18 RX ADMIN — VITAMIN D, TAB 1000IU (100/BT) 1000 UNITS: 25 TAB at 07:58

## 2024-10-18 RX ADMIN — SACUBITRIL AND VALSARTAN 1 TABLET: 24; 26 TABLET, FILM COATED ORAL at 20:03

## 2024-10-18 RX ADMIN — SODIUM CHLORIDE, PRESERVATIVE FREE 10 ML: 5 INJECTION INTRAVENOUS at 20:03

## 2024-10-18 RX ADMIN — DOCUSATE SODIUM 50 MG AND SENNOSIDES 8.6 MG 1 TABLET: 8.6; 5 TABLET, FILM COATED ORAL at 20:03

## 2024-10-18 RX ADMIN — POTASSIUM CHLORIDE 40 MEQ: 1500 TABLET, EXTENDED RELEASE ORAL at 17:38

## 2024-10-18 RX ADMIN — SACUBITRIL AND VALSARTAN 1 TABLET: 24; 26 TABLET, FILM COATED ORAL at 07:58

## 2024-10-18 RX ADMIN — POTASSIUM BICARBONATE 40 MEQ: 782 TABLET, EFFERVESCENT ORAL at 06:41

## 2024-10-18 RX ADMIN — METOPROLOL SUCCINATE 25 MG: 25 TABLET, EXTENDED RELEASE ORAL at 07:58

## 2024-10-18 RX ADMIN — SODIUM CHLORIDE, PRESERVATIVE FREE 10 ML: 5 INJECTION INTRAVENOUS at 08:01

## 2024-10-18 RX ADMIN — Medication 15 G: at 07:57

## 2024-10-18 RX ADMIN — POTASSIUM CHLORIDE 10 MEQ: 7.46 INJECTION, SOLUTION INTRAVENOUS at 05:32

## 2024-10-18 RX ADMIN — FAMOTIDINE 20 MG: 20 TABLET, FILM COATED ORAL at 20:03

## 2024-10-18 RX ADMIN — BUMETANIDE 1 MG/HR: 0.25 INJECTION INTRAMUSCULAR; INTRAVENOUS at 03:23

## 2024-10-18 RX ADMIN — MAGNESIUM GLUCONATE 500 MG ORAL TABLET 200 MG: 500 TABLET ORAL at 07:57

## 2024-10-18 ASSESSMENT — PAIN SCALES - GENERAL: PAINLEVEL_OUTOF10: 0

## 2024-10-18 NOTE — PROGRESS NOTES
[facility] CARDIOLOGY PROGRESS NOTE    10/18/2024 6:28 AM    Admit Date: 10/9/2024        Subjective:   Stable overnight without angina or palpitations.  Elevating legs but remains significantly edematous.  BP marginal but tolerating well.  Continues Bumex drip.  Repleting electrolytes this morning.  No other complaints overnight. Tolerating meds well.  Weight down 23 pounds since admission, net 23 L out thus far as well.      Objective:      Vitals:    10/17/24 2054 10/17/24 2146 10/17/24 2353 10/18/24 0511   BP: (!) 87/59  (!) 94/56 (!) 72/59   Pulse: 87  87 83   Resp: 18 18 18 16   Temp: 97.7 °F (36.5 °C)  97.9 °F (36.6 °C) 97.9 °F (36.6 °C)   TempSrc: Oral  Oral Oral   SpO2: 98%  97% 96%   Weight:       Height:           Physical Exam:  Neck- supple, 8 cm JVD sitting upright  CV- regular rate and rhythm no MRG  Lung- clear bilaterally, mildly decreased bibasilar but no crackles or wheezing  Abd- soft, nontender, subjectively distended  Ext-tense 3+ pitting edema to above the knees bilaterally, stasis dermatitis below both knees  Skin- warm and dry    Data Review:   Pertinent lab and noninvasive imaging over the past 24 hours reviewed and evaluated.    Recent Labs     10/17/24  0341 10/18/24  0419    135*   K 3.5 3.0*   MG  --  2.0   BUN 23 25*   WBC  --  4.0*   HGB  --  13.8   HCT  --  41.7   PLT  --  226     Lab Results   Component Value Date     (H) 10/10/2024     Echocardiogram 10/10/2024:  Left Ventricle Severely reduced left ventricular systolic function with a visually estimated EF of 10 -15%. Left ventricle is severely dilated. Wall is thinner than normal. Severe global hypokinesis present. Abnormal diastolic function.   Left Atrium Left atrium is dilated.   Right Ventricle Right ventricle is dilated. Lead present in the right ventricle. Reduced systolic function.   Right Atrium Lead present in the right atrium. Right atrium is dilated.   Aortic Valve Valve structure is normal. No

## 2024-10-18 NOTE — CARE COORDINATION
CM reviewed clinical notes and patient's status discussed in IDR. Per Cardiology, continue to diurese with IV drip. Replete eletrolytes.   PT recommending home health. No DME needs.  Transition of care with [facility] Rn, PT.

## 2024-10-18 NOTE — PROGRESS NOTES
balance/strengthening.  She used RW for balance.   At end of session she is positioned back up in chair with all needs in reach. Patient overall demonstrates progress with activity tolerance and exercises. Will continue to follow.      SUBJECTIVE:   Ms. Quach states, \"I need to walk more\"     Social/Functional Lives With: Spouse  Type of Home: House  Home Layout: One level  Home Access: Stairs to enter with rails  Entrance Stairs - Number of Steps: 5 steps  Entrance Stairs - Rails: Both  Bathroom Shower/Tub: Tub/Shower unit  Bathroom Toilet: Standard  Bathroom Equipment: None  Bathroom Accessibility: Accessible  Home Equipment: Walker - Rolling  Receives Help From: Family  ADL Assistance: Independent  Homemaking Assistance: Independent  Homemaking Responsibilities: Yes  Ambulation Assistance: Independent  Transfer Assistance: Independent  Active : Yes  Mode of Transportation: Car  Occupation: On disability  Additional Comments: Independent, drives. No AD, no falls. RW, BSC, grab bars, WIS, shower chair, elevated toilet, reacher  OBJECTIVE:     PAIN: VITALS / O2: PRECAUTION / LINES / DRAINS:   Pre Treatment:   Pain Assessment: None - Denies Pain      Post Treatment: 0/10 Vitals        Oxygen    Telemetry     RESTRICTIONS/PRECAUTIONS:  Restrictions/Precautions  Restrictions/Precautions: Fall Risk  Restrictions/Precautions: Fall Risk     MOBILITY: I Mod I S SBA CGA Min Mod Max Total  NT x2 Comments:   Bed Mobility    Rolling [] [] [] [] [] [] [] [] [] [x] [] Up in chair upon arrival    Supine to Sit [] [] [] [] [] [] [] [] [] [x] []    Scooting [] [] [] [] [] [] [] [] [] [x] []    Sit to Supine [] [] [] [] [] [] [] [] [] [x] []    Transfers    Sit to Stand [] [] [] [x] [] [] [] [] [] [] []    Bed to Chair [] [] [] [] [] [] [] [] [] [] []    Stand to Sit [] [] [] [x] [] [] [] [] [] [] []     [] [] [] [] [] [] [] [] [] [] []    I=Independent, Mod I=Modified Independent, S=Supervision, SBA=Standby Assistance,  CGA=Contact Guard Assistance,   Min=Minimal Assistance, Mod=Moderate Assistance, Max=Maximal Assistance, Total=Total Assistance, NT=Not Tested    BALANCE: Good Fair+ Fair Fair- Poor NT Comments   Sitting Static [x] [] [] [] [] []    Sitting Dynamic [x] [] [] [] [] []              Standing Static [] [] [x] [] [] []    Standing Dynamic [] [] [] [x] [] []      GAIT: I Mod I S SBA CGA Min Mod Max Total  NT x2 Comments:   Level of Assistance [] [] [] [x] [] [] [] [] [] [] []    Distance   150 feet    DME Rolling Walker    Gait Quality Decreased step clearance, Decreased step length, and Wide base of support    Weightbearing Status      Stairs      I=Independent, Mod I=Modified Independent, S=Supervision, SBA=Standby Assistance, CGA=Contact Guard Assistance,   Min=Minimal Assistance, Mod=Moderate Assistance, Max=Maximal Assistance, Total=Total Assistance, NT=Not Tested    PLAN:   FREQUENCY AND DURATION: 3 times/week for duration of hospital stay or until stated goals are met, whichever comes first.    TREATMENT:   TREATMENT:   Therapeutic Activity (24 Minutes): Therapeutic activity included Transfer Training, Ambulation on level ground, Sitting balance , Standing balance, and standing ex's to improve functional Activity tolerance, Balance, Mobility, and Strength.    TREATMENT GRID:  N/A     Date: 10/18/24        Ambulation  Device  assistance         Partial Squats         Hip Abduction/ Adduction Standing 2x10 AB        Heel Raises  Standing 2x10 AB        Toe Raises Standing 2x10 AB        Hip Flexion Standing 2x10 AB        Sit to Stand         Key:  R=right, L=left, B=bilaterally, A=active, AA=active assisted, P=passive      AFTER TREATMENT PRECAUTIONS: Bed/Chair Locked, Call light within reach, Chair, Needs within reach, and RN notified    INTERDISCIPLINARY COLLABORATION:  RN/ PCT and PT/ PTA    EDUCATION:      TIME IN/OUT:  Time In: 0936  Time Out: 1000  Minutes: 24    A Jasmin Miranda, PT

## 2024-10-18 NOTE — PROGRESS NOTES
Hospitalist Progress Note   Admit Date:  10/9/2024  6:47 PM   Name:  Kely Quach   Age:  64 y.o.  Sex:  female  :  1960   MRN:  [id number]   Room:  [facility]/    Presenting/Chief Complaint: No chief complaint on file.     Reason(s) for Admission: Hyponatremia with excess extracellular fluid volume [E87.1]     Hospital Course:     Copied from prior provider HPI/summary:  Kely Quach is a 64 y.o. female with medical history of CHF, HLD, mood disorder, and chronic hyponatremia who presented to the ER with complaint of worsening weakness, fatigue, and peripheral edema since discharge from this facility on 10/2/24.  She was admitted  for sodium of 123.  Workup at the time was consistent with SIADH.  BP was borderline during the hospitalization, per my personal review of discharge summary, and so valsartan, metoprolol, and Aldactone were discontinued and midodrine 5 mg 3 times daily started.  Since her recent discharge she endorsed compliance with all prescribed medications and 1000 cc fluid restriction.     ED eval showed sodium of 123, BNP 22,384 (up from 5000+ last admit), and new transaminitis. CXR with cardiomegaly with central pulmonary vascular congestion not changed from prior.  Hospitalist service was requested to admit for further management.        Subjective & 24hr Events:     Bumex drip on hold due to profound hypokalemia 23 L net negative loss with significant weight loss since admission.  Difficulty tolerating goal-directed medical therapy due to low BP-ejection fraction estimated around 10% by echocardiogram.  Hyponatremia improved-nephrology appreciated-patient received single dose tolvaptan and IV hydration seems to have been total body water overloaded.  She denies chest pain.  No dyspnea at rest but dyspnea with minimal exertion and persistent orthopnea.  No shaking chills fevers dysuria nausea or vomiting.     Assessment & Plan:      Hyponatremia  Improved.  Remains urea daily.   Nephrology following, discontinued tolvaptan yesterday.  October 17-resolved     Acute on chronic systolic congestive heart failure (EF 10-15%; 10/10/2024)/dilated cardiomyopathy/history of mitral valve repair  She remains significantly volume overloaded.  Fluid balance -16.0 L since admission.  Transitioned from Bumex 3 times daily to Bumex gtt. at 1 mg/h today per cardiology.  Remains on ASA 81, Jardiance, metoprolol, Entresto.  Borderline to low BP precludes use of mineralocorticoid receptor antagonist.  To be referred to the advanced heart failure clinic on discharge.  Poor long-term prognosis per cardiology.  October 17-requiring Bumex drip-appreciate cardiology follow-up and management-receiving goal-directed medical therapy with borderline hypotension  October 18-hold Bumex drip-correct electrolytes.  Recheck labs this p.m.  Consider convert to intermittent IV or oral dosing.  Cardiology managing.     Transaminitis  LFTs continued to decline with ongoing diuresis as the etiology is that of congestive hepatopathy.  October 17 suspect hepatic congestion-intermittent monitoring-should improve  October 18-slowly improving-monitor intermittent.     Morbid obesity (BMI 40.1)  Complicates all aspects of care.  Dietary and lifestyle modification.  Some weight loss will be expected with ongoing diuresis.        Anticipated Discharge Arrangements:   Home Health.  She remains markedly volume overloaded and requires ongoing IV diuretics and and laboratory monitoring.  Potential discharge late this week at the earliest.     PT/OT evals ordered?  Not ordered; patient not expected to need rehab  Diet:  ADULT DIET; Regular  VTE prophylaxis: Lovenox  Code status: Full Code                Non-peripheral Lines and Tubes (if present):          Telemetry (if present):  Cardiac/Telemetry Monitor On: Bedside monitor in use, Portable telemetry pack applied        Hospital Problems:  Principal Problem:    Hyponatremia  Active

## 2024-10-18 NOTE — PROGRESS NOTES
Kely Quach  Admission Date: 10/9/2024         [facility] Progress Note: 10/18/2024    Follow-up for: Hyponatremia     The patient's chart is reviewed and the patient is discussed with the staff.    Subjective:   Pt seen and examined in room     ROS:  Gen - no fever, no chills  CV - no chest pain  Lung - no shortness of breath, no cough  Abd - no tenderness, no nausea/vomiting  Ext - + edema    Current Facility-Administered Medications   Medication Dose Route Frequency    bumetanide (BUMEX) 12.5 mg in 50 mL infusion  1 mg/hr IntraVENous Continuous    urea (URE-NA) packet 15 g  15 g Oral Daily    enoxaparin (LOVENOX) injection 40 mg  40 mg SubCUTAneous Daily    traMADol (ULTRAM) tablet 25 mg  25 mg Oral Q6H PRN    sennosides-docusate sodium (SENOKOT-S) 8.6-50 MG tablet 1 tablet  1 tablet Oral BID    empagliflozin (JARDIANCE) tablet 10 mg  10 mg Oral Daily    sacubitril-valsartan (ENTRESTO) 24-26 MG per tablet 1 tablet  1 tablet Oral BID    aspirin EC tablet 81 mg  81 mg Oral Daily    cetirizine (ZYRTEC) tablet 10 mg  10 mg Oral Daily    magnesium oxide (MAG-OX) tablet 200 mg  200 mg Oral Daily    metoprolol succinate (TOPROL XL) extended release tablet 25 mg  25 mg Oral Daily    Vitamin D (CHOLECALCIFEROL) tablet 1,000 Units  1,000 Units Oral Daily    zolpidem (AMBIEN) tablet 5 mg  5 mg Oral Nightly PRN    sodium chloride flush 0.9 % injection 5-40 mL  5-40 mL IntraVENous 2 times per day    sodium chloride flush 0.9 % injection 5-40 mL  5-40 mL IntraVENous PRN    ondansetron (ZOFRAN-ODT) disintegrating tablet 4 mg  4 mg Oral Q8H PRN    Or    ondansetron (ZOFRAN) injection 4 mg  4 mg IntraVENous Q6H PRN    polyethylene glycol (GLYCOLAX) packet 17 g  17 g Oral Daily PRN    acetaminophen (TYLENOL) tablet 650 mg  650 mg Oral Q6H PRN    Or    acetaminophen (TYLENOL) suppository 650 mg  650 mg Rectal Q6H PRN    potassium chloride (KLOR-CON M) extended release tablet 40 mEq  40 mEq Oral

## 2024-10-18 NOTE — PROGRESS NOTES
Comprehensive Nutrition Assessment    Type and Reason for Visit: Reassess  General Nutrition Management/other reason (Hospitalists)    Nutrition Recommendations/Plan:   Meals and Snacks:  Diet: Continue current order  Oral Nutriton Supplement Therapy:   Medical food supplement therapy:  None Not applicable     Malnutrition Assessment:  Malnutrition Status: Insufficient data  Context: Chronic Illness  Findings of clinical characteristics of malnutrition:   Energy Intake:  Unable to assess (Reports bites of meals for months, mostly consuming fruits and vegetables)  Weight Loss:  Unable to assess     Body Fat Loss:  Unable to assess     Muscle Mass Loss:  Mild muscle mass loss Temples (temporalis), Hand (interosseous)  Fluid Accumulation:  Unable to assess     Strength:  Not Performed     Nutrition Assessment:  Nutrition History: Patient in bed with sister at bedside. She states that she has been trying to eat but is able to take a few bites of food at best. She states this has been ongoing for about 2 months. She figured out it was a lack of taste last admission when her sodium improved. She stated instantly was able to taste things normally. When her tastes are off she specifically is turned off by meats. She is able to tolerate a little bit of tuna salad and a little bit of chicken salad.      Do You Have Any Cultural, Orthodox, or Ethnic Food Preferences?: No   Weight History: 10/16/23 205# (cards). Per IM office 5/6/24 210#, 5/30/24  209#, 8/29/24 207#  Nutrition Background:       PMH significant for CHF, HLD, MVR, mood disorder. She was recently admitted for hyponatremia and diagnosed with new SIADH. She presented back 7 days later with worsening fatigue and peripheral edema. She was admitted with hyponatremia and transaminitis.   Nutrition Interval:  Pt is visiting with hospital staff during all my attempted visits today. Pt's appetite and intakes continue to improve throughout admit as pt continues to

## 2024-10-19 LAB
ANION GAP SERPL CALC-SCNC: 11 MMOL/L (ref 9–18)
BASOPHILS # BLD: 0.1 K/UL (ref 0–0.2)
BASOPHILS NFR BLD: 2 % (ref 0–2)
BUN SERPL-MCNC: 28 MG/DL (ref 8–23)
CALCIUM SERPL-MCNC: 9.5 MG/DL (ref 8.8–10.2)
CHLORIDE SERPL-SCNC: 93 MMOL/L (ref 98–107)
CO2 SERPL-SCNC: 32 MMOL/L (ref 20–28)
CREAT SERPL-MCNC: 0.83 MG/DL (ref 0.6–1.1)
DIFFERENTIAL METHOD BLD: ABNORMAL
EOSINOPHIL # BLD: 0.1 K/UL (ref 0–0.8)
EOSINOPHIL NFR BLD: 4 % (ref 0.5–7.8)
ERYTHROCYTE [DISTWIDTH] IN BLOOD BY AUTOMATED COUNT: 16.6 % (ref 11.9–14.6)
GLUCOSE SERPL-MCNC: 99 MG/DL (ref 70–99)
HCT VFR BLD AUTO: 38.8 % (ref 35.8–46.3)
HGB BLD-MCNC: 12.8 G/DL (ref 11.7–15.4)
IMM GRANULOCYTES # BLD AUTO: 0 K/UL (ref 0–0.5)
IMM GRANULOCYTES NFR BLD AUTO: 0 % (ref 0–5)
LYMPHOCYTES # BLD: 0.9 K/UL (ref 0.5–4.6)
LYMPHOCYTES NFR BLD: 23 % (ref 13–44)
MAGNESIUM SERPL-MCNC: 2.1 MG/DL (ref 1.8–2.4)
MCH RBC QN AUTO: 29.6 PG (ref 26.1–32.9)
MCHC RBC AUTO-ENTMCNC: 33 G/DL (ref 31.4–35)
MCV RBC AUTO: 89.6 FL (ref 82–102)
MONOCYTES # BLD: 0.4 K/UL (ref 0.1–1.3)
MONOCYTES NFR BLD: 11 % (ref 4–12)
NEUTS SEG # BLD: 2.4 K/UL (ref 1.7–8.2)
NEUTS SEG NFR BLD: 60 % (ref 43–78)
NRBC # BLD: 0 K/UL (ref 0–0.2)
PLATELET # BLD AUTO: 229 K/UL (ref 150–450)
PMV BLD AUTO: 9.3 FL (ref 9.4–12.3)
POTASSIUM SERPL-SCNC: 3.8 MMOL/L (ref 3.5–5.1)
RBC # BLD AUTO: 4.33 M/UL (ref 4.05–5.2)
SODIUM SERPL-SCNC: 136 MMOL/L (ref 136–145)
WBC # BLD AUTO: 3.9 K/UL (ref 4.3–11.1)

## 2024-10-19 PROCEDURE — 6360000002 HC RX W HCPCS: Performed by: INTERNAL MEDICINE

## 2024-10-19 PROCEDURE — 6370000000 HC RX 637 (ALT 250 FOR IP): Performed by: INTERNAL MEDICINE

## 2024-10-19 PROCEDURE — 2580000003 HC RX 258: Performed by: FAMILY MEDICINE

## 2024-10-19 PROCEDURE — 85025 COMPLETE CBC W/AUTO DIFF WBC: CPT

## 2024-10-19 PROCEDURE — 99232 SBSQ HOSP IP/OBS MODERATE 35: CPT | Performed by: INTERNAL MEDICINE

## 2024-10-19 PROCEDURE — 80048 BASIC METABOLIC PNL TOTAL CA: CPT

## 2024-10-19 PROCEDURE — 6370000000 HC RX 637 (ALT 250 FOR IP): Performed by: FAMILY MEDICINE

## 2024-10-19 PROCEDURE — 6370000000 HC RX 637 (ALT 250 FOR IP): Performed by: NURSE PRACTITIONER

## 2024-10-19 PROCEDURE — 83735 ASSAY OF MAGNESIUM: CPT

## 2024-10-19 PROCEDURE — 1100000003 HC PRIVATE W/ TELEMETRY

## 2024-10-19 PROCEDURE — 36415 COLL VENOUS BLD VENIPUNCTURE: CPT

## 2024-10-19 RX ORDER — TORSEMIDE 20 MG/1
20 TABLET ORAL DAILY
Status: DISCONTINUED | OUTPATIENT
Start: 2024-10-19 | End: 2024-10-20

## 2024-10-19 RX ORDER — SPIRONOLACTONE 25 MG/1
25 TABLET ORAL DAILY
Status: DISCONTINUED | OUTPATIENT
Start: 2024-10-19 | End: 2024-10-25 | Stop reason: HOSPADM

## 2024-10-19 RX ADMIN — MAGNESIUM GLUCONATE 500 MG ORAL TABLET 400 MG: 500 TABLET ORAL at 08:30

## 2024-10-19 RX ADMIN — TRAMADOL HYDROCHLORIDE 25 MG: 50 TABLET ORAL at 20:37

## 2024-10-19 RX ADMIN — SPIRONOLACTONE 25 MG: 25 TABLET ORAL at 08:31

## 2024-10-19 RX ADMIN — FAMOTIDINE 20 MG: 20 TABLET, FILM COATED ORAL at 08:31

## 2024-10-19 RX ADMIN — SACUBITRIL AND VALSARTAN 1 TABLET: 24; 26 TABLET, FILM COATED ORAL at 19:53

## 2024-10-19 RX ADMIN — Medication 15 G: at 08:31

## 2024-10-19 RX ADMIN — CETIRIZINE HYDROCHLORIDE 10 MG: 10 TABLET, FILM COATED ORAL at 08:31

## 2024-10-19 RX ADMIN — SODIUM CHLORIDE, PRESERVATIVE FREE 10 ML: 5 INJECTION INTRAVENOUS at 08:32

## 2024-10-19 RX ADMIN — SODIUM CHLORIDE, PRESERVATIVE FREE 10 ML: 5 INJECTION INTRAVENOUS at 19:53

## 2024-10-19 RX ADMIN — TORSEMIDE 20 MG: 20 TABLET ORAL at 08:31

## 2024-10-19 RX ADMIN — METOPROLOL SUCCINATE 25 MG: 25 TABLET, EXTENDED RELEASE ORAL at 08:31

## 2024-10-19 RX ADMIN — TRAMADOL HYDROCHLORIDE 25 MG: 50 TABLET ORAL at 04:58

## 2024-10-19 RX ADMIN — FAMOTIDINE 20 MG: 20 TABLET, FILM COATED ORAL at 19:53

## 2024-10-19 RX ADMIN — ASPIRIN 81 MG: 81 TABLET, COATED ORAL at 08:30

## 2024-10-19 RX ADMIN — DOCUSATE SODIUM 50 MG AND SENNOSIDES 8.6 MG 1 TABLET: 8.6; 5 TABLET, FILM COATED ORAL at 08:30

## 2024-10-19 RX ADMIN — EMPAGLIFLOZIN 10 MG: 10 TABLET, FILM COATED ORAL at 08:31

## 2024-10-19 RX ADMIN — VITAMIN D, TAB 1000IU (100/BT) 1000 UNITS: 25 TAB at 08:30

## 2024-10-19 RX ADMIN — SACUBITRIL AND VALSARTAN 1 TABLET: 24; 26 TABLET, FILM COATED ORAL at 08:31

## 2024-10-19 RX ADMIN — ENOXAPARIN SODIUM 40 MG: 100 INJECTION SUBCUTANEOUS at 08:31

## 2024-10-19 RX ADMIN — DOCUSATE SODIUM 50 MG AND SENNOSIDES 8.6 MG 1 TABLET: 8.6; 5 TABLET, FILM COATED ORAL at 19:53

## 2024-10-19 ASSESSMENT — PAIN DESCRIPTION - LOCATION: LOCATION: LEG;FOOT

## 2024-10-19 ASSESSMENT — PAIN SCALES - GENERAL
PAINLEVEL_OUTOF10: 5
PAINLEVEL_OUTOF10: 5
PAINLEVEL_OUTOF10: 0

## 2024-10-19 ASSESSMENT — PAIN - FUNCTIONAL ASSESSMENT: PAIN_FUNCTIONAL_ASSESSMENT: ACTIVITIES ARE NOT PREVENTED

## 2024-10-19 ASSESSMENT — PAIN DESCRIPTION - DESCRIPTORS: DESCRIPTORS: THROBBING

## 2024-10-19 ASSESSMENT — PAIN DESCRIPTION - ORIENTATION: ORIENTATION: LEFT;RIGHT

## 2024-10-19 NOTE — PROGRESS NOTES
Kely Quach  Admission Date: 10/9/2024         [facility] Progress Note: 10/19/2024    Follow-up for: Hyponatremia     The patient's chart is reviewed and the patient is discussed with the staff.    Subjective:   Pt seen and examined in room     ROS:  Gen - no fever, no chills  CV - no chest pain  Lung - no shortness of breath, no cough  Abd - no tenderness, no nausea/vomiting  Ext - + edema    Current Facility-Administered Medications   Medication Dose Route Frequency    spironolactone (ALDACTONE) tablet 25 mg  25 mg Oral Daily    torsemide (DEMADEX) tablet 20 mg  20 mg Oral Daily    magnesium oxide (MAG-OX) tablet 400 mg  400 mg Oral Daily    [Held by provider] bumetanide (BUMEX) 12.5 mg in 50 mL infusion  1 mg/hr IntraVENous Continuous    urea (URE-NA) packet 15 g  15 g Oral Daily    enoxaparin (LOVENOX) injection 40 mg  40 mg SubCUTAneous Daily    traMADol (ULTRAM) tablet 25 mg  25 mg Oral Q6H PRN    sennosides-docusate sodium (SENOKOT-S) 8.6-50 MG tablet 1 tablet  1 tablet Oral BID    empagliflozin (JARDIANCE) tablet 10 mg  10 mg Oral Daily    sacubitril-valsartan (ENTRESTO) 24-26 MG per tablet 1 tablet  1 tablet Oral BID    aspirin EC tablet 81 mg  81 mg Oral Daily    cetirizine (ZYRTEC) tablet 10 mg  10 mg Oral Daily    metoprolol succinate (TOPROL XL) extended release tablet 25 mg  25 mg Oral Daily    Vitamin D (CHOLECALCIFEROL) tablet 1,000 Units  1,000 Units Oral Daily    zolpidem (AMBIEN) tablet 5 mg  5 mg Oral Nightly PRN    sodium chloride flush 0.9 % injection 5-40 mL  5-40 mL IntraVENous 2 times per day    sodium chloride flush 0.9 % injection 5-40 mL  5-40 mL IntraVENous PRN    ondansetron (ZOFRAN-ODT) disintegrating tablet 4 mg  4 mg Oral Q8H PRN    Or    ondansetron (ZOFRAN) injection 4 mg  4 mg IntraVENous Q6H PRN    polyethylene glycol (GLYCOLAX) packet 17 g  17 g Oral Daily PRN    acetaminophen (TYLENOL) tablet 650 mg  650 mg Oral Q6H PRN    Or    acetaminophen

## 2024-10-19 NOTE — PROGRESS NOTES
[facility] [[email or web address]] CARDIOLOGY PROGRESS NOTE    10/19/2024 7:08 AM    Admit Date: 10/9/2024        Subjective:   Stable overnight without angina or palpitations.  Elevating legs but remains significantly edematous.  BP marginal and in the 70s and 80s yesterday, holding Bumex drip.  Repleting electrolytes daily as needed.,.  No other complaints overnight. Tolerating meds well.  Weight down 23 pounds since admission, net 23 L out thus far as well.    Objective:      Vitals:    10/18/24 1542 10/18/24 2056 10/19/24 0000 10/19/24 0453   BP: 91/60 (!) 84/54 (!) 82/56 (!) 95/58   Pulse: 94 93 85 84   Resp: 17 16 16 16   Temp: 98.1 °F (36.7 °C) 98.2 °F (36.8 °C) 98.1 °F (36.7 °C) 98.1 °F (36.7 °C)   TempSrc: Oral Oral Temporal Temporal   SpO2: 98% 95% 96% 100%   Weight:       Height:           Physical Exam:  Neck- supple, 8 cm JVD sitting upright  CV- regular rate and rhythm no MRG  Lung- clear bilaterally, mildly decreased bibasilar but no crackles or wheezing  Abd- soft, nontender, subjectively distended  Ext-tense 3+ pitting edema to above the knees bilaterally, stasis dermatitis below both knees  Skin- warm and dry    Data Review:   Pertinent lab and noninvasive imaging over the past 24 hours reviewed and evaluated.    Recent Labs     10/18/24  0419 10/18/24  1952 10/19/24  0356   * 137 136   K 3.0* 3.8 3.8   MG 2.0  --  2.1   BUN 25* 33* 28*   WBC 4.0*  --  3.9*   HGB 13.8  --  12.8   HCT 41.7  --  38.8     --  229     Lab Results   Component Value Date     (H) 10/10/2024     Echocardiogram 10/10/2024:  Left Ventricle Severely reduced left ventricular systolic function with a visually estimated EF of 10 -15%. Left ventricle is severely dilated. Wall is thinner than normal. Severe global hypokinesis present. Abnormal diastolic function.   Left Atrium Left atrium is dilated.   Right Ventricle Right ventricle is dilated. Lead present in the right ventricle. Reduced systolic function.   Right Atrium Lead

## 2024-10-19 NOTE — PROGRESS NOTES
Hospitalist Progress Note   Admit Date:  10/9/2024  6:47 PM   Name:  Kely Quach   Age:  64 y.o.  Sex:  female  :  1960   MRN:  [id number]   Room:  [facility]/    Presenting/Chief Complaint: No chief complaint on file.     Reason(s) for Admission: Hyponatremia with excess extracellular fluid volume [E87.1]     Hospital Course:     Copied from prior provider HPI/summary:  Kely Quach is a 64 y.o. female with medical history of CHF, HLD, mood disorder, and chronic hyponatremia who presented to the ER with complaint of worsening weakness, fatigue, and peripheral edema since discharge from this facility on 10/2/24.  She was admitted  for sodium of 123.  Workup at the time was consistent with SIADH.  BP was borderline during the hospitalization, per my personal review of discharge summary, and so valsartan, metoprolol, and Aldactone were discontinued and midodrine 5 mg 3 times daily started.  Since her recent discharge she endorsed compliance with all prescribed medications and 1000 cc fluid restriction.     ED eval showed sodium of 123, BNP 22,384 (up from 5000+ last admit), and new transaminitis. CXR with cardiomegaly with central pulmonary vascular congestion not changed from prior.  Hospitalist service was requested to admit for further management.        Subjective & 24hr Events:     Bumex drip stopped today.  Serum sodium remains stable.  For follow-up LFTs in the a.m.  Overall significant clinical improvement.    She denies chest pain-dyspnea is improving.     Assessment & Plan:      Hyponatremia  Improved.  Remains urea daily.  Nephrology following, discontinued tolvaptan yesterday.  -resolved     Acute on chronic systolic congestive heart failure (EF 10-15%; 10/10/2024)/dilated cardiomyopathy/history of mitral valve repair  She remains significantly volume overloaded.  Fluid balance -16.0 L since admission.  Transitioned from Bumex 3 times daily to Bumex gtt. at 1 mg/h today per  Oral Daily    sacubitril-valsartan (ENTRESTO) 24-26 MG per tablet 1 tablet  1 tablet Oral BID    aspirin EC tablet 81 mg  81 mg Oral Daily    cetirizine (ZYRTEC) tablet 10 mg  10 mg Oral Daily    metoprolol succinate (TOPROL XL) extended release tablet 25 mg  25 mg Oral Daily    Vitamin D (CHOLECALCIFEROL) tablet 1,000 Units  1,000 Units Oral Daily    zolpidem (AMBIEN) tablet 5 mg  5 mg Oral Nightly PRN    sodium chloride flush 0.9 % injection 5-40 mL  5-40 mL IntraVENous 2 times per day    sodium chloride flush 0.9 % injection 5-40 mL  5-40 mL IntraVENous PRN    ondansetron (ZOFRAN-ODT) disintegrating tablet 4 mg  4 mg Oral Q8H PRN    Or    ondansetron (ZOFRAN) injection 4 mg  4 mg IntraVENous Q6H PRN    polyethylene glycol (GLYCOLAX) packet 17 g  17 g Oral Daily PRN    acetaminophen (TYLENOL) tablet 650 mg  650 mg Oral Q6H PRN    Or    acetaminophen (TYLENOL) suppository 650 mg  650 mg Rectal Q6H PRN    potassium chloride (KLOR-CON M) extended release tablet 40 mEq  40 mEq Oral PRN    Or    potassium bicarb-citric acid (EFFER-K) effervescent tablet 40 mEq  40 mEq Oral PRN    Or    potassium chloride 10 mEq/100 mL IVPB (Peripheral Line)  10 mEq IntraVENous PRN    magnesium sulfate 2000 mg in 50 mL IVPB premix  2,000 mg IntraVENous PRN    famotidine (PEPCID) tablet 20 mg  20 mg Oral BID       Signed:  Stephen Taylor DO    Part of this note may have been written by using a voice dictation software.  The note has been proof read but may still contain some grammatical/other typographical errors.

## 2024-10-20 LAB
ALBUMIN SERPL-MCNC: 3.4 G/DL (ref 3.2–4.6)
ALBUMIN/GLOB SERPL: 1.2 (ref 1–1.9)
ALP SERPL-CCNC: 120 U/L (ref 35–104)
ALT SERPL-CCNC: 45 U/L (ref 8–45)
ANION GAP SERPL CALC-SCNC: 11 MMOL/L (ref 9–18)
AST SERPL-CCNC: 37 U/L (ref 15–37)
BASOPHILS # BLD: 0 K/UL (ref 0–0.2)
BASOPHILS NFR BLD: 1 % (ref 0–2)
BILIRUB DIRECT SERPL-MCNC: 0.8 MG/DL (ref 0–0.4)
BILIRUB SERPL-MCNC: 1.7 MG/DL (ref 0–1.2)
BUN SERPL-MCNC: 28 MG/DL (ref 8–23)
CALCIUM SERPL-MCNC: 9.5 MG/DL (ref 8.8–10.2)
CHLORIDE SERPL-SCNC: 93 MMOL/L (ref 98–107)
CO2 SERPL-SCNC: 30 MMOL/L (ref 20–28)
CREAT SERPL-MCNC: 0.81 MG/DL (ref 0.6–1.1)
DIFFERENTIAL METHOD BLD: ABNORMAL
EOSINOPHIL # BLD: 0.2 K/UL (ref 0–0.8)
EOSINOPHIL NFR BLD: 4 % (ref 0.5–7.8)
ERYTHROCYTE [DISTWIDTH] IN BLOOD BY AUTOMATED COUNT: 16.3 % (ref 11.9–14.6)
GLOBULIN SER CALC-MCNC: 2.9 G/DL (ref 2.3–3.5)
GLUCOSE SERPL-MCNC: 93 MG/DL (ref 70–99)
HCT VFR BLD AUTO: 36.8 % (ref 35.8–46.3)
HGB BLD-MCNC: 12.1 G/DL (ref 11.7–15.4)
IMM GRANULOCYTES # BLD AUTO: 0 K/UL (ref 0–0.5)
IMM GRANULOCYTES NFR BLD AUTO: 1 % (ref 0–5)
LYMPHOCYTES # BLD: 0.7 K/UL (ref 0.5–4.6)
LYMPHOCYTES NFR BLD: 19 % (ref 13–44)
MAGNESIUM SERPL-MCNC: 2.3 MG/DL (ref 1.8–2.4)
MCH RBC QN AUTO: 29.6 PG (ref 26.1–32.9)
MCHC RBC AUTO-ENTMCNC: 32.9 G/DL (ref 31.4–35)
MCV RBC AUTO: 90 FL (ref 82–102)
MONOCYTES # BLD: 0.4 K/UL (ref 0.1–1.3)
MONOCYTES NFR BLD: 11 % (ref 4–12)
NEUTS SEG # BLD: 2.3 K/UL (ref 1.7–8.2)
NEUTS SEG NFR BLD: 64 % (ref 43–78)
NRBC # BLD: 0 K/UL (ref 0–0.2)
PLATELET # BLD AUTO: 226 K/UL (ref 150–450)
PMV BLD AUTO: 9.5 FL (ref 9.4–12.3)
POTASSIUM SERPL-SCNC: 3.7 MMOL/L (ref 3.5–5.1)
PROT SERPL-MCNC: 6.3 G/DL (ref 6.3–8.2)
RBC # BLD AUTO: 4.09 M/UL (ref 4.05–5.2)
SODIUM SERPL-SCNC: 134 MMOL/L (ref 136–145)
WBC # BLD AUTO: 3.6 K/UL (ref 4.3–11.1)

## 2024-10-20 PROCEDURE — 1100000003 HC PRIVATE W/ TELEMETRY

## 2024-10-20 PROCEDURE — 6370000000 HC RX 637 (ALT 250 FOR IP): Performed by: INTERNAL MEDICINE

## 2024-10-20 PROCEDURE — 2580000003 HC RX 258: Performed by: FAMILY MEDICINE

## 2024-10-20 PROCEDURE — 80048 BASIC METABOLIC PNL TOTAL CA: CPT

## 2024-10-20 PROCEDURE — 36415 COLL VENOUS BLD VENIPUNCTURE: CPT

## 2024-10-20 PROCEDURE — 85025 COMPLETE CBC W/AUTO DIFF WBC: CPT

## 2024-10-20 PROCEDURE — 6370000000 HC RX 637 (ALT 250 FOR IP): Performed by: NURSE PRACTITIONER

## 2024-10-20 PROCEDURE — 80076 HEPATIC FUNCTION PANEL: CPT

## 2024-10-20 PROCEDURE — 6370000000 HC RX 637 (ALT 250 FOR IP): Performed by: FAMILY MEDICINE

## 2024-10-20 PROCEDURE — 83735 ASSAY OF MAGNESIUM: CPT

## 2024-10-20 PROCEDURE — 6360000002 HC RX W HCPCS: Performed by: INTERNAL MEDICINE

## 2024-10-20 PROCEDURE — 6360000002 HC RX W HCPCS

## 2024-10-20 PROCEDURE — 99232 SBSQ HOSP IP/OBS MODERATE 35: CPT | Performed by: INTERNAL MEDICINE

## 2024-10-20 RX ORDER — POTASSIUM CHLORIDE 1500 MG/1
20 TABLET, EXTENDED RELEASE ORAL ONCE
Status: COMPLETED | OUTPATIENT
Start: 2024-10-20 | End: 2024-10-20

## 2024-10-20 RX ADMIN — ASPIRIN 81 MG: 81 TABLET, COATED ORAL at 08:16

## 2024-10-20 RX ADMIN — DOCUSATE SODIUM 50 MG AND SENNOSIDES 8.6 MG 1 TABLET: 8.6; 5 TABLET, FILM COATED ORAL at 08:16

## 2024-10-20 RX ADMIN — SPIRONOLACTONE 25 MG: 25 TABLET ORAL at 08:17

## 2024-10-20 RX ADMIN — ACETAMINOPHEN 650 MG: 325 TABLET ORAL at 17:26

## 2024-10-20 RX ADMIN — CETIRIZINE HYDROCHLORIDE 10 MG: 10 TABLET, FILM COATED ORAL at 08:17

## 2024-10-20 RX ADMIN — VITAMIN D, TAB 1000IU (100/BT) 1000 UNITS: 25 TAB at 08:17

## 2024-10-20 RX ADMIN — POTASSIUM CHLORIDE 20 MEQ: 1500 TABLET, EXTENDED RELEASE ORAL at 17:26

## 2024-10-20 RX ADMIN — FAMOTIDINE 20 MG: 20 TABLET, FILM COATED ORAL at 08:17

## 2024-10-20 RX ADMIN — ENOXAPARIN SODIUM 40 MG: 100 INJECTION SUBCUTANEOUS at 08:17

## 2024-10-20 RX ADMIN — METOPROLOL SUCCINATE 25 MG: 25 TABLET, EXTENDED RELEASE ORAL at 08:17

## 2024-10-20 RX ADMIN — SODIUM CHLORIDE, PRESERVATIVE FREE 10 ML: 5 INJECTION INTRAVENOUS at 08:18

## 2024-10-20 RX ADMIN — MAGNESIUM GLUCONATE 500 MG ORAL TABLET 400 MG: 500 TABLET ORAL at 08:16

## 2024-10-20 RX ADMIN — Medication 15 G: at 08:17

## 2024-10-20 RX ADMIN — FAMOTIDINE 20 MG: 20 TABLET, FILM COATED ORAL at 20:46

## 2024-10-20 RX ADMIN — BUMETANIDE 1 MG/HR: 0.25 INJECTION INTRAMUSCULAR; INTRAVENOUS at 17:26

## 2024-10-20 RX ADMIN — TRAMADOL HYDROCHLORIDE 25 MG: 50 TABLET ORAL at 12:17

## 2024-10-20 RX ADMIN — SODIUM CHLORIDE, PRESERVATIVE FREE 10 ML: 5 INJECTION INTRAVENOUS at 20:46

## 2024-10-20 RX ADMIN — EMPAGLIFLOZIN 10 MG: 10 TABLET, FILM COATED ORAL at 08:17

## 2024-10-20 RX ADMIN — DOCUSATE SODIUM 50 MG AND SENNOSIDES 8.6 MG 1 TABLET: 8.6; 5 TABLET, FILM COATED ORAL at 20:46

## 2024-10-20 ASSESSMENT — PAIN SCALES - GENERAL
PAINLEVEL_OUTOF10: 0
PAINLEVEL_OUTOF10: 3
PAINLEVEL_OUTOF10: 0
PAINLEVEL_OUTOF10: 8

## 2024-10-20 ASSESSMENT — PAIN DESCRIPTION - LOCATION
LOCATION: BACK;LEG
LOCATION: LEG

## 2024-10-20 ASSESSMENT — PAIN DESCRIPTION - DESCRIPTORS
DESCRIPTORS: ACHING;THROBBING
DESCRIPTORS: ACHING;DISCOMFORT;HEAVINESS

## 2024-10-20 ASSESSMENT — PAIN DESCRIPTION - ORIENTATION
ORIENTATION: RIGHT;LEFT;LOWER
ORIENTATION: LEFT;RIGHT

## 2024-10-20 NOTE — PROGRESS NOTES
round and reactive to light, extraocular movements intact.   Lungs:                       Clear to auscultation bilaterally.  Decreased breath sounds bases bilateral  Heart:                     Rate controlled regular no obvious JVD.  Abdomen:                       Soft, non-tender. Bowel sounds normal. No masses,  No organomegaly.  Extremities:                     Taut lower extremity pretibial pitting edema.  Some ascites present.  Neurologic:                     CNII-XII intact. Normal strength, sensation and reflexes throughout.        Lab/Data Review:  All lab results for the last 24 hours reviewed.    I have personally reviewed labs and tests:  Recent Labs:  Recent Results (from the past 48 hour(s))   Basic Metabolic Panel w/ Reflex to MG    Collection Time: 10/18/24  7:52 PM   Result Value Ref Range    Sodium 137 136 - 145 mmol/L    Potassium 3.8 3.5 - 5.1 mmol/L    Chloride 92 (L) 98 - 107 mmol/L    CO2 35 (H) 20 - 28 mmol/L    Anion Gap 10 9 - 18 mmol/L    Glucose 106 (H) 70 - 99 mg/dL    BUN 33 (H) 8 - 23 MG/DL    Creatinine 0.91 0.60 - 1.10 MG/DL    Est, Glom Filt Rate 71 >60 ml/min/1.73m2    Calcium 9.1 8.8 - 10.2 MG/DL   Basic Metabolic Panel w/ Reflex to MG    Collection Time: 10/19/24  3:56 AM   Result Value Ref Range    Sodium 136 136 - 145 mmol/L    Potassium 3.8 3.5 - 5.1 mmol/L    Chloride 93 (L) 98 - 107 mmol/L    CO2 32 (H) 20 - 28 mmol/L    Anion Gap 11 9 - 18 mmol/L    Glucose 99 70 - 99 mg/dL    BUN 28 (H) 8 - 23 MG/DL    Creatinine 0.83 0.60 - 1.10 MG/DL    Est, Glom Filt Rate 80 >60 ml/min/1.73m2    Calcium 9.5 8.8 - 10.2 MG/DL   CBC with Auto Differential    Collection Time: 10/19/24  3:56 AM   Result Value Ref Range    WBC 3.9 (L) 4.3 - 11.1 K/uL    RBC 4.33 4.05 - 5.2 M/uL    Hemoglobin 12.8 11.7 - 15.4 g/dL    Hematocrit 38.8 35.8 - 46.3 %    MCV 89.6 82 - 102 FL    MCH 29.6 26.1 - 32.9 PG    MCHC 33.0 31.4 - 35.0 g/dL    RDW 16.6 (H) 11.9 - 14.6 %    Platelets 229 150 - 450 K/uL    MPV  polyethylene glycol (GLYCOLAX) packet 17 g  17 g Oral Daily PRN    acetaminophen (TYLENOL) tablet 650 mg  650 mg Oral Q6H PRN    Or    acetaminophen (TYLENOL) suppository 650 mg  650 mg Rectal Q6H PRN    potassium chloride (KLOR-CON M) extended release tablet 40 mEq  40 mEq Oral PRN    Or    potassium bicarb-citric acid (EFFER-K) effervescent tablet 40 mEq  40 mEq Oral PRN    Or    potassium chloride 10 mEq/100 mL IVPB (Peripheral Line)  10 mEq IntraVENous PRN    magnesium sulfate 2000 mg in 50 mL IVPB premix  2,000 mg IntraVENous PRN    famotidine (PEPCID) tablet 20 mg  20 mg Oral BID       Signed:  Stephen Taylor DO    Part of this note may have been written by using a voice dictation software.  The note has been proof read but may still contain some grammatical/other typographical errors.

## 2024-10-20 NOTE — PROGRESS NOTES
[facility] CARDIOLOGY PROGRESS NOTE    10/20/2024 7:34 AM    Admit Date: 10/9/2024        Subjective:   Stable overnight without angina or palpitations.  Not really elevating legs aggressively, remains significantly edematous.  BP marginal and in the 70s and 80s yesterday, holding Bumex drip, BP back in the mid 90s which is her baseline per report.  Repleting electrolytes daily as needed.,.  No other complaints overnight. Tolerating meds well.  Weight down 23 pounds since admission, but +550 cc over the past 24 hours on oral diuretics.    Objective:      Vitals:    10/19/24 2046 10/19/24 2333 10/20/24 0434 10/20/24 0731   BP: 90/61 (!) 82/46 (!) 97/58 (!) 92/59   Pulse: 93 85 86 85   Resp: 12 12 12    Temp: 97.9 °F (36.6 °C) 98.4 °F (36.9 °C) 97.7 °F (36.5 °C) 97.9 °F (36.6 °C)   TempSrc: Oral Temporal Temporal Oral   SpO2: 96% 98% 98% 97%   Weight:       Height:           Physical Exam:  Neck- supple, 8 cm JVD sitting upright  CV- regular rate and rhythm no MRG  Lung- clear bilaterally, mildly decreased bibasilar but no crackles or wheezing  Abd- soft, nontender, subjectively distended  Ext-tense 3+ pitting edema to above the knees bilaterally, stasis dermatitis below both knees  Skin- warm and dry    Data Review:   Pertinent lab and noninvasive imaging over the past 24 hours reviewed and evaluated.    Recent Labs     10/19/24  0356 10/20/24  0418    134*   K 3.8 3.7   MG 2.1 2.3   BUN 28* 28*   WBC 3.9* 3.6*   HGB 12.8 12.1   HCT 38.8 36.8    226     Lab Results   Component Value Date     (H) 10/10/2024     Echocardiogram 10/10/2024:  Left Ventricle Severely reduced left ventricular systolic function with a visually estimated EF of 10 -15%. Left ventricle is severely dilated. Wall is thinner than normal. Severe global hypokinesis present. Abnormal diastolic function.   Left Atrium Left atrium is dilated.   Right Ventricle Right ventricle is dilated. Lead present in the right ventricle. Reduced  Melissa Carpenter RN

## 2024-10-20 NOTE — PLAN OF CARE
Problem: Chronic Conditions and Co-morbidities  Goal: Patient's chronic conditions and co-morbidity symptoms are monitored and maintained or improved  Outcome: Progressing  Flowsheets  Taken 10/20/2024 0816 by Rita Jackson RN  Care Plan - Patient's Chronic Conditions and Co-Morbidity Symptoms are Monitored and Maintained or Improved:   Monitor and assess patient's chronic conditions and comorbid symptoms for stability, deterioration, or improvement   Collaborate with multidisciplinary team to address chronic and comorbid conditions and prevent exacerbation or deterioration   Update acute care plan with appropriate goals if chronic or comorbid symptoms are exacerbated and prevent overall improvement and discharge  Taken 10/19/2024 2037 by Wu Samson RN  Care Plan - Patient's Chronic Conditions and Co-Morbidity Symptoms are Monitored and Maintained or Improved:   Monitor and assess patient's chronic conditions and comorbid symptoms for stability, deterioration, or improvement   Collaborate with multidisciplinary team to address chronic and comorbid conditions and prevent exacerbation or deterioration   Update acute care plan with appropriate goals if chronic or comorbid symptoms are exacerbated and prevent overall improvement and discharge     Problem: Discharge Planning  Goal: Discharge to home or other facility with appropriate resources  Outcome: Progressing  Flowsheets  Taken 10/20/2024 0816 by Rita Jackson RN  Discharge to home or other facility with appropriate resources:   Identify barriers to discharge with patient and caregiver   Arrange for needed discharge resources and transportation as appropriate   Identify discharge learning needs (meds, wound care, etc)   Refer to discharge planning if patient needs post-hospital services based on physician order or complex needs related to functional status, cognitive ability or social support system  Taken 10/19/2024 2037 by Wu Samson,  Progressing  Flowsheets (Taken 10/20/2024 0816)  Absence of cardiac dysrhythmias or at baseline:   Monitor cardiac rate and rhythm   Assess for signs of decreased cardiac output   Administer antiarrhythmia medication and electrolyte replacement as ordered     Problem: Gastrointestinal - Adult  Goal: Maintains or returns to baseline bowel function  Outcome: Progressing  Flowsheets (Taken 10/20/2024 0816)  Maintains or returns to baseline bowel function: Assess bowel function     Problem: Metabolic/Fluid and Electrolytes - Adult  Goal: Electrolytes maintained within normal limits  Outcome: Progressing  Flowsheets (Taken 10/19/2024 2037 by Wu Samson, RN)  Electrolytes maintained within normal limits:   Monitor response to electrolyte replacements, including repeat lab results as appropriate   Administer electrolyte replacement as ordered   Monitor labs and assess patient for signs and symptoms of electrolyte imbalances  Goal: Hemodynamic stability and optimal renal function maintained  Outcome: Progressing  Flowsheets (Taken 10/19/2024 2037 by Wu Samson, RN)  Hemodynamic stability and optimal renal function maintained:   Monitor labs and assess for signs and symptoms of volume excess or deficit   Monitor intake, output and patient weight   Monitor urine specific gravity, serum osmolarity and serum sodium as indicated or ordered   Monitor response to interventions for patient's volume status, including labs, urine output, blood pressure (other measures as available)

## 2024-10-20 NOTE — PROGRESS NOTES
Kely Quach  Admission Date: 10/9/2024         [facility] Progress Note: 10/20/2024    Follow-up for: Hyponatremia     The patient's chart is reviewed and the patient is discussed with the staff.    Subjective:   Pt seen and examined in room     ROS:  Gen - no fever, no chills  CV - no chest pain  Lung - no shortness of breath, no cough  Abd - no tenderness, no nausea/vomiting  Ext - + edema    Current Facility-Administered Medications   Medication Dose Route Frequency    spironolactone (ALDACTONE) tablet 25 mg  25 mg Oral Daily    magnesium oxide (MAG-OX) tablet 400 mg  400 mg Oral Daily    [Held by provider] bumetanide (BUMEX) 12.5 mg in 50 mL infusion  1 mg/hr IntraVENous Continuous    urea (URE-NA) packet 15 g  15 g Oral Daily    enoxaparin (LOVENOX) injection 40 mg  40 mg SubCUTAneous Daily    traMADol (ULTRAM) tablet 25 mg  25 mg Oral Q6H PRN    sennosides-docusate sodium (SENOKOT-S) 8.6-50 MG tablet 1 tablet  1 tablet Oral BID    empagliflozin (JARDIANCE) tablet 10 mg  10 mg Oral Daily    [Held by provider] sacubitril-valsartan (ENTRESTO) 24-26 MG per tablet 1 tablet  1 tablet Oral BID    aspirin EC tablet 81 mg  81 mg Oral Daily    cetirizine (ZYRTEC) tablet 10 mg  10 mg Oral Daily    metoprolol succinate (TOPROL XL) extended release tablet 25 mg  25 mg Oral Daily    Vitamin D (CHOLECALCIFEROL) tablet 1,000 Units  1,000 Units Oral Daily    zolpidem (AMBIEN) tablet 5 mg  5 mg Oral Nightly PRN    sodium chloride flush 0.9 % injection 5-40 mL  5-40 mL IntraVENous 2 times per day    sodium chloride flush 0.9 % injection 5-40 mL  5-40 mL IntraVENous PRN    ondansetron (ZOFRAN-ODT) disintegrating tablet 4 mg  4 mg Oral Q8H PRN    Or    ondansetron (ZOFRAN) injection 4 mg  4 mg IntraVENous Q6H PRN    polyethylene glycol (GLYCOLAX) packet 17 g  17 g Oral Daily PRN    acetaminophen (TYLENOL) tablet 650 mg  650 mg Oral Q6H PRN    Or    acetaminophen (TYLENOL) suppository 650 mg  650

## 2024-10-21 LAB
ANION GAP SERPL CALC-SCNC: 9 MMOL/L (ref 9–18)
BASOPHILS # BLD: 0 K/UL (ref 0–0.2)
BASOPHILS NFR BLD: 1 % (ref 0–2)
BUN SERPL-MCNC: 29 MG/DL (ref 8–23)
CALCIUM SERPL-MCNC: 9.6 MG/DL (ref 8.8–10.2)
CHLORIDE SERPL-SCNC: 93 MMOL/L (ref 98–107)
CO2 SERPL-SCNC: 34 MMOL/L (ref 20–28)
CREAT SERPL-MCNC: 0.86 MG/DL (ref 0.6–1.1)
DIFFERENTIAL METHOD BLD: ABNORMAL
EOSINOPHIL # BLD: 0.1 K/UL (ref 0–0.8)
EOSINOPHIL NFR BLD: 4 % (ref 0.5–7.8)
ERYTHROCYTE [DISTWIDTH] IN BLOOD BY AUTOMATED COUNT: 16.1 % (ref 11.9–14.6)
GLUCOSE SERPL-MCNC: 99 MG/DL (ref 70–99)
HCT VFR BLD AUTO: 37.1 % (ref 35.8–46.3)
HGB BLD-MCNC: 12.1 G/DL (ref 11.7–15.4)
IMM GRANULOCYTES # BLD AUTO: 0 K/UL (ref 0–0.5)
IMM GRANULOCYTES NFR BLD AUTO: 0 % (ref 0–5)
LYMPHOCYTES # BLD: 0.7 K/UL (ref 0.5–4.6)
LYMPHOCYTES NFR BLD: 22 % (ref 13–44)
MAGNESIUM SERPL-MCNC: 2.4 MG/DL (ref 1.8–2.4)
MCH RBC QN AUTO: 29.4 PG (ref 26.1–32.9)
MCHC RBC AUTO-ENTMCNC: 32.6 G/DL (ref 31.4–35)
MCV RBC AUTO: 90.3 FL (ref 82–102)
MONOCYTES # BLD: 0.3 K/UL (ref 0.1–1.3)
MONOCYTES NFR BLD: 10 % (ref 4–12)
NEUTS SEG # BLD: 2.1 K/UL (ref 1.7–8.2)
NEUTS SEG NFR BLD: 63 % (ref 43–78)
NRBC # BLD: 0 K/UL (ref 0–0.2)
PLATELET # BLD AUTO: 229 K/UL (ref 150–450)
PMV BLD AUTO: 9.7 FL (ref 9.4–12.3)
POTASSIUM SERPL-SCNC: 3.3 MMOL/L (ref 3.5–5.1)
POTASSIUM SERPL-SCNC: 3.6 MMOL/L (ref 3.5–5.1)
RBC # BLD AUTO: 4.11 M/UL (ref 4.05–5.2)
SODIUM SERPL-SCNC: 136 MMOL/L (ref 136–145)
WBC # BLD AUTO: 3.3 K/UL (ref 4.3–11.1)

## 2024-10-21 PROCEDURE — 6370000000 HC RX 637 (ALT 250 FOR IP): Performed by: INTERNAL MEDICINE

## 2024-10-21 PROCEDURE — 6370000000 HC RX 637 (ALT 250 FOR IP): Performed by: FAMILY MEDICINE

## 2024-10-21 PROCEDURE — 80048 BASIC METABOLIC PNL TOTAL CA: CPT

## 2024-10-21 PROCEDURE — 6370000000 HC RX 637 (ALT 250 FOR IP): Performed by: NURSE PRACTITIONER

## 2024-10-21 PROCEDURE — 6360000002 HC RX W HCPCS: Performed by: INTERNAL MEDICINE

## 2024-10-21 PROCEDURE — 36415 COLL VENOUS BLD VENIPUNCTURE: CPT

## 2024-10-21 PROCEDURE — 83735 ASSAY OF MAGNESIUM: CPT

## 2024-10-21 PROCEDURE — 2580000003 HC RX 258: Performed by: FAMILY MEDICINE

## 2024-10-21 PROCEDURE — 1100000003 HC PRIVATE W/ TELEMETRY

## 2024-10-21 PROCEDURE — 99232 SBSQ HOSP IP/OBS MODERATE 35: CPT | Performed by: INTERNAL MEDICINE

## 2024-10-21 PROCEDURE — 84132 ASSAY OF SERUM POTASSIUM: CPT

## 2024-10-21 PROCEDURE — 6360000002 HC RX W HCPCS

## 2024-10-21 PROCEDURE — 85025 COMPLETE CBC W/AUTO DIFF WBC: CPT

## 2024-10-21 PROCEDURE — 97530 THERAPEUTIC ACTIVITIES: CPT

## 2024-10-21 RX ADMIN — BUMETANIDE 1 MG/HR: 0.25 INJECTION INTRAMUSCULAR; INTRAVENOUS at 05:33

## 2024-10-21 RX ADMIN — BUMETANIDE 1 MG/HR: 0.25 INJECTION INTRAMUSCULAR; INTRAVENOUS at 19:54

## 2024-10-21 RX ADMIN — SACUBITRIL AND VALSARTAN 1 TABLET: 24; 26 TABLET, FILM COATED ORAL at 19:55

## 2024-10-21 RX ADMIN — CETIRIZINE HYDROCHLORIDE 10 MG: 10 TABLET, FILM COATED ORAL at 08:20

## 2024-10-21 RX ADMIN — Medication 15 G: at 08:21

## 2024-10-21 RX ADMIN — FAMOTIDINE 20 MG: 20 TABLET, FILM COATED ORAL at 19:52

## 2024-10-21 RX ADMIN — SACUBITRIL AND VALSARTAN 1 TABLET: 24; 26 TABLET, FILM COATED ORAL at 09:49

## 2024-10-21 RX ADMIN — TRAMADOL HYDROCHLORIDE 25 MG: 50 TABLET ORAL at 23:36

## 2024-10-21 RX ADMIN — DOCUSATE SODIUM 50 MG AND SENNOSIDES 8.6 MG 1 TABLET: 8.6; 5 TABLET, FILM COATED ORAL at 08:20

## 2024-10-21 RX ADMIN — SPIRONOLACTONE 25 MG: 25 TABLET ORAL at 08:20

## 2024-10-21 RX ADMIN — MAGNESIUM GLUCONATE 500 MG ORAL TABLET 400 MG: 500 TABLET ORAL at 08:20

## 2024-10-21 RX ADMIN — ENOXAPARIN SODIUM 40 MG: 100 INJECTION SUBCUTANEOUS at 08:20

## 2024-10-21 RX ADMIN — METOPROLOL SUCCINATE 25 MG: 25 TABLET, EXTENDED RELEASE ORAL at 08:20

## 2024-10-21 RX ADMIN — EMPAGLIFLOZIN 10 MG: 10 TABLET, FILM COATED ORAL at 08:20

## 2024-10-21 RX ADMIN — FAMOTIDINE 20 MG: 20 TABLET, FILM COATED ORAL at 08:20

## 2024-10-21 RX ADMIN — SODIUM CHLORIDE, PRESERVATIVE FREE 10 ML: 5 INJECTION INTRAVENOUS at 08:27

## 2024-10-21 RX ADMIN — SODIUM CHLORIDE, PRESERVATIVE FREE 10 ML: 5 INJECTION INTRAVENOUS at 19:56

## 2024-10-21 RX ADMIN — VITAMIN D, TAB 1000IU (100/BT) 1000 UNITS: 25 TAB at 08:20

## 2024-10-21 RX ADMIN — TRAMADOL HYDROCHLORIDE 25 MG: 50 TABLET ORAL at 00:44

## 2024-10-21 RX ADMIN — ASPIRIN 81 MG: 81 TABLET, COATED ORAL at 08:20

## 2024-10-21 RX ADMIN — ACETAMINOPHEN 650 MG: 325 TABLET ORAL at 12:23

## 2024-10-21 RX ADMIN — POTASSIUM CHLORIDE 40 MEQ: 1500 TABLET, EXTENDED RELEASE ORAL at 08:20

## 2024-10-21 RX ADMIN — DOCUSATE SODIUM 50 MG AND SENNOSIDES 8.6 MG 1 TABLET: 8.6; 5 TABLET, FILM COATED ORAL at 19:51

## 2024-10-21 ASSESSMENT — PAIN SCALES - GENERAL
PAINLEVEL_OUTOF10: 0
PAINLEVEL_OUTOF10: 0
PAINLEVEL_OUTOF10: 3
PAINLEVEL_OUTOF10: 8
PAINLEVEL_OUTOF10: 9
PAINLEVEL_OUTOF10: 0
PAINLEVEL_OUTOF10: 6

## 2024-10-21 ASSESSMENT — PAIN - FUNCTIONAL ASSESSMENT: PAIN_FUNCTIONAL_ASSESSMENT: ACTIVITIES ARE NOT PREVENTED

## 2024-10-21 ASSESSMENT — PAIN DESCRIPTION - DESCRIPTORS
DESCRIPTORS: ACHING;SORE
DESCRIPTORS: THROBBING
DESCRIPTORS: BURNING
DESCRIPTORS: ACHING;SORE

## 2024-10-21 ASSESSMENT — PAIN DESCRIPTION - LOCATION
LOCATION: LEG
LOCATION: FOOT
LOCATION: LEG
LOCATION: LEG

## 2024-10-21 ASSESSMENT — PAIN DESCRIPTION - ORIENTATION
ORIENTATION: RIGHT;LEFT
ORIENTATION: RIGHT;LEFT
ORIENTATION: LEFT;DISTAL
ORIENTATION: LEFT

## 2024-10-21 ASSESSMENT — PAIN DESCRIPTION - PAIN TYPE: TYPE: CHRONIC PAIN

## 2024-10-21 NOTE — PROGRESS NOTES
am  10/21/2024 7:12 AM    Admit Date: 10/9/2024    Admit Diagnosis: Hyponatremia with excess extracellular fluid volume [E87.1]      Subjective:    Patient : awake & sitting up in chloe. Stable overnight. Voices concerns over retained fluid, but states IV Bumex has been working to alleviate sxs. Pt voices no other questions or concerns at this time..  Patient continues on Bumex drip with apparently significant improvement in her lower extremity edema from her biventricular heart failure.  She still has significant edema but it is much improved    Objective:    BP (!) 94/55   Pulse 93   Temp 97.5 °F (36.4 °C) (Oral)   Resp 16   Ht 1.575 m (5' 2\")   Wt 90 kg (198 lb 8 oz)   SpO2 96%   BMI 36.31 kg/m²     ROS:  General ROS: negative for - chills  Hematological and Lymphatic ROS: negative for - blood clots or jaundice  Respiratory ROS: no cough, shortness of breath, or wheezing  Cardiovascular ROS: no chest pain or dyspnea on exertion  Gastrointestinal ROS: no abdominal pain, change in bowel habits, or black or bloody stools  Neurological ROS: no TIA or stroke symptoms    Physical Exam:      Physical Examination: General appearance - Appearance: alert, well appearing, and in no distress.   Neck/lymph - supple, no significant adenopathy  Chest/CV - clear to auscultation, no wheezes, rales or rhonchi, symmetric air entry  Heart - normal rate, regular rhythm, normal S1, S2, no murmurs, rubs, clicks or gallops  Abdomen/GI - soft, nontender, nondistended, no masses or organomegaly   Musculoskeletal - no joint tenderness, deformity or swelling  Extremities - peripheral pulses normal, no pedal edema, no clubbing or cyanosis, no pedal edema noted  Skin - normal coloration and turgor, no rashes, no suspicious skin lesions noted    Current Facility-Administered Medications   Medication Dose Route Frequency    spironolactone (ALDACTONE) tablet 25 mg  25 mg Oral Daily    magnesium oxide (MAG-OX) tablet 400 mg  400 mg Oral  coronary artery of native heart without angina pectoris    Acute on chronic systolic congestive heart failure (HCC)    ICD (implantable cardioverter-defibrillator) in place    Chronic systolic HF (heart failure) ([facility])    S/P MVR (mitral valve repair)    COVID-19    Non-rheumatic mitral regurgitation    DCM (dilated cardiomyopathy) ([facility])    Pure hypercholesterolemia    Encounter due to AICD at end of battery life    H/O mitral valve repair    Accelerating angina ([facility])    HFrEF (heart failure with reduced ejection fraction) ([facility])    Iron deficiency anemia secondary to inadequate dietary iron intake    Status post placement of leadless cardiac pacemaker    Hyponatremia    Transaminitis    Hypoglobulinemia    Encounter for palliative care    Edema    Shortness of breath     PLAN    1) sCHF -Bumex but changed to a Bumex infusion daily with daily BMPs  BB -metoprolol XL 25 mg daily  ACE/ARB/ARNi -restart Entresto  MRA -continue Aldactone  SGLT2i -Jardiance 10 mg daily    2) Hyponatermia   - on samsca doing better today    3) Hypotension   - watch BPs, Pt denies any symptoms of dizziness.  - goal to up titrate BP meds as Pt able to tolerate    Patient with severe biventricular heart failure who is responding well to Bumex drip we need to aggressively try to maximize her guideline directed medical therapy for heart failure we will restart her Entresto continue her Toprol-XL continue her Aldactone and continue her Jardiance      JOHN VILLASENOR MD  I have personally seen and evaluated the patient and reviewed the students note and agree with the following assessment and plan and findings. I was present for and observed the key components of this note.  Any appropriate additions or editing of the information have been done by me.    John Villasenor MD, FACC  Cardiology

## 2024-10-21 NOTE — PROGRESS NOTES
ACUTE PHYSICAL THERAPY GOALS:   (Developed with and agreed upon by patient and/or caregiver.)    (1.) Kely Quach  will move from supine to sit and sit to supine , scoot up and down, and roll side to side with MODIFIED INDEPENDENCE within 7 treatment day(s).    (2.) Kely Quach will transfer from bed to chair and chair to bed with MODIFIED INDEPENDENCE using the least restrictive device within 7 treatment day(s).    (3.) Kely Quach will ambulate with MODIFIED INDEPENDENCE for 200 feet with the least restrictive device within 7 treatment day(s).   (4.) Kely Quach will perform standing static and dynamic balance activities x 8 minutes with MODIFIED INDEPENDENCE to improve safety within 7 treatment day(s).  (5.) Kely Quach will ascend and descend 5 stairs using 1 hand rail(s) with SUPERVISION to improve functional mobility and safety within 7 treatment day(s).  (6.) Kely Quach will perform therapeutic exercises x 15 min for HEP with MODIFIED INDEPENDENCE to improve strength, endurance, and functional mobility within 7 treatment day(s).     PHYSICAL THERAPY: Daily Note PM   (Link to Caseload Tracking: PT Visit Days : 3  Time In/Out PT Charge Capture  Rehab Caseload Tracker  Orders    Kely Quach is a 64 y.o. female   PRIMARY DIAGNOSIS: Hyponatremia  Hyponatremia with excess extracellular fluid volume [E87.1]       Inpatient: Payor: [employer] MEDICARE / Plan: [employer] CHOICE-O MEDICARE / Product Type: *No Product type* /     ASSESSMENT:     REHAB RECOMMENDATIONS:   Recommendation to date pending progress:  Setting:  Home Health Therapy    Equipment:    None (has 2WW)     ASSESSMENT:  Ms. Quach is seated up in chair upon arrival and eager for PT. She needed assistance to don her slipper sox.  She performs all mobility with SBA.  Took patient outdoors to [place], she was able to ambulate 100' on slightly uneven/unlevel surfaces with RW.     HR increased from 80's to 90's with

## 2024-10-21 NOTE — CARE COORDINATION
No new discharge needs identified by CM. Pt remains on Bumex gtt. Pt expected to discharge home with [facility]  with RN/PT when medically stable.

## 2024-10-21 NOTE — PLAN OF CARE
Problem: Chronic Conditions and Co-morbidities  Goal: Patient's chronic conditions and co-morbidity symptoms are monitored and maintained or improved  Outcome: Progressing  Flowsheets (Taken 10/21/2024 0820)  Care Plan - Patient's Chronic Conditions and Co-Morbidity Symptoms are Monitored and Maintained or Improved:   Monitor and assess patient's chronic conditions and comorbid symptoms for stability, deterioration, or improvement   Collaborate with multidisciplinary team to address chronic and comorbid conditions and prevent exacerbation or deterioration   Update acute care plan with appropriate goals if chronic or comorbid symptoms are exacerbated and prevent overall improvement and discharge     Problem: Discharge Planning  Goal: Discharge to home or other facility with appropriate resources  Outcome: Progressing  Flowsheets (Taken 10/21/2024 0820)  Discharge to home or other facility with appropriate resources:   Identify barriers to discharge with patient and caregiver   Arrange for needed discharge resources and transportation as appropriate   Identify discharge learning needs (meds, wound care, etc)   Refer to discharge planning if patient needs post-hospital services based on physician order or complex needs related to functional status, cognitive ability or social support system     Problem: Safety - Adult  Goal: Free from fall injury  Outcome: Progressing     Problem: Pain  Goal: Verbalizes/displays adequate comfort level or baseline comfort level  Outcome: Progressing  Flowsheets  Taken 10/21/2024 0820 by Rita Jackson, RN  Verbalizes/displays adequate comfort level or baseline comfort level: Encourage patient to monitor pain and request assistance  Taken 10/21/2024 0044 by Wu Samson, RN  Verbalizes/displays adequate comfort level or baseline comfort level:   Encourage patient to monitor pain and request assistance   Assess pain using appropriate pain scale   Administer analgesics based on  type and severity of pain and evaluate response   Implement non-pharmacological measures as appropriate and evaluate response     Problem: Skin/Tissue Integrity  Goal: Absence of new skin breakdown  Description: 1.  Monitor for areas of redness and/or skin breakdown  2.  Assess vascular access sites hourly  3.  Every 4-6 hours minimum:  Change oxygen saturation probe site  4.  Every 4-6 hours:  If on nasal continuous positive airway pressure, respiratory therapy assess nares and determine need for appliance change or resting period.  Outcome: Progressing     Problem: Cardiovascular - Adult  Goal: Maintains optimal cardiac output and hemodynamic stability  Outcome: Progressing  Flowsheets (Taken 10/21/2024 0820)  Maintains optimal cardiac output and hemodynamic stability: Monitor blood pressure and heart rate  Goal: Absence of cardiac dysrhythmias or at baseline  Outcome: Progressing  Flowsheets (Taken 10/21/2024 0820)  Absence of cardiac dysrhythmias or at baseline:   Administer antiarrhythmia medication and electrolyte replacement as ordered   Monitor cardiac rate and rhythm   Assess for signs of decreased cardiac output     Problem: Gastrointestinal - Adult  Goal: Maintains or returns to baseline bowel function  Outcome: Progressing     Problem: Metabolic/Fluid and Electrolytes - Adult  Goal: Electrolytes maintained within normal limits  Outcome: Progressing  Flowsheets (Taken 10/21/2024 0820)  Electrolytes maintained within normal limits: Monitor labs and assess patient for signs and symptoms of electrolyte imbalances  Goal: Hemodynamic stability and optimal renal function maintained  Outcome: Progressing

## 2024-10-21 NOTE — PROGRESS NOTES
Hospitalist Progress Note   Admit Date:  10/9/2024  6:47 PM   Name:  Kely Quach   Age:  64 y.o.  Sex:  female  :  1960   MRN:  [id number]   Room:  [facility]/    Presenting/Chief Complaint: No chief complaint on file.     Reason(s) for Admission: Hyponatremia with excess extracellular fluid volume [E87.1]     Hospital Course:     Copied from prior provider HPI/summary:  Kely Quach is a 64 y.o. female with medical history of CHF, HLD, mood disorder, and chronic hyponatremia who presented to the ER with complaint of worsening weakness, fatigue, and peripheral edema since discharge from this facility on 10/2/24.  She was admitted  for sodium of 123.  Workup at the time was consistent with SIADH.  BP was borderline during the hospitalization, per my personal review of discharge summary, and so valsartan, metoprolol, and Aldactone were discontinued and midodrine 5 mg 3 times daily started.  Since her recent discharge she endorsed compliance with all prescribed medications and 1000 cc fluid restriction.     ED eval showed sodium of 123, BNP 22,384 (up from 5000+ last admit), and new transaminitis. CXR with cardiomegaly with central pulmonary vascular congestion not changed from prior.  Hospitalist service was requested to admit for further management.        Subjective & 24hr Events:     Bumex drip resumed.  Nephrology going to try to stop urea to see if patient can maintain serum sodium.  Presented with hyponatremia corrected with diuresis-extremely total body water overloaded.  Nephrology counseled regarding fluid restriction.  Patient pleased that she is back on Bumex drip.  Will need to monitor electrolytes and correct as needed.       Assessment & Plan:        Acute on chronic systolic congestive heart failure (EF 10-15%; 10/10/2024)/dilated cardiomyopathy/history of mitral valve repair  She remains significantly volume overloaded.  Fluid balance -16.0 L since admission.  Transitioned from

## 2024-10-21 NOTE — PROGRESS NOTES
[facility] Progress Note    Follow-Up on: Hyponatremia    ROS:  Gen - no fever, no chills, appetite unchanged  CV - no chest pain, no palpitation  Lung - no shortness of breath, no cough  Abd - no tenderness, no nausea/vomiting, no diarrhea  Ext - + edema    Exam:  Vitals:    10/21/24 0451 10/21/24 0739 10/21/24 0820 10/21/24 0945   BP: (!) 94/55 (!) 87/52 (!) 94/54 (!) 86/47   Pulse: 93 86     Resp: 16 16     Temp: 97.5 °F (36.4 °C) 97.3 °F (36.3 °C)     TempSrc: Oral Axillary     SpO2: 96% 99%     Weight: 90 kg (198 lb 8 oz)      Height:             Intake/Output Summary (Last 24 hours) at 10/21/2024 1136  Last data filed at 10/21/2024 0831  Gross per 24 hour   Intake 1882 ml   Output 4500 ml   Net -2618 ml       Wt Readings from Last 3 Encounters:   10/21/24 90 kg (198 lb 8 oz)   10/09/24 90.7 kg (200 lb)   10/02/24 90.7 kg (200 lb)       GEN - in no distress  CV - regular, no murmur, no rub  Lung - clear bilaterally  Abd - soft, nontender  Ext - 1+ edema    Recent Labs     10/19/24  0356 10/20/24  0418 10/21/24  0333   WBC 3.9* 3.6* 3.3*   HGB 12.8 12.1 12.1   HCT 38.8 36.8 37.1    226 229        Recent Labs     10/19/24  0356 10/20/24  0418 10/21/24  0333 10/21/24  0957    134* 136  --    K 3.8 3.7 3.3* 3.6   CL 93* 93* 93*  --    CO2 32* 30* 34*  --    BUN 28* 28* 29*  --    CREATININE 0.83 0.81 0.86  --    CALCIUM 9.5 9.5 9.6  --    MG 2.1 2.3 2.4  --    ALBUMIN  --  3.4  --   --        Assessment / Plan:  Principal Problem:    Hyponatremia  Active Problems:    Acute on chronic systolic congestive heart failure (HCC)    DCM (dilated cardiomyopathy) (HCC)    H/O mitral valve repair    Transaminitis    Edema    Shortness of breath  Resolved Problems:    * No resolved hospital problems. *    1) Hyponatremia  - In setting of hypervolemic hyponatremia  - Previous Tolvaptan  - Na stable 136 today but remains on Urea  - Will stop Urea to see if she can maintain  - Needs long-term fluid

## 2024-10-22 LAB
ANION GAP SERPL CALC-SCNC: 12 MMOL/L (ref 9–18)
BUN SERPL-MCNC: 29 MG/DL (ref 8–23)
CALCIUM SERPL-MCNC: 9.5 MG/DL (ref 8.8–10.2)
CHLORIDE SERPL-SCNC: 92 MMOL/L (ref 98–107)
CO2 SERPL-SCNC: 32 MMOL/L (ref 20–28)
CREAT SERPL-MCNC: 0.93 MG/DL (ref 0.6–1.1)
GLUCOSE SERPL-MCNC: 101 MG/DL (ref 70–99)
MAGNESIUM SERPL-MCNC: 2.2 MG/DL (ref 1.8–2.4)
POTASSIUM SERPL-SCNC: 3.5 MMOL/L (ref 3.5–5.1)
SODIUM SERPL-SCNC: 135 MMOL/L (ref 136–145)

## 2024-10-22 PROCEDURE — 36415 COLL VENOUS BLD VENIPUNCTURE: CPT

## 2024-10-22 PROCEDURE — 1100000003 HC PRIVATE W/ TELEMETRY

## 2024-10-22 PROCEDURE — 6360000002 HC RX W HCPCS

## 2024-10-22 PROCEDURE — 6370000000 HC RX 637 (ALT 250 FOR IP): Performed by: FAMILY MEDICINE

## 2024-10-22 PROCEDURE — 83735 ASSAY OF MAGNESIUM: CPT

## 2024-10-22 PROCEDURE — 6370000000 HC RX 637 (ALT 250 FOR IP): Performed by: INTERNAL MEDICINE

## 2024-10-22 PROCEDURE — 6360000002 HC RX W HCPCS: Performed by: INTERNAL MEDICINE

## 2024-10-22 PROCEDURE — 2580000003 HC RX 258: Performed by: FAMILY MEDICINE

## 2024-10-22 PROCEDURE — 80048 BASIC METABOLIC PNL TOTAL CA: CPT

## 2024-10-22 PROCEDURE — 99232 SBSQ HOSP IP/OBS MODERATE 35: CPT | Performed by: INTERNAL MEDICINE

## 2024-10-22 RX ADMIN — VITAMIN D, TAB 1000IU (100/BT) 1000 UNITS: 25 TAB at 09:02

## 2024-10-22 RX ADMIN — SACUBITRIL AND VALSARTAN 1 TABLET: 24; 26 TABLET, FILM COATED ORAL at 19:51

## 2024-10-22 RX ADMIN — DOCUSATE SODIUM 50 MG AND SENNOSIDES 8.6 MG 1 TABLET: 8.6; 5 TABLET, FILM COATED ORAL at 09:02

## 2024-10-22 RX ADMIN — CETIRIZINE HYDROCHLORIDE 10 MG: 10 TABLET, FILM COATED ORAL at 09:03

## 2024-10-22 RX ADMIN — DOCUSATE SODIUM 50 MG AND SENNOSIDES 8.6 MG 1 TABLET: 8.6; 5 TABLET, FILM COATED ORAL at 19:51

## 2024-10-22 RX ADMIN — SODIUM CHLORIDE, PRESERVATIVE FREE 10 ML: 5 INJECTION INTRAVENOUS at 19:55

## 2024-10-22 RX ADMIN — EMPAGLIFLOZIN 10 MG: 10 TABLET, FILM COATED ORAL at 09:03

## 2024-10-22 RX ADMIN — MAGNESIUM GLUCONATE 500 MG ORAL TABLET 400 MG: 500 TABLET ORAL at 09:02

## 2024-10-22 RX ADMIN — SODIUM CHLORIDE, PRESERVATIVE FREE 10 ML: 5 INJECTION INTRAVENOUS at 09:13

## 2024-10-22 RX ADMIN — ENOXAPARIN SODIUM 40 MG: 100 INJECTION SUBCUTANEOUS at 09:03

## 2024-10-22 RX ADMIN — FAMOTIDINE 20 MG: 20 TABLET, FILM COATED ORAL at 09:03

## 2024-10-22 RX ADMIN — FAMOTIDINE 20 MG: 20 TABLET, FILM COATED ORAL at 19:52

## 2024-10-22 RX ADMIN — SPIRONOLACTONE 25 MG: 25 TABLET ORAL at 09:12

## 2024-10-22 RX ADMIN — TRAMADOL HYDROCHLORIDE 25 MG: 50 TABLET ORAL at 15:04

## 2024-10-22 RX ADMIN — BUMETANIDE 1 MG/HR: 0.25 INJECTION INTRAMUSCULAR; INTRAVENOUS at 19:51

## 2024-10-22 RX ADMIN — ASPIRIN 81 MG: 81 TABLET, COATED ORAL at 09:02

## 2024-10-22 RX ADMIN — METOPROLOL SUCCINATE 25 MG: 25 TABLET, EXTENDED RELEASE ORAL at 09:03

## 2024-10-22 RX ADMIN — SACUBITRIL AND VALSARTAN 1 TABLET: 24; 26 TABLET, FILM COATED ORAL at 10:19

## 2024-10-22 RX ADMIN — BUMETANIDE 1 MG/HR: 0.25 INJECTION INTRAMUSCULAR; INTRAVENOUS at 09:13

## 2024-10-22 ASSESSMENT — PAIN SCALES - GENERAL
PAINLEVEL_OUTOF10: 0
PAINLEVEL_OUTOF10: 8
PAINLEVEL_OUTOF10: 0

## 2024-10-22 ASSESSMENT — PAIN DESCRIPTION - ORIENTATION: ORIENTATION: RIGHT;LEFT

## 2024-10-22 ASSESSMENT — PAIN DESCRIPTION - DESCRIPTORS: DESCRIPTORS: SQUEEZING;THROBBING

## 2024-10-22 ASSESSMENT — PAIN DESCRIPTION - LOCATION: LOCATION: LEG

## 2024-10-22 NOTE — PROGRESS NOTES
Result Value Ref Range    Magnesium 2.2 1.8 - 2.4 mg/dL       No results for input(s): \"COVID19\" in the last 72 hours.    Current Meds:  Current Facility-Administered Medications   Medication Dose Route Frequency    spironolactone (ALDACTONE) tablet 25 mg  25 mg Oral Daily    magnesium oxide (MAG-OX) tablet 400 mg  400 mg Oral Daily    bumetanide (BUMEX) 12.5 mg in 50 mL infusion  1 mg/hr IntraVENous Continuous    enoxaparin (LOVENOX) injection 40 mg  40 mg SubCUTAneous Daily    traMADol (ULTRAM) tablet 25 mg  25 mg Oral Q6H PRN    sennosides-docusate sodium (SENOKOT-S) 8.6-50 MG tablet 1 tablet  1 tablet Oral BID    empagliflozin (JARDIANCE) tablet 10 mg  10 mg Oral Daily    sacubitril-valsartan (ENTRESTO) 24-26 MG per tablet 1 tablet  1 tablet Oral BID    aspirin EC tablet 81 mg  81 mg Oral Daily    cetirizine (ZYRTEC) tablet 10 mg  10 mg Oral Daily    metoprolol succinate (TOPROL XL) extended release tablet 25 mg  25 mg Oral Daily    Vitamin D (CHOLECALCIFEROL) tablet 1,000 Units  1,000 Units Oral Daily    zolpidem (AMBIEN) tablet 5 mg  5 mg Oral Nightly PRN    sodium chloride flush 0.9 % injection 5-40 mL  5-40 mL IntraVENous 2 times per day    sodium chloride flush 0.9 % injection 5-40 mL  5-40 mL IntraVENous PRN    ondansetron (ZOFRAN-ODT) disintegrating tablet 4 mg  4 mg Oral Q8H PRN    Or    ondansetron (ZOFRAN) injection 4 mg  4 mg IntraVENous Q6H PRN    polyethylene glycol (GLYCOLAX) packet 17 g  17 g Oral Daily PRN    acetaminophen (TYLENOL) tablet 650 mg  650 mg Oral Q6H PRN    Or    acetaminophen (TYLENOL) suppository 650 mg  650 mg Rectal Q6H PRN    potassium chloride (KLOR-CON M) extended release tablet 40 mEq  40 mEq Oral PRN    Or    potassium bicarb-citric acid (EFFER-K) effervescent tablet 40 mEq  40 mEq Oral PRN    Or    potassium chloride 10 mEq/100 mL IVPB (Peripheral Line)  10 mEq IntraVENous PRN    magnesium sulfate 2000 mg in 50 mL IVPB premix  2,000 mg IntraVENous PRN    famotidine  (PEPCID) tablet 20 mg  20 mg Oral BID       Signed:  JOSE SANTIAGO MD    Part of this note may have been written by using a voice dictation software.  The note has been proof read but may still contain some grammatical/other typographical errors.

## 2024-10-22 NOTE — PROGRESS NOTES
[facility] CARDIOLOGY PROGRESS NOTE           10/22/2024 9:29 AM    Admit Date: 10/9/2024         Subjective: remains on bumex gtt with excellent output    ROS:  Cardiovascular:  As noted above    Objective:      Vitals:    10/22/24 0209 10/22/24 0446 10/22/24 0739 10/22/24 0902   BP: (!) 94/53 (!) 92/59 95/81 (!) 81/46   Pulse:  92 86 88   Resp:  18 20    Temp:  97.9 °F (36.6 °C) 97.5 °F (36.4 °C)    TempSrc:   Axillary    SpO2:  97% 98%    Weight:  86.1 kg (189 lb 12.8 oz)     Height:             Physical Exam:  General: Well Developed, Well Nourished, No Acute Distress, Alert & Oriented x 3, Appropriate mood  Neck: supple, no JVD  Heart: S1S2 with RRR without murmurs or gallops  Lungs: Clear throughout auscultation bilaterally without adventitious sounds  Abd: soft, nontender, nondistended, with good bowel sounds  Ext: no edema bilaterally  Skin: warm and dry      Data Review:   Recent Labs     10/20/24  0418 10/21/24  0333 10/21/24  0957 10/22/24  0348   * 136  --  135*   K 3.7 3.3* 3.6 3.5   MG 2.3 2.4  --  2.2   BUN 28* 29*  --  29*   WBC 3.6* 3.3*  --   --    HGB 12.1 12.1  --   --    HCT 36.8 37.1  --   --     229  --   --        No results for input(s): \"TNIPOC\" in the last 72 hours.    Invalid input(s): \"TROIQ\"        Assessment/Plan:     Principal Problem:    Hyponatremia  Active Problems:    Acute on chronic systolic congestive heart failure (HCC)    DCM (dilated cardiomyopathy) ([facility])    H/O mitral valve repair    Transaminitis    Edema    Shortness of breath  Resolved Problems:    * No resolved hospital problems. *    A/P  1) sCHF -Bumex but changed to a Bumex infusion daily with daily BMPs  BB -metoprolol XL 25 mg daily  ACE/ARB/ARNi -Entresto 24/26 mg BID  MRA -Aldactone 25 mg daily  SGLT2i -Jardiance 10 mg daily  2) Hyponatermia has resolved has not had samsca since 10/15/24, continue to monitor with daily BMPs  3) Hypotension - seems acceptable with HF meds continue

## 2024-10-22 NOTE — PROGRESS NOTES
Patient said she was doing well and hopeful to go home soon. She said her family and Uats[place] have been supporting her well at this time.

## 2024-10-22 NOTE — PLAN OF CARE
Problem: Chronic Conditions and Co-morbidities  Goal: Patient's chronic conditions and co-morbidity symptoms are monitored and maintained or improved  Outcome: Progressing  Flowsheets (Taken 10/22/2024 0900)  Care Plan - Patient's Chronic Conditions and Co-Morbidity Symptoms are Monitored and Maintained or Improved:   Monitor and assess patient's chronic conditions and comorbid symptoms for stability, deterioration, or improvement   Collaborate with multidisciplinary team to address chronic and comorbid conditions and prevent exacerbation or deterioration   Update acute care plan with appropriate goals if chronic or comorbid symptoms are exacerbated and prevent overall improvement and discharge     Problem: Discharge Planning  Goal: Discharge to home or other facility with appropriate resources  Outcome: Progressing  Flowsheets (Taken 10/22/2024 0900)  Discharge to home or other facility with appropriate resources:   Identify barriers to discharge with patient and caregiver   Arrange for needed discharge resources and transportation as appropriate   Identify discharge learning needs (meds, wound care, etc)   Refer to discharge planning if patient needs post-hospital services based on physician order or complex needs related to functional status, cognitive ability or social support system     Problem: Safety - Adult  Goal: Free from fall injury  Outcome: Progressing     Problem: Pain  Goal: Verbalizes/displays adequate comfort level or baseline comfort level  Outcome: Progressing     Problem: Skin/Tissue Integrity  Goal: Absence of new skin breakdown  Description: 1.  Monitor for areas of redness and/or skin breakdown  2.  Assess vascular access sites hourly  3.  Every 4-6 hours minimum:  Change oxygen saturation probe site  4.  Every 4-6 hours:  If on nasal continuous positive airway pressure, respiratory therapy assess nares and determine need for appliance change or resting period.  Outcome: Progressing      Problem: Cardiovascular - Adult  Goal: Maintains optimal cardiac output and hemodynamic stability  Outcome: Progressing  Goal: Absence of cardiac dysrhythmias or at baseline  Outcome: Progressing     Problem: Gastrointestinal - Adult  Goal: Maintains or returns to baseline bowel function  Outcome: Progressing     Problem: Metabolic/Fluid and Electrolytes - Adult  Goal: Electrolytes maintained within normal limits  Outcome: Progressing  Goal: Hemodynamic stability and optimal renal function maintained  Outcome: Progressing

## 2024-10-22 NOTE — PROGRESS NOTES
[facility] Progress Note    Follow-Up on: Hyponatremia    ROS:  Gen - no fever, no chills, appetite unchanged  CV - no chest pain, no palpitation  Lung - no shortness of breath, no cough  Abd - no tenderness, no nausea/vomiting, no diarrhea  Ext - + edema    Exam:  Vitals:    10/22/24 0930 10/22/24 0945 10/22/24 1000 10/22/24 1015   BP: (!) 83/48 (!) 84/50 (!) 82/52 104/60   Pulse: 88 81 78 94   Resp:       Temp:       TempSrc:       SpO2:       Weight:       Height:             Intake/Output Summary (Last 24 hours) at 10/22/2024 1037  Last data filed at 10/22/2024 0913  Gross per 24 hour   Intake 987 ml   Output 6450 ml   Net -5463 ml       Wt Readings from Last 3 Encounters:   10/22/24 86.1 kg (189 lb 12.8 oz)   10/09/24 90.7 kg (200 lb)   10/02/24 90.7 kg (200 lb)       GEN - in no distress  CV - regular, no murmur, no rub  Lung - clear bilaterally  Abd - soft, nontender  Ext - 2+ edema    Recent Labs     10/20/24  0418 10/21/24  0333   WBC 3.6* 3.3*   HGB 12.1 12.1   HCT 36.8 37.1    229        Recent Labs     10/20/24  0418 10/21/24  0333 10/21/24  0957 10/22/24  0348   * 136  --  135*   K 3.7 3.3* 3.6 3.5   CL 93* 93*  --  92*   CO2 30* 34*  --  32*   BUN 28* 29*  --  29*   CREATININE 0.81 0.86  --  0.93   CALCIUM 9.5 9.6  --  9.5   MG 2.3 2.4  --  2.2   ALBUMIN 3.4  --   --   --        Assessment / Plan:  Principal Problem:    Hyponatremia  Active Problems:    Acute on chronic systolic congestive heart failure (HCC)    DCM (dilated cardiomyopathy) (HCC)    H/O mitral valve repair    Transaminitis    Edema    Shortness of breath  Resolved Problems:    * No resolved hospital problems. *    1) Hyponatremia  - In setting of hypervolemic hyponatremia  - Previous Tolvaptan - looking at med history, I don't think she got this although it has been mentioned in notes  - Urea stopped 10/21 to see if we can maintain Na  - Needs long-term fluid restriction - discussed with patient  - Emilie Turcios

## 2024-10-23 LAB
ANION GAP SERPL CALC-SCNC: 12 MMOL/L (ref 9–18)
ANION GAP SERPL CALC-SCNC: 13 MMOL/L (ref 9–18)
BUN SERPL-MCNC: 17 MG/DL (ref 8–23)
BUN SERPL-MCNC: 19 MG/DL (ref 8–23)
CALCIUM SERPL-MCNC: 8.7 MG/DL (ref 8.8–10.2)
CALCIUM SERPL-MCNC: 9.8 MG/DL (ref 8.8–10.2)
CHLORIDE SERPL-SCNC: 91 MMOL/L (ref 98–107)
CHLORIDE SERPL-SCNC: 91 MMOL/L (ref 98–107)
CO2 SERPL-SCNC: 31 MMOL/L (ref 20–28)
CO2 SERPL-SCNC: 32 MMOL/L (ref 20–28)
CREAT SERPL-MCNC: 0.83 MG/DL (ref 0.6–1.1)
CREAT SERPL-MCNC: 1.06 MG/DL (ref 0.6–1.1)
GLUCOSE SERPL-MCNC: 104 MG/DL (ref 70–99)
GLUCOSE SERPL-MCNC: 99 MG/DL (ref 70–99)
MAGNESIUM SERPL-MCNC: 2.2 MG/DL (ref 1.8–2.4)
POTASSIUM SERPL-SCNC: 3 MMOL/L (ref 3.5–5.1)
POTASSIUM SERPL-SCNC: 3 MMOL/L (ref 3.5–5.1)
SODIUM SERPL-SCNC: 136 MMOL/L (ref 136–145)
SODIUM SERPL-SCNC: 136 MMOL/L (ref 136–145)

## 2024-10-23 PROCEDURE — 1100000003 HC PRIVATE W/ TELEMETRY

## 2024-10-23 PROCEDURE — 6370000000 HC RX 637 (ALT 250 FOR IP): Performed by: FAMILY MEDICINE

## 2024-10-23 PROCEDURE — 80048 BASIC METABOLIC PNL TOTAL CA: CPT

## 2024-10-23 PROCEDURE — 6370000000 HC RX 637 (ALT 250 FOR IP): Performed by: INTERNAL MEDICINE

## 2024-10-23 PROCEDURE — 6360000002 HC RX W HCPCS

## 2024-10-23 PROCEDURE — 6370000000 HC RX 637 (ALT 250 FOR IP): Performed by: NURSE PRACTITIONER

## 2024-10-23 PROCEDURE — 36415 COLL VENOUS BLD VENIPUNCTURE: CPT

## 2024-10-23 PROCEDURE — 83735 ASSAY OF MAGNESIUM: CPT

## 2024-10-23 PROCEDURE — 2580000003 HC RX 258: Performed by: FAMILY MEDICINE

## 2024-10-23 PROCEDURE — 6360000002 HC RX W HCPCS: Performed by: INTERNAL MEDICINE

## 2024-10-23 PROCEDURE — 99232 SBSQ HOSP IP/OBS MODERATE 35: CPT | Performed by: INTERNAL MEDICINE

## 2024-10-23 RX ORDER — POTASSIUM CHLORIDE 1500 MG/1
40 TABLET, EXTENDED RELEASE ORAL ONCE
Status: COMPLETED | OUTPATIENT
Start: 2024-10-23 | End: 2024-10-23

## 2024-10-23 RX ORDER — POTASSIUM CHLORIDE 1500 MG/1
40 TABLET, EXTENDED RELEASE ORAL EVERY 4 HOURS
Status: COMPLETED | OUTPATIENT
Start: 2024-10-23 | End: 2024-10-23

## 2024-10-23 RX ADMIN — ENOXAPARIN SODIUM 40 MG: 100 INJECTION SUBCUTANEOUS at 08:01

## 2024-10-23 RX ADMIN — POTASSIUM CHLORIDE 40 MEQ: 1500 TABLET, EXTENDED RELEASE ORAL at 13:09

## 2024-10-23 RX ADMIN — DOCUSATE SODIUM 50 MG AND SENNOSIDES 8.6 MG 1 TABLET: 8.6; 5 TABLET, FILM COATED ORAL at 20:27

## 2024-10-23 RX ADMIN — FAMOTIDINE 20 MG: 20 TABLET, FILM COATED ORAL at 20:27

## 2024-10-23 RX ADMIN — SPIRONOLACTONE 25 MG: 25 TABLET ORAL at 07:59

## 2024-10-23 RX ADMIN — SODIUM CHLORIDE, PRESERVATIVE FREE 10 ML: 5 INJECTION INTRAVENOUS at 20:28

## 2024-10-23 RX ADMIN — CETIRIZINE HYDROCHLORIDE 10 MG: 10 TABLET, FILM COATED ORAL at 07:59

## 2024-10-23 RX ADMIN — EMPAGLIFLOZIN 10 MG: 10 TABLET, FILM COATED ORAL at 08:00

## 2024-10-23 RX ADMIN — POTASSIUM CHLORIDE 40 MEQ: 1500 TABLET, EXTENDED RELEASE ORAL at 16:18

## 2024-10-23 RX ADMIN — METOPROLOL SUCCINATE 25 MG: 25 TABLET, EXTENDED RELEASE ORAL at 07:59

## 2024-10-23 RX ADMIN — SODIUM CHLORIDE, PRESERVATIVE FREE 10 ML: 5 INJECTION INTRAVENOUS at 08:01

## 2024-10-23 RX ADMIN — TRAMADOL HYDROCHLORIDE 25 MG: 50 TABLET ORAL at 13:16

## 2024-10-23 RX ADMIN — SACUBITRIL AND VALSARTAN 1 TABLET: 24; 26 TABLET, FILM COATED ORAL at 08:00

## 2024-10-23 RX ADMIN — SACUBITRIL AND VALSARTAN 1 TABLET: 24; 26 TABLET, FILM COATED ORAL at 20:27

## 2024-10-23 RX ADMIN — BUMETANIDE 1 MG/HR: 0.25 INJECTION INTRAMUSCULAR; INTRAVENOUS at 20:20

## 2024-10-23 RX ADMIN — TRAMADOL HYDROCHLORIDE 25 MG: 50 TABLET ORAL at 20:27

## 2024-10-23 RX ADMIN — POLYETHYLENE GLYCOL 3350 17 G: 17 POWDER, FOR SOLUTION ORAL at 20:30

## 2024-10-23 RX ADMIN — POTASSIUM CHLORIDE 40 MEQ: 1500 TABLET, EXTENDED RELEASE ORAL at 05:21

## 2024-10-23 RX ADMIN — DOCUSATE SODIUM 50 MG AND SENNOSIDES 8.6 MG 1 TABLET: 8.6; 5 TABLET, FILM COATED ORAL at 07:59

## 2024-10-23 RX ADMIN — BUMETANIDE 1 MG/HR: 0.25 INJECTION INTRAMUSCULAR; INTRAVENOUS at 08:37

## 2024-10-23 RX ADMIN — ASPIRIN 81 MG: 81 TABLET, COATED ORAL at 07:59

## 2024-10-23 RX ADMIN — MAGNESIUM GLUCONATE 500 MG ORAL TABLET 400 MG: 500 TABLET ORAL at 07:59

## 2024-10-23 RX ADMIN — FAMOTIDINE 20 MG: 20 TABLET, FILM COATED ORAL at 07:59

## 2024-10-23 RX ADMIN — VITAMIN D, TAB 1000IU (100/BT) 1000 UNITS: 25 TAB at 07:59

## 2024-10-23 ASSESSMENT — PAIN SCALES - GENERAL
PAINLEVEL_OUTOF10: 8
PAINLEVEL_OUTOF10: 0
PAINLEVEL_OUTOF10: 0
PAINLEVEL_OUTOF10: 5
PAINLEVEL_OUTOF10: 0
PAINLEVEL_OUTOF10: 3
PAINLEVEL_OUTOF10: 0

## 2024-10-23 ASSESSMENT — PAIN DESCRIPTION - DESCRIPTORS
DESCRIPTORS: ACHING
DESCRIPTORS: ACHING;BURNING;DISCOMFORT

## 2024-10-23 ASSESSMENT — PAIN DESCRIPTION - ORIENTATION
ORIENTATION: RIGHT;LEFT
ORIENTATION: LOWER

## 2024-10-23 ASSESSMENT — PAIN DESCRIPTION - LOCATION
LOCATION: BACK
LOCATION: FOOT

## 2024-10-23 NOTE — PROGRESS NOTES
136 - 145 mmol/L    Potassium 3.5 3.5 - 5.1 mmol/L    Chloride 92 (L) 98 - 107 mmol/L    CO2 32 (H) 20 - 28 mmol/L    Anion Gap 12 9 - 18 mmol/L    Glucose 101 (H) 70 - 99 mg/dL    BUN 29 (H) 8 - 23 MG/DL    Creatinine 0.93 0.60 - 1.10 MG/DL    Est, Glom Filt Rate 69 >60 ml/min/1.73m2    Calcium 9.5 8.8 - 10.2 MG/DL   Magnesium    Collection Time: 10/22/24  3:48 AM   Result Value Ref Range    Magnesium 2.2 1.8 - 2.4 mg/dL   Basic Metabolic Panel    Collection Time: 10/23/24  3:44 AM   Result Value Ref Range    Sodium 136 136 - 145 mmol/L    Potassium 3.0 (L) 3.5 - 5.1 mmol/L    Chloride 91 (L) 98 - 107 mmol/L    CO2 31 (H) 20 - 28 mmol/L    Anion Gap 13 9 - 18 mmol/L    Glucose 104 (H) 70 - 99 mg/dL    BUN 19 8 - 23 MG/DL    Creatinine 0.83 0.60 - 1.10 MG/DL    Est, Glom Filt Rate 80 >60 ml/min/1.73m2    Calcium 8.7 (L) 8.8 - 10.2 MG/DL       No results for input(s): \"COVID19\" in the last 72 hours.    Current Meds:  Current Facility-Administered Medications   Medication Dose Route Frequency    spironolactone (ALDACTONE) tablet 25 mg  25 mg Oral Daily    magnesium oxide (MAG-OX) tablet 400 mg  400 mg Oral Daily    bumetanide (BUMEX) 12.5 mg in 50 mL infusion  1 mg/hr IntraVENous Continuous    enoxaparin (LOVENOX) injection 40 mg  40 mg SubCUTAneous Daily    traMADol (ULTRAM) tablet 25 mg  25 mg Oral Q6H PRN    sennosides-docusate sodium (SENOKOT-S) 8.6-50 MG tablet 1 tablet  1 tablet Oral BID    empagliflozin (JARDIANCE) tablet 10 mg  10 mg Oral Daily    sacubitril-valsartan (ENTRESTO) 24-26 MG per tablet 1 tablet  1 tablet Oral BID    aspirin EC tablet 81 mg  81 mg Oral Daily    cetirizine (ZYRTEC) tablet 10 mg  10 mg Oral Daily    metoprolol succinate (TOPROL XL) extended release tablet 25 mg  25 mg Oral Daily    Vitamin D (CHOLECALCIFEROL) tablet 1,000 Units  1,000 Units Oral Daily    zolpidem (AMBIEN) tablet 5 mg  5 mg Oral Nightly PRN    sodium chloride flush 0.9 % injection 5-40 mL  5-40 mL IntraVENous 2 times

## 2024-10-23 NOTE — PROGRESS NOTES
[facility] Progress Note    Follow-Up on: Hyponatremia    ROS:  Gen - no fever, no chills, appetite unchanged  CV - no chest pain, no palpitation  Lung - no shortness of breath, no cough  Abd - no tenderness, no nausea/vomiting, no diarrhea  Ext - + edema    Exam:  Vitals:    10/23/24 0200 10/23/24 0304 10/23/24 0433 10/23/24 0717   BP: (!) 85/56 (!) 99/51 (!) 91/44 (!) 99/59   Pulse: 87 82 84 90   Resp:   18    Temp:   97.9 °F (36.6 °C)    TempSrc:       SpO2:   95% 96%   Weight:   82.3 kg (181 lb 6.4 oz)    Height:             Intake/Output Summary (Last 24 hours) at 10/23/2024 0940  Last data filed at 10/23/2024 0525  Gross per 24 hour   Intake 540 ml   Output 5400 ml   Net -4860 ml       Wt Readings from Last 3 Encounters:   10/23/24 82.3 kg (181 lb 6.4 oz)   10/09/24 90.7 kg (200 lb)   10/02/24 90.7 kg (200 lb)       GEN - in no distress  CV - regular, no murmur, no rub  Lung - clear bilaterally  Abd - soft, nontender  Ext - 1+ edema    Recent Labs     10/21/24  0333   WBC 3.3*   HGB 12.1   HCT 37.1           Recent Labs     10/21/24  0333 10/21/24  0957 10/22/24  0348 10/23/24  0344     --  135* 136   K 3.3* 3.6 3.5 3.0*   CL 93*  --  92* 91*   CO2 34*  --  32* 31*   BUN 29*  --  29* 19   CREATININE 0.86  --  0.93 0.83   CALCIUM 9.6  --  9.5 8.7*   MG 2.4  --  2.2  --        Assessment / Plan:  Principal Problem:    Hyponatremia  Active Problems:    Acute on chronic systolic congestive heart failure (HCC)    DCM (dilated cardiomyopathy) (HCC)    H/O mitral valve repair    Transaminitis    Edema    Shortness of breath  Resolved Problems:    * No resolved hospital problems. *    1) Hyponatremia  - In setting of hypervolemic hyponatremia  - Previous Tolvaptan - looking at med history, I don't think she got this although it has been mentioned in notes  - Urea stopped 10/21 to see if we can maintain Na  - Needs long-term fluid restriction - discussed with patient  - Na 136 today,

## 2024-10-23 NOTE — CARE COORDINATION
Pt remains on Bumex gtt for LE edema. When medically stable, will discharge home with Edwige  with RN/PT.

## 2024-10-23 NOTE — PROGRESS NOTES
am  10/23/2024 8:15 AM    Admit Date: 10/9/2024    Admit Diagnosis: Hyponatremia with excess extracellular fluid volume [E87.1]      Subjective:    Patient : Pt is alert and well-appearing. Pt on bumex for lower extremity edema, which is still significant but improved.    Objective:    BP (!) 99/59   Pulse 90   Temp 97.9 °F (36.6 °C)   Resp 18   Ht 1.575 m (5' 2\")   Wt 82.3 kg (181 lb 6.4 oz)   SpO2 96%   BMI 33.18 kg/m²     ROS:  General ROS: negative for - chills  Hematological and Lymphatic ROS: negative for - blood clots or jaundice  Respiratory ROS: no cough, shortness of breath, or wheezing  Cardiovascular ROS: no chest pain or dyspnea on exertion  Gastrointestinal ROS: no abdominal pain, change in bowel habits, or black or bloody stools  Neurological ROS: no TIA or stroke symptoms    Physical Exam:      Physical Examination: General appearance - Appearance: alert, well appearing, and in no distress.   Neck/lymph - supple, no significant adenopathy  Chest/CV - clear to auscultation, no wheezes, rales or rhonchi, symmetric air entry  Heart - normal rate, regular rhythm, normal S1, S2, no murmurs, rubs, clicks or gallops  Abdomen/GI - soft, nontender, nondistended, no masses or organomegaly   Musculoskeletal - no joint tenderness, deformity or swelling  Extremities - peripheral pulses normal, no pedal edema, no clubbing or cyanosis  Skin - normal coloration and turgor, no rashes, no suspicious skin lesions noted    Current Facility-Administered Medications   Medication Dose Route Frequency    spironolactone (ALDACTONE) tablet 25 mg  25 mg Oral Daily    magnesium oxide (MAG-OX) tablet 400 mg  400 mg Oral Daily    bumetanide (BUMEX) 12.5 mg in 50 mL infusion  1 mg/hr IntraVENous Continuous    enoxaparin (LOVENOX) injection 40 mg  40 mg SubCUTAneous Daily    traMADol (ULTRAM) tablet 25 mg  25 mg Oral Q6H PRN    sennosides-docusate sodium (SENOKOT-S) 8.6-50 MG tablet 1 tablet  1 tablet Oral BID

## 2024-10-23 NOTE — PROGRESS NOTES
Patient K this AM 3.0. Patient refusing IV replacement saying last time she had it she was crying in pain. Allison Cardenas MD notified and orders received for PO replacement.

## 2024-10-24 LAB
ANION GAP SERPL CALC-SCNC: 13 MMOL/L (ref 9–18)
BUN SERPL-MCNC: 17 MG/DL (ref 8–23)
CALCIUM SERPL-MCNC: 10.2 MG/DL (ref 8.8–10.2)
CHLORIDE SERPL-SCNC: 91 MMOL/L (ref 98–107)
CO2 SERPL-SCNC: 32 MMOL/L (ref 20–28)
CREAT SERPL-MCNC: 0.89 MG/DL (ref 0.6–1.1)
GLUCOSE SERPL-MCNC: 91 MG/DL (ref 70–99)
POTASSIUM SERPL-SCNC: 3.8 MMOL/L (ref 3.5–5.1)
SODIUM SERPL-SCNC: 136 MMOL/L (ref 136–145)

## 2024-10-24 PROCEDURE — 80048 BASIC METABOLIC PNL TOTAL CA: CPT

## 2024-10-24 PROCEDURE — 6370000000 HC RX 637 (ALT 250 FOR IP): Performed by: INTERNAL MEDICINE

## 2024-10-24 PROCEDURE — 6360000002 HC RX W HCPCS

## 2024-10-24 PROCEDURE — 6360000002 HC RX W HCPCS: Performed by: INTERNAL MEDICINE

## 2024-10-24 PROCEDURE — 1100000003 HC PRIVATE W/ TELEMETRY

## 2024-10-24 PROCEDURE — 6370000000 HC RX 637 (ALT 250 FOR IP): Performed by: FAMILY MEDICINE

## 2024-10-24 PROCEDURE — 99232 SBSQ HOSP IP/OBS MODERATE 35: CPT | Performed by: INTERNAL MEDICINE

## 2024-10-24 PROCEDURE — 97530 THERAPEUTIC ACTIVITIES: CPT

## 2024-10-24 PROCEDURE — 2580000003 HC RX 258: Performed by: FAMILY MEDICINE

## 2024-10-24 PROCEDURE — 36415 COLL VENOUS BLD VENIPUNCTURE: CPT

## 2024-10-24 RX ORDER — POTASSIUM CHLORIDE 1500 MG/1
40 TABLET, EXTENDED RELEASE ORAL EVERY 4 HOURS
Status: COMPLETED | OUTPATIENT
Start: 2024-10-24 | End: 2024-10-24

## 2024-10-24 RX ADMIN — SACUBITRIL AND VALSARTAN 1 TABLET: 24; 26 TABLET, FILM COATED ORAL at 12:42

## 2024-10-24 RX ADMIN — METOPROLOL SUCCINATE 25 MG: 25 TABLET, EXTENDED RELEASE ORAL at 09:53

## 2024-10-24 RX ADMIN — SPIRONOLACTONE 25 MG: 25 TABLET ORAL at 09:52

## 2024-10-24 RX ADMIN — POTASSIUM CHLORIDE 40 MEQ: 1500 TABLET, EXTENDED RELEASE ORAL at 09:53

## 2024-10-24 RX ADMIN — SODIUM CHLORIDE, PRESERVATIVE FREE 5 ML: 5 INJECTION INTRAVENOUS at 20:23

## 2024-10-24 RX ADMIN — SODIUM CHLORIDE, PRESERVATIVE FREE 10 ML: 5 INJECTION INTRAVENOUS at 09:55

## 2024-10-24 RX ADMIN — VITAMIN D, TAB 1000IU (100/BT) 1000 UNITS: 25 TAB at 09:52

## 2024-10-24 RX ADMIN — DOCUSATE SODIUM 50 MG AND SENNOSIDES 8.6 MG 1 TABLET: 8.6; 5 TABLET, FILM COATED ORAL at 09:53

## 2024-10-24 RX ADMIN — FAMOTIDINE 20 MG: 20 TABLET, FILM COATED ORAL at 20:23

## 2024-10-24 RX ADMIN — ENOXAPARIN SODIUM 40 MG: 100 INJECTION SUBCUTANEOUS at 09:58

## 2024-10-24 RX ADMIN — POTASSIUM CHLORIDE 40 MEQ: 1500 TABLET, EXTENDED RELEASE ORAL at 06:25

## 2024-10-24 RX ADMIN — FAMOTIDINE 20 MG: 20 TABLET, FILM COATED ORAL at 09:53

## 2024-10-24 RX ADMIN — BUMETANIDE 1 MG/HR: 0.25 INJECTION INTRAMUSCULAR; INTRAVENOUS at 23:15

## 2024-10-24 RX ADMIN — CETIRIZINE HYDROCHLORIDE 10 MG: 10 TABLET, FILM COATED ORAL at 09:53

## 2024-10-24 RX ADMIN — MAGNESIUM GLUCONATE 500 MG ORAL TABLET 400 MG: 500 TABLET ORAL at 09:52

## 2024-10-24 RX ADMIN — ASPIRIN 81 MG: 81 TABLET, COATED ORAL at 09:52

## 2024-10-24 RX ADMIN — SACUBITRIL AND VALSARTAN 1 TABLET: 24; 26 TABLET, FILM COATED ORAL at 20:23

## 2024-10-24 RX ADMIN — DOCUSATE SODIUM 50 MG AND SENNOSIDES 8.6 MG 1 TABLET: 8.6; 5 TABLET, FILM COATED ORAL at 20:23

## 2024-10-24 RX ADMIN — EMPAGLIFLOZIN 10 MG: 10 TABLET, FILM COATED ORAL at 09:53

## 2024-10-24 RX ADMIN — TRAMADOL HYDROCHLORIDE 25 MG: 50 TABLET ORAL at 20:23

## 2024-10-24 RX ADMIN — BUMETANIDE 1 MG/HR: 0.25 INJECTION INTRAMUSCULAR; INTRAVENOUS at 10:35

## 2024-10-24 RX ADMIN — BUMETANIDE 1 MG/HR: 0.25 INJECTION INTRAMUSCULAR; INTRAVENOUS at 14:45

## 2024-10-24 ASSESSMENT — PAIN DESCRIPTION - DESCRIPTORS: DESCRIPTORS: ACHING

## 2024-10-24 ASSESSMENT — PAIN SCALES - GENERAL
PAINLEVEL_OUTOF10: 0
PAINLEVEL_OUTOF10: 0
PAINLEVEL_OUTOF10: 3
PAINLEVEL_OUTOF10: 0
PAINLEVEL_OUTOF10: 5
PAINLEVEL_OUTOF10: 0

## 2024-10-24 ASSESSMENT — PAIN DESCRIPTION - PAIN TYPE: TYPE: CHRONIC PAIN

## 2024-10-24 ASSESSMENT — PAIN DESCRIPTION - ORIENTATION: ORIENTATION: MID

## 2024-10-24 ASSESSMENT — PAIN DESCRIPTION - LOCATION: LOCATION: BACK

## 2024-10-24 NOTE — PROGRESS NOTES
[facility] CARDIOLOGY PROGRESS NOTE    10/24/2024 6:19 AM    Admit Date: 10/9/2024        Subjective:   Stable overnight without angina, CHF, or palpitations.  BP low but chronically marginal at home and asymptomatic.  Otherwise vitals stable and controlled. No other complaints overnight. Tolerating meds well.  Marked diuresis with net 40L out and 50+lb weight loss since admission.  Nearly euvolemic clinically.  Renal function remains stable.        Objective:      Vitals:    10/23/24 2028 10/23/24 2057 10/24/24 0054 10/24/24 0456   BP: (!) 93/56  (!) 87/57 (!) 87/53   Pulse:   92 82   Resp: 18 18 18 20   Temp: 97.7 °F (36.5 °C)  97.9 °F (36.6 °C) 97.7 °F (36.5 °C)   TempSrc:       SpO2: 93%  93% 95%   Weight:    78.3 kg (172 lb 11.2 oz)   Height:           Physical Exam:  Neck- supple, 9-10cm JVD at 45 deg  CV- regular rate and rhythm no MRG  Lung- clear bilaterally, decreased bibasilar but no crackles or wheeze  Abd- soft, nontender, nondistended  Ext- trace pedal and anterior tibial pitting edema  Skin- warm and dry    Data Review:   Pertinent lab and noninvasive imaging over the past 24 hours reviewed and evaluated.    Recent Labs     10/22/24  0348 10/23/24  0344 10/23/24  1223   * 136 136   K 3.5 3.0* 3.0*   MG 2.2  --  2.2   BUN 29* 19 17     Lab Results   Component Value Date     (H) 10/10/2024     Echocardiogram 10/10/2024:  Left Ventricle Severely reduced left ventricular systolic function with a visually estimated EF of 10 -15%. Left ventricle is severely dilated. Wall is thinner than normal. Severe global hypokinesis present. Abnormal diastolic function.   Left Atrium Left atrium is dilated.   Right Ventricle Right ventricle is dilated. Lead present in the right ventricle. Reduced systolic function.   Right Atrium Lead present in the right atrium. Right atrium is dilated.   Aortic Valve Valve structure is normal. No regurgitation. No stenosis.   Mitral Valve Bioprosthetic valve that is

## 2024-10-24 NOTE — PROGRESS NOTES
Comprehensive Nutrition Assessment    Type and Reason for Visit: Reassess  General Nutrition Management/other reason (Hospitalists)    Nutrition Recommendations/Plan:   Meals and Snacks:  Diet: Continue current order  Oral Nutriton Supplement Therapy:   Medical food supplement therapy:  None Not applicable     Malnutrition Assessment:  Malnutrition Status: Insufficient data  Context: Chronic Illness  Findings of clinical characteristics of malnutrition:   Energy Intake:  Unable to assess (Reports bites of meals for months, mostly consuming fruits and vegetables)  Weight Loss:  Unable to assess     Body Fat Loss:  Unable to assess     Muscle Mass Loss:  Mild muscle mass loss Temples (temporalis), Hand (interosseous)  Fluid Accumulation:  Unable to assess     Strength:  Not Performed     Nutrition Assessment:  Nutrition History: Patient in bed with sister at bedside. She states that she has been trying to eat but is able to take a few bites of food at best. She states this has been ongoing for about 2 months. She figured out it was a lack of taste last admission when her sodium improved. She stated instantly was able to taste things normally. When her tastes are off she specifically is turned off by meats. She is able to tolerate a little bit of tuna salad and a little bit of chicken salad.      Do You Have Any Cultural, Baptism, or Ethnic Food Preferences?: No   Weight History: 10/16/23 205# (cards). Per IM office 5/6/24 210#, 5/30/24  209#, 8/29/24 207#  Nutrition Background:       PMH significant for CHF, HLD, MVR, mood disorder. She was recently admitted for hyponatremia and diagnosed with new SIADH. She presented back 7 days later with worsening fatigue and peripheral edema. She was admitted with hyponatremia and transaminitis.   Nutrition Interval:  Pt is sitting on the edge of bed during RD visit this AM, no family present. States that she's feeling so much better with the all the fluid they've pulled off

## 2024-10-24 NOTE — CARE COORDINATION
CM following. Cards plan to keep pt on IV Bumex for another 24 hrs. Pt to discharge home with Edwige ALMANZA with RN/PT. Will advise agency when pt discharges.

## 2024-10-24 NOTE — PROGRESS NOTES
ACUTE PHYSICAL THERAPY GOALS:   (Developed with and agreed upon by patient and/or caregiver.)    (1.) Kely Quach  will move from supine to sit and sit to supine , scoot up and down, and roll side to side with MODIFIED INDEPENDENCE within 7 treatment day(s).    (2.) Kely Quach will transfer from bed to chair and chair to bed with MODIFIED INDEPENDENCE using the least restrictive device within 7 treatment day(s).    (3.) Kely Quach will ambulate with MODIFIED INDEPENDENCE for 200 feet with the least restrictive device within 7 treatment day(s).   (4.) Kely Quach will perform standing static and dynamic balance activities x 8 minutes with MODIFIED INDEPENDENCE to improve safety within 7 treatment day(s).  (5.) Kely Quach will ascend and descend 5 stairs using 1 hand rail(s) with SUPERVISION to improve functional mobility and safety within 7 treatment day(s).  (6.) Kely Quach will perform therapeutic exercises x 15 min for HEP with MODIFIED INDEPENDENCE to improve strength, endurance, and functional mobility within 7 treatment day(s).     PHYSICAL THERAPY: Daily Note PM   (Link to Caseload Tracking: PT Visit Days : 4  Time In/Out PT Charge Capture  Rehab Caseload Tracker  Orders    Kely Quach is a 64 y.o. female   PRIMARY DIAGNOSIS: Hyponatremia  Hyponatremia with excess extracellular fluid volume [E87.1]       Inpatient: Payor: [employer] MEDICARE / Plan: [employer] CHOICE-O MEDICARE / Product Type: *No Product type* /     ASSESSMENT:     REHAB RECOMMENDATIONS:   Recommendation to date pending progress:  Setting:  Home Health Therapy    Equipment:    None (has 2WW)     ASSESSMENT:  Ms. Quach is in restroom upon arrival and eager for PT. She needed assistance to don her slipper sox.  She performs all mobility with SBA.   RW used initially, then progressed patient to cane for trial.  Today, she was able to increase gait distance to 150' with cane and cueing for sequencing.  She was  [] [] [x] [] [] [] [] [] [] []     [] [] [] [] [] [] [] [] [] [] []    I=Independent, Mod I=Modified Independent, S=Supervision, SBA=Standby Assistance, CGA=Contact Guard Assistance,   Min=Minimal Assistance, Mod=Moderate Assistance, Max=Maximal Assistance, Total=Total Assistance, NT=Not Tested    BALANCE: Good Fair+ Fair Fair- Poor NT Comments   Sitting Static [x] [] [] [] [] []    Sitting Dynamic [x] [] [] [] [] []              Standing Static [] [] [x] [] [] []    Standing Dynamic [] [] [] [x] [] []      GAIT: I Mod I S SBA CGA Min Mod Max Total  NT x2 Comments:   Level of Assistance [] [] [] [x] [x] [] [] [] [] [] []    Distance 50, 50, 150   feet     DME Gait Belt, Rolling Walker, and SPC    Gait Quality Decreased step clearance, Decreased step length, and Wide base of support    Weightbearing Status      Stairs      I=Independent, Mod I=Modified Independent, S=Supervision, SBA=Standby Assistance, CGA=Contact Guard Assistance,   Min=Minimal Assistance, Mod=Moderate Assistance, Max=Maximal Assistance, Total=Total Assistance, NT=Not Tested    PLAN:   FREQUENCY AND DURATION: 3 times/week for duration of hospital stay or until stated goals are met, whichever comes first.    TREATMENT:   TREATMENT:   Therapeutic Activity (24 Minutes): Therapeutic activity included Sit to Supine, Transfer Training, Ambulation on level ground, Stair Training, Sitting balance , and Standing balance to improve functional Activity tolerance, Balance, Mobility, and Strength.    TREATMENT GRID:  N/A     Date: 10/18/24        Ambulation  Device  assistance         Partial Squats         Hip Abduction/ Adduction Standing 2x10 AB        Heel Raises  Standing 2x10 AB        Toe Raises Standing 2x10 AB        Hip Flexion Standing 2x10 AB        Sit to Stand         Key:  R=right, L=left, B=bilaterally, A=active, AA=active assisted, P=passive      AFTER TREATMENT PRECAUTIONS: Bed, Bed/Chair Locked, Call light within reach, Needs within reach,

## 2024-10-24 NOTE — PROGRESS NOTES
-40 L since admission.    Cardiology recommend to continue Bumex drip for 24 more hours and hopefully transition to p.o. tomorrow.     Assessment & Plan:      Acute on chronic systolic congestive heart failure (EF 10-15%; 10/10/2024)  Dilated cardiomyopathy/history of mitral valve repair  Continue Bumex drip as per cardiology  Strict I&O's  Continue ASA 81, Jardiance, metoprolol, Entresto.  Borderline to low BP precludes use of mineralocorticoid receptor antagonist.    Replete electrolyte as needed  Cardiology following, appreciate recommendation    Hypervolemic hyponatremia  Sodium improved with Diuresis  Urea stopped 10/21  Nephrology following, appreciate recommendations  Sodium stable, 136 today     Transaminitis  LFTs continued to improve with ongoing diuresis as the etiology is that of congestive hepatopathy.  HIDA scan September 2024 normal noted.     Morbid obesity (BMI 40.1)  Complicates all aspects of care.  Dietary and lifestyle modification.  Some weight loss will be expected with ongoing diuresis.       Anticipated Discharge Arrangements:   Home Health.  She remains volume overloaded and requires ongoing IV diuretics and laboratory monitoring.  Potential discharge late this week at the earliest.     PT/OT evals ordered?  Not ordered; patient not expected to need rehab  Diet:  ADULT DIET; Regular  VTE prophylaxis: Lovenox  Code status: Full Code      Non-peripheral Lines and Tubes (if present):          Telemetry (if present):  Cardiac/Telemetry Monitor On: Bedside monitor in use, Portable telemetry pack applied        Hospital Problems:  Principal Problem:    Hyponatremia  Active Problems:    Acute on chronic systolic congestive heart failure (HCC)    DCM (dilated cardiomyopathy) (HCC)    H/O mitral valve repair    Transaminitis    Edema    Shortness of breath    Psychiatric disorder  Resolved Problems:    * No resolved hospital problems. *      Objective:   Patient Vitals for the past 24 hrs:   Temp  Daily    metoprolol succinate (TOPROL XL) extended release tablet 25 mg  25 mg Oral Daily    Vitamin D (CHOLECALCIFEROL) tablet 1,000 Units  1,000 Units Oral Daily    zolpidem (AMBIEN) tablet 5 mg  5 mg Oral Nightly PRN    sodium chloride flush 0.9 % injection 5-40 mL  5-40 mL IntraVENous 2 times per day    sodium chloride flush 0.9 % injection 5-40 mL  5-40 mL IntraVENous PRN    ondansetron (ZOFRAN-ODT) disintegrating tablet 4 mg  4 mg Oral Q8H PRN    Or    ondansetron (ZOFRAN) injection 4 mg  4 mg IntraVENous Q6H PRN    polyethylene glycol (GLYCOLAX) packet 17 g  17 g Oral Daily PRN    acetaminophen (TYLENOL) tablet 650 mg  650 mg Oral Q6H PRN    Or    acetaminophen (TYLENOL) suppository 650 mg  650 mg Rectal Q6H PRN    potassium chloride (KLOR-CON M) extended release tablet 40 mEq  40 mEq Oral PRN    Or    potassium bicarb-citric acid (EFFER-K) effervescent tablet 40 mEq  40 mEq Oral PRN    Or    potassium chloride 10 mEq/100 mL IVPB (Peripheral Line)  10 mEq IntraVENous PRN    magnesium sulfate 2000 mg in 50 mL IVPB premix  2,000 mg IntraVENous PRN    famotidine (PEPCID) tablet 20 mg  20 mg Oral BID       Signed:  JOSE SANTIAGO MD    Part of this note may have been written by using a voice dictation software.  The note has been proof read but may still contain some grammatical/other typographical errors.

## 2024-10-24 NOTE — PLAN OF CARE
Problem: Chronic Conditions and Co-morbidities  Goal: Patient's chronic conditions and co-morbidity symptoms are monitored and maintained or improved  Outcome: Progressing  Flowsheets (Taken 10/24/2024 0945)  Care Plan - Patient's Chronic Conditions and Co-Morbidity Symptoms are Monitored and Maintained or Improved: Monitor and assess patient's chronic conditions and comorbid symptoms for stability, deterioration, or improvement     Problem: Discharge Planning  Goal: Discharge to home or other facility with appropriate resources  Outcome: Progressing  Flowsheets (Taken 10/24/2024 0945)  Discharge to home or other facility with appropriate resources: Identify barriers to discharge with patient and caregiver     Problem: Safety - Adult  Goal: Free from fall injury  Outcome: Progressing  Flowsheets (Taken 10/24/2024 0945)  Free From Fall Injury: Instruct family/caregiver on patient safety     Problem: Pain  Goal: Verbalizes/displays adequate comfort level or baseline comfort level  Outcome: Progressing  Flowsheets (Taken 10/24/2024 0945)  Verbalizes/displays adequate comfort level or baseline comfort level: Encourage patient to monitor pain and request assistance     Problem: Skin/Tissue Integrity  Goal: Absence of new skin breakdown  Description: 1.  Monitor for areas of redness and/or skin breakdown  2.  Assess vascular access sites hourly  3.  Every 4-6 hours minimum:  Change oxygen saturation probe site  4.  Every 4-6 hours:  If on nasal continuous positive airway pressure, respiratory therapy assess nares and determine need for appliance change or resting period.  Outcome: Progressing     Problem: Cardiovascular - Adult  Goal: Maintains optimal cardiac output and hemodynamic stability  Outcome: Progressing  Flowsheets (Taken 10/24/2024 0945)  Maintains optimal cardiac output and hemodynamic stability: Monitor blood pressure and heart rate  Goal: Absence of cardiac dysrhythmias or at baseline  Outcome:

## 2024-10-24 NOTE — PROGRESS NOTES
[facility] Progress Note    Follow-Up on: Hyponatremia    ROS:  Gen - no fever, no chills, appetite unchanged  CV - no chest pain, no palpitation  Lung - no shortness of breath, no cough  Abd - no tenderness, no nausea/vomiting, no diarrhea  Ext - + edema    Exam:  Vitals:    10/23/24 2057 10/24/24 0054 10/24/24 0456 10/24/24 0725   BP:  (!) 87/57 (!) 87/53 (!) 86/49   Pulse:  92 82 90   Resp: 18 18 20    Temp:  97.9 °F (36.6 °C) 97.7 °F (36.5 °C) 97.6 °F (36.4 °C)   TempSrc:    Oral   SpO2:  93% 95% 96%   Weight:   78.3 kg (172 lb 11.2 oz)    Height:             Intake/Output Summary (Last 24 hours) at 10/24/2024 0920  Last data filed at 10/24/2024 0459  Gross per 24 hour   Intake 620 ml   Output 4350 ml   Net -3730 ml       Wt Readings from Last 3 Encounters:   10/24/24 78.3 kg (172 lb 11.2 oz)   10/09/24 90.7 kg (200 lb)   10/02/24 90.7 kg (200 lb)       GEN - in no distress  CV - regular, no murmur, no rub  Lung - clear bilaterally  Abd - soft, nontender  Ext - 1+ edema    No results for input(s): \"WBC\", \"HGB\", \"HCT\", \"PLT\", \"INR\" in the last 72 hours.       Recent Labs     10/22/24  0348 10/23/24  0344 10/23/24  1223 10/24/24  0528   * 136 136 136   K 3.5 3.0* 3.0* 3.8   CL 92* 91* 91* 91*   CO2 32* 31* 32* 32*   BUN 29* 19 17 17   CREATININE 0.93 0.83 1.06 0.89   CALCIUM 9.5 8.7* 9.8 10.2   MG 2.2  --  2.2  --        Assessment / Plan:  Principal Problem:    Hyponatremia  Active Problems:    Acute on chronic systolic congestive heart failure (HCC)    DCM (dilated cardiomyopathy) ([facility])    H/O mitral valve repair    Transaminitis    Edema    Shortness of breath    Psychiatric disorder  Resolved Problems:    * No resolved hospital problems. *    1) Hyponatremia  - In setting of hypervolemic hyponatremia  - Previous Tolvaptan - looking at med history, I don't think she got this although it has been mentioned in notes  - Urea stopped 10/21 to see if we can maintain Na  - Needs long-term fluid

## 2024-10-25 ENCOUNTER — TELEPHONE (OUTPATIENT)
Age: 64
End: 2024-10-25

## 2024-10-25 VITALS
BODY MASS INDEX: 30.57 KG/M2 | HEIGHT: 62 IN | HEART RATE: 96 BPM | TEMPERATURE: 98.6 F | RESPIRATION RATE: 19 BRPM | OXYGEN SATURATION: 95 % | DIASTOLIC BLOOD PRESSURE: 61 MMHG | WEIGHT: 166.1 LBS | SYSTOLIC BLOOD PRESSURE: 93 MMHG

## 2024-10-25 DIAGNOSIS — I50.20 HFREF (HEART FAILURE WITH REDUCED EJECTION FRACTION) (HCC): Primary | ICD-10-CM

## 2024-10-25 DIAGNOSIS — I50.22 CHRONIC SYSTOLIC CONGESTIVE HEART FAILURE (HCC): ICD-10-CM

## 2024-10-25 DIAGNOSIS — I42.0 DCM (DILATED CARDIOMYOPATHY) (HCC): Chronic | ICD-10-CM

## 2024-10-25 LAB
ANION GAP SERPL CALC-SCNC: 12 MMOL/L (ref 9–18)
BASOPHILS # BLD: 0.1 K/UL (ref 0–0.2)
BASOPHILS NFR BLD: 1 % (ref 0–2)
BUN SERPL-MCNC: 20 MG/DL (ref 8–23)
CALCIUM SERPL-MCNC: 9.9 MG/DL (ref 8.8–10.2)
CHLORIDE SERPL-SCNC: 93 MMOL/L (ref 98–107)
CO2 SERPL-SCNC: 30 MMOL/L (ref 20–28)
CREAT SERPL-MCNC: 0.91 MG/DL (ref 0.6–1.1)
DIFFERENTIAL METHOD BLD: ABNORMAL
EOSINOPHIL # BLD: 0.2 K/UL (ref 0–0.8)
EOSINOPHIL NFR BLD: 4 % (ref 0.5–7.8)
ERYTHROCYTE [DISTWIDTH] IN BLOOD BY AUTOMATED COUNT: 15.9 % (ref 11.9–14.6)
GLUCOSE SERPL-MCNC: 95 MG/DL (ref 70–99)
HCT VFR BLD AUTO: 36.7 % (ref 35.8–46.3)
HGB BLD-MCNC: 12.1 G/DL (ref 11.7–15.4)
IMM GRANULOCYTES # BLD AUTO: 0 K/UL (ref 0–0.5)
IMM GRANULOCYTES NFR BLD AUTO: 0 % (ref 0–5)
LYMPHOCYTES # BLD: 1.2 K/UL (ref 0.5–4.6)
LYMPHOCYTES NFR BLD: 26 % (ref 13–44)
MAGNESIUM SERPL-MCNC: 2.3 MG/DL (ref 1.8–2.4)
MCH RBC QN AUTO: 29 PG (ref 26.1–32.9)
MCHC RBC AUTO-ENTMCNC: 33 G/DL (ref 31.4–35)
MCV RBC AUTO: 88 FL (ref 82–102)
MONOCYTES # BLD: 0.4 K/UL (ref 0.1–1.3)
MONOCYTES NFR BLD: 9 % (ref 4–12)
NEUTS SEG # BLD: 2.7 K/UL (ref 1.7–8.2)
NEUTS SEG NFR BLD: 60 % (ref 43–78)
NRBC # BLD: 0 K/UL (ref 0–0.2)
PLATELET # BLD AUTO: 249 K/UL (ref 150–450)
PMV BLD AUTO: 9.5 FL (ref 9.4–12.3)
POTASSIUM SERPL-SCNC: 3.6 MMOL/L (ref 3.5–5.1)
RBC # BLD AUTO: 4.17 M/UL (ref 4.05–5.2)
SODIUM SERPL-SCNC: 135 MMOL/L (ref 136–145)
WBC # BLD AUTO: 4.5 K/UL (ref 4.3–11.1)

## 2024-10-25 PROCEDURE — 85025 COMPLETE CBC W/AUTO DIFF WBC: CPT

## 2024-10-25 PROCEDURE — 2580000003 HC RX 258: Performed by: FAMILY MEDICINE

## 2024-10-25 PROCEDURE — 6370000000 HC RX 637 (ALT 250 FOR IP): Performed by: INTERNAL MEDICINE

## 2024-10-25 PROCEDURE — 36415 COLL VENOUS BLD VENIPUNCTURE: CPT

## 2024-10-25 PROCEDURE — 83735 ASSAY OF MAGNESIUM: CPT

## 2024-10-25 PROCEDURE — 6360000002 HC RX W HCPCS: Performed by: INTERNAL MEDICINE

## 2024-10-25 PROCEDURE — 6370000000 HC RX 637 (ALT 250 FOR IP): Performed by: FAMILY MEDICINE

## 2024-10-25 PROCEDURE — 80048 BASIC METABOLIC PNL TOTAL CA: CPT

## 2024-10-25 PROCEDURE — 6360000002 HC RX W HCPCS

## 2024-10-25 PROCEDURE — 99232 SBSQ HOSP IP/OBS MODERATE 35: CPT | Performed by: INTERNAL MEDICINE

## 2024-10-25 RX ORDER — SPIRONOLACTONE 25 MG/1
25 TABLET ORAL DAILY
Qty: 30 TABLET | Refills: 3 | Status: SHIPPED | OUTPATIENT
Start: 2024-10-25

## 2024-10-25 RX ORDER — POTASSIUM CHLORIDE 1500 MG/1
40 TABLET, EXTENDED RELEASE ORAL EVERY 4 HOURS
Status: COMPLETED | OUTPATIENT
Start: 2024-10-25 | End: 2024-10-25

## 2024-10-25 RX ORDER — POTASSIUM CHLORIDE 1500 MG/1
20 TABLET, EXTENDED RELEASE ORAL DAILY
Qty: 30 TABLET | Refills: 0 | Status: SHIPPED | OUTPATIENT
Start: 2024-10-25 | End: 2024-11-24

## 2024-10-25 RX ADMIN — METOPROLOL SUCCINATE 25 MG: 25 TABLET, EXTENDED RELEASE ORAL at 09:02

## 2024-10-25 RX ADMIN — ASPIRIN 81 MG: 81 TABLET, COATED ORAL at 09:03

## 2024-10-25 RX ADMIN — MAGNESIUM GLUCONATE 500 MG ORAL TABLET 400 MG: 500 TABLET ORAL at 09:03

## 2024-10-25 RX ADMIN — SPIRONOLACTONE 25 MG: 25 TABLET ORAL at 09:03

## 2024-10-25 RX ADMIN — VITAMIN D, TAB 1000IU (100/BT) 1000 UNITS: 25 TAB at 09:03

## 2024-10-25 RX ADMIN — DOCUSATE SODIUM 50 MG AND SENNOSIDES 8.6 MG 1 TABLET: 8.6; 5 TABLET, FILM COATED ORAL at 09:03

## 2024-10-25 RX ADMIN — BUMETANIDE 1 MG/HR: 0.25 INJECTION INTRAMUSCULAR; INTRAVENOUS at 09:01

## 2024-10-25 RX ADMIN — POTASSIUM CHLORIDE 40 MEQ: 1500 TABLET, EXTENDED RELEASE ORAL at 09:02

## 2024-10-25 RX ADMIN — SODIUM CHLORIDE, PRESERVATIVE FREE 5 ML: 5 INJECTION INTRAVENOUS at 09:04

## 2024-10-25 RX ADMIN — SACUBITRIL AND VALSARTAN 1 TABLET: 24; 26 TABLET, FILM COATED ORAL at 09:03

## 2024-10-25 RX ADMIN — FAMOTIDINE 20 MG: 20 TABLET, FILM COATED ORAL at 09:03

## 2024-10-25 RX ADMIN — EMPAGLIFLOZIN 10 MG: 10 TABLET, FILM COATED ORAL at 09:03

## 2024-10-25 RX ADMIN — POTASSIUM CHLORIDE 40 MEQ: 1500 TABLET, EXTENDED RELEASE ORAL at 12:34

## 2024-10-25 RX ADMIN — CETIRIZINE HYDROCHLORIDE 10 MG: 10 TABLET, FILM COATED ORAL at 09:03

## 2024-10-25 RX ADMIN — ENOXAPARIN SODIUM 40 MG: 100 INJECTION SUBCUTANEOUS at 09:02

## 2024-10-25 ASSESSMENT — PAIN SCALES - GENERAL
PAINLEVEL_OUTOF10: 0
PAINLEVEL_OUTOF10: 0

## 2024-10-25 NOTE — TELEPHONE ENCOUNTER
I called the pt for her TC appt.    She needs a basic metabolic panel and magnesium in 5 days  Please order nutrition counseling downstairs as well with cardiac rehab

## 2024-10-25 NOTE — PROGRESS NOTES
[facility] CARDIOLOGY PROGRESS NOTE    10/25/2024 7:18 AM    Admit Date: 10/9/2024        Subjective:   Stable overnight without angina, CHF, or palpitations.  BP low but chronically marginal at home and asymptomatic.  Otherwise vitals stable and controlled. No other complaints overnight. Tolerating meds well.  Marked diuresis with net 40L out and 50+lb weight loss since admission.  Nearly euvolemic clinically.  Renal function remains stable.        Objective:      Vitals:    10/24/24 2351 10/25/24 0354 10/25/24 0615 10/25/24 0709   BP: (!) 86/63 (!) 88/57     Pulse: 91 85  (!) 41   Resp: 17 18  18   Temp: 97.7 °F (36.5 °C) 98.2 °F (36.8 °C)  98.2 °F (36.8 °C)   TempSrc: Oral   Oral   SpO2: 95% 95%  95%   Weight:   75.3 kg (166 lb 1.6 oz)    Height:           Physical Exam:  Neck- supple, 9-10cm JVD at 45 deg  CV- regular rate and rhythm no MRG  Lung- clear bilaterally, decreased bibasilar but no crackles or wheeze  Abd- soft, nontender, nondistended  Ext- trace pedal and anterior tibial pitting edema  Skin- warm and dry    Data Review:   Pertinent lab and noninvasive imaging over the past 24 hours reviewed and evaluated.    Recent Labs     10/23/24  1223 10/24/24  0528 10/25/24  0359    136 135*   K 3.0* 3.8 3.6   MG 2.2  --  2.3   BUN 17 17 20   WBC  --   --  4.5   HGB  --   --  12.1   HCT  --   --  36.7   PLT  --   --  249     Lab Results   Component Value Date     (H) 10/10/2024     Echocardiogram 10/10/2024:  Left Ventricle Severely reduced left ventricular systolic function with a visually estimated EF of 10 -15%. Left ventricle is severely dilated. Wall is thinner than normal. Severe global hypokinesis present. Abnormal diastolic function.   Left Atrium Left atrium is dilated.   Right Ventricle Right ventricle is dilated. Lead present in the right ventricle. Reduced systolic function.   Right Atrium Lead present in the right atrium. Right atrium is dilated.   Aortic Valve Valve structure is  normal. No regurgitation. No stenosis.   Mitral Valve Bioprosthetic valve that is well-seated with leaflet motion that was not well visualized. No regurgitation. Mild stenosis noted. MV mean gradient is 3 mmHg.   Tricuspid Valve Valve structure is normal. Trace regurgitation. No stenosis noted. The estimated RVSP is 37 mmHg.   Pulmonic Valve No regurgitation. No stenosis noted.   Aorta Normal sized aorta.   IVC/Hepatic Veins IVC diameter is greater than 21 mm and decreases less than 50% during inspiration; therefore the estimated right atrial pressure is elevated (~15 mmHg).   Pericardium No pericardial effusion.         Assessment and Plan:      Acute on chronic systolic heart failure-echo as noted above.  BP marginal but tolerating.  Continue low-dose Toprol-XL, Entresto, Aldactone, and Jardiance.   Replete electrolytes daily.  Elevate legs aggressively and continue diuresis as tolerated.  BP limits up titration of GDMT at this point.  Continue Bumex drip until this afternoon and then discharged home.  See below     Active Hospital Problems    Edema-markedly improved compared to last week.  May be nearing euvolemia.  Continue Bumex drip and reassess in 24 hours.  Net 43 L out since admission, 65 pound weight loss since admission.       Shortness of breath-resolved, none overnight.  Continue diuresis as noted       Transaminitis-now normalized, due to passive hepatic congestion.  Follow periodically.       Hyponatremia-stable and sodium 136 today.  Tolerating Bumex drip well.  Recheck BMP daily.  Continue diuresis as tolerated       H/O mitral valve repair- stable, echo as noted above.       DCM (dilated cardiomyopathy) (HCC)- very severe LV systolic dysfunction.  Also with RV dysfunction.  Continue Bumex drip until this afternoon and then discharge later today.    Plan:  Discharge later today, follow-up with Dr. Wills in the next 7 days for transition of care follow-up  Needs nutrition counseling and cardiac

## 2024-10-25 NOTE — CARE COORDINATION
10/25/24 1000   Discharge Planning   Patient expects to be discharged to: House   Services At/After Discharge   Transition of Care Consult (CM Consult) Home Health  ([facility];)   Internal Home Health No   Reason Outside Agency Chosen Script used patient chose alternate agency   Services At/After Discharge Home Health   [place] Resource Information Provided? No   Mode of Transport at Discharge Other (see comment)  (Spouse to transport by car.)   Confirm Follow Up Transport Family   Condition of Participation: Discharge Planning   The Plan for Transition of Care is related to the following treatment goals: Home with home health to assist and support return to baseline.   The Patient and/or Patient Representative was provided with a Choice of Provider? Patient   The Patient and/Or Patient Representative agree with the Discharge Plan? Yes   Freedom of Choice list was provided with basic dialogue that supports the patient's individualized plan of care/goals, treatment preferences, and shares the quality data associated with the providers?  Yes     CM reviewed discharge summary then met with patient. Per Cardiology, Bumex drip until this PM then discharge home.  Home health arranged with [facility] by prior CM. CM sent discharge summary via EPIC.   Patient verbalizes agreement to discharge. Spouse to transport home after his workday, 1630.

## 2024-10-25 NOTE — PROGRESS NOTES
Outpatient Pharmacy Progress Note for Meds-to-Beds    Total number of Prescriptions Filled: 1    List of Medications (name,strength):  Potassium Chloride 20 meq      Delivered to: Kely Quach       Co-pay: 0      Payment Type: 0      Additional Documentation:  Medication(s) were delivered to the patient's room prior to discharge  Will transfer out torsemide pt stated entresto should go to a free pharmacy      Thank you for letting us serve your patients.  [facility] [id number] [place] [address]  Phone: [phone]  Fax: [phone]

## 2024-10-25 NOTE — DISCHARGE SUMMARY
Hospitalist Discharge Summary   Admit Date:  10/9/2024  6:47 PM   DC Note date: 10/25/2024  Name:  Kely Quach   Age:  64 y.o.  Sex:  female  :  1960   MRN:  [id number]   Room:  [facility]  PCP:  Sabine Ordonez MD    Presenting Complaint: No chief complaint on file.     Initial Admission Diagnosis: Hyponatremia with excess extracellular fluid volume [E87.1]     Problem List for this Hospitalization (present on admission):    Principal Problem:    Hyponatremia  Active Problems:    Acute on chronic systolic congestive heart failure (HCC)    DCM (dilated cardiomyopathy) (HCC)    H/O mitral valve repair    Transaminitis    Edema    Shortness of breath    Psychiatric disorder  Resolved Problems:    * No resolved hospital problems. *      Hospital Course:  Kely Quach is a 64 y.o. female with medical history of CHF, HLD, mood disorder, and chronic hyponatremia who presented to the ER with complaint of worsening weakness, fatigue, and peripheral edema since discharge from this facility on 10/2/24.  She was admitted  for sodium of 123.  Workup at the time was consistent with SIADH.  BP was borderline during the hospitalization, per review of discharge summary, and so valsartan, metoprolol, and Aldactone were discontinued and midodrine 5 mg 3 times daily started.      ED eval showed sodium of 123, BNP 22,384 (up from 5000+ last admit), and new transaminitis. CXR with cardiomegaly with central pulmonary vascular congestion not changed from prior.       Patient admitted with hyponatremia and acute on chronic systolic CHF.  EF 10 to 15%.  Nephrology and cardiology consulted.  Patient started on Bumex drip with marked diuresis, net 42 L negative fluid balance since admission and has lost 50+ pound.  Cardiology has optimized medical management and patient started on metoprolol, Entresto, Aldactone and Jardiance.  Blood pressure borderline but acceptable with heart failure medicines.  Clinically nearly

## 2024-10-25 NOTE — TELEPHONE ENCOUNTER
----- Message from Dr. João Trejo MD sent at 10/25/2024  7:26 AM EDT -----  Discharging this patient today.  She needs transitional care 7-day follow-up with [facility]  She needs a basic metabolic panel and magnesium in 5 days  Please order nutrition counseling downstairs as well with cardiac rehab

## 2024-10-28 ENCOUNTER — TELEPHONE (OUTPATIENT)
Age: 64
End: 2024-10-28

## 2024-10-28 DIAGNOSIS — I50.20 HFREF (HEART FAILURE WITH REDUCED EJECTION FRACTION) (HCC): Primary | ICD-10-CM

## 2024-10-28 RX ORDER — FUROSEMIDE 40 MG/1
40 TABLET ORAL DAILY
Qty: 30 TABLET | Refills: 2 | Status: SHIPPED | OUTPATIENT
Start: 2024-10-28 | End: 2024-10-31

## 2024-10-28 RX ORDER — METOPROLOL SUCCINATE 50 MG/1
25 TABLET, EXTENDED RELEASE ORAL DAILY
Qty: 45 TABLET | Refills: 3 | Status: SHIPPED | OUTPATIENT
Start: 2024-10-28 | End: 2024-10-31 | Stop reason: SDUPTHER

## 2024-10-28 NOTE — TELEPHONE ENCOUNTER
Sent in refills per request with new rx for Furosemide 40 mg daily. Patient made aware to stop the Torsemide and to have labs drawn on Wed for appt on Thurs.  Verbalized understanding.

## 2024-10-28 NOTE — TELEPHONE ENCOUNTER
Patient recently discharged (10/25/2024) from a prolonged hospitalization for SIADH.    She reports generalized itching over body that began in the hospital and used calamine lotion for relief. She feels it's associated with the Torsemide or potassium as these are her only new medications. According to patient the generalized itching coincides with the initiation of Torsemide gtt in hosp. The itching has continues at home keeping her awake at night. See current meds below. Note she lost about 50 lbs in hospital. No wt gain since d/c. Tolerates Furosemide. Needs refills on BB and entresto. SBP 90's. I will send in rx refill. Message sent to [place].    What do you think is causing the itching?   2.   Any changes in meds?   3.   Also when would to have her labs collected before her TC7 Thurs so you'll have results?         Current Outpatient Medications:     spironolactone (ALDACTONE) 25 MG tablet, Take 1 tablet by mouth daily, Disp: 30 tablet, Rfl: 3    sacubitril-valsartan (ENTRESTO) 24-26 MG per tablet, Take 1 tablet by mouth 2 times daily, Disp: 60 tablet, Rfl: 2    empagliflozin (JARDIANCE) 10 MG tablet, Take 1 tablet by mouth daily, Disp: 30 tablet, Rfl: 3    torsemide 40 MG TABS, Take 40 mg by mouth daily, Disp: 30 tablet, Rfl: 2    potassium chloride (KLOR-CON M) 20 MEQ extended release tablet, Take 1 tablet by mouth daily, Disp: 30 tablet, Rfl: 0    UNABLE TO FIND, Blood builder, Disp: , Rfl:     metoprolol succinate (TOPROL XL) 50 MG extended release tablet, Take 0.5 tablets by mouth daily, Disp: 45 tablet, Rfl: 3    magnesium oxide (MAG-OX) 400 MG tablet, Take 250 mg by mouth daily, Disp: , Rfl:     aspirin 81 MG EC tablet, Take by mouth daily, Disp: , Rfl:     vitamin D (CHOLECALCIFEROL) 25 MCG (1000 UT) TABS tablet, Take by mouth daily, Disp: , Rfl:     loratadine (CLARITIN) 10 MG tablet, Take 1 tablet by mouth daily as needed, Disp: , Rfl:     zolpidem (AMBIEN) 5 MG tablet, Take by mouth as needed.,

## 2024-10-28 NOTE — TELEPHONE ENCOUNTER
Care Transitions Initial Follow Up Call    Call within 2 business days of discharge: Yes     Patient: Kely Quach Patient : 1960 MRN: [id number]    [unfilled]    RARS: Readmission Risk Score: 14.1       Spoke with: Patient    Discharge department/facility: [facility]    Non-face-to-face services provided:  Assistance in accessing community resources-Transitional Care call to FU on 10/9/24-10/25/24 hospitalization at [facility] for hyponatremia and acute on chronic CHF. Patient states she has been doing well since D/C from [facility].  Home health nurses visited, today. Advised patient to follow low salt, low saturated fat, low cholesterol diet and 2 liter per day fluid restriction. Instructed patient to weigh every day and call to report any weight gain of 2 lbs overnight or 5 lbs in 1 week, or any increase in SOB or peripheral edema. Reviewed meds, stressing importance of compliance. Reminded patient of 10/31/24 1:00 pm SA appointment with Dr. Wills. Advised patient to call with any questions or concerns prior to appointment. Patient verbalized understanding.       Follow Up  Future Appointments   Date Time Provider Department Center   10/31/2024  1:00 PM Devon Wills MD [facility] AMB   2024 11:00 AM [facility] DEVICE [address] [facility] AMB   2024 11:15 AM Landon Mills MD [facility] AM[facility]   2025 10:30 AM [facility]       Liudmila Billingsley RN

## 2024-10-28 NOTE — TELEPHONE ENCOUNTER
Pt needs ENTRESTO and metoprolol succinate sent to   [id number] - [place], [place] - [address] [phone] -  [phone]       Also pt states that one of her meds are making her itch, might be the torsemide she said

## 2024-10-29 ENCOUNTER — TELEPHONE (OUTPATIENT)
Age: 64
End: 2024-10-29

## 2024-10-29 NOTE — TELEPHONE ENCOUNTER
The patient just got admitted to home health. Patient would like labs drawn with home health instead of driving to [place]. Fax orders to: [phone].

## 2024-10-30 DIAGNOSIS — I50.22 CHRONIC SYSTOLIC CONGESTIVE HEART FAILURE (HCC): ICD-10-CM

## 2024-10-30 DIAGNOSIS — I50.20 HFREF (HEART FAILURE WITH REDUCED EJECTION FRACTION) (HCC): ICD-10-CM

## 2024-10-30 DIAGNOSIS — I42.0 DCM (DILATED CARDIOMYOPATHY) (HCC): Chronic | ICD-10-CM

## 2024-10-30 LAB
ANION GAP SERPL CALC-SCNC: 12 MMOL/L (ref 7–16)
BUN SERPL-MCNC: 16 MG/DL (ref 8–23)
CALCIUM SERPL-MCNC: 10.1 MG/DL (ref 8.8–10.2)
CHLORIDE SERPL-SCNC: 95 MMOL/L (ref 98–107)
CO2 SERPL-SCNC: 26 MMOL/L (ref 20–29)
CREAT SERPL-MCNC: 0.75 MG/DL (ref 0.6–1.1)
GLUCOSE SERPL-MCNC: 107 MG/DL (ref 70–99)
MAGNESIUM SERPL-MCNC: 2.6 MG/DL (ref 1.8–2.4)
POTASSIUM SERPL-SCNC: 4.1 MMOL/L (ref 3.5–5.1)
SODIUM SERPL-SCNC: 133 MMOL/L (ref 136–145)

## 2024-10-31 ENCOUNTER — OFFICE VISIT (OUTPATIENT)
Age: 64
End: 2024-10-31

## 2024-10-31 VITALS
HEIGHT: 62 IN | OXYGEN SATURATION: 98 % | DIASTOLIC BLOOD PRESSURE: 62 MMHG | BODY MASS INDEX: 30.73 KG/M2 | WEIGHT: 167 LBS | SYSTOLIC BLOOD PRESSURE: 102 MMHG | HEART RATE: 107 BPM

## 2024-10-31 DIAGNOSIS — Z98.890 S/P MVR (MITRAL VALVE REPAIR): ICD-10-CM

## 2024-10-31 DIAGNOSIS — D64.9 ANEMIA, UNSPECIFIED TYPE: ICD-10-CM

## 2024-10-31 DIAGNOSIS — I50.20 HFREF (HEART FAILURE WITH REDUCED EJECTION FRACTION) (HCC): Primary | ICD-10-CM

## 2024-10-31 RX ORDER — METOPROLOL SUCCINATE 25 MG/1
25 TABLET, EXTENDED RELEASE ORAL DAILY
Qty: 90 TABLET | Refills: 3 | Status: SHIPPED | OUTPATIENT
Start: 2024-10-31

## 2024-10-31 RX ORDER — FUROSEMIDE 40 MG/1
TABLET ORAL
Qty: 180 TABLET | Refills: 3
Start: 2024-10-31

## 2024-10-31 NOTE — TELEPHONE ENCOUNTER
Chelo from [facility] called in regards to getting the labs faxed over to them. Fax [phone]

## 2024-10-31 NOTE — PROGRESS NOTES
minimum amount of salt she should be consuming daily.    She is currently working with a home health therapist on strength training and walking.    FAMILY HISTORY  Her father had high blood pressure.        Interval history:  History of mitral valve replacement tricuspid repair performed 1/2024 at the [facility].  She has had multiple calls to triage over the past several days she was seen in the emergency department 4/11/2024.  She was concerned with dehydration lab performed 4/11/2024 with normal renal function electrolytes. Had IV fluids yesterday.      Cardiac history  6/5/2017 transesophageal echocardiogram ejection fraction 35 to 40% severe mitral regurgitation  6/6/2017 cardiac catheterization minimal CAD circumflex 30% right coronary 30%  7/2017 Mitral valve repair with a 28 mm future ring Dr. Raimundo Veloz  4/7/2022 echocardiogram ejection fraction 30 to 35% moderate severe transvalvular regurgitation of mitral valve annular ring  Biotronik dual-chamber ICD: Inventra 7 VR-T DX  5/18/2023 transesophageal echocardiogram ejection fraction 25 to 30% with mitral valve repair #28 future ring with severe mitral regurgitation restricted posterior leaflet.  Eccentric jet  1/5/2024 mitral valve replacement, tricuspid valve repair, atrial septal defect repair, left atrial appendage closure on 1/5 with Dr. De Souza. [facility]  1/24/2024 echocardiogram ejection fraction 30 to 35% mitral valve annuloplasty ring in place mean gradient 8 mmHg tricuspid repair  6/19/2024 [employer] optimizer Smart Mini   10/10/2024 echocardiogram ejection fraction 10 to 15% abnormal diastolic function mitral valve prosthetic gradient 3 mmHg RVSP 37 mmHg left atrium dilated right atrium dilated right ventricle with reduced right ventricular systolic function     Results  Laboratory Studies  Sodium level is 133.       Assessment & Plan  1. Heart Failure with Reduced Ejection Fraction (HFrEF).  The

## 2024-11-04 ENCOUNTER — TELEPHONE (OUTPATIENT)
Age: 64
End: 2024-11-04

## 2024-11-04 NOTE — TELEPHONE ENCOUNTER
Patient called stating she has the following issues :    Patient recently released from hospital  Patient would like to know if it is OK to get her Flu and Covid Vaccines at the same time?

## 2024-11-04 NOTE — TELEPHONE ENCOUNTER
Moris with [facility] wants to know if they are doing BMP weekly or not?  [phone]

## 2024-11-04 NOTE — TELEPHONE ENCOUNTER
I spoke with mariely and told her ok to do BMPs. She stated she will continue until she hears anything otherwise from us.

## 2024-11-05 NOTE — TELEPHONE ENCOUNTER
Please advise patient to ask pharmacist or administering provider regarding vaccination recommendations.

## 2024-11-06 ENCOUNTER — PATIENT MESSAGE (OUTPATIENT)
Age: 64
End: 2024-11-06

## 2024-11-06 DIAGNOSIS — I50.20 HFREF (HEART FAILURE WITH REDUCED EJECTION FRACTION) (HCC): ICD-10-CM

## 2024-11-06 RX ORDER — FUROSEMIDE 40 MG/1
TABLET ORAL
Qty: 180 TABLET | Refills: 3 | Status: CANCELLED | OUTPATIENT
Start: 2024-11-06

## 2024-11-07 ENCOUNTER — TELEPHONE (OUTPATIENT)
Age: 64
End: 2024-11-07

## 2024-11-07 NOTE — TELEPHONE ENCOUNTER
Patient called stating she has the following concerns :    Had blood drawn by Edwige ALMANZA on Monday 11/7  CW states the results were faxed and they received confirmation  Hasn't heard the results as of today  Patient would like to discuss as soon as they are available.  Monitoring the eletrolytes    Please call and advise.

## 2024-11-08 ENCOUNTER — TELEPHONE (OUTPATIENT)
Age: 64
End: 2024-11-08

## 2024-11-08 ENCOUNTER — TELEPHONE (OUTPATIENT)
Dept: CARDIOLOGY | Age: 64
End: 2024-11-08

## 2024-11-08 NOTE — TELEPHONE ENCOUNTER
Pt called to discuss her recent labs that were drawn on 11/4/24. She has not gotten the results or any f/u on the values. She had labs drawn by her HH RN.     Discussed that labs are not currently available in the system for review. Appears per notes that labs were faxed to our office and are pending review by Dr. Wills.     D/w that she can wait until Mon for a response on her labs or if she is concerned that she can go to the ED or UC for labs and provider evaluation. Pt v/u.       Rosalind Kahn, NP-C  
Present, accurate, and signed

## 2024-11-08 NOTE — TELEPHONE ENCOUNTER
Patient called again stating she needs the following :    See first Chart Note from 11/7  Patient would like to discuss    Please call and advise.

## 2024-11-11 ENCOUNTER — TELEPHONE (OUTPATIENT)
Age: 64
End: 2024-11-11

## 2024-11-11 NOTE — TELEPHONE ENCOUNTER
I spoke with Moris and let her know ok to do weekly BMP until end of the month then I will check with Dr. Wills for continuation. I have her my fax number [phone] to send results.

## 2024-11-11 NOTE — TELEPHONE ENCOUNTER
Labs done by [facility] collected on 11/4/2024 scanned to [place] in [place] for Dr. Wills to review.

## 2024-11-11 NOTE — TELEPHONE ENCOUNTER
Moris is calling requesting nurse to call her,she is needing to find out when the next time she is to draw labs on pt.  Please call her -[phone]

## 2024-11-14 ENCOUNTER — TELEPHONE (OUTPATIENT)
Age: 64
End: 2024-11-14

## 2024-11-14 NOTE — TELEPHONE ENCOUNTER
Fax received from [facility]. Weekly BMP sent to Dr Wills and sent to medical records to be scanned into chart.

## 2024-11-18 ENCOUNTER — TELEPHONE (OUTPATIENT)
Age: 64
End: 2024-11-18

## 2024-11-18 ENCOUNTER — PATIENT MESSAGE (OUTPATIENT)
Age: 64
End: 2024-11-18

## 2024-11-18 DIAGNOSIS — I50.20 HFREF (HEART FAILURE WITH REDUCED EJECTION FRACTION) (HCC): Primary | ICD-10-CM

## 2024-11-18 DIAGNOSIS — R60.9 EDEMA: ICD-10-CM

## 2024-11-19 NOTE — TELEPHONE ENCOUNTER
Requested Prescriptions     Pending Prescriptions Disp Refills    metOLazone (ZAROXOLYN) 2.5 MG tablet 30 tablet 3     Sig: Take 1 tablet 30 minutes prior to lasix

## 2024-11-19 NOTE — TELEPHONE ENCOUNTER
Metolazone 2.5 mg 30 minutes prior to Lasix dose.  Please advise patient to have a BMP checked within next 5 to 7 days to check electrolyte and kidney levels.  Please let her know the metolazone hopefully will help the Lasix work better.

## 2024-11-19 NOTE — TELEPHONE ENCOUNTER
Please asked the patient her current Lasix or torsemide dose as well as changes in weight.  Use of as needed metolazone may be an option for her.  Please ask if she is ever used this medication and or had side effects.

## 2024-11-20 ENCOUNTER — TELEPHONE (OUTPATIENT)
Age: 64
End: 2024-11-20

## 2024-11-20 RX ORDER — METOLAZONE 2.5 MG/1
TABLET ORAL
Qty: 30 TABLET | Refills: 3 | Status: SHIPPED | OUTPATIENT
Start: 2024-11-20

## 2024-11-20 NOTE — TELEPHONE ENCOUNTER
The patient has reported symptoms that were concerning for an allergy to torsemide.  Many diuretics are sulfa-based.  Based on her most recent response she felt her reaction was for torsemide specifically.  It appears that she has a follow-up appointment this will be an opportunity to discuss this.  It is most important to make sure she has a diuretic that is effective until that time.

## 2024-11-20 NOTE — TELEPHONE ENCOUNTER
Pt is calling about she is leaving on a trip and want to see If her labs came back. Then she want to speak with someone because she has some questions.

## 2024-11-25 ENCOUNTER — TELEPHONE (OUTPATIENT)
Age: 64
End: 2024-11-25

## 2024-11-26 ENCOUNTER — TELEPHONE (OUTPATIENT)
Age: 64
End: 2024-11-26

## 2024-11-26 ENCOUNTER — PATIENT MESSAGE (OUTPATIENT)
Age: 64
End: 2024-11-26

## 2024-11-26 NOTE — TELEPHONE ENCOUNTER
Pt is calling about medication causing the rash/itchiness. Pt haven't taken lasix in a day and a half, and Pt still itching. Please reach back out to pt

## 2024-11-26 NOTE — TELEPHONE ENCOUNTER
Kely SINGER [facility] Card[facility] Clinical Staff (supporting Devon Wills MD)1 hour ago (8:34 AM)       When can I expect a response?  I waited all day yesterday.  I am gaining weight/fluid.

## 2024-11-26 NOTE — TELEPHONE ENCOUNTER
Pt is taking lasix 40mg daily since 11/20/24.  Slight increase in edema; weight up a total of 5lbs since 10/25/24.   No change in itching/rash.  Feels symptoms are essentially stable at present.    Hesitant to add metolazone prn, but ultimately wishes to avoid re-hospitalization.  F/u appt is scheduled for 12/5/24  HHN scheduled 12/1 and will draw repeat BMP (lab is full. unable to get drawn while in [place]).    Triage will inform Dr. Wills 11/27/24 when he returns to the office.

## 2024-11-27 NOTE — TELEPHONE ENCOUNTER
Difficult situation.  Would recommend the as needed metolazone according to protocol.  By her history rash was due to Bumex.  She will need an appointment.  There have been multiple calls and this is not straightforward.

## 2024-12-05 ENCOUNTER — OFFICE VISIT (OUTPATIENT)
Age: 64
End: 2024-12-05
Payer: MEDICARE

## 2024-12-05 VITALS
HEART RATE: 90 BPM | OXYGEN SATURATION: 99 % | HEIGHT: 62 IN | BODY MASS INDEX: 31.8 KG/M2 | DIASTOLIC BLOOD PRESSURE: 56 MMHG | WEIGHT: 172.8 LBS | SYSTOLIC BLOOD PRESSURE: 98 MMHG

## 2024-12-05 DIAGNOSIS — E87.1 HYPONATREMIA: ICD-10-CM

## 2024-12-05 DIAGNOSIS — Z88.2 ALLERGY TO SULFA DRUGS: ICD-10-CM

## 2024-12-05 DIAGNOSIS — Z95.0 CARDIAC PACEMAKER IN SITU: ICD-10-CM

## 2024-12-05 DIAGNOSIS — R79.89 ELEVATED LFTS: ICD-10-CM

## 2024-12-05 DIAGNOSIS — R21 RASH: ICD-10-CM

## 2024-12-05 DIAGNOSIS — I50.20 HFREF (HEART FAILURE WITH REDUCED EJECTION FRACTION) (HCC): ICD-10-CM

## 2024-12-05 DIAGNOSIS — Z98.890 S/P MVR (MITRAL VALVE REPAIR): ICD-10-CM

## 2024-12-05 PROCEDURE — 1036F TOBACCO NON-USER: CPT | Performed by: INTERNAL MEDICINE

## 2024-12-05 PROCEDURE — G8427 DOCREV CUR MEDS BY ELIG CLIN: HCPCS | Performed by: INTERNAL MEDICINE

## 2024-12-05 PROCEDURE — 3017F COLORECTAL CA SCREEN DOC REV: CPT | Performed by: INTERNAL MEDICINE

## 2024-12-05 PROCEDURE — 99214 OFFICE O/P EST MOD 30 MIN: CPT | Performed by: INTERNAL MEDICINE

## 2024-12-05 PROCEDURE — G8417 CALC BMI ABV UP PARAM F/U: HCPCS | Performed by: INTERNAL MEDICINE

## 2024-12-05 PROCEDURE — G8484 FLU IMMUNIZE NO ADMIN: HCPCS | Performed by: INTERNAL MEDICINE

## 2024-12-05 RX ORDER — POTASSIUM CHLORIDE 1500 MG/1
20 TABLET, EXTENDED RELEASE ORAL DAILY
Qty: 30 TABLET | Refills: 0 | Status: SHIPPED | OUTPATIENT
Start: 2024-12-05 | End: 2025-01-04

## 2024-12-05 RX ORDER — ETHACRYNIC ACID 25 MG/1
25 TABLET ORAL DAILY
Qty: 90 TABLET | Refills: 3 | Status: SHIPPED | OUTPATIENT
Start: 2024-12-05

## 2024-12-05 NOTE — PROGRESS NOTES
115 10/10/2024 05:08 AM    .4 07/09/2019 10:01 AM    VLDL 16 10/10/2024 05:08 AM       No results found for any visits on 12/05/24.        ANGELA Edge was seen today for hfref (heart failure with reduced ejection fraction) ([facility]).    Diagnoses and all orders for this visit:    HFrEF (heart failure with reduced ejection fraction) ([facility])  -     Amb External Referral To Cardiology    S/P MVR (mitral valve repair)    Cardiac pacemaker in situ    Hyponatremia  -     Basic Metabolic Panel; Future    Rash  -     AFL - [place] [facility]  -     ethacrynic acid (EDECRIN) 25 MG tablet; Take 1 tablet by mouth daily    Allergy to sulfa drugs  -     ethacrynic acid (EDECRIN) 25 MG tablet; Take 1 tablet by mouth daily    Elevated LFTs  -     Hepatic Function Panel; Future    Other orders  -     potassium chloride (KLOR-CON M) 20 MEQ extended release tablet; Take 1 tablet by mouth daily        Return in about 2 months (around 2/5/2025).       Devon Wills MD  12/5/2024  10:02 AM      The patient (or guardian, if applicable) and other individuals in attendance with the patient were advised that Artificial Intelligence will be utilized during this visit to record, process the conversation to generate a clinical note, and support improvement of the AI technology. The patient (or guardian, if applicable) and other individuals in attendance at the appointment consented to the use of AI, including the recording.

## 2024-12-06 ENCOUNTER — TELEPHONE (OUTPATIENT)
Age: 64
End: 2024-12-06

## 2024-12-06 NOTE — TELEPHONE ENCOUNTER
Pt stated [facility] was [facility] to [facility] to [facility] about medication     Pt will like for someone to give a call

## 2024-12-08 ENCOUNTER — PATIENT MESSAGE (OUTPATIENT)
Age: 64
End: 2024-12-08

## 2024-12-11 ENCOUNTER — TELEPHONE (OUTPATIENT)
Age: 64
End: 2024-12-11

## 2024-12-11 NOTE — TELEPHONE ENCOUNTER
Patient called stating she has the following concerns :    Checking on status of torsemide substitute  Edecrin 25MG tablets   There is a note from YZULEYKA dated 12/11 at 12:40 in regard to the submission    Please call and advise

## 2024-12-13 ENCOUNTER — TELEPHONE (OUTPATIENT)
Age: 64
End: 2024-12-13

## 2024-12-13 DIAGNOSIS — R79.89 ELEVATED LFTS: ICD-10-CM

## 2024-12-13 DIAGNOSIS — E87.1 HYPONATREMIA: ICD-10-CM

## 2024-12-13 LAB
ALBUMIN SERPL-MCNC: 4 G/DL (ref 3.2–4.6)
ALBUMIN/GLOB SERPL: 1.3 (ref 1–1.9)
ALP SERPL-CCNC: 122 U/L (ref 35–104)
ALT SERPL-CCNC: 20 U/L (ref 8–45)
ANION GAP SERPL CALC-SCNC: 12 MMOL/L (ref 7–16)
AST SERPL-CCNC: 31 U/L (ref 15–37)
BILIRUB DIRECT SERPL-MCNC: 0.6 MG/DL (ref 0–0.4)
BILIRUB SERPL-MCNC: 1.7 MG/DL (ref 0–1.2)
BUN SERPL-MCNC: 22 MG/DL (ref 8–23)
CALCIUM SERPL-MCNC: 9.6 MG/DL (ref 8.8–10.2)
CHLORIDE SERPL-SCNC: 91 MMOL/L (ref 98–107)
CO2 SERPL-SCNC: 28 MMOL/L (ref 20–29)
CREAT SERPL-MCNC: 1.02 MG/DL (ref 0.6–1.1)
GLOBULIN SER CALC-MCNC: 3.1 G/DL (ref 2.3–3.5)
GLUCOSE SERPL-MCNC: 99 MG/DL (ref 70–99)
POTASSIUM SERPL-SCNC: 4.1 MMOL/L (ref 3.5–5.1)
PROT SERPL-MCNC: 7.1 G/DL (ref 6.3–8.2)
SODIUM SERPL-SCNC: 131 MMOL/L (ref 136–145)

## 2024-12-13 NOTE — TELEPHONE ENCOUNTER
Pt called again about Lasix/Torsemide. States she needs to speak with someone today about what she can take do to allergies

## 2024-12-13 NOTE — TELEPHONE ENCOUNTER
Patient called back and states she needs to take a break from the Torsemide until new diuretic comes in from pharmacy. She will get new diuretic on Monday, she will remain off Torsemide

## 2024-12-17 ENCOUNTER — TELEPHONE (OUTPATIENT)
Age: 64
End: 2024-12-17

## 2024-12-17 DIAGNOSIS — E87.1 HYPONATREMIA: Primary | ICD-10-CM

## 2024-12-17 NOTE — TELEPHONE ENCOUNTER
I contacted the patient. She states she had a rash with Bumetanide & Lasix & hasn't tolerated Torsemide very well.    Patient states she was able to get a 3 mos supply of Ethacrynic Acid 25mg from [facility] for $68. I informed her that even if we get the Appeal approved through [employer], it may still be over $68 for a 3 mos supply.    Patient states her insurance is changing as of Jan 1. She said she will continue to get it through [facility] for awhile to see how it works. If after Jan 1, her new insurance requires a PA/Appeal, she may decide to go through the process to see how much they will cover. If it's too high. She will continue to go through [facility].

## 2024-12-17 NOTE — TELEPHONE ENCOUNTER
I received a denial from [employer] stating Ethacrynic Acid 25mg is non formulary therefore, is not covered. The denial states the patient must have tried and failed Bemetanide.     According to chart notes, patient had a rash with Bumetanide. It is also a Sulfonamide and patient is allergic to Sulfa.     I will contact the patient to see if she wants to proceed with the Appeal.

## 2024-12-18 ENCOUNTER — PATIENT MESSAGE (OUTPATIENT)
Age: 64
End: 2024-12-18

## 2024-12-18 NOTE — TELEPHONE ENCOUNTER
Take 50 mg of ethacrynic acid daily.  Please make sure patient has BMP scheduled within the next 7 to 10 days

## 2024-12-19 ENCOUNTER — PATIENT MESSAGE (OUTPATIENT)
Age: 64
End: 2024-12-19

## 2024-12-19 ENCOUNTER — TELEPHONE (OUTPATIENT)
Age: 64
End: 2024-12-19

## 2024-12-19 DIAGNOSIS — E87.1 HYPONATREMIA: ICD-10-CM

## 2024-12-19 LAB
ANION GAP SERPL CALC-SCNC: 13 MMOL/L (ref 7–16)
BUN SERPL-MCNC: 22 MG/DL (ref 8–23)
CALCIUM SERPL-MCNC: 9.7 MG/DL (ref 8.8–10.2)
CHLORIDE SERPL-SCNC: 88 MMOL/L (ref 98–107)
CO2 SERPL-SCNC: 26 MMOL/L (ref 20–29)
CREAT SERPL-MCNC: 1.02 MG/DL (ref 0.6–1.1)
GLUCOSE SERPL-MCNC: 118 MG/DL (ref 70–99)
OSMOLALITY SERPL: 271 MOSM/KG H2O (ref 275–295)
OSMOLALITY UR: 468 MOSM/KG H2O (ref 50–1400)
POTASSIUM SERPL-SCNC: 4.8 MMOL/L (ref 3.5–5.1)
SODIUM SERPL-SCNC: 127 MMOL/L (ref 136–145)
SODIUM UR-SCNC: <20 MMOL/L

## 2024-12-19 NOTE — TELEPHONE ENCOUNTER
I am coming in for labs, but last week my sodium and especially my chloride, were low.  I feel the symptoms of feeling weak, lack of appetite.  Is there something I can take at home to increase my chloride/sodium?       In addition, I have gained weight, so now I up am to 10 pounds more than when I left the hospital.  I am going to make sure I am being strict enough with fluid intake. I am not eating too much or getting too much salt.  IF anything, not enough.       I started ethacrynic acid.  have not had torsemide in four days, and I am still itchy.  If it was the sulfa causing the itching, does it take time to leave the body?    Thank you, Kely

## 2024-12-20 ENCOUNTER — TELEPHONE (OUTPATIENT)
Age: 64
End: 2024-12-20

## 2024-12-20 NOTE — TELEPHONE ENCOUNTER
This is why we are doing appointment.  There is no therapy that we get to improve sodium as stated in previous notes recommendation is volume restriction less than 1500 cc/day

## 2024-12-20 NOTE — TELEPHONE ENCOUNTER
,  I have scheduled her for Monday.She wants to know if you have any recommendations before Monday of her itching and what to help w/low sodium?

## 2024-12-20 NOTE — TELEPHONE ENCOUNTER
----- Message from Dr. Devon Wills MD sent at 12/20/2024  7:12 AM EST -----  Please advise patient to limit free water intake to less than 1.5 L daily.  Please offer an appointment to discuss if there are issues.

## 2024-12-23 ENCOUNTER — OFFICE VISIT (OUTPATIENT)
Age: 64
End: 2024-12-23
Payer: MEDICARE

## 2024-12-23 VITALS
WEIGHT: 185 LBS | HEIGHT: 62 IN | DIASTOLIC BLOOD PRESSURE: 40 MMHG | SYSTOLIC BLOOD PRESSURE: 88 MMHG | HEART RATE: 92 BPM | BODY MASS INDEX: 34.04 KG/M2

## 2024-12-23 DIAGNOSIS — Z98.890 S/P MVR (MITRAL VALVE REPAIR): Primary | ICD-10-CM

## 2024-12-23 DIAGNOSIS — E87.1 HYPONATREMIA: ICD-10-CM

## 2024-12-23 DIAGNOSIS — I50.20 HFREF (HEART FAILURE WITH REDUCED EJECTION FRACTION) (HCC): ICD-10-CM

## 2024-12-23 DIAGNOSIS — R21 RASH: ICD-10-CM

## 2024-12-23 LAB
ANION GAP SERPL CALC-SCNC: 14 MMOL/L (ref 7–16)
BUN SERPL-MCNC: 22 MG/DL (ref 8–23)
CALCIUM SERPL-MCNC: 9.2 MG/DL (ref 8.8–10.2)
CHLORIDE SERPL-SCNC: 91 MMOL/L (ref 98–107)
CO2 SERPL-SCNC: 28 MMOL/L (ref 20–29)
CORTIS AM PEAK SERPL-MCNC: 12.8 UG/DL (ref 4.8–19.5)
CREAT SERPL-MCNC: 1.1 MG/DL (ref 0.6–1.1)
GLUCOSE SERPL-MCNC: 80 MG/DL (ref 70–99)
NT PRO BNP: 5946 PG/ML (ref 0–125)
POTASSIUM SERPL-SCNC: 4.2 MMOL/L (ref 3.5–5.1)
SODIUM SERPL-SCNC: 133 MMOL/L (ref 136–145)
TSH W FREE THYROID IF ABNORMAL: 2.47 UIU/ML (ref 0.27–4.2)

## 2024-12-23 PROCEDURE — 3017F COLORECTAL CA SCREEN DOC REV: CPT | Performed by: INTERNAL MEDICINE

## 2024-12-23 PROCEDURE — 1036F TOBACCO NON-USER: CPT | Performed by: INTERNAL MEDICINE

## 2024-12-23 PROCEDURE — G8484 FLU IMMUNIZE NO ADMIN: HCPCS | Performed by: INTERNAL MEDICINE

## 2024-12-23 PROCEDURE — G8417 CALC BMI ABV UP PARAM F/U: HCPCS | Performed by: INTERNAL MEDICINE

## 2024-12-23 PROCEDURE — G8428 CUR MEDS NOT DOCUMENT: HCPCS | Performed by: INTERNAL MEDICINE

## 2024-12-23 PROCEDURE — 99214 OFFICE O/P EST MOD 30 MIN: CPT | Performed by: INTERNAL MEDICINE

## 2024-12-23 RX ORDER — TORSEMIDE 20 MG/1
20 TABLET ORAL DAILY
COMMUNITY

## 2024-12-23 NOTE — PROGRESS NOTES
technology. The patient (or guardian, if applicable) and other individuals in attendance at the appointment consented to the use of AI, including the recording.

## 2024-12-24 NOTE — RESULT ENCOUNTER NOTE
Your most recent labs showed improvement of sodium levels    Please let me know if you have additional questions or concerns.    Devon Wills MD

## 2025-01-09 DIAGNOSIS — I50.20 HFREF (HEART FAILURE WITH REDUCED EJECTION FRACTION) (HCC): ICD-10-CM

## 2025-01-09 LAB
ANION GAP SERPL CALC-SCNC: 12 MMOL/L (ref 7–16)
BUN SERPL-MCNC: 16 MG/DL (ref 8–23)
CALCIUM SERPL-MCNC: 9.5 MG/DL (ref 8.8–10.2)
CHLORIDE SERPL-SCNC: 94 MMOL/L (ref 98–107)
CO2 SERPL-SCNC: 29 MMOL/L (ref 20–29)
CREAT SERPL-MCNC: 0.96 MG/DL (ref 0.6–1.1)
GLUCOSE SERPL-MCNC: 119 MG/DL (ref 70–99)
POTASSIUM SERPL-SCNC: 3.6 MMOL/L (ref 3.5–5.1)
SODIUM SERPL-SCNC: 135 MMOL/L (ref 136–145)

## 2025-01-10 NOTE — RESULT ENCOUNTER NOTE
Your most recent labs show stable kidney function and electrolytes.    Please let me know if you have additional questions or concerns.    Devon Wills MD

## 2025-01-12 DIAGNOSIS — R00.0 TACHYCARDIA: Primary | ICD-10-CM

## 2025-01-13 RX ORDER — POTASSIUM CHLORIDE 1500 MG/1
20 TABLET, EXTENDED RELEASE ORAL DAILY
Qty: 30 TABLET | Refills: 3 | Status: SHIPPED | OUTPATIENT
Start: 2025-01-13 | End: 2025-05-13

## 2025-01-13 NOTE — TELEPHONE ENCOUNTER
Requested Prescriptions     Pending Prescriptions Disp Refills    potassium chloride (KLOR-CON M) 20 MEQ extended release tablet 30 tablet 3     Sig: Take 1 tablet by mouth daily

## 2025-01-20 ENCOUNTER — NURSE ONLY (OUTPATIENT)
Age: 65
End: 2025-01-20

## 2025-01-20 ENCOUNTER — OFFICE VISIT (OUTPATIENT)
Age: 65
End: 2025-01-20
Payer: COMMERCIAL

## 2025-01-20 VITALS
HEIGHT: 62 IN | WEIGHT: 184.5 LBS | SYSTOLIC BLOOD PRESSURE: 108 MMHG | HEART RATE: 88 BPM | BODY MASS INDEX: 33.95 KG/M2 | DIASTOLIC BLOOD PRESSURE: 58 MMHG

## 2025-01-20 DIAGNOSIS — I50.20 HFREF (HEART FAILURE WITH REDUCED EJECTION FRACTION) (HCC): Primary | ICD-10-CM

## 2025-01-20 DIAGNOSIS — I42.0 DCM (DILATED CARDIOMYOPATHY) (HCC): ICD-10-CM

## 2025-01-20 DIAGNOSIS — I50.22 CHRONIC SYSTOLIC HF (HEART FAILURE) (HCC): ICD-10-CM

## 2025-01-20 PROCEDURE — 99214 OFFICE O/P EST MOD 30 MIN: CPT | Performed by: INTERNAL MEDICINE

## 2025-01-20 RX ORDER — METRONIDAZOLE 375 MG/1
375 CAPSULE ORAL 2 TIMES DAILY
COMMUNITY
Start: 2025-01-15

## 2025-01-20 RX ORDER — HYDROCODONE BITARTRATE AND ACETAMINOPHEN 5; 325 MG/1; MG/1
TABLET ORAL
COMMUNITY
Start: 2025-01-15

## 2025-01-20 ASSESSMENT — ENCOUNTER SYMPTOMS
SHORTNESS OF BREATH: 0
GASTROINTESTINAL NEGATIVE: 1
EYES NEGATIVE: 1
ALLERGIC/IMMUNOLOGIC NEGATIVE: 1

## 2025-01-20 NOTE — PROGRESS NOTES
[facility] [[email or web address]] CARDIOLOGY  [address] [place] [address], [address]  [place] [address]  PHONE: [phone]        25      NAME:  eKly Quach  : 1960  MRN: [id number]     Referring Cardiologist: Devon Wills II, MD    Reason for Consultation: [facility]    ASSESSMENT and PLAN:  Kely was seen today for consultation and congestive heart failure.    Diagnoses and all orders for this visit:    Tachycardia    Chronic systolic congestive heart failure (HCC)    Chronic systolic HF (heart failure) ([facility])    Coronary artery disease involving native coronary artery of native heart without angina pectoris    DCM (dilated cardiomyopathy) ([facility])    ICD (implantable cardioverter-defibrillator) in place    H/O mitral valve repair    S/P MVR (mitral valve repair) now MVR ([facility]).     64 year old female with complicated PMH of NIDCM, with previous mitral and tricuspid valve surgeries with ongoing NYHA class III HF. She does have a primary prevention ICD. She has severe symptoms and is interested in improvement. She is now s/p CCM implant.      -NIDCM with NYHA class III symptoms - EF severely reduced, s/p SC ICD with stable function. CCM implanted 2024. She had tough end to  associated with a hospitalization but doing better now. Still a NYHA class II/III, possibly subjectively improved from earlier in . EF was 10-15%, will look to recheck later this year.    -Follow up with Dr. aRmey to discuss advanced HF options.    -Continue 3 drug GDMT (ARNI, STGL2i, BB) regimen along with diuretics.    -Low Na admission in , doing better now.     -ICD - stable function. Interrogation of ICD performed today.     -Valvular heart disease - s/p MVRepair in 2017, s/p MVR (bioprosthetic) in 2024 (at [facility]) stable function.     -Resting sinus tachycardia - consider use of ivabradine. Much improved lately, suspect she is more compensated from her HF now.     -Montse considerations - she is Jehovah's witness.

## 2025-01-24 ENCOUNTER — TELEPHONE (OUTPATIENT)
Age: 65
End: 2025-01-24

## 2025-01-24 DIAGNOSIS — I47.20 VT (VENTRICULAR TACHYCARDIA) (HCC): Primary | ICD-10-CM

## 2025-01-24 NOTE — TELEPHONE ENCOUNTER
Labs stable. Patient has resolved using miralax and diarrhea has resolved.     Offered EP PA appt, patient declined.     Will call back for worsening issues or complaints.       Landon Mills MD Schaaf, Cara, RN  Caller: Unspecified (Today,  8:47 AM)  Thanks. I just saw her. Have her see Sabine next month for a follow up ideally.

## 2025-01-24 NOTE — TELEPHONE ENCOUNTER
Reji alert for ICD shock on 1/23/25 at 1800. Strip confirmed VT detected and terminated w/ 40J ICD shock.     Patient stated \" I was on the phone and got dizzy.\" Patient does not remember passing out. Denied any falling or injuries.    Denies increasing HANDY and chest pain. Advised ER for recurrent episodes. Took miralax ands having diarrhea last 2 days, advised to stop today.     Taking toprol 25mg QD, aldactone recently d/c'd.     BMP and MAG ordered, will try to go today. Aware we advise no driving.                   Tachycardia     Chronic systolic congestive heart failure (HCC)     Chronic systolic HF (heart failure) ([facility])     Coronary artery disease involving native coronary artery of native heart without angina pectoris     DCM (dilated cardiomyopathy) ([facility])     ICD (implantable cardioverter-defibrillator) in place     H/O mitral valve repair     S/P MVR (mitral valve repair) now MVR ([facility]).      64 year old female with complicated PMH of NIDCM, with previous mitral and tricuspid valve surgeries with ongoing NYHA class III HF. She does have a primary prevention ICD. She has severe symptoms and is interested in improvement. She is now s/p CCM implant.       -NIDCM with NYHA class III symptoms - EF severely reduced, s/p SC ICD with stable function. CCM implanted 6/2024. She had tough end to 2024 associated with a hospitalization but doing better now. Still a NYHA class II/III, possibly subjectively improved from earlier in 2024. EF was 10-15%, will look to recheck later this year.               -Follow up with Dr. Ramey to discuss advanced HF options.               -Continue 3 drug GDMT (ARNI, STGL2i, BB) regimen along with diuretics.               -Low Na admission in 2024, doing better now.

## 2025-01-28 DIAGNOSIS — I47.20 VT (VENTRICULAR TACHYCARDIA) (HCC): ICD-10-CM

## 2025-01-28 LAB
ANION GAP SERPL CALC-SCNC: 13 MMOL/L (ref 7–16)
BUN SERPL-MCNC: 15 MG/DL (ref 8–23)
CALCIUM SERPL-MCNC: 9.8 MG/DL (ref 8.8–10.2)
CHLORIDE SERPL-SCNC: 94 MMOL/L (ref 98–107)
CO2 SERPL-SCNC: 28 MMOL/L (ref 20–29)
CREAT SERPL-MCNC: 1.01 MG/DL (ref 0.6–1.1)
GLUCOSE SERPL-MCNC: 96 MG/DL (ref 70–99)
MAGNESIUM SERPL-MCNC: 2.2 MG/DL (ref 1.8–2.4)
POTASSIUM SERPL-SCNC: 4 MMOL/L (ref 3.5–5.1)
SODIUM SERPL-SCNC: 134 MMOL/L (ref 136–145)

## 2025-02-03 ENCOUNTER — OFFICE VISIT (OUTPATIENT)
Age: 65
End: 2025-02-03
Payer: COMMERCIAL

## 2025-02-03 VITALS
HEIGHT: 62 IN | HEART RATE: 96 BPM | SYSTOLIC BLOOD PRESSURE: 82 MMHG | WEIGHT: 189.4 LBS | DIASTOLIC BLOOD PRESSURE: 56 MMHG | BODY MASS INDEX: 34.85 KG/M2

## 2025-02-03 DIAGNOSIS — R00.0 TACHYCARDIA: ICD-10-CM

## 2025-02-03 DIAGNOSIS — I50.20 HFREF (HEART FAILURE WITH REDUCED EJECTION FRACTION) (HCC): Primary | ICD-10-CM

## 2025-02-03 DIAGNOSIS — E87.1 HYPONATREMIA: ICD-10-CM

## 2025-02-03 DIAGNOSIS — Z98.890 S/P MVR (MITRAL VALVE REPAIR): ICD-10-CM

## 2025-02-03 PROCEDURE — 99214 OFFICE O/P EST MOD 30 MIN: CPT | Performed by: INTERNAL MEDICINE

## 2025-02-03 RX ORDER — TORSEMIDE 20 MG/1
TABLET ORAL
Qty: 180 TABLET | Refills: 3 | Status: SHIPPED | OUTPATIENT
Start: 2025-02-03

## 2025-02-03 NOTE — PROGRESS NOTES
Smoking status: Former     Current packs/day: 0.00     Types: Cigarettes     Quit date: 1991     Years since quittin.1    Smokeless tobacco: Never   Substance Use Topics    Alcohol use: Yes     Alcohol/week: 1.0 standard drink of alcohol     Types: 1 Glasses of wine per week       ROS:    ROS        PHYSICAL EXAM:    BP (!) 82/56   Pulse 96   Ht 1.575 m (5' 2\")   Wt 85.9 kg (189 lb 6.4 oz)   BMI 34.64 kg/m²        Wt Readings from Last 3 Encounters:   25 85.9 kg (189 lb 6.4 oz)   25 83.7 kg (184 lb 8 oz)   24 83.9 kg (185 lb)     BP Readings from Last 3 Encounters:   25 (!) 82/56   25 (!) 108/58   24 (!) 88/40           Physical Exam  Physical Exam  Lungs clear.    Medical problems and test results were reviewed with the patient today.           Recent Results (from the past 672 hour(s))   Basic Metabolic Panel    Collection Time: 25  1:32 PM   Result Value Ref Range    Sodium 135 (L) 136 - 145 mmol/L    Potassium 3.6 3.5 - 5.1 mmol/L    Chloride 94 (L) 98 - 107 mmol/L    CO2 29 20 - 29 mmol/L    Anion Gap 12 7 - 16 mmol/L    Glucose 119 (H) 70 - 99 mg/dL    BUN 16 8 - 23 MG/DL    Creatinine 0.96 0.60 - 1.10 MG/DL    Est, Glom Filt Rate 67 >60 ml/min/1.73m2    Calcium 9.5 8.8 - 10.2 MG/DL   Magnesium    Collection Time: 25 11:19 AM   Result Value Ref Range    Magnesium 2.2 1.8 - 2.4 mg/dL   Basic Metabolic Panel    Collection Time: 25 11:19 AM   Result Value Ref Range    Sodium 134 (L) 136 - 145 mmol/L    Potassium 4.0 3.5 - 5.1 mmol/L    Chloride 94 (L) 98 - 107 mmol/L    CO2 28 20 - 29 mmol/L    Anion Gap 13 7 - 16 mmol/L    Glucose 96 70 - 99 mg/dL    BUN 15 8 - 23 MG/DL    Creatinine 1.01 0.60 - 1.10 MG/DL    Est, Glom Filt Rate 63 >60 ml/min/1.73m2    Calcium 9.8 8.8 - 10.2 MG/DL     Lab Results   Component Value Date/Time    CHOL 166 10/10/2024 05:08 AM    HDL 35 10/10/2024 05:08 AM     10/10/2024 05:08 AM    .4 2019 10:01

## 2025-02-18 DIAGNOSIS — E87.1 HYPONATREMIA: ICD-10-CM

## 2025-02-18 LAB
ANION GAP SERPL CALC-SCNC: 15 MMOL/L (ref 7–16)
BUN SERPL-MCNC: 31 MG/DL (ref 8–23)
CALCIUM SERPL-MCNC: 9.4 MG/DL (ref 8.8–10.2)
CHLORIDE SERPL-SCNC: 87 MMOL/L (ref 98–107)
CO2 SERPL-SCNC: 25 MMOL/L (ref 20–29)
CREAT SERPL-MCNC: 1.01 MG/DL (ref 0.6–1.1)
GLUCOSE SERPL-MCNC: 108 MG/DL (ref 70–99)
MAGNESIUM SERPL-MCNC: 2.4 MG/DL (ref 1.8–2.4)
POTASSIUM SERPL-SCNC: 4 MMOL/L (ref 3.5–5.1)
SODIUM SERPL-SCNC: 126 MMOL/L (ref 136–145)

## 2025-02-19 ENCOUNTER — TELEPHONE (OUTPATIENT)
Age: 65
End: 2025-02-19

## 2025-02-19 DIAGNOSIS — I50.22 CHRONIC SYSTOLIC HF (HEART FAILURE) (HCC): ICD-10-CM

## 2025-02-19 DIAGNOSIS — I50.23 ACUTE ON CHRONIC SYSTOLIC CONGESTIVE HEART FAILURE (HCC): Primary | ICD-10-CM

## 2025-02-19 NOTE — TELEPHONE ENCOUNTER
I called and informed pt.of MD response and placed order for BMP.Pt.v/u.  Orders Placed This Encounter   Procedures    Basic Metabolic Panel     Standing Status:   Future     Standing Expiration Date:   2/19/2026

## 2025-02-19 NOTE — TELEPHONE ENCOUNTER
Dr. Salazar made these changes after my visit:  Stop Entresto   Start losartan 12.5 mg bid   Start eplerenone 25 mg daily, (unlike spironolactone, it does not contain sulfur.)  Increase torsemide to 40 mg daily      I started the losartan a week ago, but   [facility] had to order the eplerenone, so I only started the eplerenone on the weekend.       I feel that the losartan is actually causing me to hold more fluid, and I have not felt well on it.  I was very reluctant to stop Entresto, because it has been a lifesaver.       I have felt terrible for several days, and I was certain that my sodium was low.  No appetite, nausea, weakness. My labs show low sodium.  I stopped losartan yesterday on my own.  I skipped the second dose of torsemid today also, because of suspecting the low sodium.       My sodium was already two points below normal to start with, and I felt symptoms then.       How can I get my sodium up when I feel the obvious symptoms coming on that it is low?       Please call  and let him know how these med  changes he made are affecting you.    Last read by Kely Quach at  1:50 PM on 2/19/2025.  Margaux Nunn     CG    2/19/25  1:47 PM  Note     Patient states she contacted Dr. Salazar and would like to speak with Dr. Wills.              CG    2/19/25  1:47 PM  Margaux Nunn routed this conversation to [facility] Cardiology Triage  Kely Quach   to [facility] Cardiology Clinical Staff (supporting Devon Wills MD)         2/19/25  2:05 PM  I did get back, and they tried to schedule an appt. In [place] for this Friday! ..Dr. Wills sent me to [facility] as a backup plan in case I needed a transplant and for another opinion on managing my meds (low sodium issues).  Dr. Ramey said [facility] would not do a transplant, because I sm a Religion, but he would work \"with\" Dr. Wills on tweaking my meds.  I have a hospital liaison committee through my jorge that found

## 2025-02-19 NOTE — TELEPHONE ENCOUNTER
----- Message from Dr. Devon Wills MD sent at 2/19/2025 12:29 PM EST -----  Please let the patient know that sodium level is lower than it has been previously.  Please advise her to reduce free water intake to less than 1500 cc daily she will need another BMP checked within the next 5 days

## 2025-02-25 DIAGNOSIS — I50.22 CHRONIC SYSTOLIC HF (HEART FAILURE) (HCC): ICD-10-CM

## 2025-02-25 DIAGNOSIS — I50.23 ACUTE ON CHRONIC SYSTOLIC CONGESTIVE HEART FAILURE (HCC): ICD-10-CM

## 2025-02-25 LAB
ANION GAP SERPL CALC-SCNC: 13 MMOL/L (ref 7–16)
BUN SERPL-MCNC: 25 MG/DL (ref 8–23)
CALCIUM SERPL-MCNC: 9.2 MG/DL (ref 8.8–10.2)
CHLORIDE SERPL-SCNC: 92 MMOL/L (ref 98–107)
CO2 SERPL-SCNC: 27 MMOL/L (ref 20–29)
CREAT SERPL-MCNC: 1.02 MG/DL (ref 0.6–1.1)
GLUCOSE SERPL-MCNC: 113 MG/DL (ref 70–99)
POTASSIUM SERPL-SCNC: 4.2 MMOL/L (ref 3.5–5.1)
SODIUM SERPL-SCNC: 131 MMOL/L (ref 136–145)

## 2025-02-26 ENCOUNTER — TELEPHONE (OUTPATIENT)
Age: 65
End: 2025-02-26

## 2025-02-26 DIAGNOSIS — Z79.899 LONG-TERM USE OF HIGH-RISK MEDICATION: Primary | ICD-10-CM

## 2025-02-26 NOTE — RESULT ENCOUNTER NOTE
Your most recent labs show improvement of sodium levels compared to prior    Please let me know if you have additional questions or concerns.    Devon Wills MD

## 2025-02-26 NOTE — TELEPHONE ENCOUNTER
Notified of Dr. Wills response- voices understanding of instructions given. BMP ordered- Patient will go on Monday to have it drawn.

## 2025-02-26 NOTE — TELEPHONE ENCOUNTER
Patient seen by Dr. Wills on 2/3/25. Patient reports she has gained 10 lbs in the last 2 weeks. Swelling noted BLE and abdomen.  She reports she has been more active lately so she thinks she has been drinking more fluids aditya she should. Patient is taking Demadex 40 mg QD and Eplerenone 25 mg QD. She reports no CP or SOB. She would like to know what she can do to decrease edema.

## 2025-02-26 NOTE — TELEPHONE ENCOUNTER
Pt states concern for fluid build up and wants to know if there is some diauretics at home because she is trying to prevent it from becoming more than it needs to be. (Doesn't want to have to go to hospital)    Pt states that she has gained 10 pounds within the last 2 weeks.    Pt states that her Calcium levels have been slightly elevated.

## 2025-02-27 ENCOUNTER — APPOINTMENT (OUTPATIENT)
Dept: GENERAL RADIOLOGY | Age: 65
DRG: 292 | End: 2025-02-27
Payer: MEDICARE

## 2025-02-27 ENCOUNTER — HOSPITAL ENCOUNTER (INPATIENT)
Age: 65
LOS: 6 days | Discharge: ANOTHER HOSPITAL | DRG: 292 | End: 2025-03-07
Attending: GENERAL PRACTICE | Admitting: INTERNAL MEDICINE
Payer: MEDICARE

## 2025-02-27 DIAGNOSIS — I50.82 BIVENTRICULAR FAILURE (HCC): ICD-10-CM

## 2025-02-27 DIAGNOSIS — I50.9 CONGESTIVE HEART FAILURE, UNSPECIFIED HF CHRONICITY, UNSPECIFIED HEART FAILURE TYPE (HCC): ICD-10-CM

## 2025-02-27 DIAGNOSIS — R60.1 ANASARCA: ICD-10-CM

## 2025-02-27 DIAGNOSIS — R53.1 ACUTE WEAKNESS: Primary | ICD-10-CM

## 2025-02-27 LAB
ALBUMIN SERPL-MCNC: 4 G/DL (ref 3.2–4.6)
ALBUMIN/GLOB SERPL: 1.2 (ref 1–1.9)
ALP SERPL-CCNC: 139 U/L (ref 35–104)
ALT SERPL-CCNC: 31 U/L (ref 8–45)
ANION GAP SERPL CALC-SCNC: 15 MMOL/L (ref 7–16)
AST SERPL-CCNC: 62 U/L (ref 15–37)
B PERT DNA SPEC QL NAA+PROBE: NOT DETECTED
BASOPHILS # BLD: 0.02 K/UL (ref 0–0.2)
BASOPHILS NFR BLD: 0.3 % (ref 0–2)
BILIRUB SERPL-MCNC: 1.7 MG/DL (ref 0–1.2)
BORDETELLA PARAPERTUSSIS BY PCR: NOT DETECTED
BUN SERPL-MCNC: 20 MG/DL (ref 8–23)
C PNEUM DNA SPEC QL NAA+PROBE: NOT DETECTED
CALCIUM SERPL-MCNC: 9.4 MG/DL (ref 8.8–10.2)
CHLORIDE SERPL-SCNC: 94 MMOL/L (ref 98–107)
CO2 SERPL-SCNC: 23 MMOL/L (ref 20–29)
CREAT SERPL-MCNC: 0.96 MG/DL (ref 0.6–1.1)
DIFFERENTIAL METHOD BLD: ABNORMAL
EOSINOPHIL # BLD: 0 K/UL (ref 0–0.8)
EOSINOPHIL NFR BLD: 0 % (ref 0.5–7.8)
ERYTHROCYTE [DISTWIDTH] IN BLOOD BY AUTOMATED COUNT: 14.9 % (ref 11.9–14.6)
FLUAV SUBTYP SPEC NAA+PROBE: NOT DETECTED
FLUBV RNA SPEC QL NAA+PROBE: NOT DETECTED
GLOBULIN SER CALC-MCNC: 3.3 G/DL (ref 2.3–3.5)
GLUCOSE SERPL-MCNC: 157 MG/DL (ref 70–99)
HADV DNA SPEC QL NAA+PROBE: NOT DETECTED
HCOV 229E RNA SPEC QL NAA+PROBE: NOT DETECTED
HCOV HKU1 RNA SPEC QL NAA+PROBE: NOT DETECTED
HCOV NL63 RNA SPEC QL NAA+PROBE: NOT DETECTED
HCOV OC43 RNA SPEC QL NAA+PROBE: NOT DETECTED
HCT VFR BLD AUTO: 39.3 % (ref 35.8–46.3)
HGB BLD-MCNC: 13.3 G/DL (ref 11.7–15.4)
HMPV RNA SPEC QL NAA+PROBE: NOT DETECTED
HPIV1 RNA SPEC QL NAA+PROBE: NOT DETECTED
HPIV2 RNA SPEC QL NAA+PROBE: NOT DETECTED
HPIV3 RNA SPEC QL NAA+PROBE: NOT DETECTED
HPIV4 RNA SPEC QL NAA+PROBE: NOT DETECTED
IMM GRANULOCYTES # BLD AUTO: 0.01 K/UL (ref 0–0.5)
IMM GRANULOCYTES NFR BLD AUTO: 0.2 % (ref 0–5)
LACTATE SERPL-SCNC: 1.9 MMOL/L (ref 0.5–2)
LIPASE SERPL-CCNC: 25 U/L (ref 13–60)
LYMPHOCYTES # BLD: 0.31 K/UL (ref 0.5–4.6)
LYMPHOCYTES NFR BLD: 5.3 % (ref 13–44)
M PNEUMO DNA SPEC QL NAA+PROBE: NOT DETECTED
MCH RBC QN AUTO: 30.3 PG (ref 26.1–32.9)
MCHC RBC AUTO-ENTMCNC: 33.8 G/DL (ref 31.4–35)
MCV RBC AUTO: 89.5 FL (ref 82–102)
MONOCYTES # BLD: 0.24 K/UL (ref 0.1–1.3)
MONOCYTES NFR BLD: 4.1 % (ref 4–12)
NEUTS SEG # BLD: 5.3 K/UL (ref 1.7–8.2)
NEUTS SEG NFR BLD: 90.1 % (ref 43–78)
NRBC # BLD: 0 K/UL (ref 0–0.2)
NT PRO BNP: 9833 PG/ML (ref 0–125)
PLATELET # BLD AUTO: 207 K/UL (ref 150–450)
PMV BLD AUTO: 9.9 FL (ref 9.4–12.3)
POTASSIUM SERPL-SCNC: ABNORMAL MMOL/L (ref 3.5–5.1)
PROCALCITONIN SERPL-MCNC: 0.04 NG/ML (ref 0–0.1)
PROT SERPL-MCNC: 7.3 G/DL (ref 6.3–8.2)
RBC # BLD AUTO: 4.39 M/UL (ref 4.05–5.2)
RSV RNA SPEC QL NAA+PROBE: NOT DETECTED
RV+EV RNA SPEC QL NAA+PROBE: NOT DETECTED
SARS-COV-2 RNA RESP QL NAA+PROBE: NOT DETECTED
SODIUM SERPL-SCNC: 132 MMOL/L (ref 136–145)
TROPONIN T SERPL HS-MCNC: 16 NG/L (ref 0–14)
WBC # BLD AUTO: 5.9 K/UL (ref 4.3–11.1)

## 2025-02-27 PROCEDURE — 71045 X-RAY EXAM CHEST 1 VIEW: CPT

## 2025-02-27 PROCEDURE — 99285 EMERGENCY DEPT VISIT HI MDM: CPT

## 2025-02-27 PROCEDURE — 83690 ASSAY OF LIPASE: CPT

## 2025-02-27 PROCEDURE — 85025 COMPLETE CBC W/AUTO DIFF WBC: CPT

## 2025-02-27 PROCEDURE — 6360000002 HC RX W HCPCS: Performed by: GENERAL PRACTICE

## 2025-02-27 PROCEDURE — 96376 TX/PRO/DX INJ SAME DRUG ADON: CPT

## 2025-02-27 PROCEDURE — G0378 HOSPITAL OBSERVATION PER HR: HCPCS

## 2025-02-27 PROCEDURE — 96375 TX/PRO/DX INJ NEW DRUG ADDON: CPT

## 2025-02-27 PROCEDURE — 93005 ELECTROCARDIOGRAM TRACING: CPT | Performed by: EMERGENCY MEDICINE

## 2025-02-27 PROCEDURE — 96374 THER/PROPH/DIAG INJ IV PUSH: CPT

## 2025-02-27 PROCEDURE — 0202U NFCT DS 22 TRGT SARS-COV-2: CPT

## 2025-02-27 PROCEDURE — 80053 COMPREHEN METABOLIC PANEL: CPT

## 2025-02-27 PROCEDURE — 84132 ASSAY OF SERUM POTASSIUM: CPT

## 2025-02-27 PROCEDURE — 84484 ASSAY OF TROPONIN QUANT: CPT

## 2025-02-27 PROCEDURE — 83605 ASSAY OF LACTIC ACID: CPT

## 2025-02-27 PROCEDURE — 84145 PROCALCITONIN (PCT): CPT

## 2025-02-27 PROCEDURE — 83880 ASSAY OF NATRIURETIC PEPTIDE: CPT

## 2025-02-27 RX ORDER — MAGNESIUM SULFATE IN WATER 40 MG/ML
2000 INJECTION, SOLUTION INTRAVENOUS PRN
Status: DISCONTINUED | OUTPATIENT
Start: 2025-02-27 | End: 2025-03-07 | Stop reason: HOSPADM

## 2025-02-27 RX ORDER — ONDANSETRON 2 MG/ML
4 INJECTION INTRAMUSCULAR; INTRAVENOUS EVERY 4 HOURS PRN
Status: DISCONTINUED | OUTPATIENT
Start: 2025-02-27 | End: 2025-03-07 | Stop reason: HOSPADM

## 2025-02-27 RX ORDER — FUROSEMIDE 10 MG/ML
40 INJECTION INTRAMUSCULAR; INTRAVENOUS ONCE
Status: COMPLETED | OUTPATIENT
Start: 2025-02-27 | End: 2025-02-27

## 2025-02-27 RX ORDER — ACETAMINOPHEN 325 MG/1
650 TABLET ORAL EVERY 6 HOURS PRN
Status: DISCONTINUED | OUTPATIENT
Start: 2025-02-27 | End: 2025-03-07 | Stop reason: HOSPADM

## 2025-02-27 RX ORDER — METOPROLOL SUCCINATE 25 MG/1
25 TABLET, EXTENDED RELEASE ORAL DAILY
Status: DISCONTINUED | OUTPATIENT
Start: 2025-02-28 | End: 2025-03-03

## 2025-02-27 RX ORDER — POLYETHYLENE GLYCOL 3350 17 G/17G
17 POWDER, FOR SOLUTION ORAL DAILY PRN
Status: DISCONTINUED | OUTPATIENT
Start: 2025-02-27 | End: 2025-03-07 | Stop reason: HOSPADM

## 2025-02-27 RX ORDER — ASPIRIN 81 MG/1
81 TABLET ORAL DAILY
Status: DISCONTINUED | OUTPATIENT
Start: 2025-02-28 | End: 2025-03-07 | Stop reason: HOSPADM

## 2025-02-27 RX ORDER — ONDANSETRON 2 MG/ML
4 INJECTION INTRAMUSCULAR; INTRAVENOUS ONCE
Status: COMPLETED | OUTPATIENT
Start: 2025-02-27 | End: 2025-02-27

## 2025-02-27 RX ORDER — POTASSIUM CHLORIDE 7.45 MG/ML
10 INJECTION INTRAVENOUS PRN
Status: DISCONTINUED | OUTPATIENT
Start: 2025-02-27 | End: 2025-03-07 | Stop reason: HOSPADM

## 2025-02-27 RX ORDER — 0.9 % SODIUM CHLORIDE 0.9 %
1000 INTRAVENOUS SOLUTION INTRAVENOUS
Status: DISCONTINUED | OUTPATIENT
Start: 2025-02-27 | End: 2025-02-27

## 2025-02-27 RX ORDER — SODIUM CHLORIDE 0.9 % (FLUSH) 0.9 %
5-40 SYRINGE (ML) INJECTION PRN
Status: DISCONTINUED | OUTPATIENT
Start: 2025-02-27 | End: 2025-03-07 | Stop reason: HOSPADM

## 2025-02-27 RX ORDER — NITROGLYCERIN 0.4 MG/1
0.4 TABLET SUBLINGUAL EVERY 5 MIN PRN
Status: DISCONTINUED | OUTPATIENT
Start: 2025-02-27 | End: 2025-03-07 | Stop reason: HOSPADM

## 2025-02-27 RX ORDER — POTASSIUM CHLORIDE 1500 MG/1
40 TABLET, EXTENDED RELEASE ORAL PRN
Status: DISCONTINUED | OUTPATIENT
Start: 2025-02-27 | End: 2025-03-07 | Stop reason: HOSPADM

## 2025-02-27 RX ORDER — SODIUM CHLORIDE 0.9 % (FLUSH) 0.9 %
5-40 SYRINGE (ML) INJECTION EVERY 12 HOURS SCHEDULED
Status: DISCONTINUED | OUTPATIENT
Start: 2025-02-28 | End: 2025-03-07 | Stop reason: HOSPADM

## 2025-02-27 RX ORDER — ACETAMINOPHEN 650 MG/1
650 SUPPOSITORY RECTAL EVERY 6 HOURS PRN
Status: DISCONTINUED | OUTPATIENT
Start: 2025-02-27 | End: 2025-03-07 | Stop reason: HOSPADM

## 2025-02-27 RX ORDER — LANOLIN ALCOHOL/MO/W.PET/CERES
200 CREAM (GRAM) TOPICAL DAILY
Status: DISCONTINUED | OUTPATIENT
Start: 2025-02-28 | End: 2025-03-07 | Stop reason: HOSPADM

## 2025-02-27 RX ADMIN — FUROSEMIDE 40 MG: 10 INJECTION, SOLUTION INTRAMUSCULAR; INTRAVENOUS at 23:21

## 2025-02-27 RX ADMIN — ONDANSETRON 4 MG: 2 INJECTION, SOLUTION INTRAMUSCULAR; INTRAVENOUS at 23:21

## 2025-02-27 ASSESSMENT — PAIN SCALES - GENERAL
PAINLEVEL_OUTOF10: 0
PAINLEVEL_OUTOF10: 0

## 2025-02-27 ASSESSMENT — LIFESTYLE VARIABLES
HOW MANY STANDARD DRINKS CONTAINING ALCOHOL DO YOU HAVE ON A TYPICAL DAY: PATIENT DOES NOT DRINK
HOW OFTEN DO YOU HAVE A DRINK CONTAINING ALCOHOL: NEVER

## 2025-02-27 ASSESSMENT — PAIN - FUNCTIONAL ASSESSMENT: PAIN_FUNCTIONAL_ASSESSMENT: 0-10

## 2025-02-28 ENCOUNTER — APPOINTMENT (OUTPATIENT)
Dept: ULTRASOUND IMAGING | Age: 65
DRG: 292 | End: 2025-02-28
Payer: MEDICARE

## 2025-02-28 PROBLEM — I50.23 ACUTE ON CHRONIC SYSTOLIC CONGESTIVE HEART FAILURE (HCC): Status: RESOLVED | Noted: 2017-11-29 | Resolved: 2025-02-28

## 2025-02-28 PROBLEM — R54 FRAILTY: Status: ACTIVE | Noted: 2025-02-28

## 2025-02-28 PROBLEM — R53.1 ACUTE WEAKNESS: Status: ACTIVE | Noted: 2025-02-28

## 2025-02-28 LAB
ANION GAP SERPL CALC-SCNC: 16 MMOL/L (ref 7–16)
APPEARANCE UR: CLEAR
BACTERIA URNS QL MICRO: 0 /HPF
BILIRUB UR QL: NEGATIVE
BUN SERPL-MCNC: 19 MG/DL (ref 8–23)
CALCIUM SERPL-MCNC: 9.7 MG/DL (ref 8.8–10.2)
CASTS URNS QL MICRO: ABNORMAL /LPF
CHLORIDE SERPL-SCNC: 96 MMOL/L (ref 98–107)
CO2 SERPL-SCNC: 27 MMOL/L (ref 20–29)
COLOR UR: ABNORMAL
CREAT SERPL-MCNC: 0.84 MG/DL (ref 0.6–1.1)
CRYSTALS URNS QL MICRO: 0 /LPF
EKG ATRIAL RATE: 86 BPM
EKG DIAGNOSIS: NORMAL
EKG P AXIS: 203 DEGREES
EKG P-R INTERVAL: 185 MS
EKG Q-T INTERVAL: 502 MS
EKG QRS DURATION: 158 MS
EKG QTC CALCULATION (BAZETT): 601 MS
EKG R AXIS: -40 DEGREES
EKG T AXIS: 113 DEGREES
EKG VENTRICULAR RATE: 86 BPM
EPI CELLS #/AREA URNS HPF: ABNORMAL /HPF
GLUCOSE SERPL-MCNC: 114 MG/DL (ref 70–99)
GLUCOSE UR STRIP.AUTO-MCNC: 500 MG/DL
HGB UR QL STRIP: ABNORMAL
KETONES UR QL STRIP.AUTO: NEGATIVE MG/DL
LEUKOCYTE ESTERASE UR QL STRIP.AUTO: ABNORMAL
MAGNESIUM SERPL-MCNC: 2.6 MG/DL (ref 1.8–2.4)
MUCOUS THREADS URNS QL MICRO: ABNORMAL /LPF
NITRITE UR QL STRIP.AUTO: NEGATIVE
OTHER OBSERVATIONS: ABNORMAL
PH UR STRIP: 6.5 (ref 5–9)
POTASSIUM SERPL-SCNC: 3.4 MMOL/L (ref 3.5–5.1)
POTASSIUM SERPL-SCNC: 4 MMOL/L (ref 3.5–5.1)
PROT UR STRIP-MCNC: ABNORMAL MG/DL
RBC #/AREA URNS HPF: ABNORMAL /HPF
SODIUM SERPL-SCNC: 139 MMOL/L (ref 136–145)
SP GR UR REFRACTOMETRY: 1.01 (ref 1–1.02)
TROPONIN T SERPL HS-MCNC: 18 NG/L (ref 0–14)
URINE CULTURE IF INDICATED: ABNORMAL
UROBILINOGEN UR QL STRIP.AUTO: 1 EU/DL (ref 0.2–1)
WBC URNS QL MICRO: ABNORMAL /HPF

## 2025-02-28 PROCEDURE — 2580000003 HC RX 258: Performed by: NURSE PRACTITIONER

## 2025-02-28 PROCEDURE — 93010 ELECTROCARDIOGRAM REPORT: CPT | Performed by: INTERNAL MEDICINE

## 2025-02-28 PROCEDURE — 96366 THER/PROPH/DIAG IV INF ADDON: CPT

## 2025-02-28 PROCEDURE — 6370000000 HC RX 637 (ALT 250 FOR IP): Performed by: CASE MANAGER/CARE COORDINATOR

## 2025-02-28 PROCEDURE — 99497 ADVNCD CARE PLAN 30 MIN: CPT | Performed by: INTERNAL MEDICINE

## 2025-02-28 PROCEDURE — 96376 TX/PRO/DX INJ SAME DRUG ADON: CPT

## 2025-02-28 PROCEDURE — 6360000002 HC RX W HCPCS: Performed by: NURSE PRACTITIONER

## 2025-02-28 PROCEDURE — 80048 BASIC METABOLIC PNL TOTAL CA: CPT

## 2025-02-28 PROCEDURE — 96365 THER/PROPH/DIAG IV INF INIT: CPT

## 2025-02-28 PROCEDURE — 76700 US EXAM ABDOM COMPLETE: CPT

## 2025-02-28 PROCEDURE — 83735 ASSAY OF MAGNESIUM: CPT

## 2025-02-28 PROCEDURE — 2500000003 HC RX 250 WO HCPCS: Performed by: NURSE PRACTITIONER

## 2025-02-28 PROCEDURE — G0378 HOSPITAL OBSERVATION PER HR: HCPCS

## 2025-02-28 PROCEDURE — 99222 1ST HOSP IP/OBS MODERATE 55: CPT | Performed by: INTERNAL MEDICINE

## 2025-02-28 PROCEDURE — 2400000000

## 2025-02-28 PROCEDURE — 51798 US URINE CAPACITY MEASURE: CPT

## 2025-02-28 PROCEDURE — 81001 URINALYSIS AUTO W/SCOPE: CPT

## 2025-02-28 PROCEDURE — 36415 COLL VENOUS BLD VENIPUNCTURE: CPT

## 2025-02-28 PROCEDURE — 51701 INSERT BLADDER CATHETER: CPT

## 2025-02-28 PROCEDURE — 6370000000 HC RX 637 (ALT 250 FOR IP): Performed by: NURSE PRACTITIONER

## 2025-02-28 RX ORDER — BUSPIRONE HYDROCHLORIDE 10 MG/1
5 TABLET ORAL 2 TIMES DAILY
Status: DISCONTINUED | OUTPATIENT
Start: 2025-02-28 | End: 2025-03-07 | Stop reason: HOSPADM

## 2025-02-28 RX ORDER — SPIRONOLACTONE 25 MG/1
25 TABLET ORAL DAILY
Status: ON HOLD | COMMUNITY
End: 2025-03-06 | Stop reason: HOSPADM

## 2025-02-28 RX ORDER — BUSPIRONE HYDROCHLORIDE 5 MG/1
5 TABLET ORAL 2 TIMES DAILY
COMMUNITY
Start: 2025-02-20 | End: 2026-02-20

## 2025-02-28 RX ORDER — MAGNESIUM HYDROXIDE/ALUMINUM HYDROXICE/SIMETHICONE 120; 1200; 1200 MG/30ML; MG/30ML; MG/30ML
30 SUSPENSION ORAL EVERY 6 HOURS PRN
Status: DISCONTINUED | OUTPATIENT
Start: 2025-02-28 | End: 2025-03-07 | Stop reason: HOSPADM

## 2025-02-28 RX ADMIN — BUSPIRONE HYDROCHLORIDE 5 MG: 10 TABLET ORAL at 22:27

## 2025-02-28 RX ADMIN — FUROSEMIDE 10 MG/HR: 10 INJECTION, SOLUTION INTRAVENOUS at 00:51

## 2025-02-28 RX ADMIN — ASPIRIN 81 MG: 81 TABLET, COATED ORAL at 10:16

## 2025-02-28 RX ADMIN — SODIUM CHLORIDE, PRESERVATIVE FREE 10 ML: 5 INJECTION INTRAVENOUS at 10:19

## 2025-02-28 RX ADMIN — POTASSIUM BICARBONATE 40 MEQ: 782 TABLET, EFFERVESCENT ORAL at 10:16

## 2025-02-28 RX ADMIN — SODIUM CHLORIDE, PRESERVATIVE FREE 10 ML: 5 INJECTION INTRAVENOUS at 22:29

## 2025-02-28 RX ADMIN — METOPROLOL SUCCINATE 25 MG: 25 TABLET, EXTENDED RELEASE ORAL at 10:16

## 2025-02-28 RX ADMIN — ONDANSETRON 4 MG: 2 INJECTION, SOLUTION INTRAMUSCULAR; INTRAVENOUS at 04:41

## 2025-02-28 RX ADMIN — ACETAMINOPHEN 650 MG: 325 TABLET ORAL at 07:17

## 2025-02-28 RX ADMIN — ACETAMINOPHEN 650 MG: 325 TABLET ORAL at 22:27

## 2025-02-28 RX ADMIN — EMPAGLIFLOZIN 10 MG: 10 TABLET, FILM COATED ORAL at 10:17

## 2025-02-28 RX ADMIN — FUROSEMIDE 10 MG/HR: 10 INJECTION, SOLUTION INTRAVENOUS at 07:04

## 2025-02-28 RX ADMIN — ALUMINUM HYDROXIDE, MAGNESIUM HYDROXIDE, AND SIMETHICONE 30 ML: 200; 200; 20 SUSPENSION ORAL at 22:49

## 2025-02-28 ASSESSMENT — PAIN SCALES - GENERAL
PAINLEVEL_OUTOF10: 7
PAINLEVEL_OUTOF10: 0
PAINLEVEL_OUTOF10: 7
PAINLEVEL_OUTOF10: 2

## 2025-02-28 ASSESSMENT — PAIN DESCRIPTION - DESCRIPTORS
DESCRIPTORS: ACHING;THROBBING;SORE
DESCRIPTORS: ACHING;CRAMPING

## 2025-02-28 ASSESSMENT — PAIN - FUNCTIONAL ASSESSMENT: PAIN_FUNCTIONAL_ASSESSMENT: ACTIVITIES ARE NOT PREVENTED

## 2025-02-28 ASSESSMENT — PAIN DESCRIPTION - ORIENTATION
ORIENTATION: MID;LOWER
ORIENTATION: RIGHT;LEFT

## 2025-02-28 ASSESSMENT — PAIN DESCRIPTION - LOCATION
LOCATION: BACK
LOCATION: HEAD

## 2025-02-28 NOTE — PLAN OF CARE
4 Eyes Skin Assessment     NAME:  Kely Quach  YOB: 1960  MEDICAL RECORD NUMBER:  [id number]    The patient is being assessed for  Admission    I agree that at least one RN has performed a thorough Head to Toe Skin Assessment on the patient. ALL assessment sites listed below have been assessed.      Areas assessed by both nurses:    Sacrum. Buttock, Coccyx, Ischium and Legs. Feet and Heels        Does the Patient have a Wound? No noted wound(s)       Abraham Prevention initiated by RN: No  Wound Care Orders initiated by RN: No    Pressure Injury (Stage 3,4, Unstageable, DTI, NWPT, and Complex wounds) if present, place Wound referral order by RN under : No    New Ostomies, if present place, Ostomy referral order under : No     Nurse 1 eSignature: Electronically signed by ZULEMA DAVIS RN on 2/28/25 at 2:26 AM EST    **SHARE this note so that the co-signing nurse can place an eSignature**    Nurse 2 eSignature: Electronically signed by AURELIANO EMERSON RN on 2/28/25 at 2:27 AM EST

## 2025-02-28 NOTE — ED NOTES
TRANSFER - OUT REPORT:    Verbal report given to Reema MÁRQUEZ on Kely Quach  being transferred to [facility] for routine progression of patient care       Report consisted of patient's Situation, Background, Assessment and   Recommendations(SBAR).     Information from the following report(s) SBAR was reviewed with the receiving nurse.    [place] Fall Assessment:    Presents to emergency department  because of falls (Syncope, seizure, or loss of consciousness): No  Age > 70: No  Altered Mental Status, Intoxication with alcohol or substance confusion (Disorientation, impaired judgment, poor safety awaremess, or inability to follow instructions): Yes  Impaired Mobility: Ambulates or transfers with assistive devices or assistance; Unable to ambulate or transer.: No  Nursing Judgement: No          Lines:   Peripheral IV 02/27/25 Left Forearm (Active)   Site Assessment Clean, dry & intact 02/27/25 2339   Line Status Blood return noted;Normal saline locked;Flushed 02/27/25 2224   Phlebitis Assessment No symptoms 02/27/25 2339   Infiltration Assessment 0 02/27/25 2339   Dressing Status Clean, dry & intact 02/27/25 2224   Dressing Type Transparent 02/27/25 2224        Opportunity for questions and clarification was provided.      Patient transported with:  Monitor and Registered Nurse           Santo Turner RN  02/27/25 [id number]

## 2025-02-28 NOTE — PLAN OF CARE
Problem: Chronic Conditions and Co-morbidities  Goal: Patient's chronic conditions and co-morbidity symptoms are monitored and maintained or improved  Outcome: Progressing     Problem: Discharge Planning  Goal: Discharge to home or other facility with appropriate resources  Outcome: Progressing     Problem: Pain  Goal: Verbalizes/displays adequate comfort level or baseline comfort level  Outcome: Progressing     Problem: Safety - Adult  Goal: Free from fall injury  Outcome: Progressing     Problem: Cardiovascular - Adult  Goal: Maintains optimal cardiac output and hemodynamic stability  Outcome: Progressing     Problem: Metabolic/Fluid and Electrolytes - Adult  Goal: Electrolytes maintained within normal limits  Outcome: Progressing     Problem: Hematologic - Adult  Goal: Maintains hematologic stability  Outcome: Progressing

## 2025-02-28 NOTE — CONSULTS
Patient: Kely Quach MRN: [id number]  SSN: [id number]    YOB: 1960  Age: 64 y.o.  Sex: female       Date of Request: 2/28/2025  Date of Consult:  2/28/2025  Reason for Consult:  goals of care and medical decision making  Requesting Physician: Dr. Nice     Assessment/Plan:     Principal Diagnosis:    Dyspnea  R06.00    Additional Diagnoses:   Debility, Unspecified  R53.81  Frailty  R54  Counseling, Encounter for Medical Advice  Z71.9  Encounter for Palliative Care  Z51.5    Palliative Performance Scale (PPS):       Medical Decision Making:   Reviewed and summarized labs and imaging from admission.  Met with pt at bedside.  We discussed concerns given severity of heart disease.  I reviewed home palliative programs and pt is interested in potentially pursuing.  I also discussed code status.  Pt states at this time she wishes to remain FULL code but would not wish to be maintained on life support without recovering.  She states her  is aware of her wishes and he would be her decision maker should she not be able to make her decisions.  She also stated she would not want these things if there was nothing else to be done to help her.      I spent greater than 25 minutes on advanced care planning.     Will discuss findings with members of the interdisciplinary team.      Thank you for this referral.         Subjective:     History obtained from:  Patient and Chart    Chief Complaint: dyspnea  History of Present Illness:  64 y.o. 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 Associated symptoms include fatigue, dizziness, and nausea. Patient called the office yesterday reporting a 10 pound weight gain ove the past 2 weeks. She reports compliance with diuretic therapy as outpatient. She reports that she has   Current packs/day: 0.00     Types: Cigarettes     Quit date: 1991     Years since quittin.1    Smokeless tobacco: Never   Substance Use Topics    Alcohol use: Yes     Alcohol/week: 1.0 standard drink of alcohol     Types: 1 Glasses of wine per week     Prior to Admission medications    Medication Sig Start Date End Date Taking? Authorizing Provider   spironolactone (ALDACTONE) 25 MG tablet Take 1 tablet by mouth daily   Yes ProviderSue MD   torsemide (DEMADEX) 20 MG tablet Take 1 tab daily if weight increases by 2 lbs in 24 hours or 4 lbs in 48 hours. 2/3/25  Yes Devon Wills MD   potassium chloride (KLOR-CON M) 20 MEQ extended release tablet Take 1 tablet by mouth daily 25 Yes Devon Wills MD   metoprolol succinate (TOPROL XL) 25 MG extended release tablet Take 1 tablet by mouth daily 10/31/24  Yes Devon Wills MD   empagliflozin (JARDIANCE) 10 MG tablet Take 1 tablet by mouth daily 10/25/24  Yes Asif Thakur MD   magnesium oxide (MAG-OX) 400 MG tablet Take 250 mg by mouth daily 21  Yes ProviderSue MD   aspirin 81 MG EC tablet Take by mouth daily   Yes Automatic Reconciliation, Ar   vitamin D (CHOLECALCIFEROL) 25 MCG (1000 UT) TABS tablet Take by mouth daily   Yes Automatic Reconciliation, Ar   loratadine (CLARITIN) 10 MG tablet Take 1 tablet by mouth daily as needed   Yes Automatic Reconciliation, Ar   sacubitril-valsartan (ENTRESTO) 24-26 MG per tablet Take 1 tablet by mouth 2 times daily 10/28/24 2/3/25  Devon Wills MD   UNABLE TO FIND Blood builder    ProviderSue MD       Allergies   Allergen Reactions    Sulfa Antibiotics Anaphylaxis     Shortness of breath & hives    Atorvastatin Other (See Comments)    Augmentin [Amoxicillin-Pot Clavulanate] Nausea And Vomiting     \"Sick stomach\"        Comprehensive review of systems completed and negative with exception of noted above     Objective:     Visit Vitals  /62   Pulse 85   Temp

## 2025-02-28 NOTE — ED TRIAGE NOTES
Coming from home transported by [facility], complaining of fluid retention, fatigue, nausea, dizziness.

## 2025-02-28 NOTE — ED PROVIDER NOTES
Emergency Department Provider Note       PCP: Sabine Ordonez MD   Age: 64 y.o.   Sex: female     DISPOSITION Decision To Admit 02/27/2025 10:18:31 PM   DISPOSITION CONDITION Stable            ICD-10-CM    1. Acute weakness  R53.1       2. Anasarca  R60.1       3. Congestive heart failure, unspecified HF chronicity, unspecified heart failure type (HCC)  I50.9           Medical Decision Making     Patient presents with acute weakness.  Patient does have significant fluid retention.  Likely CHF related.  Patient apparently cannot perform her ADLs and is very weak.  Likely will need admission for continued diuresis.  I have consulted with cardiology who will be admitting the patient.     1 or more chronic illnesses with a severe exacerbation or progression.  Drug therapy given requiring intensive monitoring for toxicity.  Discussion with external consultants.  Chronic medical problems impacting care include CHF.  Shared medical decision making was utilized in creating the patients health plan today.    I independently ordered and reviewed each unique test.  I reviewed external records: provider visit note from PCP.  I reviewed external records: previous lab results from outside ED.  I reviewed external records: previous imaging study including radiologist interpretation.     I interpreted the X-rays chest x-ray shows no evidence of infiltrate or edema and heart size is normal.  I have reviewed and agree with radiology report.  My Independent EKG Interpretation: sinus rhythm, no evidence of arrhythmia      ST Segments:Nonspecific ST segments - NO STEMI   Rate: 86  The patient was admitted and I have discussed patient management with the admitting provider.  The management of this patient was discussed with an external consultant.            History     Patient presents with overall fluid retention and weakness and inability to perform her ADLs.  She also was feeling short of breath and dizzy.  Patient has history

## 2025-02-28 NOTE — PLAN OF CARE
Problem: Chronic Conditions and Co-morbidities  Goal: Patient's chronic conditions and co-morbidity symptoms are monitored and maintained or improved  2/28/2025 1331 by Cleo Bright RN  Outcome: Progressing  2/28/2025 0032 by Kat Parmar RN  Outcome: Progressing     Problem: Discharge Planning  Goal: Discharge to home or other facility with appropriate resources  2/28/2025 1331 by Cleo Bright RN  Outcome: Progressing  2/28/2025 0032 by Kat Parmar RN  Outcome: Progressing     Problem: Pain  Goal: Verbalizes/displays adequate comfort level or baseline comfort level  2/28/2025 1331 by Cleo Bright RN  Outcome: Progressing  2/28/2025 0032 by Kat Parmar RN  Outcome: Progressing     Problem: Safety - Adult  Goal: Free from fall injury  2/28/2025 1331 by Cleo Bright RN  Outcome: Progressing  2/28/2025 0032 by Kat Parmar RN  Outcome: Progressing     Problem: Cardiovascular - Adult  Goal: Maintains optimal cardiac output and hemodynamic stability  2/28/2025 1331 by Cleo Bright RN  Outcome: Progressing  2/28/2025 0032 by Kat Parmar RN  Outcome: Progressing     Problem: Metabolic/Fluid and Electrolytes - Adult  Goal: Electrolytes maintained within normal limits  2/28/2025 1331 by Cleo Bright RN  Outcome: Progressing  2/28/2025 0032 by Kat Parmar RN  Outcome: Progressing     Problem: Hematologic - Adult  Goal: Maintains hematologic stability  2/28/2025 1331 by Cleo Bright RN  Outcome: Progressing  2/28/2025 0032 by Kat Parmar RN  Outcome: Progressing     Problem: ABCDS Injury Assessment  Goal: Absence of physical injury  Outcome: Progressing

## 2025-02-28 NOTE — H&P
[facility] Cardiology Initial Cardiac Evaluation                Date of  Admission: 2/27/2025  8:01 PM     Primary Care Physician: Dr. Sabine Ordonez  Primary Cardiologist: Dr. Wills  Referring Physician: Dr. Martinez  Supervising Physician: Dr. Wills    Reason for consult/admission: edema      Kely Quach is a 64 y.o. 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 Associated symptoms include fatigue, dizziness, and nausea. Patient called the office yesterday reporting a 10 pound weight gain ove the past 2 weeks. She reports compliance with diuretic therapy as outpatient. She reports that she has been trying to exercise more and is likely drinking more fluids.     Patient is followed by cardiology in [facility] and advanced HF clinic in [place]. She was seen at [facility] earlier this month with plan to attempt titration of GDMT, however, due to labile blood pressures it is felt that aggressive titration will likely not be possible. Cardiac transplantation and LVAD support was discussed with patient as well, but patient is a practicing [employer] and will likely not be a candidate.     Work-up in the ER included CXR that showed no pulmonary edema or pleural effusions. Blood work significant for sodium 132, pBNP 9833, Her creatinine is stable at 0.96. She was given 40mg IV lasix and 4MG IV zofran in the ER. BP in the ER was 108/74.     Cardiology was asked to see patient for admission. Patient is significantly volume overloaded with edema into bilateral thighs. Her abdomen is also distended. She reports that she wears \"stretchy\" pants at home and these have become tighter than normal. She is also describing decreased appetite at times.       Past Medical History:   Diagnosis Date    Heart failure (HCC)     Hyperlipidemia

## 2025-03-01 PROBLEM — I50.82 BIVENTRICULAR FAILURE (HCC): Status: ACTIVE | Noted: 2025-03-01

## 2025-03-01 LAB
ANION GAP SERPL CALC-SCNC: 12 MMOL/L (ref 7–16)
BUN SERPL-MCNC: 18 MG/DL (ref 8–23)
CALCIUM SERPL-MCNC: 9.6 MG/DL (ref 8.8–10.2)
CHLORIDE SERPL-SCNC: 96 MMOL/L (ref 98–107)
CO2 SERPL-SCNC: 28 MMOL/L (ref 20–29)
CREAT SERPL-MCNC: 0.91 MG/DL (ref 0.6–1.1)
GLUCOSE SERPL-MCNC: 92 MG/DL (ref 70–99)
MAGNESIUM SERPL-MCNC: 2.6 MG/DL (ref 1.8–2.4)
POTASSIUM SERPL-SCNC: 4 MMOL/L (ref 3.5–5.1)
SODIUM SERPL-SCNC: 135 MMOL/L (ref 136–145)

## 2025-03-01 PROCEDURE — 83735 ASSAY OF MAGNESIUM: CPT

## 2025-03-01 PROCEDURE — 6370000000 HC RX 637 (ALT 250 FOR IP): Performed by: CASE MANAGER/CARE COORDINATOR

## 2025-03-01 PROCEDURE — 80048 BASIC METABOLIC PNL TOTAL CA: CPT

## 2025-03-01 PROCEDURE — 2140000000 HC CCU INTERMEDIATE R&B

## 2025-03-01 PROCEDURE — 99232 SBSQ HOSP IP/OBS MODERATE 35: CPT | Performed by: INTERNAL MEDICINE

## 2025-03-01 PROCEDURE — 96366 THER/PROPH/DIAG IV INF ADDON: CPT

## 2025-03-01 PROCEDURE — 36415 COLL VENOUS BLD VENIPUNCTURE: CPT

## 2025-03-01 PROCEDURE — 2500000003 HC RX 250 WO HCPCS: Performed by: NURSE PRACTITIONER

## 2025-03-01 PROCEDURE — 6370000000 HC RX 637 (ALT 250 FOR IP): Performed by: NURSE PRACTITIONER

## 2025-03-01 RX ADMIN — ASPIRIN 81 MG: 81 TABLET, COATED ORAL at 09:22

## 2025-03-01 RX ADMIN — EMPAGLIFLOZIN 10 MG: 10 TABLET, FILM COATED ORAL at 09:23

## 2025-03-01 RX ADMIN — BUSPIRONE HYDROCHLORIDE 5 MG: 10 TABLET ORAL at 09:23

## 2025-03-01 RX ADMIN — SODIUM CHLORIDE, PRESERVATIVE FREE 10 ML: 5 INJECTION INTRAVENOUS at 20:11

## 2025-03-01 RX ADMIN — POLYETHYLENE GLYCOL 3350 17 G: 17 POWDER, FOR SOLUTION ORAL at 01:13

## 2025-03-01 RX ADMIN — BUSPIRONE HYDROCHLORIDE 5 MG: 10 TABLET ORAL at 20:11

## 2025-03-01 NOTE — PROGRESS NOTES
[facility] CARDIOLOGY PROGRESS NOTE           3/1/2025 10:49 AM    Admit Date: 2/27/2025      Subjective:   Some continued edema.  No overnight events.  Blood pressure remains soft.    Review of Systems   All other systems reviewed and are negative.          Objective:      Vitals:    03/01/25 0832 03/01/25 0900 03/01/25 0930 03/01/25 1012   BP: 96/65 (!) 82/50 90/60 95/65   Pulse:  91 90 90   Resp: 16      Temp: 97.8 °F (36.6 °C)      TempSrc: Temporal      SpO2: 98%      Weight:       Height:             Physical Exam  Vitals reviewed.   Constitutional:       Appearance: Normal appearance.      Comments: Appears younger than stated age   HENT:      Head: Normocephalic and atraumatic.   Eyes:      General: No scleral icterus.  Neck:      Vascular: No carotid bruit.   Cardiovascular:      Rate and Rhythm: Normal rate and regular rhythm.      Heart sounds: No murmur heard.     No gallop.   Pulmonary:      Breath sounds: Normal breath sounds.   Abdominal:      Palpations: Abdomen is soft.   Musculoskeletal:      Cervical back: Neck supple.      Right lower leg: Edema present.      Left lower leg: Edema present.   Skin:     General: Skin is warm and dry.   Neurological:      Mental Status: She is alert and oriented to person, place, and time.   Psychiatric:         Mood and Affect: Mood normal.         Data Review:   Recent Labs     02/27/25 2035 02/27/25  2319 02/28/25  0659 03/01/25  0700   *  --  139 135*   K HEMOLYZED SPECIMEN   < > 3.4* 4.0   MG  --   --  2.6* 2.6*   BUN 20  --  19 18   WBC 5.9  --   --   --    HGB 13.3  --   --   --    HCT 39.3  --   --   --      --   --   --     < > = values in this interval not displayed.       [unfilled]  Current Facility-Administered Medications   Medication Dose Route Frequency    furosemide (LASIX) 100 mg in sodium chloride 0.9 % 100 mL IVPB (addEASE)  5 mg/hr IntraVENous Continuous    busPIRone (BUSPAR) tablet 5 mg  5 mg Oral BID

## 2025-03-02 LAB
ANION GAP SERPL CALC-SCNC: 12 MMOL/L (ref 7–16)
BUN SERPL-MCNC: 16 MG/DL (ref 8–23)
CALCIUM SERPL-MCNC: 9 MG/DL (ref 8.8–10.2)
CHLORIDE SERPL-SCNC: 94 MMOL/L (ref 98–107)
CO2 SERPL-SCNC: 26 MMOL/L (ref 20–29)
CREAT SERPL-MCNC: 0.87 MG/DL (ref 0.6–1.1)
GLUCOSE SERPL-MCNC: 92 MG/DL (ref 70–99)
MAGNESIUM SERPL-MCNC: 2.5 MG/DL (ref 1.8–2.4)
POTASSIUM SERPL-SCNC: 3.3 MMOL/L (ref 3.5–5.1)
SODIUM SERPL-SCNC: 132 MMOL/L (ref 136–145)

## 2025-03-02 PROCEDURE — 6360000002 HC RX W HCPCS: Performed by: CASE MANAGER/CARE COORDINATOR

## 2025-03-02 PROCEDURE — 6370000000 HC RX 637 (ALT 250 FOR IP): Performed by: NURSE PRACTITIONER

## 2025-03-02 PROCEDURE — 80048 BASIC METABOLIC PNL TOTAL CA: CPT

## 2025-03-02 PROCEDURE — 6370000000 HC RX 637 (ALT 250 FOR IP): Performed by: CASE MANAGER/CARE COORDINATOR

## 2025-03-02 PROCEDURE — 99232 SBSQ HOSP IP/OBS MODERATE 35: CPT | Performed by: INTERNAL MEDICINE

## 2025-03-02 PROCEDURE — 2580000003 HC RX 258: Performed by: CASE MANAGER/CARE COORDINATOR

## 2025-03-02 PROCEDURE — 2140000000 HC CCU INTERMEDIATE R&B

## 2025-03-02 PROCEDURE — 2500000003 HC RX 250 WO HCPCS: Performed by: NURSE PRACTITIONER

## 2025-03-02 PROCEDURE — 36415 COLL VENOUS BLD VENIPUNCTURE: CPT

## 2025-03-02 PROCEDURE — 83735 ASSAY OF MAGNESIUM: CPT

## 2025-03-02 RX ADMIN — BUSPIRONE HYDROCHLORIDE 5 MG: 10 TABLET ORAL at 20:11

## 2025-03-02 RX ADMIN — POTASSIUM CHLORIDE 40 MEQ: 1500 TABLET, EXTENDED RELEASE ORAL at 06:54

## 2025-03-02 RX ADMIN — METOPROLOL SUCCINATE 25 MG: 25 TABLET, EXTENDED RELEASE ORAL at 09:30

## 2025-03-02 RX ADMIN — FUROSEMIDE 10 MG/HR: 10 INJECTION, SOLUTION INTRAVENOUS at 06:49

## 2025-03-02 RX ADMIN — SODIUM CHLORIDE, PRESERVATIVE FREE 10 ML: 5 INJECTION INTRAVENOUS at 20:12

## 2025-03-02 RX ADMIN — EMPAGLIFLOZIN 10 MG: 10 TABLET, FILM COATED ORAL at 09:30

## 2025-03-02 RX ADMIN — FUROSEMIDE 10 MG/HR: 10 INJECTION, SOLUTION INTRAVENOUS at 20:11

## 2025-03-02 RX ADMIN — BUSPIRONE HYDROCHLORIDE 5 MG: 10 TABLET ORAL at 09:29

## 2025-03-02 RX ADMIN — ASPIRIN 81 MG: 81 TABLET, COATED ORAL at 09:29

## 2025-03-02 NOTE — PROGRESS NOTES
[facility] CARDIOLOGY PROGRESS NOTE           3/2/2025 10:48 AM    Admit Date: 2/27/2025      Subjective:   Better diuresis overnight.  No complaints this morning.  Blood pressure improved.    Review of Systems   All other systems reviewed and are negative.          Objective:      Vitals:    03/02/25 0843 03/02/25 0900 03/02/25 1000 03/02/25 1015   BP: 99/61      Pulse: 90 (!) 112 92 88   Resp: 16      Temp: 97.3 °F (36.3 °C)      TempSrc: Oral      SpO2: 96%      Weight:       Height:             Physical Exam  Vitals reviewed.   Constitutional:       Appearance: Normal appearance.      Comments: Appears younger than stated age   HENT:      Head: Normocephalic and atraumatic.   Eyes:      General: No scleral icterus.  Neck:      Vascular: No carotid bruit.   Cardiovascular:      Rate and Rhythm: Normal rate and regular rhythm.      Heart sounds: No murmur heard.     No gallop.   Pulmonary:      Breath sounds: Normal breath sounds.   Abdominal:      Palpations: Abdomen is soft.   Musculoskeletal:      Cervical back: Neck supple.      Right lower leg: Edema present.      Left lower leg: Edema present.   Skin:     General: Skin is warm and dry.   Neurological:      Mental Status: She is alert and oriented to person, place, and time.   Psychiatric:         Mood and Affect: Mood normal.         Data Review:   Recent Labs     02/27/25 2035 02/27/25  2319 03/01/25  0700 03/02/25  0552   *   < > 135* 132*   K HEMOLYZED SPECIMEN   < > 4.0 3.3*   MG  --    < > 2.6* 2.5*   BUN 20   < > 18 16   WBC 5.9  --   --   --    HGB 13.3  --   --   --    HCT 39.3  --   --   --      --   --   --     < > = values in this interval not displayed.       [unfilled]  Current Facility-Administered Medications   Medication Dose Route Frequency    furosemide (LASIX) 100 mg in sodium chloride 0.9 % 100 mL IVPB (addEASE)  10 mg/hr IntraVENous Continuous    busPIRone (BUSPAR) tablet 5 mg  5 mg Oral BID    aluminum &

## 2025-03-03 LAB
ANION GAP SERPL CALC-SCNC: 12 MMOL/L (ref 7–16)
BUN SERPL-MCNC: 15 MG/DL (ref 8–23)
CALCIUM SERPL-MCNC: 9.2 MG/DL (ref 8.8–10.2)
CHLORIDE SERPL-SCNC: 95 MMOL/L (ref 98–107)
CO2 SERPL-SCNC: 29 MMOL/L (ref 20–29)
CREAT SERPL-MCNC: 0.87 MG/DL (ref 0.6–1.1)
ERYTHROCYTE [DISTWIDTH] IN BLOOD BY AUTOMATED COUNT: 15 % (ref 11.9–14.6)
GLUCOSE SERPL-MCNC: 95 MG/DL (ref 70–99)
HCT VFR BLD AUTO: 39.8 % (ref 35.8–46.3)
HGB BLD-MCNC: 13.3 G/DL (ref 11.7–15.4)
MAGNESIUM SERPL-MCNC: 2.4 MG/DL (ref 1.8–2.4)
MCH RBC QN AUTO: 30 PG (ref 26.1–32.9)
MCHC RBC AUTO-ENTMCNC: 33.4 G/DL (ref 31.4–35)
MCV RBC AUTO: 89.8 FL (ref 82–102)
NRBC # BLD: 0 K/UL (ref 0–0.2)
PLATELET # BLD AUTO: 180 K/UL (ref 150–450)
PMV BLD AUTO: 10 FL (ref 9.4–12.3)
POTASSIUM SERPL-SCNC: 3 MMOL/L (ref 3.5–5.1)
POTASSIUM SERPL-SCNC: 3.3 MMOL/L (ref 3.5–5.1)
RBC # BLD AUTO: 4.43 M/UL (ref 4.05–5.2)
SODIUM SERPL-SCNC: 135 MMOL/L (ref 136–145)
WBC # BLD AUTO: 6.8 K/UL (ref 4.3–11.1)

## 2025-03-03 PROCEDURE — 2140000000 HC CCU INTERMEDIATE R&B

## 2025-03-03 PROCEDURE — 2500000003 HC RX 250 WO HCPCS: Performed by: NURSE PRACTITIONER

## 2025-03-03 PROCEDURE — 84132 ASSAY OF SERUM POTASSIUM: CPT

## 2025-03-03 PROCEDURE — 6370000000 HC RX 637 (ALT 250 FOR IP): Performed by: NURSE PRACTITIONER

## 2025-03-03 PROCEDURE — 6360000002 HC RX W HCPCS: Performed by: CASE MANAGER/CARE COORDINATOR

## 2025-03-03 PROCEDURE — 85027 COMPLETE CBC AUTOMATED: CPT

## 2025-03-03 PROCEDURE — 6360000002 HC RX W HCPCS: Performed by: INTERNAL MEDICINE

## 2025-03-03 PROCEDURE — 83735 ASSAY OF MAGNESIUM: CPT

## 2025-03-03 PROCEDURE — 76937 US GUIDE VASCULAR ACCESS: CPT

## 2025-03-03 PROCEDURE — 80048 BASIC METABOLIC PNL TOTAL CA: CPT

## 2025-03-03 PROCEDURE — 6360000002 HC RX W HCPCS: Performed by: NURSE PRACTITIONER

## 2025-03-03 PROCEDURE — 2580000003 HC RX 258: Performed by: CASE MANAGER/CARE COORDINATOR

## 2025-03-03 PROCEDURE — 36415 COLL VENOUS BLD VENIPUNCTURE: CPT

## 2025-03-03 PROCEDURE — 99232 SBSQ HOSP IP/OBS MODERATE 35: CPT | Performed by: INTERNAL MEDICINE

## 2025-03-03 PROCEDURE — 6370000000 HC RX 637 (ALT 250 FOR IP): Performed by: CASE MANAGER/CARE COORDINATOR

## 2025-03-03 RX ORDER — DOPAMINE HYDROCHLORIDE 320 MG/100ML
2.5 INJECTION, SOLUTION INTRAVENOUS CONTINUOUS
Status: DISCONTINUED | OUTPATIENT
Start: 2025-03-03 | End: 2025-03-07 | Stop reason: HOSPADM

## 2025-03-03 RX ORDER — DOBUTAMINE HYDROCHLORIDE 200 MG/100ML
2.5 INJECTION INTRAVENOUS CONTINUOUS
Status: DISCONTINUED | OUTPATIENT
Start: 2025-03-03 | End: 2025-03-07 | Stop reason: HOSPADM

## 2025-03-03 RX ADMIN — METOPROLOL SUCCINATE 25 MG: 25 TABLET, EXTENDED RELEASE ORAL at 10:17

## 2025-03-03 RX ADMIN — FUROSEMIDE 10 MG/HR: 10 INJECTION, SOLUTION INTRAVENOUS at 19:48

## 2025-03-03 RX ADMIN — FUROSEMIDE 10 MG/HR: 10 INJECTION, SOLUTION INTRAVENOUS at 07:25

## 2025-03-03 RX ADMIN — POTASSIUM CHLORIDE 40 MEQ: 1500 TABLET, EXTENDED RELEASE ORAL at 22:26

## 2025-03-03 RX ADMIN — SODIUM CHLORIDE, PRESERVATIVE FREE 10 ML: 5 INJECTION INTRAVENOUS at 10:17

## 2025-03-03 RX ADMIN — POTASSIUM CHLORIDE 10 MEQ: 7.46 INJECTION, SOLUTION INTRAVENOUS at 12:14

## 2025-03-03 RX ADMIN — POTASSIUM CHLORIDE 40 MEQ: 1500 TABLET, EXTENDED RELEASE ORAL at 06:49

## 2025-03-03 RX ADMIN — MAGNESIUM GLUCONATE 500 MG ORAL TABLET 200 MG: 500 TABLET ORAL at 10:06

## 2025-03-03 RX ADMIN — BUSPIRONE HYDROCHLORIDE 5 MG: 10 TABLET ORAL at 10:07

## 2025-03-03 RX ADMIN — SODIUM CHLORIDE, PRESERVATIVE FREE 10 ML: 5 INJECTION INTRAVENOUS at 19:50

## 2025-03-03 RX ADMIN — BUSPIRONE HYDROCHLORIDE 5 MG: 10 TABLET ORAL at 19:50

## 2025-03-03 RX ADMIN — EMPAGLIFLOZIN 10 MG: 10 TABLET, FILM COATED ORAL at 10:08

## 2025-03-03 RX ADMIN — DOBUTAMINE IN DEXTROSE 2.5 MCG/KG/MIN: 200 INJECTION, SOLUTION INTRAVENOUS at 16:25

## 2025-03-03 RX ADMIN — DOPAMINE HYDROCHLORIDE 2.5 MCG/KG/MIN: 320 INJECTION, SOLUTION INTRAVENOUS at 16:24

## 2025-03-03 RX ADMIN — ASPIRIN 81 MG: 81 TABLET, COATED ORAL at 10:07

## 2025-03-03 RX ADMIN — POTASSIUM CHLORIDE 10 MEQ: 7.46 INJECTION, SOLUTION INTRAVENOUS at 09:31

## 2025-03-03 RX ADMIN — POTASSIUM CHLORIDE 10 MEQ: 7.46 INJECTION, SOLUTION INTRAVENOUS at 14:59

## 2025-03-03 ASSESSMENT — PAIN SCALES - GENERAL
PAINLEVEL_OUTOF10: 0

## 2025-03-03 NOTE — PROGRESS NOTES
[facility] CARDIOLOGY PROGRESS NOTE           3/3/2025 3:14 PM    Admit Date: 2/27/2025      Subjective:   Patient remains with moderate lower extremity edema.  Hemodynamics remain borderline unable to initiate guideline directed medical therapy.    ROS:  Cardiovascular:  As noted above    Objective:      Vitals:    03/03/25 0915 03/03/25 1017 03/03/25 1214 03/03/25 1216   BP: 96/69 96/69  (!) 89/60   Pulse: 90  90    Resp:       Temp:    97.7 °F (36.5 °C)   TempSrc:    Oral   SpO2:    99%   Weight:       Height:           Physical Exam:  General-No Acute Distress  Neck- supple, no JVD  CV- regular rate and rhythm no MRG  Lung-diminished bilaterally  Abd- soft, nontender, nondistended  Ext-2+  Skin- warm and dry    Data Review:   Recent Labs     03/02/25  0552 03/03/25  0517   * 135*   K 3.3* 3.0*   MG 2.5* 2.4   BUN 16 15   WBC  --  6.8   HGB  --  13.3   HCT  --  39.8   PLT  --  180     Echo Findings    Left Ventricle Severely reduced left ventricular systolic function with a visually estimated EF of 10 -15%. Left ventricle is severely dilated. Wall is thinner than normal. Severe global hypokinesis present. Abnormal diastolic function.   Left Atrium Left atrium is dilated.   Right Ventricle Right ventricle is dilated. Lead present in the right ventricle. Reduced systolic function.   Right Atrium Lead present in the right atrium. Right atrium is dilated.   Aortic Valve Valve structure is normal. No regurgitation. No stenosis.   Mitral Valve Bioprosthetic valve that is well-seated with leaflet motion that was not well visualized. No regurgitation. Mild stenosis noted. MV mean gradient is 3 mmHg.   Tricuspid Valve Valve structure is normal. Trace regurgitation. No stenosis noted. The estimated RVSP is 37 mmHg.   Pulmonic Valve No regurgitation. No stenosis noted.   Aorta Normal sized aorta.   IVC/Hepatic Veins IVC diameter is greater than 21 mm and decreases less than 50% during

## 2025-03-03 NOTE — CARE COORDINATION
CM reviewed clinical notes. Cardiology discussing with CTS at [facility] to arrange follow up for further management of patient's advanced heart failure.    1205 Per nurse extender, [facility] has accepted patient and has begun prior authorization with insurance.    with [facility]: Shani Boateng NP ([phone]).  Charge nurse initiating referral to [facility] to assist with transfer.

## 2025-03-04 LAB
ANION GAP SERPL CALC-SCNC: 12 MMOL/L (ref 7–16)
BASOPHILS # BLD: 0.03 K/UL (ref 0–0.2)
BASOPHILS NFR BLD: 0.5 % (ref 0–2)
BUN SERPL-MCNC: 15 MG/DL (ref 8–23)
CALCIUM SERPL-MCNC: 9.4 MG/DL (ref 8.8–10.2)
CHLORIDE SERPL-SCNC: 94 MMOL/L (ref 98–107)
CO2 SERPL-SCNC: 28 MMOL/L (ref 20–29)
CREAT SERPL-MCNC: 0.81 MG/DL (ref 0.6–1.1)
DIFFERENTIAL METHOD BLD: ABNORMAL
EOSINOPHIL # BLD: 0.06 K/UL (ref 0–0.8)
EOSINOPHIL NFR BLD: 1 % (ref 0.5–7.8)
ERYTHROCYTE [DISTWIDTH] IN BLOOD BY AUTOMATED COUNT: 15 % (ref 11.9–14.6)
GLUCOSE SERPL-MCNC: 95 MG/DL (ref 70–99)
HCT VFR BLD AUTO: 39.2 % (ref 35.8–46.3)
HGB BLD-MCNC: 13.4 G/DL (ref 11.7–15.4)
IMM GRANULOCYTES # BLD AUTO: 0.02 K/UL (ref 0–0.5)
IMM GRANULOCYTES NFR BLD AUTO: 0.3 % (ref 0–5)
LYMPHOCYTES # BLD: 0.66 K/UL (ref 0.5–4.6)
LYMPHOCYTES NFR BLD: 10.6 % (ref 13–44)
MAGNESIUM SERPL-MCNC: 2.3 MG/DL (ref 1.8–2.4)
MCH RBC QN AUTO: 29.9 PG (ref 26.1–32.9)
MCHC RBC AUTO-ENTMCNC: 34.2 G/DL (ref 31.4–35)
MCV RBC AUTO: 87.5 FL (ref 82–102)
MONOCYTES # BLD: 0.41 K/UL (ref 0.1–1.3)
MONOCYTES NFR BLD: 6.6 % (ref 4–12)
NEUTS SEG # BLD: 5.05 K/UL (ref 1.7–8.2)
NEUTS SEG NFR BLD: 81 % (ref 43–78)
NRBC # BLD: 0 K/UL (ref 0–0.2)
PLATELET # BLD AUTO: 167 K/UL (ref 150–450)
PMV BLD AUTO: 9.7 FL (ref 9.4–12.3)
POTASSIUM SERPL-SCNC: 3.1 MMOL/L (ref 3.5–5.1)
RBC # BLD AUTO: 4.48 M/UL (ref 4.05–5.2)
SODIUM SERPL-SCNC: 134 MMOL/L (ref 136–145)
WBC # BLD AUTO: 6.2 K/UL (ref 4.3–11.1)

## 2025-03-04 PROCEDURE — 2580000003 HC RX 258: Performed by: CASE MANAGER/CARE COORDINATOR

## 2025-03-04 PROCEDURE — 36415 COLL VENOUS BLD VENIPUNCTURE: CPT

## 2025-03-04 PROCEDURE — 6370000000 HC RX 637 (ALT 250 FOR IP): Performed by: CASE MANAGER/CARE COORDINATOR

## 2025-03-04 PROCEDURE — 83735 ASSAY OF MAGNESIUM: CPT

## 2025-03-04 PROCEDURE — 85025 COMPLETE CBC W/AUTO DIFF WBC: CPT

## 2025-03-04 PROCEDURE — 2500000003 HC RX 250 WO HCPCS: Performed by: NURSE PRACTITIONER

## 2025-03-04 PROCEDURE — 6370000000 HC RX 637 (ALT 250 FOR IP): Performed by: INTERNAL MEDICINE

## 2025-03-04 PROCEDURE — 2140000000 HC CCU INTERMEDIATE R&B

## 2025-03-04 PROCEDURE — 6360000002 HC RX W HCPCS: Performed by: CASE MANAGER/CARE COORDINATOR

## 2025-03-04 PROCEDURE — 6370000000 HC RX 637 (ALT 250 FOR IP): Performed by: NURSE PRACTITIONER

## 2025-03-04 PROCEDURE — 80048 BASIC METABOLIC PNL TOTAL CA: CPT

## 2025-03-04 PROCEDURE — 99232 SBSQ HOSP IP/OBS MODERATE 35: CPT | Performed by: INTERNAL MEDICINE

## 2025-03-04 RX ORDER — POTASSIUM CHLORIDE 1500 MG/1
40 TABLET, EXTENDED RELEASE ORAL 2 TIMES DAILY
Status: DISCONTINUED | OUTPATIENT
Start: 2025-03-04 | End: 2025-03-07 | Stop reason: HOSPADM

## 2025-03-04 RX ADMIN — FUROSEMIDE 10 MG/HR: 10 INJECTION, SOLUTION INTRAVENOUS at 15:09

## 2025-03-04 RX ADMIN — BUSPIRONE HYDROCHLORIDE 5 MG: 10 TABLET ORAL at 20:53

## 2025-03-04 RX ADMIN — EMPAGLIFLOZIN 10 MG: 10 TABLET, FILM COATED ORAL at 08:14

## 2025-03-04 RX ADMIN — POTASSIUM CHLORIDE 40 MEQ: 1500 TABLET, EXTENDED RELEASE ORAL at 20:52

## 2025-03-04 RX ADMIN — BUSPIRONE HYDROCHLORIDE 5 MG: 10 TABLET ORAL at 08:13

## 2025-03-04 RX ADMIN — POTASSIUM CHLORIDE 40 MEQ: 1500 TABLET, EXTENDED RELEASE ORAL at 09:56

## 2025-03-04 RX ADMIN — ASPIRIN 81 MG: 81 TABLET, COATED ORAL at 08:13

## 2025-03-04 RX ADMIN — SODIUM CHLORIDE, PRESERVATIVE FREE 10 ML: 5 INJECTION INTRAVENOUS at 08:09

## 2025-03-04 RX ADMIN — MAGNESIUM GLUCONATE 500 MG ORAL TABLET 200 MG: 500 TABLET ORAL at 08:14

## 2025-03-04 RX ADMIN — FUROSEMIDE 10 MG/HR: 10 INJECTION, SOLUTION INTRAVENOUS at 04:02

## 2025-03-04 RX ADMIN — Medication 6 MG: at 20:53

## 2025-03-04 ASSESSMENT — PAIN SCALES - GENERAL
PAINLEVEL_OUTOF10: 0

## 2025-03-04 NOTE — PLAN OF CARE
Problem: Chronic Conditions and Co-morbidities  Goal: Patient's chronic conditions and co-morbidity symptoms are monitored and maintained or improved  Outcome: Progressing     Problem: Discharge Planning  Goal: Discharge to home or other facility with appropriate resources  Outcome: Progressing     Problem: Pain  Goal: Verbalizes/displays adequate comfort level or baseline comfort level  Outcome: Progressing     Problem: Safety - Adult  Goal: Free from fall injury  Outcome: Progressing     Problem: Cardiovascular - Adult  Goal: Maintains optimal cardiac output and hemodynamic stability  Outcome: Progressing     Problem: Metabolic/Fluid and Electrolytes - Adult  Goal: Electrolytes maintained within normal limits  Outcome: Progressing     Problem: Hematologic - Adult  Goal: Maintains hematologic stability  Outcome: Progressing     Problem: ABCDS Injury Assessment  Goal: Absence of physical injury  Outcome: Progressing

## 2025-03-04 NOTE — PLAN OF CARE
Problem: Chronic Conditions and Co-morbidities  Goal: Patient's chronic conditions and co-morbidity symptoms are monitored and maintained or improved  Outcome: Progressing  Flowsheets  Taken 3/3/2025 1951 by Maria M Jacobson RN  Care Plan - Patient's Chronic Conditions and Co-Morbidity Symptoms are Monitored and Maintained or Improved:   Monitor and assess patient's chronic conditions and comorbid symptoms for stability, deterioration, or improvement   Collaborate with multidisciplinary team to address chronic and comorbid conditions and prevent exacerbation or deterioration   Update acute care plan with appropriate goals if chronic or comorbid symptoms are exacerbated and prevent overall improvement and discharge  Taken 3/3/2025 0914 by Femi Chiang RN  Care Plan - Patient's Chronic Conditions and Co-Morbidity Symptoms are Monitored and Maintained or Improved: Monitor and assess patient's chronic conditions and comorbid symptoms for stability, deterioration, or improvement     Problem: Discharge Planning  Goal: Discharge to home or other facility with appropriate resources  Outcome: Progressing  Flowsheets  Taken 3/3/2025 1951 by Maria M Jacobson RN  Discharge to home or other facility with appropriate resources:   Identify barriers to discharge with patient and caregiver   Arrange for needed discharge resources and transportation as appropriate   Identify discharge learning needs (meds, wound care, etc)  Taken 3/3/2025 0914 by Femi Chiang RN  Discharge to home or other facility with appropriate resources: Identify barriers to discharge with patient and caregiver     Problem: Pain  Goal: Verbalizes/displays adequate comfort level or baseline comfort level  Outcome: Progressing  Flowsheets (Taken 3/3/2025 0914 by Femi Chiang RN)  Verbalizes/displays adequate comfort level or baseline comfort level: Encourage patient to monitor pain and request assistance     Problem: Safety - Adult  Goal: Free from fall  injury  Outcome: Progressing  Flowsheets (Taken 3/3/2025 0914 by Femi Chiang RN)  Free From Fall Injury: Instruct family/caregiver on patient safety     Problem: Cardiovascular - Adult  Goal: Maintains optimal cardiac output and hemodynamic stability  Outcome: Progressing  Flowsheets (Taken 3/3/2025 0914 by Femi Chiang RN)  Maintains optimal cardiac output and hemodynamic stability: Monitor blood pressure and heart rate     Problem: Metabolic/Fluid and Electrolytes - Adult  Goal: Electrolytes maintained within normal limits  Outcome: Progressing  Flowsheets  Taken 3/3/2025 1951 by Maria M Jacobson RN  Electrolytes maintained within normal limits:   Monitor labs and assess patient for signs and symptoms of electrolyte imbalances   Administer electrolyte replacement as ordered   Monitor response to electrolyte replacements, including repeat lab results as appropriate  Taken 3/3/2025 0914 by Femi Chiang RN  Electrolytes maintained within normal limits: Monitor labs and assess patient for signs and symptoms of electrolyte imbalances     Problem: Hematologic - Adult  Goal: Maintains hematologic stability  Outcome: Progressing  Flowsheets  Taken 3/3/2025 1951 by Maria M Jacobson RN  Maintains hematologic stability:   Assess for signs and symptoms of bleeding or hemorrhage   Monitor labs for bleeding or clotting disorders   Administer blood products/factors as ordered  Taken 3/3/2025 0914 by Femi Chiang RN  Maintains hematologic stability: Assess for signs and symptoms of bleeding or hemorrhage     Problem: ABCDS Injury Assessment  Goal: Absence of physical injury  Outcome: Progressing  Flowsheets (Taken 3/3/2025 0914 by Femi Chiang RN)  Absence of Physical Injury: Implement safety measures based on patient assessment

## 2025-03-04 NOTE — PROGRESS NOTES
[facility] CARDIOLOGY PROGRESS NOTE           3/4/2025 9:20 AM    Admit Date: 2/27/2025      Subjective:   Patient remains with moderate lower extremity edema.  Hemodynamics remain borderline unable to initiate guideline directed medical therapy.  Diuresis dramatically improved with initiating dopamine and dobutamine.    ROS:  Cardiovascular:  As noted above    Objective:      Vitals:    03/04/25 0100 03/04/25 0300 03/04/25 0344 03/04/25 0728   BP: (!) 91/58 (!) 96/59 104/68 113/70   Pulse: 86 95 95 87   Resp:   18 18   Temp:   98.2 °F (36.8 °C) 98.4 °F (36.9 °C)   TempSrc:   Oral Oral   SpO2:   99% 96%   Weight:   86.4 kg (190 lb 6.4 oz)    Height:           Physical Exam:  General-No Acute Distress  Neck- supple, no JVD  CV- regular rate and rhythm no MRG  Lung-diminished bilaterally  Abd- soft, nontender, nondistended  Ext-2+  Skin- warm and dry    Data Review:   Recent Labs     03/03/25  0517 03/03/25  1948 03/04/25  0754   *  --  134*   K 3.0* 3.3* 3.1*   MG 2.4  --  2.3   BUN 15  --  15   WBC 6.8  --  6.2   HGB 13.3  --  13.4   HCT 39.8  --  39.2     --  167     Echo Findings    Left Ventricle Severely reduced left ventricular systolic function with a visually estimated EF of 10 -15%. Left ventricle is severely dilated. Wall is thinner than normal. Severe global hypokinesis present. Abnormal diastolic function.   Left Atrium Left atrium is dilated.   Right Ventricle Right ventricle is dilated. Lead present in the right ventricle. Reduced systolic function.   Right Atrium Lead present in the right atrium. Right atrium is dilated.   Aortic Valve Valve structure is normal. No regurgitation. No stenosis.   Mitral Valve Bioprosthetic valve that is well-seated with leaflet motion that was not well visualized. No regurgitation. Mild stenosis noted. MV mean gradient is 3 mmHg.   Tricuspid Valve Valve structure is normal. Trace regurgitation. No stenosis noted. The estimated RVSP is

## 2025-03-05 LAB
ANION GAP SERPL CALC-SCNC: 11 MMOL/L (ref 7–16)
BASOPHILS # BLD: 0.04 K/UL (ref 0–0.2)
BASOPHILS NFR BLD: 0.6 % (ref 0–2)
BUN SERPL-MCNC: 15 MG/DL (ref 8–23)
CALCIUM SERPL-MCNC: 9 MG/DL (ref 8.8–10.2)
CHLORIDE SERPL-SCNC: 96 MMOL/L (ref 98–107)
CO2 SERPL-SCNC: 28 MMOL/L (ref 20–29)
CREAT SERPL-MCNC: 0.85 MG/DL (ref 0.6–1.1)
DIFFERENTIAL METHOD BLD: ABNORMAL
EOSINOPHIL # BLD: 0.09 K/UL (ref 0–0.8)
EOSINOPHIL NFR BLD: 1.4 % (ref 0.5–7.8)
ERYTHROCYTE [DISTWIDTH] IN BLOOD BY AUTOMATED COUNT: 14.8 % (ref 11.9–14.6)
GLUCOSE SERPL-MCNC: 109 MG/DL (ref 70–99)
HCT VFR BLD AUTO: 38.8 % (ref 35.8–46.3)
HGB BLD-MCNC: 12.8 G/DL (ref 11.7–15.4)
IMM GRANULOCYTES # BLD AUTO: 0.01 K/UL (ref 0–0.5)
IMM GRANULOCYTES NFR BLD AUTO: 0.2 % (ref 0–5)
LYMPHOCYTES # BLD: 0.62 K/UL (ref 0.5–4.6)
LYMPHOCYTES NFR BLD: 9.5 % (ref 13–44)
MAGNESIUM SERPL-MCNC: 2.3 MG/DL (ref 1.8–2.4)
MAGNESIUM SERPL-MCNC: 2.3 MG/DL (ref 1.8–2.4)
MCH RBC QN AUTO: 30.1 PG (ref 26.1–32.9)
MCHC RBC AUTO-ENTMCNC: 33 G/DL (ref 31.4–35)
MCV RBC AUTO: 91.3 FL (ref 82–102)
MONOCYTES # BLD: 0.49 K/UL (ref 0.1–1.3)
MONOCYTES NFR BLD: 7.5 % (ref 4–12)
NEUTS SEG # BLD: 5.25 K/UL (ref 1.7–8.2)
NEUTS SEG NFR BLD: 80.8 % (ref 43–78)
NRBC # BLD: 0 K/UL (ref 0–0.2)
PLATELET # BLD AUTO: 164 K/UL (ref 150–450)
PMV BLD AUTO: 9.7 FL (ref 9.4–12.3)
POTASSIUM SERPL-SCNC: 3.5 MMOL/L (ref 3.5–5.1)
POTASSIUM SERPL-SCNC: 3.7 MMOL/L (ref 3.5–5.1)
RBC # BLD AUTO: 4.25 M/UL (ref 4.05–5.2)
SODIUM SERPL-SCNC: 134 MMOL/L (ref 136–145)
WBC # BLD AUTO: 6.5 K/UL (ref 4.3–11.1)

## 2025-03-05 PROCEDURE — 36415 COLL VENOUS BLD VENIPUNCTURE: CPT

## 2025-03-05 PROCEDURE — 6370000000 HC RX 637 (ALT 250 FOR IP): Performed by: NURSE PRACTITIONER

## 2025-03-05 PROCEDURE — 6360000002 HC RX W HCPCS: Performed by: INTERNAL MEDICINE

## 2025-03-05 PROCEDURE — 83735 ASSAY OF MAGNESIUM: CPT

## 2025-03-05 PROCEDURE — 85025 COMPLETE CBC W/AUTO DIFF WBC: CPT

## 2025-03-05 PROCEDURE — 2500000003 HC RX 250 WO HCPCS: Performed by: NURSE PRACTITIONER

## 2025-03-05 PROCEDURE — 84132 ASSAY OF SERUM POTASSIUM: CPT

## 2025-03-05 PROCEDURE — 6370000000 HC RX 637 (ALT 250 FOR IP): Performed by: CASE MANAGER/CARE COORDINATOR

## 2025-03-05 PROCEDURE — 76937 US GUIDE VASCULAR ACCESS: CPT

## 2025-03-05 PROCEDURE — 6360000002 HC RX W HCPCS: Performed by: CASE MANAGER/CARE COORDINATOR

## 2025-03-05 PROCEDURE — 2580000003 HC RX 258: Performed by: CASE MANAGER/CARE COORDINATOR

## 2025-03-05 PROCEDURE — 6370000000 HC RX 637 (ALT 250 FOR IP): Performed by: INTERNAL MEDICINE

## 2025-03-05 PROCEDURE — 2140000000 HC CCU INTERMEDIATE R&B

## 2025-03-05 PROCEDURE — 99231 SBSQ HOSP IP/OBS SF/LOW 25: CPT | Performed by: INTERNAL MEDICINE

## 2025-03-05 PROCEDURE — 80048 BASIC METABOLIC PNL TOTAL CA: CPT

## 2025-03-05 RX ORDER — LORAZEPAM 0.5 MG/1
0.5 TABLET ORAL ONCE
Status: COMPLETED | OUTPATIENT
Start: 2025-03-05 | End: 2025-03-05

## 2025-03-05 RX ADMIN — DOBUTAMINE IN DEXTROSE 2.5 MCG/KG/MIN: 200 INJECTION, SOLUTION INTRAVENOUS at 06:15

## 2025-03-05 RX ADMIN — SODIUM CHLORIDE, PRESERVATIVE FREE 10 ML: 5 INJECTION INTRAVENOUS at 08:46

## 2025-03-05 RX ADMIN — LORAZEPAM 0.5 MG: 0.5 TABLET ORAL at 20:52

## 2025-03-05 RX ADMIN — EMPAGLIFLOZIN 10 MG: 10 TABLET, FILM COATED ORAL at 08:45

## 2025-03-05 RX ADMIN — POTASSIUM CHLORIDE 40 MEQ: 1500 TABLET, EXTENDED RELEASE ORAL at 08:45

## 2025-03-05 RX ADMIN — FUROSEMIDE 10 MG/HR: 10 INJECTION, SOLUTION INTRAVENOUS at 08:51

## 2025-03-05 RX ADMIN — POTASSIUM CHLORIDE 40 MEQ: 1500 TABLET, EXTENDED RELEASE ORAL at 20:52

## 2025-03-05 RX ADMIN — SODIUM CHLORIDE, PRESERVATIVE FREE 10 ML: 5 INJECTION INTRAVENOUS at 20:53

## 2025-03-05 RX ADMIN — MAGNESIUM GLUCONATE 500 MG ORAL TABLET 200 MG: 500 TABLET ORAL at 08:45

## 2025-03-05 RX ADMIN — BUSPIRONE HYDROCHLORIDE 5 MG: 10 TABLET ORAL at 20:52

## 2025-03-05 RX ADMIN — BUSPIRONE HYDROCHLORIDE 5 MG: 10 TABLET ORAL at 08:45

## 2025-03-05 RX ADMIN — ASPIRIN 81 MG: 81 TABLET, COATED ORAL at 08:45

## 2025-03-05 ASSESSMENT — PAIN SCALES - GENERAL
PAINLEVEL_OUTOF10: 0

## 2025-03-05 NOTE — PROGRESS NOTES
[facility] CARDIOLOGY PROGRESS NOTE           3/5/2025 12:26 PM    Admit Date: 2/27/2025      Subjective:   No cp or sob      Objective:      Vitals:    03/05/25 0426 03/05/25 0431 03/05/25 0745 03/05/25 1124   BP:  90/67 106/63    Pulse:  98 100    Resp:  16 16 16   Temp:  98.1 °F (36.7 °C) 98.6 °F (37 °C) 98.2 °F (36.8 °C)   TempSrc:  Oral Oral Oral   SpO2:  95% 96% 97%   Weight: 85.9 kg (189 lb 4.8 oz)      Height:           Physical Exam:  General-No Acute Distress    Data Review:   Recent Labs     03/04/25  0754 03/05/25  0420   * 134*   K 3.1* 3.5   MG 2.3 2.3   BUN 15 15   WBC 6.2 6.5   HGB 13.4 12.8   HCT 39.2 38.8    164       Assessment/Plan:     Principal Problem:    Biventricular heart failure (HCC)  Plan:   Active Problems:    Biventricular failure (HCC)  Plan:     ICD (implantable cardioverter-defibrillator) in place  Plan:     Chronic systolic HF (heart failure) (HCC)  Plan:     Hyponatremia  Plan:     Acute weakness  Plan:     Frailty  Plan:   ///  Stable on current rx  Await tertiary care transfer         SARIKA ELMORE MD  3/5/2025 12:26 PM

## 2025-03-05 NOTE — PROGRESS NOTES
Patient not tolerated original dose Lasix, ok to reduce rate to 5mg/hr per Jona Arce NP.     Patient also c/o burning in feet. Order placed to recheck potassium/magnesium levels. Order also placed for Ativan x1 for anxiety. Orders verbal per Jona Arce NP.

## 2025-03-05 NOTE — CARE COORDINATION
CM informed [facility] is trying to run patient's insurance. Additional information requested. CM printed front and back of insurance [employer] card.   FAX sent to Shani Boateng [phone].

## 2025-03-05 NOTE — PLAN OF CARE
Problem: Chronic Conditions and Co-morbidities  Goal: Patient's chronic conditions and co-morbidity symptoms are monitored and maintained or improved  3/5/2025 0001 by Maria M Jacobson RN  Outcome: Progressing  3/4/2025 1555 by Teagan Solano RN  Outcome: Progressing     Problem: Discharge Planning  Goal: Discharge to home or other facility with appropriate resources  3/5/2025 0001 by Maria M Jacobson RN  Outcome: Progressing  3/4/2025 1555 by Teagan Solano RN  Outcome: Progressing     Problem: Pain  Goal: Verbalizes/displays adequate comfort level or baseline comfort level  3/5/2025 0001 by Maria M Jacobson RN  Outcome: Progressing  3/4/2025 1555 by Teagan Solano RN  Outcome: Progressing     Problem: Safety - Adult  Goal: Free from fall injury  3/5/2025 0001 by Maria M Jacobson RN  Outcome: Progressing  3/4/2025 1555 by Teagan Solano RN  Outcome: Progressing     Problem: Cardiovascular - Adult  Goal: Maintains optimal cardiac output and hemodynamic stability  3/5/2025 0001 by Maria M Jacobson RN  Outcome: Progressing  3/4/2025 1555 by Teagan Solano RN  Outcome: Progressing     Problem: Metabolic/Fluid and Electrolytes - Adult  Goal: Electrolytes maintained within normal limits  3/5/2025 0001 by Maria M Jacobson RN  Outcome: Progressing  3/4/2025 1555 by Teagan Solano RN  Outcome: Progressing     Problem: Hematologic - Adult  Goal: Maintains hematologic stability  3/5/2025 0001 by Maria M Jacobson RN  Outcome: Progressing  3/4/2025 1555 by Teagan Solano RN  Outcome: Progressing     Problem: ABCDS Injury Assessment  Goal: Absence of physical injury  3/5/2025 0001 by Maria M Jacobson RN  Outcome: Progressing  3/4/2025 1555 by Teagan Solano RN  Outcome: Progressing     Problem: Respiratory - Adult  Goal: Achieves optimal ventilation and oxygenation  3/5/2025 0001 by Maria M Jacobson RN  Outcome: Progressing  3/4/2025 1555 by Teagan Solano RN  Reactivated     Problem: Skin/Tissue  Integrity - Adult  Goal: Skin integrity remains intact  3/5/2025 0001 by Maria M Jacobson RN  Outcome: Progressing  3/4/2025 1555 by Teagan Solano RN  Reactivated     Problem: Musculoskeletal - Adult  Goal: Return mobility to safest level of function  3/5/2025 0001 by Maria M Jacobson RN  Outcome: Progressing  3/4/2025 1555 by Teagan Solano RN  Reactivated  Goal: Return ADL status to a safe level of function  3/5/2025 0001 by Maria M Jacobson RN  Outcome: Progressing  3/4/2025 1555 by Teagan Solano RN  Reactivated     Problem: Gastrointestinal - Adult  Goal: Maintains or returns to baseline bowel function  3/5/2025 0001 by Maria M Jacobson RN  Outcome: Progressing  3/4/2025 1555 by Teagan Solano RN  Reactivated  Goal: Maintains adequate nutritional intake  3/5/2025 0001 by Maria M Jacobson RN  Outcome: Progressing  3/4/2025 1555 by Teagan Solano RN  Reactivated

## 2025-03-06 LAB
ANION GAP SERPL CALC-SCNC: 12 MMOL/L (ref 7–16)
BUN SERPL-MCNC: 14 MG/DL (ref 8–23)
CALCIUM SERPL-MCNC: 9.5 MG/DL (ref 8.8–10.2)
CHLORIDE SERPL-SCNC: 96 MMOL/L (ref 98–107)
CO2 SERPL-SCNC: 26 MMOL/L (ref 20–29)
CREAT SERPL-MCNC: 0.8 MG/DL (ref 0.6–1.1)
GLUCOSE SERPL-MCNC: 106 MG/DL (ref 70–99)
POTASSIUM SERPL-SCNC: 3.7 MMOL/L (ref 3.5–5.1)
SODIUM SERPL-SCNC: 133 MMOL/L (ref 136–145)

## 2025-03-06 PROCEDURE — 2140000000 HC CCU INTERMEDIATE R&B

## 2025-03-06 PROCEDURE — 6360000002 HC RX W HCPCS: Performed by: CASE MANAGER/CARE COORDINATOR

## 2025-03-06 PROCEDURE — 6370000000 HC RX 637 (ALT 250 FOR IP): Performed by: INTERNAL MEDICINE

## 2025-03-06 PROCEDURE — 6360000002 HC RX W HCPCS: Performed by: INTERNAL MEDICINE

## 2025-03-06 PROCEDURE — 6370000000 HC RX 637 (ALT 250 FOR IP): Performed by: CASE MANAGER/CARE COORDINATOR

## 2025-03-06 PROCEDURE — 6370000000 HC RX 637 (ALT 250 FOR IP): Performed by: NURSE PRACTITIONER

## 2025-03-06 PROCEDURE — 80048 BASIC METABOLIC PNL TOTAL CA: CPT

## 2025-03-06 PROCEDURE — 99232 SBSQ HOSP IP/OBS MODERATE 35: CPT | Performed by: INTERNAL MEDICINE

## 2025-03-06 PROCEDURE — 36415 COLL VENOUS BLD VENIPUNCTURE: CPT

## 2025-03-06 PROCEDURE — 2500000003 HC RX 250 WO HCPCS: Performed by: NURSE PRACTITIONER

## 2025-03-06 PROCEDURE — 2580000003 HC RX 258: Performed by: CASE MANAGER/CARE COORDINATOR

## 2025-03-06 RX ORDER — DOBUTAMINE HYDROCHLORIDE 200 MG/100ML
2.5 INJECTION INTRAVENOUS CONTINUOUS
Qty: 100 ML | Refills: 0
Start: 2025-03-06

## 2025-03-06 RX ORDER — DOPAMINE HYDROCHLORIDE 320 MG/100ML
2.5 INJECTION, SOLUTION INTRAVENOUS CONTINUOUS
Qty: 100 ML | Refills: 0
Start: 2025-03-06

## 2025-03-06 RX ADMIN — Medication 6 MG: at 00:14

## 2025-03-06 RX ADMIN — ACETAMINOPHEN 650 MG: 325 TABLET ORAL at 20:48

## 2025-03-06 RX ADMIN — BUSPIRONE HYDROCHLORIDE 5 MG: 10 TABLET ORAL at 20:47

## 2025-03-06 RX ADMIN — ASPIRIN 81 MG: 81 TABLET, COATED ORAL at 08:25

## 2025-03-06 RX ADMIN — DOPAMINE HYDROCHLORIDE 2.5 MCG/KG/MIN: 320 INJECTION, SOLUTION INTRAVENOUS at 10:48

## 2025-03-06 RX ADMIN — Medication 6 MG: at 20:47

## 2025-03-06 RX ADMIN — POTASSIUM CHLORIDE 40 MEQ: 1500 TABLET, EXTENDED RELEASE ORAL at 20:46

## 2025-03-06 RX ADMIN — SODIUM CHLORIDE, PRESERVATIVE FREE 10 ML: 5 INJECTION INTRAVENOUS at 20:50

## 2025-03-06 RX ADMIN — POTASSIUM CHLORIDE 40 MEQ: 1500 TABLET, EXTENDED RELEASE ORAL at 08:25

## 2025-03-06 RX ADMIN — EMPAGLIFLOZIN 10 MG: 10 TABLET, FILM COATED ORAL at 08:25

## 2025-03-06 RX ADMIN — MAGNESIUM GLUCONATE 500 MG ORAL TABLET 200 MG: 500 TABLET ORAL at 08:25

## 2025-03-06 RX ADMIN — FUROSEMIDE 5 MG/HR: 10 INJECTION, SOLUTION INTRAVENOUS at 08:32

## 2025-03-06 RX ADMIN — BUSPIRONE HYDROCHLORIDE 5 MG: 10 TABLET ORAL at 08:25

## 2025-03-06 ASSESSMENT — PAIN SCALES - GENERAL: PAINLEVEL_OUTOF10: 0

## 2025-03-06 NOTE — PROGRESS NOTES
IP Consult to Vascular Access Team  Consult performed by: Efrain Arteaga RN  Consult ordered by: Jassi Mahan MD       US Guided PIV access-    Ultrasound was used to find the vein which was compressible and without any ultrasound features of an artery or nerve bundle. Skin was cleaned and disinfected prior to IV puncture.  Under real-time ultrasound guidance peripheral access was obtained in the right forearm using 20 G 1.75\" Peripheral IV catheter after 1 attempt(s). Blood return was present and IV flushed without difficulty with no clinical signs of infiltration. IV dressing applied and no immediate complications noted. Patient tolerated the procedure well.

## 2025-03-06 NOTE — CARE COORDINATION
LILY contacted Shani Boateng NP [phone] regarding transfer of patient for advanced heart failure. Shani reports patient's [employer] insurance has approved heart failure work up.  LILY provided [facility] information to Shani to give to their transfer team. Shani provided [facility] transfer team number [phone] (Emily).  Shani reports no bed available today.    LILY informed Jona Arce of insurance approval.    1525 CM met with patient on room air. Dobut/Dopa/Furosemide. Patient reports she's having a hard day.   Patient states her DR in [facility], Dr Mena De Souza.

## 2025-03-06 NOTE — PROGRESS NOTES
[facility] CARDIOLOGY PROGRESS NOTE           3/6/2025 12:22 PM    Admit Date: 2/27/2025      Subjective:   Patient with improved lower extremity edema.  Hemodynamics remain borderline and unable to initiate guideline directed medical therapy.  Diuresis dramatically improved with initiating dopamine and dobutamine.    ROS:  Cardiovascular:  As noted above    Objective:      Vitals:    03/06/25 0801 03/06/25 1048 03/06/25 1200 03/06/25 1201   BP: 117/82 111/73 107/66    Pulse:  (!) 119 (!) 114 (!) 111   Resp: 14      Temp: 98.9 °F (37.2 °C)  98.2 °F (36.8 °C)    TempSrc:       SpO2: 94%  97% 94%   Weight:       Height:           Physical Exam:  General-No Acute Distress  Neck- supple, no JVD  CV- regular rate and rhythm no MRG  Lung-diminished bilaterally  Abd- soft, nontender, nondistended  Ext-2+  Skin- warm and dry    Data Review:   Recent Labs     03/04/25  0754 03/05/25  0420 03/05/25  1718 03/06/25  0442   * 134*  --  133*   K 3.1* 3.5 3.7 3.7   MG 2.3 2.3 2.3  --    BUN 15 15  --  14   WBC 6.2 6.5  --   --    HGB 13.4 12.8  --   --    HCT 39.2 38.8  --   --     164  --   --      Echo Findings    Left Ventricle Severely reduced left ventricular systolic function with a visually estimated EF of 10 -15%. Left ventricle is severely dilated. Wall is thinner than normal. Severe global hypokinesis present. Abnormal diastolic function.   Left Atrium Left atrium is dilated.   Right Ventricle Right ventricle is dilated. Lead present in the right ventricle. Reduced systolic function.   Right Atrium Lead present in the right atrium. Right atrium is dilated.   Aortic Valve Valve structure is normal. No regurgitation. No stenosis.   Mitral Valve Bioprosthetic valve that is well-seated with leaflet motion that was not well visualized. No regurgitation. Mild stenosis noted. MV mean gradient is 3 mmHg.   Tricuspid Valve Valve structure is normal. Trace regurgitation. No stenosis noted. The

## 2025-03-06 NOTE — DISCHARGE SUMMARY
[facility] Cardiology Discharge Summary     Patient ID:  Kely Quach  [id number]  64 y.o.  1960    Admit date: 2/27/2025    Discharge date and time:  03/07/25    Admitting Physician: Jassi Mahan MD     Discharge Physician: Jona Arce, AKASH - CNP/Dr. Sahu    Admission Diagnoses: Anasarca [R60.1]  Biventricular heart failure (HCC) [I50.82]  Acute weakness [R53.1]  Congestive heart failure, unspecified HF chronicity, unspecified heart failure type (HCC) [I50.9]  Biventricular failure (HCC) [I50.82]    Discharge Diagnoses:     Principal Problem:    Biventricular heart failure (HCC)  Active Problems:    Biventricular failure (HCC)    ICD (implantable cardioverter-defibrillator) in place    Chronic systolic HF (heart failure) (HCC)    Hyponatremia    Acute weakness    Frailty  Resolved Problems:    * No resolved hospital problems. *      Cardiology Procedures this admission: NA    Consults:     Hospital Course:     The patient came into the hospital with complaints of HFrEF with NYHA class IV symptoms and not able to tolerate guideline directed medial therapy due to hypotension.  She has had an ICD placed in the past and a CCM device placed in June of 2024.  Further cardiac hx listed below.      Cardiac history  6/5/2017 transesophageal echocardiogram ejection fraction 35 to 40% severe mitral regurgitation  6/6/2017 cardiac catheterization minimal CAD circumflex 30% right coronary 30%  7/2017 Mitral valve repair with a 28 mm future ring Dr. Raimundo Veloz  4/7/2022 echocardiogram ejection fraction 30 to 35% moderate severe transvalvular regurgitation of mitral valve annular ring  Biotronik dual-chamber ICD: Inventra 7 VR-T DX  5/18/2023 transesophageal echocardiogram ejection fraction 25 to 30% with mitral valve repair #28 future ring with severe mitral regurgitation restricted posterior leaflet.  Eccentric jet  1/5/2024 mitral valve replacement, tricuspid valve repair, atrial septal defect

## 2025-03-06 NOTE — PROGRESS NOTES
Pt awake for majority of the night and states she feels anxious this morning. Wants to take BP cuff off to take a break from hourly BP cycling. Pt educated and is agreeable to re-evaluate hourly BP cycling later this morning.

## 2025-03-06 NOTE — PLAN OF CARE
Problem: Chronic Conditions and Co-morbidities  Goal: Patient's chronic conditions and co-morbidity symptoms are monitored and maintained or improved  Outcome: Progressing     Problem: Discharge Planning  Goal: Discharge to home or other facility with appropriate resources  Outcome: Progressing     Problem: Pain  Goal: Verbalizes/displays adequate comfort level or baseline comfort level  Outcome: Progressing     Problem: Safety - Adult  Goal: Free from fall injury  Outcome: Progressing     Problem: Cardiovascular - Adult  Goal: Maintains optimal cardiac output and hemodynamic stability  Outcome: Progressing     Problem: Metabolic/Fluid and Electrolytes - Adult  Goal: Electrolytes maintained within normal limits  Outcome: Progressing     Problem: Hematologic - Adult  Goal: Maintains hematologic stability  Outcome: Progressing     Problem: ABCDS Injury Assessment  Goal: Absence of physical injury  Outcome: Progressing     Problem: Respiratory - Adult  Goal: Achieves optimal ventilation and oxygenation  Outcome: Progressing     Problem: Skin/Tissue Integrity - Adult  Goal: Skin integrity remains intact  Outcome: Progressing     Problem: Musculoskeletal - Adult  Goal: Return mobility to safest level of function  Outcome: Progressing  Goal: Return ADL status to a safe level of function  Outcome: Progressing     Problem: Gastrointestinal - Adult  Goal: Maintains or returns to baseline bowel function  Outcome: Progressing  Goal: Maintains adequate nutritional intake  Outcome: Progressing

## 2025-03-07 VITALS
TEMPERATURE: 98.2 F | SYSTOLIC BLOOD PRESSURE: 98 MMHG | DIASTOLIC BLOOD PRESSURE: 74 MMHG | RESPIRATION RATE: 16 BRPM | OXYGEN SATURATION: 93 % | HEIGHT: 62 IN | BODY MASS INDEX: 35.17 KG/M2 | WEIGHT: 191.1 LBS | HEART RATE: 113 BPM

## 2025-03-07 LAB — MAGNESIUM SERPL-MCNC: 2.1 MG/DL (ref 1.8–2.4)

## 2025-03-07 PROCEDURE — 6370000000 HC RX 637 (ALT 250 FOR IP): Performed by: NURSE PRACTITIONER

## 2025-03-07 PROCEDURE — 99232 SBSQ HOSP IP/OBS MODERATE 35: CPT | Performed by: INTERNAL MEDICINE

## 2025-03-07 PROCEDURE — 6370000000 HC RX 637 (ALT 250 FOR IP): Performed by: INTERNAL MEDICINE

## 2025-03-07 PROCEDURE — 2580000003 HC RX 258

## 2025-03-07 PROCEDURE — 2500000003 HC RX 250 WO HCPCS: Performed by: NURSE PRACTITIONER

## 2025-03-07 PROCEDURE — 6360000002 HC RX W HCPCS: Performed by: INTERNAL MEDICINE

## 2025-03-07 PROCEDURE — 83735 ASSAY OF MAGNESIUM: CPT

## 2025-03-07 PROCEDURE — 6370000000 HC RX 637 (ALT 250 FOR IP): Performed by: CASE MANAGER/CARE COORDINATOR

## 2025-03-07 PROCEDURE — 36415 COLL VENOUS BLD VENIPUNCTURE: CPT

## 2025-03-07 PROCEDURE — 6360000002 HC RX W HCPCS

## 2025-03-07 RX ORDER — ENOXAPARIN SODIUM 100 MG/ML
40 INJECTION SUBCUTANEOUS DAILY
Status: DISCONTINUED | OUTPATIENT
Start: 2025-03-07 | End: 2025-03-07 | Stop reason: HOSPADM

## 2025-03-07 RX ADMIN — ASPIRIN 81 MG: 81 TABLET, COATED ORAL at 08:38

## 2025-03-07 RX ADMIN — EMPAGLIFLOZIN 10 MG: 10 TABLET, FILM COATED ORAL at 08:39

## 2025-03-07 RX ADMIN — DOPAMINE HYDROCHLORIDE 2.5 MCG/KG/MIN: 320 INJECTION, SOLUTION INTRAVENOUS at 14:44

## 2025-03-07 RX ADMIN — DOBUTAMINE IN DEXTROSE 2.5 MCG/KG/MIN: 200 INJECTION, SOLUTION INTRAVENOUS at 14:42

## 2025-03-07 RX ADMIN — SODIUM CHLORIDE, PRESERVATIVE FREE 10 ML: 5 INJECTION INTRAVENOUS at 08:37

## 2025-03-07 RX ADMIN — FUROSEMIDE 5 MG/HR: 10 INJECTION, SOLUTION INTRAVENOUS at 08:47

## 2025-03-07 RX ADMIN — DOBUTAMINE IN DEXTROSE 2.5 MCG/KG/MIN: 200 INJECTION, SOLUTION INTRAVENOUS at 02:57

## 2025-03-07 RX ADMIN — POTASSIUM CHLORIDE 40 MEQ: 1500 TABLET, EXTENDED RELEASE ORAL at 08:39

## 2025-03-07 RX ADMIN — BUSPIRONE HYDROCHLORIDE 5 MG: 10 TABLET ORAL at 08:38

## 2025-03-07 RX ADMIN — MAGNESIUM GLUCONATE 500 MG ORAL TABLET 200 MG: 500 TABLET ORAL at 08:38

## 2025-03-07 RX ADMIN — FUROSEMIDE 5 MG/HR: 10 INJECTION, SOLUTION INTRAVENOUS at 14:46

## 2025-03-07 RX ADMIN — ENOXAPARIN SODIUM 40 MG: 100 INJECTION SUBCUTANEOUS at 08:39

## 2025-03-07 ASSESSMENT — PAIN SCALES - GENERAL
PAINLEVEL_OUTOF10: 0

## 2025-03-07 NOTE — PROGRESS NOTES
TRANSFER - OUT REPORT:    Verbal report given to Nas Goetz, RN charge, RN on Kely Quach  being transferred to [facility] for ordered procedure       Report consisted of patient’s Situation, Background, Assessment and Recommendations(SBAR).     Information from the following report(s) SBAR, Kardex, ED Summary, Intake/Output, MAR, Accordion, Recent Results, and Cardiac Rhythm NSR/ST  was reviewed with the receiving nurse.    Opportunity for questions and clarification was provided.      Patient will be transferring on room air and three IV medications: Dobutamine, Dopamine, Lasix.

## 2025-03-07 NOTE — PROGRESS NOTES
[facility] CARDIOLOGY PROGRESS NOTE           3/7/2025 7:38 AM    Admit Date: 2/27/2025      Subjective:   No cp or sob      Objective:      Vitals:    03/07/25 0500 03/07/25 0530 03/07/25 0600 03/07/25 0620   BP:    99/70   Pulse: (!) 106 (!) 112 (!) 109 (!) 106   Resp:       Temp:       TempSrc:       SpO2:       Weight:       Height:           Physical Exam:  General-No Acute Distress, + gallop    Data Review:   Recent Labs     03/04/25  0754 03/05/25  0420 03/05/25  1718 03/06/25  0442   * 134*  --  133*   K 3.1* 3.5 3.7 3.7   MG 2.3 2.3 2.3  --    BUN 15 15  --  14   WBC 6.2 6.5  --   --    HGB 13.4 12.8  --   --    HCT 39.2 38.8  --   --     164  --   --        Assessment/Plan:     Principal Problem:    Biventricular heart failure (HCC)  Plan:   Active Problems:    Biventricular failure (HCC)  Plan:     ICD (implantable cardioverter-defibrillator) in place  Plan:     Chronic systolic HF (heart failure) (HCC)  Plan:     Hyponatremia  Plan:     Acute weakness  Plan:     Frailty  Plan:   ///  Await tertiary care transfer  She is inotrope dependent  She needs a bloodless cardiac transplant due to her [employer] convictions.  The surgeon and team at the [facility] are the closest to achieve this goal.  Add Lovenox 40 for VTE prophylaxis  Check Mg++ w/ neck lab draw         SARIKA ELMORE MD  3/7/2025 7:38 AM

## 2025-03-07 NOTE — CARE COORDINATION
LILY informed by charge nurse of air transport cost , exceeding 20K, to [facility] for adv heart failure management. Charge appealed to [facility] Admin for assistance. Patient's condition will not allow her to come off the inotropes and diuretics for ground travel.   LILY informed Admin has spoken with Shani Melvin at [facility] who stated they could ask for assistance from the institution as they are the only bloodless procedure facility in the region.   LILY informed Dr Murray has approved the assistance to cover the transport cost. Charge nurse to obtain signatures on on documents.

## 2025-03-07 NOTE — PLAN OF CARE
Pt made aware that they have a bed ready a [facility] for their tentative procedure. Pt made aware of general cost of transport. Pt not agreeable with cost of transport and would like to discuss with CM alternative options available.

## 2025-03-07 NOTE — PLAN OF CARE
Problem: Chronic Conditions and Co-morbidities  Goal: Patient's chronic conditions and co-morbidity symptoms are monitored and maintained or improved  Outcome: Progressing  Flowsheets (Taken 3/6/2025 2045)  Care Plan - Patient's Chronic Conditions and Co-Morbidity Symptoms are Monitored and Maintained or Improved:   Monitor and assess patient's chronic conditions and comorbid symptoms for stability, deterioration, or improvement   Collaborate with multidisciplinary team to address chronic and comorbid conditions and prevent exacerbation or deterioration   Update acute care plan with appropriate goals if chronic or comorbid symptoms are exacerbated and prevent overall improvement and discharge     Problem: Discharge Planning  Goal: Discharge to home or other facility with appropriate resources  Outcome: Progressing  Flowsheets (Taken 3/6/2025 2045)  Discharge to home or other facility with appropriate resources:   Identify barriers to discharge with patient and caregiver   Arrange for needed discharge resources and transportation as appropriate   Identify discharge learning needs (meds, wound care, etc)   Refer to discharge planning if patient needs post-hospital services based on physician order or complex needs related to functional status, cognitive ability or social support system     Problem: Pain  Goal: Verbalizes/displays adequate comfort level or baseline comfort level  Outcome: Progressing     Problem: Safety - Adult  Goal: Free from fall injury  Outcome: Progressing  Flowsheets (Taken 3/6/2025 2045)  Free From Fall Injury:   Instruct family/caregiver on patient safety   Based on caregiver fall risk screen, instruct family/caregiver to ask for assistance with transferring infant if caregiver noted to have fall risk factors     Problem: Cardiovascular - Adult  Goal: Maintains optimal cardiac output and hemodynamic stability  Outcome: Progressing  Flowsheets (Taken 3/6/2025 2045)  Maintains optimal cardiac  3/6/2025 2045)  Skin Integrity Remains Intact:   Assess vascular access sites hourly   Monitor for areas of redness and/or skin breakdown   Every 4-6 hours minimum: Change oxygen saturation probe site   Every 4-6 hours: If on nasal continuous positive airway pressure, respiratory therapy assesses nares and determine need for appliance change or resting period     Problem: Musculoskeletal - Adult  Goal: Return mobility to safest level of function  Outcome: Progressing  Flowsheets (Taken 3/6/2025 2045)  Return Mobility to Safest Level of Function:   Assess patient stability and activity tolerance for standing, transferring and ambulating with or without assistive devices   Assist with transfers and ambulation using safe patient handling equipment as needed   Ensure adequate protection for wounds/incisions during mobilization   Obtain physical therapy/occupational therapy consults as needed   Apply continuous passive motion per provider or physical therapy orders to increase flexion toward goal   Instruct patient/family in ordered activity level  Goal: Return ADL status to a safe level of function  Outcome: Progressing  Flowsheets (Taken 3/6/2025 2045)  Return ADL Status to a Safe Level of Function:   Administer medication as ordered   Assess activities of daily living deficits and provide assistive devices as needed   Obtain physical therapy/occupational therapy consults as needed   Assist and instruct patient to increase activity and self care as tolerated     Problem: Gastrointestinal - Adult  Goal: Maintains or returns to baseline bowel function  Outcome: Progressing  Flowsheets (Taken 3/6/2025 2045)  Maintains or returns to baseline bowel function:   Assess bowel function   Encourage oral fluids to ensure adequate hydration   Administer IV fluids as ordered to ensure adequate hydration   Administer ordered medications as needed   Encourage mobilization and activity   Nutrition consult to assist patient with

## (undated) DEVICE — PLASMABLADE X PS210-030S-LIGHT 3.0SL: Brand: PLASMABLADE™ X

## (undated) DEVICE — DRESSING POSTOP AG PRISMASEAL 3.5X6IN

## (undated) DEVICE — GUIDEWIRE VASC L260CM DIA0.035IN RAD 3MM J TIP L7CM PTFE

## (undated) DEVICE — MICROPUNCTURE INTRODUCER SET SILHOUETTE TRANSITIONLESS WITH NITINOL WIRE GUIDE: Brand: MICROPUNCTURE

## (undated) DEVICE — SYSTEM SURG HEMSTAT PWD 1 GM POLYSACCHARIDE HEMOSPHERES

## (undated) DEVICE — SUTURE ABSORBABLE MONOFILAMENT 3-0 PS 24 CM 26 MM VIO PDS +

## (undated) DEVICE — CATHETER GUID EXTRA BACKUP 3.5 0.070IN 6FR 100CM VISTA BRITE TIP

## (undated) DEVICE — 18G NG KIT WITH 96IN PROBE COVER (10 PK): Brand: SITE-RITE

## (undated) DEVICE — CATHETER DIAG 5FR L100CM LUMN ID0.047IN JL3.5 CRV 0 SIDE H

## (undated) DEVICE — COPILOT BLEEDBACK CONTROL VALVE: Brand: COPILOT

## (undated) DEVICE — SUTURE ABSORBABLE BRAIDED 2-0 CT-1 27 IN UD VICRYL J259H

## (undated) DEVICE — GLIDESHEATH SLENDER STAINLESS STEEL KIT: Brand: GLIDESHEATH SLENDER

## (undated) DEVICE — SUTURE ABSORBABLE MONOFILAMENT 4-0 CV15 6 IN PUR V-LOC 90 VLOCM1203

## (undated) DEVICE — ADHESIVE SKIN CLSR 0.7ML TOP DERMBND ADV

## (undated) DEVICE — SUTURE V-LOC 90 3-0 L9IN ABSRB VLT L26MM V-20 1/2 CIR TAPR VLOCM0644

## (undated) DEVICE — BAND COMPR L24CM REG CLR PLAS HEMSTAT EXT HK AND LOOP RETEN

## (undated) DEVICE — CATHETER COR DIAG JUDKINS R 5.0 CRV 5FR 100CM 0 SIDE H

## (undated) DEVICE — INTRODUCER SHTH J TIP 0.038 IN 8 FRX13 CM 18 GA SIDEPRT BLU

## (undated) DEVICE — LIQUIBAND RAPID ADHESIVE 36/CS 0.8ML: Brand: MEDLINE

## (undated) DEVICE — SUTURE ETHIBOND EXCEL SZ 0 L18IN NONABSORBABLE GRN L26MM MO-6 CX45D

## (undated) DEVICE — FLUID CONTROL SHOULDER PACK: Brand: CONVERTORS

## (undated) DEVICE — PRESSURE GUIDEWIRE: Brand: COMET™ II

## (undated) DEVICE — STAPLER SKIN CLSR S STL NONABSORBABLE WIDE FIX HD HND GRP